# Patient Record
Sex: FEMALE | Race: WHITE | NOT HISPANIC OR LATINO | Employment: FULL TIME | URBAN - METROPOLITAN AREA
[De-identification: names, ages, dates, MRNs, and addresses within clinical notes are randomized per-mention and may not be internally consistent; named-entity substitution may affect disease eponyms.]

---

## 2017-01-17 ENCOUNTER — PATIENT MESSAGE (OUTPATIENT)
Dept: INTERNAL MEDICINE | Facility: CLINIC | Age: 52
End: 2017-01-17

## 2017-01-17 DIAGNOSIS — Z12.31 ENCOUNTER FOR SCREENING MAMMOGRAM FOR BREAST CANCER: Primary | ICD-10-CM

## 2017-01-18 ENCOUNTER — PROCEDURE VISIT (OUTPATIENT)
Dept: DERMATOLOGY | Facility: CLINIC | Age: 52
End: 2017-01-18
Payer: COMMERCIAL

## 2017-01-18 ENCOUNTER — OFFICE VISIT (OUTPATIENT)
Dept: DERMATOLOGY | Facility: CLINIC | Age: 52
End: 2017-01-18
Payer: COMMERCIAL

## 2017-01-18 DIAGNOSIS — D23.10 HIDROCYSTOMA OF EYELID, RIGHT: Primary | ICD-10-CM

## 2017-01-18 DIAGNOSIS — Z41.1 OTHER PLASTIC SURGERY FOR UNACCEPTABLE COSMETIC APPEARANCE: Primary | ICD-10-CM

## 2017-01-18 PROCEDURE — 99213 OFFICE O/P EST LOW 20 MIN: CPT | Mod: S$GLB,,, | Performed by: DERMATOLOGY

## 2017-01-18 PROCEDURE — 11950 SUBQ NJX FILLING MATRL 1CC/<: CPT | Mod: CSM,S$GLB,, | Performed by: DERMATOLOGY

## 2017-01-18 PROCEDURE — 99999 PR PBB SHADOW E&M-EST. PATIENT-LVL II: CPT | Mod: PBBFAC,,, | Performed by: DERMATOLOGY

## 2017-01-18 PROCEDURE — 99499 UNLISTED E&M SERVICE: CPT | Mod: S$GLB,,, | Performed by: DERMATOLOGY

## 2017-01-18 NOTE — PROGRESS NOTES
Subjective:       Patient ID:  Nora Mccoy is a 51 y.o. female who presents for   Chief Complaint   Patient presents with    Cyst     r bottom eyelid     HPI Comments: Pt presents for cyst right lower eyelid that she would like treated. Feels she has had similar lesions in the past .  Itches intermittently.  prseent for   X  Months.  Getting larger.       Cyst         Review of Systems   Skin: Negative for tendency to form keloidal scars.   Hematologic/Lymphatic: Does not bruise/bleed easily.        Objective:    Physical Exam   Constitutional: She appears well-developed and well-nourished. No distress.   Neurological: She is alert and oriented to person, place, and time. She is not disoriented.   Psychiatric: She has a normal mood and affect.   Skin:   Areas Examined (abnormalities noted in diagram):   Head / Face Inspection Performed              Diagram Legend     Erythematous scaling macule/papule c/w actinic keratosis       Vascular papule c/w angioma      Pigmented verrucoid papule/plaque c/w seborrheic keratosis      Yellow umbilicated papule c/w sebaceous hyperplasia      Irregularly shaped tan macule c/w lentigo     1-2 mm smooth white papules consistent with Milia      Movable subcutaneous cyst with punctum c/w epidermal inclusion cyst      Subcutaneous movable cyst c/w pilar cyst      Firm pink to brown papule c/w dermatofibroma      Pedunculated fleshy papule(s) c/w skin tag(s)      Evenly pigmented macule c/w junctional nevus     Mildly variegated pigmented, slightly irregular-bordered macule c/w mildly atypical nevus      Flesh colored to evenly pigmented papule c/w intradermal nevus       Pink pearly papule/plaque c/w basal cell carcinoma      Erythematous hyperkeratotic cursted plaque c/w SCC      Surgical scar with no sign of skin cancer recurrence      Open and closed comedones      Inflammatory papules and pustules      Verrucoid papule consistent consistent with wart      Erythematous eczematous patches and plaques     Dystrophic onycholytic nail with subungual debris c/w onychomycosis     Umbilicated papule    Erythematous-base heme-crusted tan verrucoid plaque consistent with inflamed seborrheic keratosis     Erythematous Silvery Scaling Plaque c/w Psoriasis     See annotation      Assessment / Plan:        Hidrocystoma of eyelid, right  After prep with alcohol, area incised with 11 blade and drained  Discussed risk of bruising and recurrence         Return if symptoms worsen or fail to improve.

## 2017-01-18 NOTE — PROGRESS NOTES
Pt here for repeat botox treatment. Has had written consent signed in the past.    Pt denies any new medical problems or medications. Pt denies current pregnancy.   Risks reviewed including headache, pain, infection, bleeding, bruising, eyebrow droop, eyelid droop, asymmetry, inability to close eye temporarily. Pt understands and would like to have treatment.  16 units placed in glabella, 8 units placed in forehead, 12 units placed in crows feet.   No complications.    Post procedure handout provided.    Botox lot number [id number]  Expiration date 8/2019

## 2017-01-23 ENCOUNTER — PATIENT MESSAGE (OUTPATIENT)
Dept: INTERNAL MEDICINE | Facility: CLINIC | Age: 52
End: 2017-01-23

## 2017-01-23 DIAGNOSIS — N93.8 DUB (DYSFUNCTIONAL UTERINE BLEEDING): Primary | ICD-10-CM

## 2017-02-08 ENCOUNTER — PATIENT MESSAGE (OUTPATIENT)
Dept: DERMATOLOGY | Facility: CLINIC | Age: 52
End: 2017-02-08

## 2017-02-20 ENCOUNTER — PATIENT MESSAGE (OUTPATIENT)
Dept: DERMATOLOGY | Facility: CLINIC | Age: 52
End: 2017-02-20

## 2017-02-20 DIAGNOSIS — H02.539 LID LAG: Primary | ICD-10-CM

## 2017-02-21 ENCOUNTER — HOSPITAL ENCOUNTER (OUTPATIENT)
Dept: RADIOLOGY | Facility: HOSPITAL | Age: 52
Discharge: HOME | End: 2017-02-21
Attending: INTERNAL MEDICINE
Payer: COMMERCIAL

## 2017-02-21 DIAGNOSIS — Z12.31 ENCOUNTER FOR SCREENING MAMMOGRAM FOR BREAST CANCER: ICD-10-CM

## 2017-02-21 PROCEDURE — 77063 BREAST TOMOSYNTHESIS BI: CPT | Mod: 26,,, | Performed by: RADIOLOGY

## 2017-02-21 PROCEDURE — 77067 SCR MAMMO BI INCL CAD: CPT | Mod: 26,,, | Performed by: RADIOLOGY

## 2017-02-21 PROCEDURE — 77067 SCR MAMMO BI INCL CAD: CPT | Mod: TC

## 2017-02-22 ENCOUNTER — TELEPHONE (OUTPATIENT)
Dept: INTERNAL MEDICINE | Facility: CLINIC | Age: 52
End: 2017-02-22

## 2017-02-27 ENCOUNTER — PATIENT MESSAGE (OUTPATIENT)
Dept: INTERNAL MEDICINE | Facility: CLINIC | Age: 52
End: 2017-02-27

## 2017-03-01 ENCOUNTER — TELEPHONE (OUTPATIENT)
Dept: INTERNAL MEDICINE | Facility: CLINIC | Age: 52
End: 2017-03-01

## 2017-03-01 DIAGNOSIS — R30.0 DYSURIA: Primary | ICD-10-CM

## 2017-03-01 RX ORDER — CIPROFLOXACIN 500 MG/1
500 TABLET ORAL 2 TIMES DAILY
Qty: 20 TABLET | Refills: 0 | Status: SHIPPED | OUTPATIENT
Start: 2017-03-01 | End: 2017-03-11

## 2017-03-01 RX ORDER — LORAZEPAM 0.5 MG/1
TABLET ORAL
Qty: 60 TABLET | Refills: 2 | Status: SHIPPED | OUTPATIENT
Start: 2017-03-01 | End: 2017-08-24 | Stop reason: SDUPTHER

## 2017-03-01 NOTE — TELEPHONE ENCOUNTER
----- Message from Cady Campbell sent at 3/1/2017  1:55 PM CST -----  Patient is asking for medication for possible UTI.  Can patient drop off a urine sample?  Please advise.

## 2017-03-02 ENCOUNTER — PATIENT MESSAGE (OUTPATIENT)
Dept: INTERNAL MEDICINE | Facility: CLINIC | Age: 52
End: 2017-03-02

## 2017-03-02 ENCOUNTER — LAB VISIT (OUTPATIENT)
Dept: LAB | Facility: HOSPITAL | Age: 52
End: 2017-03-02
Attending: INTERNAL MEDICINE
Payer: COMMERCIAL

## 2017-03-02 DIAGNOSIS — R30.0 DYSURIA: ICD-10-CM

## 2017-03-02 PROCEDURE — 87077 CULTURE AEROBIC IDENTIFY: CPT

## 2017-03-02 PROCEDURE — 87088 URINE BACTERIA CULTURE: CPT

## 2017-03-02 PROCEDURE — 87186 SC STD MICRODIL/AGAR DIL: CPT

## 2017-03-02 PROCEDURE — 87086 URINE CULTURE/COLONY COUNT: CPT

## 2017-03-05 LAB — BACTERIA UR CULT: NORMAL

## 2017-03-06 ENCOUNTER — TELEPHONE (OUTPATIENT)
Dept: INTERNAL MEDICINE | Facility: CLINIC | Age: 52
End: 2017-03-06

## 2017-03-06 NOTE — TELEPHONE ENCOUNTER
----- Message from Zamzam Bazan MD sent at 3/5/2017  5:50 PM CST -----  Nora,  Good news, your UTI is sensitive to Cipro  Let me know if your sx persist or exacerbate  diego

## 2017-03-07 ENCOUNTER — PATIENT MESSAGE (OUTPATIENT)
Dept: DERMATOLOGY | Facility: CLINIC | Age: 52
End: 2017-03-07

## 2017-03-15 ENCOUNTER — OFFICE VISIT (OUTPATIENT)
Dept: PLASTIC SURGERY | Facility: CLINIC | Age: 52
End: 2017-03-15
Payer: COMMERCIAL

## 2017-03-15 VITALS
SYSTOLIC BLOOD PRESSURE: 124 MMHG | DIASTOLIC BLOOD PRESSURE: 85 MMHG | HEART RATE: 76 BPM | HEIGHT: 67 IN | WEIGHT: 164.44 LBS | BODY MASS INDEX: 25.81 KG/M2 | TEMPERATURE: 98 F

## 2017-03-15 DIAGNOSIS — L72.0 EPIDERMAL CYST OF FACE: Primary | ICD-10-CM

## 2017-03-15 PROCEDURE — 99203 OFFICE O/P NEW LOW 30 MIN: CPT | Mod: S$GLB,,, | Performed by: PHYSICIAN ASSISTANT

## 2017-03-15 PROCEDURE — 99999 PR PBB SHADOW E&M-EST. PATIENT-LVL III: CPT | Mod: PBBFAC,,, | Performed by: PHYSICIAN ASSISTANT

## 2017-03-15 NOTE — LETTER
March 15, 2017      Kristie Valdez MD  [address]  [facility] [address]           [facility]  [address]  [facility] [address]  Phone: [phone]  Fax: [phone]          Patient: Nora Mccoy   MR Number: [id number]   YOB: 1965   Date of Visit: 3/15/2017       Dear Dr. Kristie Valdez:    Thank you for referring Nora Mccoy to me for evaluation. Attached you will find relevant portions of my assessment and plan of care.    If you have questions, please do not hesitate to call me. I look forward to following Nora Mccoy along with you.    Sincerely,    RANJAN Brand    Enclosure  CC:  No Recipients    If you would like to receive this communication electronically, please contact [email or web address] or [phone] to request more information on [facility] Link access.    For providers and/or their staff who would like to refer a patient to [facility], please contact us through our one-stop-shop provider referral line, [facility] , at [phone].    If you feel you have received this communication in error or would no longer like to receive these types of communications, please e-mail [email or web address]

## 2017-03-15 NOTE — PROGRESS NOTES
Nora Mccoy presents to [facility] on 3/15/2017 for consult regarding small cyst of R lower eyelid. She reports having this unroofed by Dermatology in 01/2017 with immediate recurrence. She reports having this for approximately 8-10 months prior to this. She was referred here for excision.     Vitals:    03/15/17 1426   BP: 124/85   Pulse: 76   Temp: 97.6 °F (36.4 °C)     Current Outpatient Prescriptions on File Prior to Visit   Medication Sig Dispense Refill    acyclovir (ZOVIRAX) 800 MG Tab Take 1 tablet (800 mg total) by mouth 2 (two) times daily. 60 tablet 0    cyclobenzaprine (FLEXERIL) 10 MG tablet Take 1 tablet (10 mg total) by mouth 3 (three) times daily as needed for Muscle spasms. 60 tablet 2    fluoxetine (PROZAC) 40 MG capsule Take 1 capsule (40 mg total) by mouth once daily. 90 capsule 3    ibuprofen (ADVIL,MOTRIN) 200 MG tablet Take 200 mg by mouth every 6 (six) hours as needed for Pain.      lorazepam (ATIVAN) 0.5 MG tablet TAKE 1-2 TABLETS BY MOUTH EVERY 6-8 HOURS 60 tablet 2    meloxicam (MOBIC) 15 MG tablet       epinephrine (EPIPEN 2-JAMES) 0.3 mg/0.3 mL (1:1,000) AtIn Inject 0.3 mLs (0.3 mg total) into the muscle once. 2 each 2    multivitamin (THERAGRAN) tablet Take 1 tablet by mouth once daily.         No current facility-administered medications on file prior to visit.      Patient Active Problem List   Diagnosis    Other plastic surgery for unacceptable cosmetic appearance    Knee pain    Screening    Meningioma     Past Surgical History:   Procedure Laterality Date    BACK SURGERY      micro discectomy L4-L5, 2002    COLONOSCOPY N/A 7/7/2016    Procedure: COLONOSCOPY;  Surgeon: Ronan Dolan MD;  Location: [facility] ([address] [place] [address]);  Service: Endoscopy;  Laterality: N/A;     Physical Exam   Constitutional  She appears well-developed and well-nourished. No distress.     Eyes      General -             Right: Right eye is negative for discharge and clear  conjunctiva. Right eye extraocular movements are normal.             Left: Left eye is negative for discharge and clear conjunctiva. Left eye extraocular movements are normal.       Pulmonary/Chest  Effort normal.     Skin  Skin is warm. No rash noted. No erythema.     Face  Her facial skeleton is symmetrical.       A/P: 52 y.o. F with 2mm cyst of R lower eyelid. Discussed excision under local anesthesia. Risks, benefits and alternatives were discussed. She understands that the risks include but are not limited to bleeding, scarring, infection, pain, numbness, asymmetry, deformity, recurrence and need for further surgery. She would like to proceed with excision. Will submit for insurance auth and office staff to call and coordinate minor procedure accordingly.

## 2017-04-04 ENCOUNTER — TELEPHONE (OUTPATIENT)
Dept: PLASTIC SURGERY | Facility: CLINIC | Age: 52
End: 2017-04-04

## 2017-04-25 ENCOUNTER — PROCEDURE VISIT (OUTPATIENT)
Dept: PLASTIC SURGERY | Facility: CLINIC | Age: 52
End: 2017-04-25
Payer: COMMERCIAL

## 2017-04-25 VITALS — DIASTOLIC BLOOD PRESSURE: 80 MMHG | SYSTOLIC BLOOD PRESSURE: 129 MMHG | TEMPERATURE: 98 F | HEART RATE: 93 BPM

## 2017-04-25 DIAGNOSIS — H02.822 CYST OF RIGHT LOWER EYELID: Primary | ICD-10-CM

## 2017-04-25 PROCEDURE — 88304 TISSUE EXAM BY PATHOLOGIST: CPT | Performed by: PATHOLOGY

## 2017-04-25 PROCEDURE — 11440 EXC FACE-MM B9+MARG 0.5 CM/<: CPT | Mod: S$GLB,,, | Performed by: PHYSICIAN ASSISTANT

## 2017-05-02 ENCOUNTER — OFFICE VISIT (OUTPATIENT)
Dept: PLASTIC SURGERY | Facility: CLINIC | Age: 52
End: 2017-05-02
Payer: COMMERCIAL

## 2017-05-02 VITALS
TEMPERATURE: 98 F | DIASTOLIC BLOOD PRESSURE: 75 MMHG | WEIGHT: 162.69 LBS | BODY MASS INDEX: 25.48 KG/M2 | HEART RATE: 83 BPM | SYSTOLIC BLOOD PRESSURE: 131 MMHG

## 2017-05-02 DIAGNOSIS — Z09 SURGERY FOLLOW-UP EXAMINATION: Primary | ICD-10-CM

## 2017-05-02 PROCEDURE — 99024 POSTOP FOLLOW-UP VISIT: CPT | Mod: S$GLB,,, | Performed by: PHYSICIAN ASSISTANT

## 2017-05-02 PROCEDURE — 99999 PR PBB SHADOW E&M-EST. PATIENT-LVL III: CPT | Mod: PBBFAC,,, | Performed by: PHYSICIAN ASSISTANT

## 2017-05-02 NOTE — PROGRESS NOTES
Nora Mccoy presents to [facility] on 5/2/2017 for a follow up visit status post excision of small skin lesion of R lower eyelid under local anesthesia on 04/25/2017    Vitals:    05/02/17 0909   BP: 131/75   Pulse: 83   Temp: 97.6 °F (36.4 °C)     Current Outpatient Prescriptions on File Prior to Visit   Medication Sig Dispense Refill    acyclovir (ZOVIRAX) 800 MG Tab Take 1 tablet (800 mg total) by mouth 2 (two) times daily. 60 tablet 0    cyclobenzaprine (FLEXERIL) 10 MG tablet Take 1 tablet (10 mg total) by mouth 3 (three) times daily as needed for Muscle spasms. 60 tablet 2    epinephrine (EPIPEN 2-JAMES) 0.3 mg/0.3 mL (1:1,000) AtIn Inject 0.3 mLs (0.3 mg total) into the muscle once. 2 each 2    fluoxetine (PROZAC) 40 MG capsule Take 1 capsule (40 mg total) by mouth once daily. 90 capsule 3    ibuprofen (ADVIL,MOTRIN) 200 MG tablet Take 200 mg by mouth every 6 (six) hours as needed for Pain.      lorazepam (ATIVAN) 0.5 MG tablet TAKE 1-2 TABLETS BY MOUTH EVERY 6-8 HOURS 60 tablet 2    meloxicam (MOBIC) 15 MG tablet       multivitamin (THERAGRAN) tablet Take 1 tablet by mouth once daily.         No current facility-administered medications on file prior to visit.      Patient Active Problem List   Diagnosis    Other plastic surgery for unacceptable cosmetic appearance    Knee pain    Screening    Meningioma     Past Surgical History:   Procedure Laterality Date    BACK SURGERY      micro discectomy L4-L5, 2002    COLONOSCOPY N/A 7/7/2016    Procedure: COLONOSCOPY;  Surgeon: Ronan Dolan MD;  Location: [address]);  Service: Endoscopy;  Laterality: N/A;     Doing well today. Expected periorbital bruising and swelling, patient reports resolving.     All sutures removed. Incision clean/dry/intact.     Pathology pending. Will call with results.     All questions were answered. Will return to clinic as needed. The patient was advised to call the clinic with any  questions or concerns in the future.

## 2017-05-05 ENCOUNTER — PATIENT MESSAGE (OUTPATIENT)
Dept: PLASTIC SURGERY | Facility: CLINIC | Age: 52
End: 2017-05-05

## 2017-05-09 ENCOUNTER — OFFICE VISIT (OUTPATIENT)
Dept: PLASTIC SURGERY | Facility: CLINIC | Age: 52
End: 2017-05-09
Payer: COMMERCIAL

## 2017-05-09 VITALS
BODY MASS INDEX: 25.88 KG/M2 | HEART RATE: 82 BPM | WEIGHT: 165.25 LBS | DIASTOLIC BLOOD PRESSURE: 72 MMHG | SYSTOLIC BLOOD PRESSURE: 114 MMHG | TEMPERATURE: 98 F

## 2017-05-09 DIAGNOSIS — Z09 SURGERY FOLLOW-UP EXAMINATION: Primary | ICD-10-CM

## 2017-05-09 PROCEDURE — 99212 OFFICE O/P EST SF 10 MIN: CPT | Mod: S$GLB,,, | Performed by: PHYSICIAN ASSISTANT

## 2017-05-09 PROCEDURE — 99999 PR PBB SHADOW E&M-EST. PATIENT-LVL III: CPT | Mod: PBBFAC,,, | Performed by: PHYSICIAN ASSISTANT

## 2017-05-09 NOTE — MR AVS SNAPSHOT
[facility]  [address]  [place] [address]  Phone: [phone]  Fax: [phone]                  Nora Mccoy   2017 8:45 AM   Office Visit    Description:  Female : 1965   Provider:  RANJAN Brand   Department:  [facility]           Reason for Visit     Follow-up                To Do List           Goals (5 Years of Data)     None      [facility]     [facility] Care [facility] -  Assistance  Unless otherwise directed by your provider, please contact [facility], our nurse care line that is available for  assistance.     Registered nurses in the [facility] Center provide: appointment scheduling, clinical advisement, health education, and other advisory services.  Call: [phone] (toll free)               Medications           Message regarding Medications     Verify the changes and/or additions to your medication regime listed below are the same as discussed with your clinician today.  If any of these changes or additions are incorrect, please notify your healthcare provider.             Verify that the below list of medications is an accurate representation of the medications you are currently taking.  If none reported, the list may be blank. If incorrect, please contact your healthcare provider. Carry this list with you in case of emergency.           Current Medications     acyclovir (ZOVIRAX) 800 MG Tab Take 1 tablet (800 mg total) by mouth 2 (two) times daily.    cyclobenzaprine (FLEXERIL) 10 MG tablet Take 1 tablet (10 mg total) by mouth 3 (three) times daily as needed for Muscle spasms.    epinephrine (EPIPEN 2-JAMES) 0.3 mg/0.3 mL (1:1,000) AtIn Inject 0.3 mLs (0.3 mg total) into the muscle once.    fluoxetine (PROZAC) 40 MG capsule Take 1 capsule (40 mg total) by mouth once daily.    ibuprofen (ADVIL,MOTRIN) 200 MG tablet Take 200 mg by mouth every 6 (six) hours as needed for Pain.     lorazepam (ATIVAN) 0.5 MG tablet TAKE 1-2 TABLETS BY MOUTH EVERY 6-8 HOURS    meloxicam (MOBIC) 15 MG tablet     multivitamin (THERAGRAN) tablet Take 1 tablet by mouth once daily.             Clinical Reference Information           Your Vitals Were     BP Pulse Temp Weight BMI    114/72 (BP Location: Right arm, Patient Position: Sitting, BP Method: Automatic) 82 98 °F (36.7 °C) (Oral) 75 kg (165 lb 3.8 oz) 25.88 kg/m2      Blood Pressure          Most Recent Value    BP  114/72      Allergies as of 5/9/2017     Penicillin G    Penicillins      Immunizations Administered on Date of Encounter - 5/9/2017     None      Language Assistance Services     ATTENTION: Language assistance services are available, free of charge. Please call [phone].      ATENCIÓN: Si tomas richey, tiene a vasquez disposición servicios gratuitos de asistencia lingüística. Llame al [phone].     CHÚ Ý: N?u b?n nói Ti?ng Vi?t, có các d?ch v? h? tr? ngôn ng? mi?n phí dành cho b?n. G?i s? [phone].         [facility] complies with applicable Federal civil rights laws and does not discriminate on the basis of race, color, national origin, age, disability, or sex.

## 2017-05-09 NOTE — PROGRESS NOTES
Nora Mccoy presents to [facility] Clinic on 5/9/2017 for a follow up visit status post excision of skin lesion of R lower eyelid under local anesthesia on 4/25/2017    Vitals:    05/09/17 0905   BP: 114/72   Pulse: 82   Temp: 98 °F (36.7 °C)     Current Outpatient Prescriptions on File Prior to Visit   Medication Sig Dispense Refill    acyclovir (ZOVIRAX) 800 MG Tab Take 1 tablet (800 mg total) by mouth 2 (two) times daily. 60 tablet 0    cyclobenzaprine (FLEXERIL) 10 MG tablet Take 1 tablet (10 mg total) by mouth 3 (three) times daily as needed for Muscle spasms. 60 tablet 2    epinephrine (EPIPEN 2-JAMES) 0.3 mg/0.3 mL (1:1,000) AtIn Inject 0.3 mLs (0.3 mg total) into the muscle once. 2 each 2    fluoxetine (PROZAC) 40 MG capsule Take 1 capsule (40 mg total) by mouth once daily. 90 capsule 3    ibuprofen (ADVIL,MOTRIN) 200 MG tablet Take 200 mg by mouth every 6 (six) hours as needed for Pain.      lorazepam (ATIVAN) 0.5 MG tablet TAKE 1-2 TABLETS BY MOUTH EVERY 6-8 HOURS 60 tablet 2    meloxicam (MOBIC) 15 MG tablet       multivitamin (THERAGRAN) tablet Take 1 tablet by mouth once daily.         No current facility-administered medications on file prior to visit.      Patient Active Problem List   Diagnosis    Other plastic surgery for unacceptable cosmetic appearance    Knee pain    Screening    Meningioma     Past Surgical History:   Procedure Laterality Date    BACK SURGERY      micro discectomy L4-L5, 2002    COLONOSCOPY N/A 7/7/2016    Procedure: COLONOSCOPY;  Surgeon: Ronan Dolan MD;  Location: [address]);  Service: Endoscopy;  Laterality: N/A;     WD WN NAD  AAOx3  Incision R lower eyelid well healed, bruising improved    A/P  - doing well, no complaints  - recommended scar massage  - pathology pending, will call with results  - all questions were answered  - follow up PRN if pathology consistent with benign findings    All questions were answered. The patient  was advised to call the clinic with any questions or concerns in the future

## 2017-05-11 ENCOUNTER — PATIENT MESSAGE (OUTPATIENT)
Dept: INTERNAL MEDICINE | Facility: CLINIC | Age: 52
End: 2017-05-11

## 2017-05-11 RX ORDER — EPINEPHRINE 0.3 MG/.3ML
1 INJECTION SUBCUTANEOUS ONCE
Qty: 2 EACH | Refills: 2 | Status: SHIPPED | OUTPATIENT
Start: 2017-05-11 | End: 2020-08-31 | Stop reason: SDUPTHER

## 2017-05-11 RX ORDER — ACYCLOVIR 800 MG/1
800 TABLET ORAL 2 TIMES DAILY
Qty: 60 TABLET | Refills: 5 | Status: SHIPPED | OUTPATIENT
Start: 2017-05-11 | End: 2017-05-12 | Stop reason: SDUPTHER

## 2017-05-12 ENCOUNTER — OFFICE VISIT (OUTPATIENT)
Dept: INTERNAL MEDICINE | Facility: CLINIC | Age: 52
End: 2017-05-12
Payer: COMMERCIAL

## 2017-05-12 VITALS
RESPIRATION RATE: 16 BRPM | SYSTOLIC BLOOD PRESSURE: 102 MMHG | TEMPERATURE: 98 F | BODY MASS INDEX: 25.51 KG/M2 | DIASTOLIC BLOOD PRESSURE: 81 MMHG | WEIGHT: 162.5 LBS | HEIGHT: 67 IN | HEART RATE: 71 BPM

## 2017-05-12 DIAGNOSIS — R20.2 FACIAL PARESTHESIA: ICD-10-CM

## 2017-05-12 DIAGNOSIS — J34.89 SINUS PRESSURE: ICD-10-CM

## 2017-05-12 DIAGNOSIS — K13.70 ORAL MUCOSAL LESION: Primary | ICD-10-CM

## 2017-05-12 PROCEDURE — 99213 OFFICE O/P EST LOW 20 MIN: CPT | Mod: S$GLB,,, | Performed by: INTERNAL MEDICINE

## 2017-05-12 PROCEDURE — 99999 PR PBB SHADOW E&M-EST. PATIENT-LVL III: CPT | Mod: PBBFAC,,, | Performed by: INTERNAL MEDICINE

## 2017-05-12 RX ORDER — ACYCLOVIR 800 MG/1
TABLET ORAL
Qty: 60 TABLET | Refills: 5
Start: 2017-05-12 | End: 2018-03-01 | Stop reason: SDUPTHER

## 2017-05-12 NOTE — PROGRESS NOTES
CC:   Chief Complaint   Patient presents with    Facial Swelling     burning feeling on face       52 y.o. female presents for tingling that started left upper gums  On Monday  Pt noted small lesion   Then developed tingling around left eye , no lesion noted  Tx: acyclovir 800mg bid ( taken in past for h.simplex)  No blisters or other skin rash noted    MEDCARD:Reviewed  ROS:  No fever, chills , or night sweats  No visual disturbance or itchy/watery eyes  ++ PND, slight pressure over the left maxillary area  No ear pain/pressure  No dysphagia or early satiety  No chest pain or palpitations  No cough or wheezing  No joint swelling or redness  No HA or focal deficits  Remainder of review negative except as previously noted    PMHX: Reviewed  PSHX: Reviewed  SHX: Reviewed  FHX: Reviewed    PE:  VSS  GEN: WDWN, A&O, NAD, conversant and co-operative  EYES: Conj/lids unremarkable, sclera anicteric pupils reactive, EOMI  ENT: Hearing intact; canals w/o cerumen,  TM's unremarkable  Nasal mucosa/turbinates injected w/o exudate or edema  O/P w/o exudate or edema, mucus membranes moist;    Upper left gums 2-3 mm lesion, on erythematous base, no other lesions noted  Sinus nontender  NECK: Supple w/o lymphadenopathy or thyromegaly  RESP: Efforts unlabored, lungs CTAP  CV: Heart RRR  MSK: Gait normal. No CCE noted  NEURO: CRAVEN. No tremor noted  SKIN: Warm and dry, no scalp lesions noted    IMPRESSION:  Oral mucosal lesion  Facial Paresthesias,  Rhinitis, chronic  Sinus pressure, left side    PLAN:  Acyclovir 800mg , increase to 5 x daily  Sinus ray- pt to consider  Call w/ update

## 2017-05-16 ENCOUNTER — TELEPHONE (OUTPATIENT)
Dept: PLASTIC SURGERY | Facility: CLINIC | Age: 52
End: 2017-05-16

## 2017-06-04 RX ORDER — FLUOXETINE HYDROCHLORIDE 40 MG/1
40 CAPSULE ORAL DAILY
Qty: 90 CAPSULE | Refills: 1 | Status: SHIPPED | OUTPATIENT
Start: 2017-06-04 | End: 2017-08-24 | Stop reason: SDUPTHER

## 2017-06-29 ENCOUNTER — OFFICE VISIT (OUTPATIENT)
Dept: PSYCHIATRY | Facility: CLINIC | Age: 52
End: 2017-06-29
Payer: COMMERCIAL

## 2017-06-29 ENCOUNTER — PATIENT MESSAGE (OUTPATIENT)
Dept: INTERNAL MEDICINE | Facility: CLINIC | Age: 52
End: 2017-06-29

## 2017-06-29 DIAGNOSIS — R69 DIAGNOSIS DEFERRED: Primary | ICD-10-CM

## 2017-06-29 PROCEDURE — 90791 PSYCH DIAGNOSTIC EVALUATION: CPT | Mod: S$GLB,,, | Performed by: SOCIAL WORKER

## 2017-06-30 ENCOUNTER — PATIENT MESSAGE (OUTPATIENT)
Dept: INTERNAL MEDICINE | Facility: CLINIC | Age: 52
End: 2017-06-30

## 2017-06-30 NOTE — TELEPHONE ENCOUNTER
Called pharmacy a prescription is not  needed in [place].  The Pharmacist treats it like a flu shot, have to sign paperwork for the medication

## 2017-07-07 ENCOUNTER — OFFICE VISIT (OUTPATIENT)
Dept: INTERNAL MEDICINE | Facility: CLINIC | Age: 52
End: 2017-07-07
Payer: COMMERCIAL

## 2017-07-07 VITALS
WEIGHT: 162.25 LBS | OXYGEN SATURATION: 98 % | TEMPERATURE: 98 F | DIASTOLIC BLOOD PRESSURE: 74 MMHG | RESPIRATION RATE: 16 BRPM | HEART RATE: 66 BPM | BODY MASS INDEX: 25.47 KG/M2 | SYSTOLIC BLOOD PRESSURE: 122 MMHG | HEIGHT: 67 IN

## 2017-07-07 DIAGNOSIS — H60.92 OTITIS EXTERNA OF LEFT EAR, UNSPECIFIED CHRONICITY, UNSPECIFIED TYPE: ICD-10-CM

## 2017-07-07 DIAGNOSIS — M26.609 TMJ (TEMPOROMANDIBULAR JOINT SYNDROME): Primary | ICD-10-CM

## 2017-07-07 PROCEDURE — 99213 OFFICE O/P EST LOW 20 MIN: CPT | Mod: S$GLB,,, | Performed by: INTERNAL MEDICINE

## 2017-07-07 PROCEDURE — 99999 PR PBB SHADOW E&M-EST. PATIENT-LVL III: CPT | Mod: PBBFAC,,, | Performed by: INTERNAL MEDICINE

## 2017-07-07 RX ORDER — NEOMYCIN SULFATE, POLYMYXIN B SULFATE AND HYDROCORTISONE 10; 3.5; 1 MG/ML; MG/ML; [USP'U]/ML
3 SUSPENSION/ DROPS AURICULAR (OTIC) 4 TIMES DAILY
Qty: 10 ML | Refills: 0 | Status: SHIPPED | OUTPATIENT
Start: 2017-07-07 | End: 2018-03-20 | Stop reason: ALTCHOICE

## 2017-07-07 NOTE — PROGRESS NOTES
CC: Ear pain    51 yo female presents today for left ear pain  Went to  , + ETD, Rx Zyrtec  SX started: after in water on vacation  Pain quality: ear feels bruised in canal and tender over the outside    Tx: steroid inj @   Assoc: +stress, ++ gum chewing      MEDCARD: Reviewed  ROS:   No fever, chills, or night sweats  No sinus pain or pressure  No sore throat or dysphagia, + PND  No nausea or emesis  No GERD or abdominal pain  No cough or wheezing  No HA or focal deficits    PMHX: Reviewed  PSHX: Reviewed  SHX: Reviewed  FHX: Reviewed    PE:  VSS  GEN: WDWN, A&O, NAD, conversant and co-operative  EYES: Conj/lids unremarkable, sclera anicteric; PERRL, EOMI  ENT: Canals w/o cerumen, canal injected , TM's unremarkable  Nasal mucosa/ turbinates  w/o exudate or erythema  O/P injected w/o exudate or erythema; mucus membranes moist; no stridor  Sinus non tender; ++ tender over the left TMJ  NECK: Supple, w/o lymphadenopathy or thyromegaly  RESP: Efforts unlabored,   MSK: Gait normal, No CCE  Neuro: CRAVEN. No tremor or facial asymmetry    IMPRESSION:  TMJ, left  Otitis Externa    PLAN:  Rx Cortisporin  Ibuprofen 800 mg tid x 2 days, then bid w/ food and 8 oz liquid  Ice packs  Caution: chewing gum, hard candy, pulling on food, or lge sandwiches or food that requires increased articulation of the jaw  Hydration  Call prn

## 2017-07-10 ENCOUNTER — OFFICE VISIT (OUTPATIENT)
Dept: OPTOMETRY | Facility: CLINIC | Age: 52
End: 2017-07-10
Payer: COMMERCIAL

## 2017-07-10 DIAGNOSIS — H52.4 MYOPIA WITH PRESBYOPIA, BILATERAL: Primary | ICD-10-CM

## 2017-07-10 DIAGNOSIS — Z46.0 FITTING AND ADJUSTMENT OF SPECTACLES AND CONTACT LENSES: Primary | ICD-10-CM

## 2017-07-10 DIAGNOSIS — H52.13 MYOPIA WITH PRESBYOPIA, BILATERAL: Primary | ICD-10-CM

## 2017-07-10 PROCEDURE — 92015 DETERMINE REFRACTIVE STATE: CPT | Mod: S$GLB,,, | Performed by: OPTOMETRIST

## 2017-07-10 PROCEDURE — 92014 COMPRE OPH EXAM EST PT 1/>: CPT | Mod: S$GLB,,, | Performed by: OPTOMETRIST

## 2017-07-10 PROCEDURE — 99999 PR PBB SHADOW E&M-EST. PATIENT-LVL II: CPT | Mod: PBBFAC,,, | Performed by: OPTOMETRIST

## 2017-07-10 PROCEDURE — 92310 CONTACT LENS FITTING OU: CPT | Mod: S$GLB,,, | Performed by: OPTOMETRIST

## 2017-07-10 NOTE — PROGRESS NOTES
MANOLO COOPER 10/2015. Want to try contacts, hasn't worn before. Glasses about 2   yr. Old reading not as clear.    Last edited by Shi Joe on 7/10/2017  8:44 AM. (History)            Assessment /Plan     For exam results, see Encounter Report.    Myopia with presbyopia, bilateral      1. Spec Rx given and  Contact lens trials dispensed to pt. Daily wear only advised, with education to risks of extended wear.  Discussed lens care, compliance and solutions.  RTC 2 weeks contact lens follow up. . Different lens options discussed with patient. RTC 2 weeks clfu and dfe.

## 2017-07-24 ENCOUNTER — TELEPHONE (OUTPATIENT)
Dept: OPTOMETRY | Facility: CLINIC | Age: 52
End: 2017-07-24

## 2017-07-24 NOTE — TELEPHONE ENCOUNTER
----- Message from Casie Olmedo sent at 7/24/2017  3:02 PM CDT -----  Contact: Nora  Ms. Mccoy wants to  a copy of her eye glass prescription tomorrow afternoon.

## 2017-08-15 ENCOUNTER — OFFICE VISIT (OUTPATIENT)
Dept: INTERNAL MEDICINE | Facility: CLINIC | Age: 52
End: 2017-08-15
Payer: COMMERCIAL

## 2017-08-15 ENCOUNTER — HOSPITAL ENCOUNTER (OUTPATIENT)
Dept: RADIOLOGY | Facility: HOSPITAL | Age: 52
Discharge: HOME | End: 2017-08-15
Attending: INTERNAL MEDICINE
Payer: COMMERCIAL

## 2017-08-15 VITALS
DIASTOLIC BLOOD PRESSURE: 75 MMHG | HEART RATE: 73 BPM | WEIGHT: 158.31 LBS | TEMPERATURE: 98 F | HEIGHT: 67 IN | RESPIRATION RATE: 16 BRPM | BODY MASS INDEX: 24.85 KG/M2 | SYSTOLIC BLOOD PRESSURE: 110 MMHG

## 2017-08-15 DIAGNOSIS — G89.29 CHRONIC LEFT SHOULDER PAIN: Primary | ICD-10-CM

## 2017-08-15 DIAGNOSIS — G89.29 CHRONIC LEFT SHOULDER PAIN: ICD-10-CM

## 2017-08-15 DIAGNOSIS — R22.30 MASS OF SHOULDER REGION: ICD-10-CM

## 2017-08-15 DIAGNOSIS — M25.512 CHRONIC LEFT SHOULDER PAIN: Primary | ICD-10-CM

## 2017-08-15 DIAGNOSIS — M25.512 CHRONIC LEFT SHOULDER PAIN: ICD-10-CM

## 2017-08-15 PROCEDURE — 73030 X-RAY EXAM OF SHOULDER: CPT | Mod: 26,LT,, | Performed by: RADIOLOGY

## 2017-08-15 PROCEDURE — 73030 X-RAY EXAM OF SHOULDER: CPT | Mod: TC,PO,LT

## 2017-08-15 PROCEDURE — 99999 PR PBB SHADOW E&M-EST. PATIENT-LVL III: CPT | Mod: PBBFAC,,, | Performed by: INTERNAL MEDICINE

## 2017-08-15 PROCEDURE — 73000 X-RAY EXAM OF COLLAR BONE: CPT | Mod: TC,PO,LT

## 2017-08-15 PROCEDURE — 73000 X-RAY EXAM OF COLLAR BONE: CPT | Mod: 26,LT,, | Performed by: RADIOLOGY

## 2017-08-15 PROCEDURE — 99214 OFFICE O/P EST MOD 30 MIN: CPT | Mod: S$GLB,,, | Performed by: INTERNAL MEDICINE

## 2017-08-15 PROCEDURE — 3008F BODY MASS INDEX DOCD: CPT | Mod: S$GLB,,, | Performed by: INTERNAL MEDICINE

## 2017-08-15 RX ORDER — MELOXICAM 15 MG/1
15 TABLET ORAL DAILY
Qty: 30 TABLET | Refills: 1 | Status: SHIPPED | OUTPATIENT
Start: 2017-08-15 | End: 2017-09-14

## 2017-08-15 NOTE — PROGRESS NOTES
Subjective:       Patient ID: Nora Mccoy is a 52 y.o. female.    Chief Complaint: Shoulder Pain (lt)    HPI   Pt here for evaluation of left sided shoulder pain around an area of possible bone which has been getting worse over the last 4-5 months. It is described as sharp in nature which can radiate to her neck and down the back. Minimal relief with Advil.   Review of Systems   Constitutional: Negative for activity change, appetite change, chills, diaphoresis, fatigue, fever and unexpected weight change.   HENT: Negative for postnasal drip, rhinorrhea, sinus pressure, sneezing, sore throat, trouble swallowing and voice change.    Respiratory: Negative for cough, shortness of breath and wheezing.    Cardiovascular: Negative for chest pain, palpitations and leg swelling.   Gastrointestinal: Negative for abdominal pain, blood in stool, constipation, diarrhea, nausea and vomiting.   Genitourinary: Negative for dysuria.   Musculoskeletal: Negative for arthralgias and myalgias.   Skin: Negative for rash and wound.   Allergic/Immunologic: Negative for environmental allergies and food allergies.   Hematological: Negative for adenopathy. Does not bruise/bleed easily.       Objective:      Physical Exam   Constitutional: She is oriented to person, place, and time. She appears well-developed and well-nourished. No distress.   HENT:   Head: Normocephalic and atraumatic.   Mouth/Throat: No oropharyngeal exudate.   Eyes: Conjunctivae and EOM are normal. Pupils are equal, round, and reactive to light. Right eye exhibits no discharge. Left eye exhibits no discharge. No scleral icterus.   Neck: Neck supple. No JVD present.   Cardiovascular: Normal rate, regular rhythm, normal heart sounds and intact distal pulses.    Pulmonary/Chest: Effort normal and breath sounds normal. No respiratory distress. She has no wheezes. She has no rales.   Musculoskeletal: She exhibits no edema.        Arms:  Tenderness mass    Lymphadenopathy:     She has no cervical adenopathy.   Neurological: She is alert and oriented to person, place, and time.   Skin: Skin is warm and dry. No rash noted. She is not diaphoretic. No pallor.       Assessment:       1. Chronic left shoulder pain    2. Mass of shoulder region        Plan:    1. X-ray shoulder       Rx Mobic 15 mg daily       Referral to Ortho

## 2017-08-16 ENCOUNTER — OFFICE VISIT (OUTPATIENT)
Dept: PSYCHIATRY | Facility: CLINIC | Age: 52
End: 2017-08-16
Payer: COMMERCIAL

## 2017-08-16 DIAGNOSIS — R69 DIAGNOSIS DEFERRED: Primary | ICD-10-CM

## 2017-08-16 PROCEDURE — 90834 PSYTX W PT 45 MINUTES: CPT | Mod: S$GLB,,, | Performed by: SOCIAL WORKER

## 2017-08-24 ENCOUNTER — OFFICE VISIT (OUTPATIENT)
Dept: PSYCHIATRY | Facility: CLINIC | Age: 52
End: 2017-08-24
Payer: COMMERCIAL

## 2017-08-24 DIAGNOSIS — F43.23 ADJUSTMENT DISORDER WITH MIXED ANXIETY AND DEPRESSED MOOD: Primary | ICD-10-CM

## 2017-08-24 PROCEDURE — 90792 PSYCH DIAG EVAL W/MED SRVCS: CPT | Mod: S$GLB,,, | Performed by: PSYCHIATRY & NEUROLOGY

## 2017-08-24 RX ORDER — LORAZEPAM 0.5 MG/1
0.5 TABLET ORAL 2 TIMES DAILY
Qty: 60 TABLET | Refills: 2 | Status: SHIPPED | OUTPATIENT
Start: 2017-08-24 | End: 2017-09-21 | Stop reason: SDUPTHER

## 2017-08-24 RX ORDER — TRAZODONE HYDROCHLORIDE 50 MG/1
TABLET ORAL
Qty: 60 TABLET | Refills: 1 | Status: SHIPPED | OUTPATIENT
Start: 2017-08-24 | End: 2017-12-01 | Stop reason: SDUPTHER

## 2017-08-24 RX ORDER — FLUOXETINE HYDROCHLORIDE 40 MG/1
40 CAPSULE ORAL DAILY
Qty: 90 CAPSULE | Refills: 1 | Status: SHIPPED | OUTPATIENT
Start: 2017-08-24 | End: 2018-09-03 | Stop reason: SDUPTHER

## 2017-08-24 NOTE — PROGRESS NOTES
Outpatient Psychiatry Initial Visit (MD/NP)    8/24/2017    Nora Mccoy, a 52 y.o. female, presenting for initial evaluation visit. Met with patient.    Reason for Encounter: Referral from [facility]. Patient complains of No chief complaint on file.  .    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  The saw the Mr. Salamanca in [facility] for a very stressful family matter.  She has been anxious and depressed about this for several years.  She has crying spells, lack of interest, low energy, and worry.  She has been on Prozac 40 mg daily and Ativan 0.5 mg at bedtime for several years.  Her general health is good.  Of note, a meningioma was discovered in the right parietal region recently.  There is no suicidal ideation or signs of psychosis.      Review Of Systems:     Pertinent items are noted in HPI.    Current Evaluation:     Nutritional Screening: Considering the patient's height and weight, medications, medical history and preferences, should a referral be made to the dietitian? no    Constitutional  Vitals:  Most recent vital signs, dated less than 90 days prior to this appointment, were reviewed.    There were no vitals filed for this visit.     General:  unremarkable, age appropriate     Musculoskeletal  Muscle Strength/Tone:  no tremor   Gait & Station:  non-ataxic     Psychiatric  Speech:  no latency; no press   Mood & Affect:  anxious, depressed  congruent and appropriate   Thought Process:  normal and logical   Associations:  intact   Thought Content:  normal, no suicidality, no homicidality, delusions, or paranoia   Insight:  intact   Judgement: behavior is adequate to circumstances   Orientation:  grossly intact   Memory: intact for content of interview   Language: grossly intact   Attention Span & Concentration:  able to focus   Fund of Knowledge:  intact and appropriate to age and level of education        Relevant Elements of Neurological Exam: normal gait    Functioning in Relationships:  Spouse/partner:   Peers: has friends  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   Latest known visit with results is:   Lab Visit on 03/02/2017   Component Date Value Ref Range Status    Urine Culture, Routine 03/05/2017    Final                    Value:ESCHERICHIA COLI  >100,000 cfu/ml           Medications  Outpatient Encounter Prescriptions as of 8/24/2017   Medication Sig Dispense Refill    acyclovir (ZOVIRAX) 800 MG Tab Take one 5 x daily 60 tablet 5    cyclobenzaprine (FLEXERIL) 10 MG tablet Take 1 tablet (10 mg total) by mouth 3 (three) times daily as needed for Muscle spasms. 60 tablet 2    epinephrine (EPIPEN 2-JAMES) 0.3 mg/0.3 mL AtIn Inject 0.3 mLs (0.3 mg total) into the muscle once. 2 each 2    fluoxetine (PROZAC) 40 MG capsule Take 1 capsule (40 mg total) by mouth once daily. 90 capsule 1    lorazepam (ATIVAN) 0.5 MG tablet Take 1 tablet (0.5 mg total) by mouth 2 (two) times daily. 60 tablet 2    meloxicam (MOBIC) 15 MG tablet Take 1 tablet (15 mg total) by mouth once daily. 30 tablet 1    multivitamin (THERAGRAN) tablet Take 1 tablet by mouth once daily.        neomycin-polymyxin-hydrocortisone (CORTISPORIN) 3.5-10,000-1 mg/mL-unit/mL-% otic suspension Place 3 drops into the left ear 4 (four) times daily. 10 mL 0    trazodone (DESYREL) 50 MG tablet 1 or 2 at bedtime 60 tablet 1    [DISCONTINUED] fluoxetine (PROZAC) 40 MG capsule TAKE 1 CAPSULE (40 MG TOTAL) BY MOUTH ONCE DAILY. 90 capsule 1    [DISCONTINUED] lorazepam (ATIVAN) 0.5 MG tablet TAKE 1-2 TABLETS BY MOUTH EVERY 6-8 HOURS 60 tablet 2     No facility-administered encounter medications on file as of 8/24/2017.            Assessment - Diagnosis - Goals:     Impression: Adjustment Disorder      ICD-10-CM ICD-9-CM   1. Adjustment disorder with mixed anxiety and depressed mood  F43.23 309.28       Strengths and Liabilities: Strength: Patient accepts guidance/feedback, Strength: Patient is expressive/articulate., Strength: Patient is intelligent., Strength: Patient is motivated for change., Strength: Patient is physically healthy., Strength: Patient has positive support network., Strength: Patient has reasonable judgment., Strength: Patient is stable.    Treatment Goals:  Specify outcomes written in observable, behavioral terms:   Help with resolution for family problem    Treatment Plan/Recommendations:   · Medication Management: Continue current medications. The risks and benefits of medication were discussed with the patient. except add trazodone 50 mg 1 or 2 at bedtime and change Ativan 0.5 mg to PRN anxiety   · Continue psychotherapy      Return to Clinic: 1 month    Counseling time: 20  Total time: 60  Consulting clinician was informed of the encounter and consult note.

## 2017-09-01 ENCOUNTER — PATIENT MESSAGE (OUTPATIENT)
Dept: PSYCHIATRY | Facility: CLINIC | Age: 52
End: 2017-09-01

## 2017-09-21 ENCOUNTER — OFFICE VISIT (OUTPATIENT)
Dept: PSYCHIATRY | Facility: CLINIC | Age: 52
End: 2017-09-21
Payer: COMMERCIAL

## 2017-09-21 ENCOUNTER — PATIENT MESSAGE (OUTPATIENT)
Dept: PSYCHIATRY | Facility: CLINIC | Age: 52
End: 2017-09-21

## 2017-09-21 DIAGNOSIS — F43.23 ADJUSTMENT DISORDER WITH MIXED ANXIETY AND DEPRESSED MOOD: Primary | ICD-10-CM

## 2017-09-21 PROCEDURE — 99214 OFFICE O/P EST MOD 30 MIN: CPT | Mod: S$GLB,,, | Performed by: PSYCHIATRY & NEUROLOGY

## 2017-09-21 PROCEDURE — 3008F BODY MASS INDEX DOCD: CPT | Mod: S$GLB,,, | Performed by: PSYCHIATRY & NEUROLOGY

## 2017-09-21 PROCEDURE — 99999 PR PBB SHADOW E&M-EST. PATIENT-LVL I: CPT | Mod: PBBFAC,,, | Performed by: PSYCHIATRY & NEUROLOGY

## 2017-09-21 RX ORDER — LORAZEPAM 0.5 MG/1
0.5 TABLET ORAL 2 TIMES DAILY
Qty: 60 TABLET | Refills: 2 | Status: SHIPPED | OUTPATIENT
Start: 2017-09-21 | End: 2019-07-20 | Stop reason: SDUPTHER

## 2017-09-21 NOTE — PROGRESS NOTES
Outpatient Psychiatry Follow-Up Visit (MD/NP)    9/21/2017    Clinical Status of Patient:  Outpatient (Ambulatory)    Chief Complaint:  Nora Mccoy is a 52 y.o. female who presents today for follow-up of depression and anxiety.  Met with patient.      Interval History and Content of Current Session:  Interim Events/Subjective Report/Content of Current Session: Her daughter was rehospitalized and now is in residential treatment in [place].  She is dealing as well as can be expected with this.  She is sleeping better with the trazodone.  She takes 1/2 to 1 at bedtime.    Psychotherapy:  · Target symptoms: depression, anxiety   · Why chosen therapy is appropriate versus another modality: relevant to diagnosis  · Outcome monitoring methods: self-report  · Therapeutic intervention type: supportive psychotherapy  · Topics discussed/themes: parenting issues, building skills sets for symptom management, symptom recognition  · The patient's response to the intervention is accepting. The patient's progress toward treatment goals is good.   · Duration of intervention: 10 minutes.    Review of Systems   · PSYCHIATRIC: Pertinant items are noted in the narrative.    Past Medical, Family and Social History: The patient's past medical, family and social history have been reviewed and updated as appropriate within the electronic medical record - see encounter notes.    Compliance: yes    Side effects: None    Risk Parameters:  Patient reports no suicidal ideation  Patient reports no homicidal ideation  Patient reports no self-injurious behavior  Patient reports no violent behavior    Exam (detailed: at least 9 elements; comprehensive: all 15 elements)   Constitutional  Vitals:  Most recent vital signs, dated less than 90 days prior to this appointment, were reviewed.   There were no vitals filed for this visit.     General:  unremarkable, age appropriate     Musculoskeletal  Muscle Strength/Tone:  no tremor   Gait &  Station:  non-ataxic     Psychiatric  Speech:  no latency; no press   Mood & Affect:  anxious  congruent and appropriate   Thought Process:  normal and logical   Associations:  intact   Thought Content:  normal, no suicidality, no homicidality, delusions, or paranoia   Insight:  intact   Judgement: behavior is adequate to circumstances   Orientation:  grossly intact   Memory: intact for content of interview   Language: grossly intact   Attention Span & Concentration:  able to focus   Fund of Knowledge:  intact and appropriate to age and level of education     Assessment and Diagnosis   Status/Progress: Based on the examination today, the patient's problem(s) is/are improved.  New problems have not been presented today.   Co-morbidities are not complicating management of the primary condition.  There are no active rule-out diagnoses for this patient at this time.     General Impression: Anxious      ICD-10-CM ICD-9-CM   1. Adjustment disorder with mixed anxiety and depressed mood F43.23 309.28       Intervention/Counseling/Treatment Plan   · Medication Management: Continue current medications.   · Continue psychotherapy      Return to Clinic: 1 month

## 2017-09-22 ENCOUNTER — OFFICE VISIT (OUTPATIENT)
Dept: PSYCHIATRY | Facility: CLINIC | Age: 52
End: 2017-09-22
Payer: COMMERCIAL

## 2017-09-22 DIAGNOSIS — R69 DIAGNOSIS DEFERRED: Primary | ICD-10-CM

## 2017-09-22 PROCEDURE — 90834 PSYTX W PT 45 MINUTES: CPT | Mod: S$GLB,,, | Performed by: SOCIAL WORKER

## 2017-09-26 ENCOUNTER — PATIENT MESSAGE (OUTPATIENT)
Dept: INTERNAL MEDICINE | Facility: CLINIC | Age: 52
End: 2017-09-26

## 2017-09-26 NOTE — TELEPHONE ENCOUNTER
Patient is fine now. She is [employer] employee.  Has chest pain sometimes in pm.  Also notices when rides bike sometimes has shortn. Of breath.  Not every time. Just occasional.  Is under a lot of stress with daughter and wondering if that is cause or just aging?    She Wants to be checked out by her pcp.  I fit her in with dr xiao for Friday.      I made her aware if symptoms return and not resolving to get to er for work up.

## 2017-09-28 ENCOUNTER — PROCEDURE VISIT (OUTPATIENT)
Dept: DERMATOLOGY | Facility: CLINIC | Age: 52
End: 2017-09-28

## 2017-09-28 DIAGNOSIS — Z41.1 OTHER PLASTIC SURGERY FOR UNACCEPTABLE COSMETIC APPEARANCE: Primary | ICD-10-CM

## 2017-09-28 PROCEDURE — 11950 SUBQ NJX FILLING MATRL 1CC/<: CPT | Mod: CSM,S$GLB,, | Performed by: DERMATOLOGY

## 2017-09-28 PROCEDURE — 99499 UNLISTED E&M SERVICE: CPT | Mod: S$GLB,,, | Performed by: DERMATOLOGY

## 2017-09-28 NOTE — PROGRESS NOTES
Pt here for repeat botox treatment. Has had written consent signed in the past.    Pt denies any new medical problems or medications. Pt denies current pregnancy.   Risks reviewed including headache, pain, infection, bleeding, bruising, eyebrow droop, eyelid droop, asymmetry, inability to close eye temporarily. Pt understands and would like to have treatment.  16 units placed in glabella, 8 units placed in forehead, 12 units placed in crows feet.   No complications.    Post procedure handout provided.    Botox lot number [id number]  Expiration date 1/2020

## 2017-09-29 ENCOUNTER — PATIENT MESSAGE (OUTPATIENT)
Dept: INTERNAL MEDICINE | Facility: CLINIC | Age: 52
End: 2017-09-29

## 2017-09-29 ENCOUNTER — OFFICE VISIT (OUTPATIENT)
Dept: INTERNAL MEDICINE | Facility: CLINIC | Age: 52
End: 2017-09-29
Payer: COMMERCIAL

## 2017-09-29 VITALS
BODY MASS INDEX: 24.81 KG/M2 | DIASTOLIC BLOOD PRESSURE: 68 MMHG | OXYGEN SATURATION: 98 % | RESPIRATION RATE: 16 BRPM | TEMPERATURE: 98 F | SYSTOLIC BLOOD PRESSURE: 98 MMHG | HEART RATE: 75 BPM | HEIGHT: 67 IN | WEIGHT: 158.06 LBS

## 2017-09-29 DIAGNOSIS — R07.9 CHEST PAIN, UNSPECIFIED TYPE: Primary | ICD-10-CM

## 2017-09-29 DIAGNOSIS — R06.02 SOB (SHORTNESS OF BREATH) ON EXERTION: ICD-10-CM

## 2017-09-29 PROCEDURE — 3008F BODY MASS INDEX DOCD: CPT | Mod: S$GLB,,, | Performed by: INTERNAL MEDICINE

## 2017-09-29 PROCEDURE — 99214 OFFICE O/P EST MOD 30 MIN: CPT | Mod: S$GLB,,, | Performed by: INTERNAL MEDICINE

## 2017-09-29 PROCEDURE — 93010 ELECTROCARDIOGRAM REPORT: CPT | Mod: S$GLB,,, | Performed by: INTERNAL MEDICINE

## 2017-09-29 PROCEDURE — 93005 ELECTROCARDIOGRAM TRACING: CPT | Mod: S$GLB,,, | Performed by: INTERNAL MEDICINE

## 2017-09-29 PROCEDURE — 99999 PR PBB SHADOW E&M-EST. PATIENT-LVL III: CPT | Mod: PBBFAC,,, | Performed by: INTERNAL MEDICINE

## 2017-09-29 NOTE — PROGRESS NOTES
CC: Chest pain    53 yo female presents for chest pain  Started: when riding bike, intermittent, can't breathe and talk @ same time  Located: left chest   Quality: tightness  Duration: 15'  Radiation: across the chest and down the left arm  Level: mild/tolerable  Associated: associated w/ SOB, checked BP and it was normal, but face flushed  ++ stress w/ dtr illness    MEDCARD: Reviewed  ROS:  No fever, or night sweats  Raw throat and chills this week- improving  No sinus or ear pain or pressure  No decongestants or excessive caffeine intake  No dysphagia or early satiety  No PND or orthopnea  No cough or wheezing  No GERD or abdominal pain  No HA or focal deficits  Remainder of review negative except as previously noted    PMHX:Reviewed  SHX:Reviewed  FHX:Reviewed    PE:  VSS:  GEN: WDWN, A&O, NAD, conversant and co-operative  EYES: Conj/lids unremarkable, sclera anicteric, pupils reactive  ENT: O/P w/o lesions or exudate;  Sinus nontender  NECK: Supple w/o lymphadenopathy or thyromegaly  RESP: Efforts unlabored; lungs CTA  CV: Heart RRR, no carotid bruits noted, 1+ pedal pulse , no LE edema  GI: BS+, soft , NT/ND, no HSM noted  MSK: Gait normal, no CCE; chest wall NT  NEURO: CRAVEN. No tremor  SKIN: WArm and dry    IMPRESSION:  Chest pain, w/ activity  SOB, w/ activity  Anxeity/stress situational    PLAN:  EKG  Stress echo

## 2017-10-06 ENCOUNTER — TELEPHONE (OUTPATIENT)
Dept: INTERNAL MEDICINE | Facility: CLINIC | Age: 52
End: 2017-10-06

## 2017-10-06 ENCOUNTER — CLINICAL SUPPORT (OUTPATIENT)
Dept: CARDIOLOGY | Facility: CLINIC | Age: 52
End: 2017-10-06
Payer: COMMERCIAL

## 2017-10-06 DIAGNOSIS — R07.9 CHEST PAIN, UNSPECIFIED TYPE: ICD-10-CM

## 2017-10-06 DIAGNOSIS — R06.02 SOB (SHORTNESS OF BREATH) ON EXERTION: ICD-10-CM

## 2017-10-06 LAB
DIASTOLIC DYSFUNCTION: NO
MITRAL VALVE MOBILITY: NORMAL
RETIRED EF AND QEF - SEE NOTES: 60 (ref 55–65)

## 2017-10-06 PROCEDURE — 93351 STRESS TTE COMPLETE: CPT | Mod: S$GLB,,, | Performed by: INTERNAL MEDICINE

## 2017-10-06 PROCEDURE — 93321 DOPPLER ECHO F-UP/LMTD STD: CPT | Mod: S$GLB,,, | Performed by: INTERNAL MEDICINE

## 2017-10-06 NOTE — TELEPHONE ENCOUNTER
----- Message from Zamzam Bazan MD sent at 10/6/2017 12:24 PM CDT -----  Nora, good news,  your stress test was negative for ischemia at a high workload , making it a very good study  Please do not hesitate to call/email if you have any questions or concerns  Zamzam Montes

## 2017-10-17 ENCOUNTER — PATIENT MESSAGE (OUTPATIENT)
Dept: PSYCHIATRY | Facility: CLINIC | Age: 52
End: 2017-10-17

## 2017-11-07 ENCOUNTER — OFFICE VISIT (OUTPATIENT)
Dept: PSYCHIATRY | Facility: CLINIC | Age: 52
End: 2017-11-07
Payer: COMMERCIAL

## 2017-11-07 DIAGNOSIS — F43.23 ADJUSTMENT DISORDER WITH MIXED ANXIETY AND DEPRESSED MOOD: Primary | ICD-10-CM

## 2017-11-07 PROCEDURE — 99214 OFFICE O/P EST MOD 30 MIN: CPT | Mod: S$GLB,,, | Performed by: PSYCHIATRY & NEUROLOGY

## 2017-11-07 PROCEDURE — 99999 PR PBB SHADOW E&M-EST. PATIENT-LVL I: CPT | Mod: PBBFAC,,, | Performed by: PSYCHIATRY & NEUROLOGY

## 2017-11-07 NOTE — PROGRESS NOTES
Outpatient Psychiatry Follow-Up Visit (MD/NP)    11/7/2017    Clinical Status of Patient:  Outpatient (Ambulatory)    Chief Complaint:  Nora Mccoy is a 52 y.o. female who presents today for follow-up of depression and anxiety.  Met with patient.      Interval History and Content of Current Session:  Interim Events/Subjective Report/Content of Current Session: Her daughter remains in rehab.  Anticipated discharge in about a month.  Now in IOP and residential part of program.  Pt doing better.  Occasional trouble getting to sleep.  Rarely needs an Ativan.    Psychotherapy:  · Target symptoms: depression, anxiety   · Why chosen therapy is appropriate versus another modality: relevant to diagnosis  · Outcome monitoring methods: self-report, observation  · Therapeutic intervention type: supportive psychotherapy  · Topics discussed/themes: parenting issues, building skills sets for symptom management, symptom recognition  · The patient's response to the intervention is accepting. The patient's progress toward treatment goals is good.   · Duration of intervention: 10 minutes.    Review of Systems   · PSYCHIATRIC: Pertinant items are noted in the narrative.    Past Medical, Family and Social History: The patient's past medical, family and social history have been reviewed and updated as appropriate within the electronic medical record - see encounter notes.    Compliance: yes    Side effects: None    Risk Parameters:  Patient reports no suicidal ideation  Patient reports no homicidal ideation  Patient reports no self-injurious behavior  Patient reports no violent behavior    Exam (detailed: at least 9 elements; comprehensive: all 15 elements)   Constitutional  Vitals:  Most recent vital signs, dated less than 90 days prior to this appointment, were reviewed.   There were no vitals filed for this visit.     General:  unremarkable, age appropriate     Musculoskeletal  Muscle Strength/Tone:  no tremor   Gait &  Station:  non-ataxic     Psychiatric  Speech:  no latency; no press   Mood & Affect:  anxious  congruent and appropriate   Thought Process:  normal and logical   Associations:  intact   Thought Content:  normal, no suicidality, no homicidality, delusions, or paranoia   Insight:  intact   Judgement: behavior is adequate to circumstances   Orientation:  grossly intact   Memory: intact for content of interview   Language: grossly intact   Attention Span & Concentration:  able to focus   Fund of Knowledge:  intact and appropriate to age and level of education     Assessment and Diagnosis   Status/Progress: Based on the examination today, the patient's problem(s) is/are improved.  New problems have not been presented today.   Co-morbidities are not complicating management of the primary condition.  There are no active rule-out diagnoses for this patient at this time.     General Impression: Doing better      ICD-10-CM ICD-9-CM   1. Adjustment disorder with mixed anxiety and depressed mood F43.23 309.28       Intervention/Counseling/Treatment Plan   · Medication Management: Continue current medications.      Return to Clinic: 1 month

## 2017-11-16 ENCOUNTER — TELEPHONE (OUTPATIENT)
Dept: NEUROSURGERY | Facility: CLINIC | Age: 52
End: 2017-11-16

## 2017-11-16 ENCOUNTER — PATIENT MESSAGE (OUTPATIENT)
Dept: NEUROSURGERY | Facility: CLINIC | Age: 52
End: 2017-11-16

## 2017-11-16 DIAGNOSIS — D32.9 MENINGIOMA: Primary | ICD-10-CM

## 2017-11-28 ENCOUNTER — OFFICE VISIT (OUTPATIENT)
Dept: PSYCHIATRY | Facility: CLINIC | Age: 52
End: 2017-11-28
Payer: COMMERCIAL

## 2017-11-28 DIAGNOSIS — R69 DIAGNOSIS DEFERRED: Primary | ICD-10-CM

## 2017-11-28 PROCEDURE — 90834 PSYTX W PT 45 MINUTES: CPT | Mod: S$GLB,,, | Performed by: SOCIAL WORKER

## 2017-12-01 RX ORDER — TRAZODONE HYDROCHLORIDE 50 MG/1
TABLET ORAL
Qty: 60 TABLET | Refills: 1 | Status: SHIPPED | OUTPATIENT
Start: 2017-12-01 | End: 2018-05-11 | Stop reason: SDUPTHER

## 2018-01-05 ENCOUNTER — HOSPITAL ENCOUNTER (OUTPATIENT)
Dept: RADIOLOGY | Facility: HOSPITAL | Age: 53
Discharge: HOME | End: 2018-01-05
Attending: NEUROLOGICAL SURGERY
Payer: COMMERCIAL

## 2018-01-05 ENCOUNTER — OFFICE VISIT (OUTPATIENT)
Dept: NEUROSURGERY | Facility: CLINIC | Age: 53
End: 2018-01-05
Payer: COMMERCIAL

## 2018-01-05 VITALS
DIASTOLIC BLOOD PRESSURE: 64 MMHG | HEIGHT: 67 IN | SYSTOLIC BLOOD PRESSURE: 102 MMHG | WEIGHT: 158.5 LBS | HEART RATE: 79 BPM | BODY MASS INDEX: 24.88 KG/M2

## 2018-01-05 DIAGNOSIS — D32.9 MENINGIOMA: ICD-10-CM

## 2018-01-05 DIAGNOSIS — D32.9 MENINGIOMA: Primary | ICD-10-CM

## 2018-01-05 PROCEDURE — A9585 GADOBUTROL INJECTION: HCPCS | Performed by: NEUROLOGICAL SURGERY

## 2018-01-05 PROCEDURE — 25500020 PHARM REV CODE 255: Performed by: NEUROLOGICAL SURGERY

## 2018-01-05 PROCEDURE — 99213 OFFICE O/P EST LOW 20 MIN: CPT | Mod: S$GLB,,, | Performed by: NEUROLOGICAL SURGERY

## 2018-01-05 PROCEDURE — 70553 MRI BRAIN STEM W/O & W/DYE: CPT | Mod: 26,,, | Performed by: RADIOLOGY

## 2018-01-05 PROCEDURE — 70553 MRI BRAIN STEM W/O & W/DYE: CPT | Mod: TC

## 2018-01-05 PROCEDURE — 99999 PR PBB SHADOW E&M-EST. PATIENT-LVL III: CPT | Mod: PBBFAC,,, | Performed by: NEUROLOGICAL SURGERY

## 2018-01-05 RX ORDER — GADOBUTROL 604.72 MG/ML
8 INJECTION INTRAVENOUS
Status: COMPLETED | OUTPATIENT
Start: 2018-01-05 | End: 2018-01-05

## 2018-01-05 RX ADMIN — GADOBUTROL 8 ML: 604.72 INJECTION INTRAVENOUS at 09:01

## 2018-01-05 NOTE — LETTER
January 5, 2018      Zamzam Bazan MD  [address] [place] [address]           [facility] - Neurosurgery 7th Fl  [address]  [place] [address]  Phone: [phone]          Patient: Nora Mccoy   MR Number: [id number]   YOB: 1965   Date of Visit: 1/5/2018       Dear Dr. Zamzam Bazan:    Thank you for referring Nora Mccoy to me for evaluation. Attached you will find relevant portions of my assessment and plan of care.    If you have questions, please do not hesitate to call me. I look forward to following Nora Mccoy along with you.    Sincerely,    Izabela Dudley MD    Enclosure  CC:  No Recipients    If you would like to receive this communication electronically, please contact [email or web address] or [phone] to request more information on [employer] access.    For providers and/or their staff who would like to refer a patient to [facility], please contact us through our one-stop-shop provider referral line, [facility], at [phone].    If you feel you have received this communication in error or would no longer like to receive these types of communications, please e-mail [email or web address]

## 2018-01-05 NOTE — PROGRESS NOTES
Established Pateint    SUBJECTIVE:     History of Present Illness:  Ms Mccoy is a 52-year-old female who is seeing me today in follow-up. Her last neurosurgery clinic appointment was December 6, 2016. At that time, she had complained of left ear pain and sinus pressure headaches. An MRI of the brain was done at that time which showed right and left frontal likely calcified meningiomas.  At the time of her visit with me, she was asymptomatic. We elected to follow the lesions conservatively.  She is here today to see me with a repeat MRI with and without contrast of the brain.  Currently, she complains of some mild sinus type headaches.  These are not interfering with her daily life and do not occur on a daily basis.  She denies blurry vision, double vision, weakness, numbness, tingling, or seizure-like activity.    Review of patient's allergies indicates:   Allergen Reactions    Penicillin g Anaphylaxis    Penicillins      Other reaction(s): Urticaria       Current Outpatient Prescriptions   Medication Sig Dispense Refill    acyclovir (ZOVIRAX) 800 MG Tab Take one 5 x daily 60 tablet 5    cyclobenzaprine (FLEXERIL) 10 MG tablet Take 1 tablet (10 mg total) by mouth 3 (three) times daily as needed for Muscle spasms. 60 tablet 2    fluoxetine (PROZAC) 40 MG capsule Take 1 capsule (40 mg total) by mouth once daily. 90 capsule 1    lorazepam (ATIVAN) 0.5 MG tablet Take 1 tablet (0.5 mg total) by mouth 2 (two) times daily. 60 tablet 2    multivitamin (THERAGRAN) tablet Take 1 tablet by mouth once daily.        neomycin-polymyxin-hydrocortisone (CORTISPORIN) 3.5-10,000-1 mg/mL-unit/mL-% otic suspension Place 3 drops into the left ear 4 (four) times daily. 10 mL 0    traZODone (DESYREL) 50 MG tablet TAKE 1 TO 2 TABLETS BY MOUTH AT BEDTIME 60 tablet 1    epinephrine (EPIPEN 2-JAMES) 0.3 mg/0.3 mL AtIn Inject 0.3 mLs (0.3 mg total) into the muscle once. 2 each 2     No current facility-administered medications for  this visit.        Past Medical History:   Diagnosis Date    Bronchitis     DJD (degenerative joint disease)     shoulder, knee, and back    Esophagitis, unspecified     Fibrocystic disease of breast     Menstrual disorder     Sinus infection     Stress     UTI (urinary tract infection)      Past Surgical History:   Procedure Laterality Date    BACK SURGERY      micro discectomy L4-L5, 2002    COLONOSCOPY N/A 7/7/2016    Procedure: COLONOSCOPY;  Surgeon: Ronan Dolan MD;  Location: [address]);  Service: Endoscopy;  Laterality: N/A;     Family History     Problem Relation (Age of Onset)    Breast cancer Mother (72), Maternal Aunt    Cancer Father    Cataracts Maternal Grandmother    Hypertension Sister    Lupus Mother    No Known Problems Brother, Brother    Other Mother    Retinal detachment Mother    Thyroid disease Mother        Social History     Social History    Marital status:      Spouse name: Sonu    Number of children: 2    Years of education: 18     Occupational History    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 No     Social History Narrative        Spouse w/ diabetes insipidus, CML , and neuroendocrine tumor newly dx    RN- working in Neuro tele ICU    2 children- dtr and son                   Review of Systems:  Review of Systems   Constitutional: Negative for activity change, diaphoresis, fatigue, fever and unexpected weight change.   HENT: Positive for sinus pain and sinus pressure.    Respiratory: Negative for cough, shortness of breath and wheezing.    Cardiovascular: Negative for chest pain and palpitations.   Gastrointestinal: Negative for abdominal distention,  "abdominal pain, blood in stool, constipation, diarrhea, nausea and vomiting.   Genitourinary: Negative for difficulty urinating, dysuria, frequency and urgency.   Musculoskeletal: Negative for back pain, gait problem, joint swelling, myalgias, neck pain and neck stiffness.   Skin: Negative for color change, rash and wound.   Allergic/Immunologic: Negative for environmental allergies and food allergies.   Neurological: Positive for headaches. Negative for dizziness, seizures, facial asymmetry, speech difficulty, weakness, light-headedness and numbness.   Hematological: Does not bruise/bleed easily.   Psychiatric/Behavioral: Negative for agitation, behavioral problems, dysphoric mood and hallucinations. The patient is not nervous/anxious.        OBJECTIVE:     Vital Signs  Pulse: 79  BP: 102/64  Pain Score:   5  Height: 5' 7" (170.2 cm)  Weight: 71.9 kg (158 lb 8 oz)  Body mass index is 24.82 kg/m².    Physical Exam:  Physical Exam:    Constitutional: She appears well-developed and well-nourished. No distress.     Eyes: Pupils are equal, round, and reactive to light. Conjunctivae and EOM are normal.     Cardiovascular: Normal rate, regular rhythm, normal pulses and no edema.     Abdominal: Soft. Bowel sounds are normal.     Skin: Skin displays no rash on trunk and no rash on extremities. Skin displays no lesions on trunk and no lesions on extremities.     Psych/Behavior: She is alert. She is oriented to person, place, and time. She has a normal mood and affect.     Musculoskeletal: Gait is normal.        Neck: Range of motion is full. There is no tenderness. Muscle strength is 5/5. Tone is normal.        Back: Range of motion is full. There is no tenderness. Muscle strength is 5/5. Tone is normal.        Right Upper Extremities: Range of motion is full. There is no tenderness. Muscle strength is 5/5. Tone is normal.        Left Upper Extremities: Range of motion is full. There is no tenderness. Muscle strength is 5/5. " Tone is normal.       Right Lower Extremities: Range of motion is full. There is no tenderness. Muscle strength is 5/5. Tone is normal.        Left Lower Extremities: Range of motion is full. There is no tenderness. Muscle strength is 5/5. Tone is normal.     Neurological:        Coordination: She has a normal Romberg Test, normal finger to nose coordination and normal tandem walking coordination.        Sensory: There is no sensory deficit in the trunk. There is no sensory deficit in the extremities.        DTRs: DTRs are DTRS NORMAL AND SYMMETRICnormal and symmetric. She displays no Babinski's sign on the right side. She displays no Babinski's sign on the left side.        Cranial nerves: Cranial nerve(s) II, III, IV, V, VI, VII, VIII, IX, X, XI and XII are intact.         Diagnostic Results:  She has an MRI of the brain with and without contrast available for review, which I personally reviewed.  This again redemonstrates both a right frontal and left frontal calcified mass.  The differential for these lesions include a calcified meningioma versus cranial exostosis. Both lesions are completely unchanged in size and character and there is no surrounding vasogenic edema. They are best seen on the [place] images.    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  I believe that she is asymptomatic from these lesions at this time.  Her MRI shows a stable appearance of the lesions when compared to an MRI from one year ago. Because these lesions are calcified, they are unlikely to change in size. Therefore, I do not feel the patient needs any  Further routine follow-up.  Should she complained of increasing and persistent headaches or any other  Signs or symptoms, we will repeat imaging at that time. However, I will plan on seeing her on an as-needed basis at this point. She knows she can  call with any further questions or concerns.        Note dictated with voice recognition software, please excuse any grammatical errors.

## 2018-02-06 ENCOUNTER — OFFICE VISIT (OUTPATIENT)
Dept: PSYCHIATRY | Facility: CLINIC | Age: 53
End: 2018-02-06
Payer: COMMERCIAL

## 2018-02-06 DIAGNOSIS — F33.0 MAJOR DEPRESSIVE DISORDER, RECURRENT EPISODE, MILD: Primary | ICD-10-CM

## 2018-02-06 DIAGNOSIS — F41.1 GENERALIZED ANXIETY DISORDER: ICD-10-CM

## 2018-02-06 PROCEDURE — 90834 PSYTX W PT 45 MINUTES: CPT | Mod: S$GLB,,, | Performed by: SOCIAL WORKER

## 2018-02-06 NOTE — PROGRESS NOTES
Individual Psychotherapy (PhD/LCSW)    2/6/2018    Site:  [facility]         Therapeutic Intervention: Met with patient.  Outpatient - Insight oriented psychotherapy 45 min - CPT code 13155    Chief complaint/reason for encounter: depression and anxiety     Interval history and content of current session:     Pt engaged in therapy and known to me from EA.  We did CBT with emphasis on the functionality of worries.  Can worries prevent bad things from happening.  Do I have control over external events?     She did her thought record--lots of self criticism.   Will start the cbt rhianna.    Papa is home.  Pt has had thoughts of leaving .  She alludes to all nothing thinking.   No active suicidal ideation.      Treatment plan:  · Target symptoms: depression, anxiety   · Why chosen therapy is appropriate versus another modality: relevant to diagnosis  · Outcome monitoring methods: self-report, observation  · Therapeutic intervention type: insight oriented psychotherapy, behavior modifying psychotherapy, supportive psychotherapy    Risk parameters:  Patient reports no suicidal ideation  Patient reports no homicidal ideation  Patient reports no self-injurious behavior  Patient reports no violent behavior    Verbal deficits: None    Patient's response to intervention:  The patient's response to intervention is accepting.    Progress toward goals and other mental status changes:  The patient's progress toward goals is fair .    Diagnosis:     ICD-10-CM ICD-9-CM   1. Major depressive disorder, recurrent episode, mild F33.0 296.31   2. Generalized anxiety disorder F41.1 300.02       Plan:  individual psychotherapy    Return to clinic: 3 weeks    Length of Service (minutes): 45

## 2018-02-22 ENCOUNTER — OFFICE VISIT (OUTPATIENT)
Dept: PSYCHIATRY | Facility: CLINIC | Age: 53
End: 2018-02-22
Payer: COMMERCIAL

## 2018-02-22 VITALS
SYSTOLIC BLOOD PRESSURE: 122 MMHG | DIASTOLIC BLOOD PRESSURE: 65 MMHG | BODY MASS INDEX: 24.4 KG/M2 | WEIGHT: 155.44 LBS | HEART RATE: 89 BPM | HEIGHT: 67 IN

## 2018-02-22 DIAGNOSIS — F43.23 ADJUSTMENT DISORDER WITH MIXED ANXIETY AND DEPRESSED MOOD: Primary | ICD-10-CM

## 2018-02-22 PROCEDURE — 3008F BODY MASS INDEX DOCD: CPT | Mod: S$GLB,,, | Performed by: PSYCHIATRY & NEUROLOGY

## 2018-02-22 PROCEDURE — 99999 PR PBB SHADOW E&M-EST. PATIENT-LVL II: CPT | Mod: PBBFAC,,, | Performed by: PSYCHIATRY & NEUROLOGY

## 2018-02-22 PROCEDURE — 99214 OFFICE O/P EST MOD 30 MIN: CPT | Mod: S$GLB,,, | Performed by: PSYCHIATRY & NEUROLOGY

## 2018-02-22 NOTE — PROGRESS NOTES
"Outpatient Psychiatry Follow-Up Visit (MD/NP)    2/22/2018    Clinical Status of Patient:  Outpatient (Ambulatory)    Chief Complaint:  Nora Mccoy is a 53 y.o. female who presents today for follow-up of depression and anxiety.  Met with patient.      Interval History and Content of Current Session:  Interim Events/Subjective Report/Content of Current Session: She is doing well.  Her daughter is doing well.  Now has 6 months of sobriety.  Pt coping well with that.  Had one minor "melt down" in the passed few months.  No complaints about meds.  Working on negative thoughts in therapy.    Psychotherapy:  · Target symptoms: depression, anxiety   · Why chosen therapy is appropriate versus another modality: relevant to diagnosis  · Outcome monitoring methods: self-report, observation  · Therapeutic intervention type: supportive psychotherapy  · Topics discussed/themes: work stress, parenting issues, building skills sets for symptom management, symptom recognition  · The patient's response to the intervention is accepting. The patient's progress toward treatment goals is good.   · Duration of intervention: 10 minutes.    Review of Systems   · PSYCHIATRIC: Pertinant items are noted in the narrative.    Past Medical, Family and Social History: The patient's past medical, family and social history have been reviewed and updated as appropriate within the electronic medical record - see encounter notes.    Compliance: yes    Side effects: None    Risk Parameters:  Patient reports no suicidal ideation  Patient reports no homicidal ideation  Patient reports no self-injurious behavior  Patient reports no violent behavior    Exam (detailed: at least 9 elements; comprehensive: all 15 elements)   Constitutional  Vitals:  Most recent vital signs, dated less than 90 days prior to this appointment, were reviewed.   Vitals:    02/22/18 0824   BP: 122/65   Pulse: 89   Weight: 70.5 kg (155 lb 6.8 oz)   Height: 5' 7" (1.702 m) "        General:  unremarkable, age appropriate     Musculoskeletal  Muscle Strength/Tone:  no tremor   Gait & Station:  non-ataxic     Psychiatric  Speech:  no latency; no press   Mood & Affect:  euthymic  congruent and appropriate   Thought Process:  normal and logical   Associations:  intact   Thought Content:  normal, no suicidality, no homicidality, delusions, or paranoia   Insight:  intact   Judgement: behavior is adequate to circumstances   Orientation:  grossly intact   Memory: intact for content of interview   Language: grossly intact   Attention Span & Concentration:  able to focus   Fund of Knowledge:  intact and appropriate to age and level of education     Assessment and Diagnosis   Status/Progress: Based on the examination today, the patient's problem(s) is/are well controlled.  New problems have not been presented today.   Co-morbidities are not complicating management of the primary condition.  There are no active rule-out diagnoses for this patient at this time.     General Impression: Doing well      ICD-10-CM ICD-9-CM   1. Adjustment disorder with mixed anxiety and depressed mood F43.23 309.28       Intervention/Counseling/Treatment Plan   · Medication Management: Continue current medications.      Return to Clinic: 2 months

## 2018-03-01 RX ORDER — ACYCLOVIR 800 MG/1
TABLET ORAL
Qty: 60 TABLET | Refills: 5
Start: 2018-03-01 | End: 2018-03-02 | Stop reason: SDUPTHER

## 2018-03-02 ENCOUNTER — TELEPHONE (OUTPATIENT)
Dept: INTERNAL MEDICINE | Facility: CLINIC | Age: 53
End: 2018-03-02

## 2018-03-02 RX ORDER — ACYCLOVIR 800 MG/1
800 TABLET ORAL 2 TIMES DAILY
Qty: 60 TABLET | Refills: 5 | Status: SHIPPED | OUTPATIENT
Start: 2018-03-02 | End: 2023-01-04 | Stop reason: SDUPTHER

## 2018-03-02 NOTE — TELEPHONE ENCOUNTER
Left msg on pharmacy recorder new rx sent for twice daily daily / discard prev rx stating 5 daily directions

## 2018-03-02 NOTE — TELEPHONE ENCOUNTER
----- Message from Evachance Cheung sent at 3/2/2018  3:45 PM CST -----  Contact: [facility] - [phone]  Pharmacy is calling to clarify an RX.  RX name:  acylovir  What do they need to clarify:  Strength in mg  Comments:

## 2018-03-10 ENCOUNTER — PATIENT MESSAGE (OUTPATIENT)
Dept: DERMATOLOGY | Facility: CLINIC | Age: 53
End: 2018-03-10

## 2018-03-18 NOTE — PROGRESS NOTES
3.2HISTORY OF PRESENT ILLNESS:                                                  53-year-old female presents for Annual PE  Nonsmoker, social alcohol consumer.                                                                                                                                            SCREENING TESTS:    Chol/lab, pending    C-scope: 7/2016     One 5 mm polyp in the ascending colon   [id number]                                                 Mammogram: pending    Pap: 3/2018  Eye exam: UTD  DDS exam: UTD    VACCINATIONS:  Flu, yearly  TDAP, 4/8/2013    MedCard reviewed.                                                                                                                                         REVIEW OF SYSTEMS:                                                           CONSTITUTIONAL:  No fever, chill or night sweats.                            CARDIOVASCULAR:  No chest pain or palpitations.                              RESPIRATORY:  No cough or wheezing.                                         GYN. No unusual discharge or abnormal bleeding.                              : No blood in her urine.     INcreased freq w/o dysuria                                                                                              Remainder of review negative except as previously noted.                                                                                                                     PHYSICAL EXAMINATION:                                                        VITAL SIGNS:                               GENERAL:  She is alert and oriented, in no acute distress.  Well-developed, well-nourished,   conversant and cooperative.   Pleasant as always.                               EYES:  Conjunctivae and lids unremarkable.    Sclerae anicteric.  Pupils  reactive.                                                                    NECK:  Supple.  No thyromegaly or lymphadenopathy.                            RESPIRATORY:  Unlabored.  Lungs clear to auscultation.                       HEART:  Regular rate and rhythm.  No carotid bruits.                         EXTREMITIES:  1+ pedal pulse.  No edema.                                     ABDOMEN:  Bowel sounds present, soft, nontender,   no hepatosplenomegaly.      BREASTS:    On inspection, no asymmetry, no puckering, no erythema          On palpation, , no warmth, no discharge, no masses     LYMPH NODES:  Cervical and axillary unremarkable.          GENITOURINARY:  External genitalia,   vaginal area, normal hair distribution.   Unremarkable urethra.  Bladder unremarkable.    Cervix midline and  NT          Uterus midline, no abnormalities.    Adnexa, no masses and no  tenderness.       MSK: Gait nl. No CCE  NEURO: CRAVEN. No tremor noted   SKIN: Warm and dry                                                              IMPRESSION:                                                                  Annual PE  Routine gyn evaluation.                                                          Family history of breast cancer.    Fibrocystic breasts, stable  Meningioma,stable MRI                    PLAN:    Pap  Fasting lab  Mammo   Call prn  RTC 1 year

## 2018-03-20 ENCOUNTER — OFFICE VISIT (OUTPATIENT)
Dept: INTERNAL MEDICINE | Facility: CLINIC | Age: 53
End: 2018-03-20
Payer: COMMERCIAL

## 2018-03-20 ENCOUNTER — LAB VISIT (OUTPATIENT)
Dept: LAB | Facility: HOSPITAL | Age: 53
End: 2018-03-20
Attending: INTERNAL MEDICINE
Payer: COMMERCIAL

## 2018-03-20 VITALS
WEIGHT: 154.75 LBS | HEIGHT: 67 IN | BODY MASS INDEX: 24.29 KG/M2 | RESPIRATION RATE: 16 BRPM | DIASTOLIC BLOOD PRESSURE: 74 MMHG | SYSTOLIC BLOOD PRESSURE: 98 MMHG | HEART RATE: 72 BPM | TEMPERATURE: 99 F

## 2018-03-20 DIAGNOSIS — Z01.419 ENCOUNTER FOR ROUTINE GYNECOLOGICAL EXAMINATION WITH PAPANICOLAOU SMEAR OF CERVIX: ICD-10-CM

## 2018-03-20 DIAGNOSIS — Z00.00 ANNUAL PHYSICAL EXAM: Primary | ICD-10-CM

## 2018-03-20 DIAGNOSIS — Z00.00 ANNUAL PHYSICAL EXAM: ICD-10-CM

## 2018-03-20 DIAGNOSIS — Z12.31 ENCOUNTER FOR SCREENING MAMMOGRAM FOR BREAST CANCER: ICD-10-CM

## 2018-03-20 LAB
ALBUMIN SERPL BCP-MCNC: 4 G/DL
ALP SERPL-CCNC: 62 U/L
ALT SERPL W/O P-5'-P-CCNC: 11 U/L
ANION GAP SERPL CALC-SCNC: 10 MMOL/L
AST SERPL-CCNC: 15 U/L
BASOPHILS # BLD AUTO: 0.05 K/UL
BASOPHILS NFR BLD: 1 %
BILIRUB SERPL-MCNC: 0.8 MG/DL
BUN SERPL-MCNC: 13 MG/DL
CALCIUM SERPL-MCNC: 9.4 MG/DL
CHLORIDE SERPL-SCNC: 105 MMOL/L
CHOLEST SERPL-MCNC: 187 MG/DL
CHOLEST/HDLC SERPL: 3.1 {RATIO}
CO2 SERPL-SCNC: 25 MMOL/L
CREAT SERPL-MCNC: 0.8 MG/DL
DIFFERENTIAL METHOD: NORMAL
EOSINOPHIL # BLD AUTO: 0.2 K/UL
EOSINOPHIL NFR BLD: 4.6 %
ERYTHROCYTE [DISTWIDTH] IN BLOOD BY AUTOMATED COUNT: 14 %
EST. GFR  (AFRICAN AMERICAN): >60 ML/MIN/1.73 M^2
EST. GFR  (NON AFRICAN AMERICAN): >60 ML/MIN/1.73 M^2
GLUCOSE SERPL-MCNC: 89 MG/DL
HCT VFR BLD AUTO: 38 %
HDLC SERPL-MCNC: 61 MG/DL
HDLC SERPL: 32.6 %
HGB BLD-MCNC: 12.2 G/DL
IMM GRANULOCYTES # BLD AUTO: 0.01 K/UL
IMM GRANULOCYTES NFR BLD AUTO: 0.2 %
LDLC SERPL CALC-MCNC: 115.2 MG/DL
LYMPHOCYTES # BLD AUTO: 1.6 K/UL
LYMPHOCYTES NFR BLD: 33.7 %
MCH RBC QN AUTO: 28.9 PG
MCHC RBC AUTO-ENTMCNC: 32.1 G/DL
MCV RBC AUTO: 90 FL
MONOCYTES # BLD AUTO: 0.4 K/UL
MONOCYTES NFR BLD: 7.7 %
NEUTROPHILS # BLD AUTO: 2.5 K/UL
NEUTROPHILS NFR BLD: 52.8 %
NONHDLC SERPL-MCNC: 126 MG/DL
NRBC BLD-RTO: 0 /100 WBC
PLATELET # BLD AUTO: 261 K/UL
PMV BLD AUTO: 11.3 FL
POTASSIUM SERPL-SCNC: 3.9 MMOL/L
PROT SERPL-MCNC: 6.7 G/DL
RBC # BLD AUTO: 4.22 M/UL
SODIUM SERPL-SCNC: 140 MMOL/L
TRIGL SERPL-MCNC: 54 MG/DL
TSH SERPL DL<=0.005 MIU/L-ACNC: 1.16 UIU/ML
WBC # BLD AUTO: 4.81 K/UL

## 2018-03-20 PROCEDURE — 88175 CYTOPATH C/V AUTO FLUID REDO: CPT

## 2018-03-20 PROCEDURE — 84443 ASSAY THYROID STIM HORMONE: CPT

## 2018-03-20 PROCEDURE — 85025 COMPLETE CBC W/AUTO DIFF WBC: CPT

## 2018-03-20 PROCEDURE — 80053 COMPREHEN METABOLIC PANEL: CPT

## 2018-03-20 PROCEDURE — 99396 PREV VISIT EST AGE 40-64: CPT | Mod: S$GLB,,, | Performed by: INTERNAL MEDICINE

## 2018-03-20 PROCEDURE — 80061 LIPID PANEL: CPT

## 2018-03-20 PROCEDURE — 99999 PR PBB SHADOW E&M-EST. PATIENT-LVL IV: CPT | Mod: PBBFAC,,, | Performed by: INTERNAL MEDICINE

## 2018-03-20 PROCEDURE — 36415 COLL VENOUS BLD VENIPUNCTURE: CPT | Mod: PO

## 2018-03-21 ENCOUNTER — TELEPHONE (OUTPATIENT)
Dept: INTERNAL MEDICINE | Facility: CLINIC | Age: 53
End: 2018-03-21

## 2018-03-21 NOTE — TELEPHONE ENCOUNTER
----- Message from Zamzam Bazan MD sent at 3/20/2018  9:49 PM CDT -----  Nora, your lab looks good  Please do not hesitate to call/email if you have any questions or concerns  Zamzam Montes

## 2018-03-27 ENCOUNTER — TELEPHONE (OUTPATIENT)
Dept: INTERNAL MEDICINE | Facility: CLINIC | Age: 53
End: 2018-03-27

## 2018-03-27 NOTE — TELEPHONE ENCOUNTER
----- Message from Zamzam Bazan MD sent at 3/26/2018  5:33 PM CDT -----  Nora,  Good news, your pap was normal;  Zamzam Montes

## 2018-04-27 ENCOUNTER — OFFICE VISIT (OUTPATIENT)
Dept: PSYCHIATRY | Facility: CLINIC | Age: 53
End: 2018-04-27
Payer: COMMERCIAL

## 2018-04-27 DIAGNOSIS — F41.1 GENERALIZED ANXIETY DISORDER: ICD-10-CM

## 2018-04-27 DIAGNOSIS — F33.0 MAJOR DEPRESSIVE DISORDER, RECURRENT EPISODE, MILD: Primary | ICD-10-CM

## 2018-04-27 PROCEDURE — 90834 PSYTX W PT 45 MINUTES: CPT | Mod: S$GLB,,, | Performed by: SOCIAL WORKER
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
## 2018-05-11 RX ORDER — TRAZODONE HYDROCHLORIDE 50 MG/1
100 TABLET ORAL NIGHTLY
Qty: 60 TABLET | Refills: 1 | Status: SHIPPED | OUTPATIENT
Start: 2018-05-11 | End: 2018-08-02 | Stop reason: SDUPTHER

## 2018-06-04 ENCOUNTER — PATIENT MESSAGE (OUTPATIENT)
Dept: INTERNAL MEDICINE | Facility: CLINIC | Age: 53
End: 2018-06-04

## 2018-06-05 ENCOUNTER — HOSPITAL ENCOUNTER (OUTPATIENT)
Dept: RADIOLOGY | Facility: HOSPITAL | Age: 53
Discharge: HOME | End: 2018-06-05
Attending: INTERNAL MEDICINE
Payer: COMMERCIAL

## 2018-06-05 DIAGNOSIS — Z12.31 ENCOUNTER FOR SCREENING MAMMOGRAM FOR BREAST CANCER: ICD-10-CM

## 2018-06-05 PROCEDURE — 77063 BREAST TOMOSYNTHESIS BI: CPT | Mod: 26,,, | Performed by: RADIOLOGY

## 2018-06-05 PROCEDURE — 77067 SCR MAMMO BI INCL CAD: CPT | Mod: TC

## 2018-06-05 PROCEDURE — 77067 SCR MAMMO BI INCL CAD: CPT | Mod: 26,,, | Performed by: RADIOLOGY

## 2018-06-06 NOTE — PROGRESS NOTES
CC: sinus infection    53 y.o. female presents today for sinus pressure x 1-2wks   over the maxillary sinuses  ++ HA , sleeping on sofa 2/2 to sx  [employer], on descended get sharp pain from eye through left eyeball to forehead, followed by a phantom pain x several days  Tx: Claritin D  Assoc:  + sneezing and PND, + left ear pressure  and sore throat    MEDCARD: Reviewed  ROS:  No fever, chills, or night sweats  No HA or dizziness  No tinnitus or hearing loss  No cough or wheezing  Remainder of review negative except as previously noted    PMHX: Reviewed  SHX: Reviewed  FHS: Reviewed    PE:  General: WDWN, A&O, NAD, conversant and co-operative  EYES: Conjunctiva and lids are normal, sclera anicteric, PERRL  ENT: Hearing intact, canals w/o cerumen, TM's are dull; nasal mucosa/turbinates are injected w/o exudate   o/p is injected w/o exudate; sinus are nontender  NECK: Supple w/o lymphadenopathy or thyromegaly  RESPIRATORY: Efforts are unlabored. Lungs are CTAP  CARDIOVASCULAR: Heart RRR  MSK: Gait nl, no CCE  NEURO: CRAVEN, no tremor noted  SKIN: Warm and dry    IMPRESSION:   Sinus pressure, left  Allergic rhinitis  ETD    PLAN:  Sinus xray  Prednisone dosepak  Mucinex  Sudafed  Hold Claritin x 3 days  Saline nasal spray  Call prn Consult ENT if sx persist or exacerbate

## 2018-06-08 ENCOUNTER — HOSPITAL ENCOUNTER (OUTPATIENT)
Dept: RADIOLOGY | Facility: HOSPITAL | Age: 53
Discharge: HOME | End: 2018-06-08
Attending: INTERNAL MEDICINE
Payer: COMMERCIAL

## 2018-06-08 ENCOUNTER — TELEPHONE (OUTPATIENT)
Dept: INTERNAL MEDICINE | Facility: CLINIC | Age: 53
End: 2018-06-08

## 2018-06-08 ENCOUNTER — OFFICE VISIT (OUTPATIENT)
Dept: INTERNAL MEDICINE | Facility: CLINIC | Age: 53
End: 2018-06-08
Payer: COMMERCIAL

## 2018-06-08 VITALS
WEIGHT: 156.75 LBS | TEMPERATURE: 99 F | RESPIRATION RATE: 16 BRPM | DIASTOLIC BLOOD PRESSURE: 70 MMHG | HEART RATE: 66 BPM | HEIGHT: 67 IN | BODY MASS INDEX: 24.6 KG/M2 | SYSTOLIC BLOOD PRESSURE: 110 MMHG

## 2018-06-08 DIAGNOSIS — J32.9 SINUSITIS, UNSPECIFIED CHRONICITY, UNSPECIFIED LOCATION: Primary | ICD-10-CM

## 2018-06-08 DIAGNOSIS — H69.90 DYSFUNCTION OF EUSTACHIAN TUBE, UNSPECIFIED LATERALITY: ICD-10-CM

## 2018-06-08 DIAGNOSIS — J32.9 SINUSITIS, UNSPECIFIED CHRONICITY, UNSPECIFIED LOCATION: ICD-10-CM

## 2018-06-08 DIAGNOSIS — J30.9 ALLERGIC RHINITIS, UNSPECIFIED SEASONALITY, UNSPECIFIED TRIGGER: ICD-10-CM

## 2018-06-08 PROCEDURE — 3008F BODY MASS INDEX DOCD: CPT | Mod: CPTII,S$GLB,, | Performed by: INTERNAL MEDICINE

## 2018-06-08 PROCEDURE — 70220 X-RAY EXAM OF SINUSES: CPT | Mod: 26,,, | Performed by: RADIOLOGY

## 2018-06-08 PROCEDURE — 99999 PR PBB SHADOW E&M-EST. PATIENT-LVL III: CPT | Mod: PBBFAC,,, | Performed by: INTERNAL MEDICINE

## 2018-06-08 PROCEDURE — 99213 OFFICE O/P EST LOW 20 MIN: CPT | Mod: S$GLB,,, | Performed by: INTERNAL MEDICINE

## 2018-06-08 PROCEDURE — 70220 X-RAY EXAM OF SINUSES: CPT | Mod: TC,PO

## 2018-06-08 RX ORDER — PREDNISONE 10 MG/1
TABLET ORAL DAILY
Qty: 1 TABLET | Refills: 0 | Status: SHIPPED | OUTPATIENT
Start: 2018-06-08 | End: 2018-06-18

## 2018-06-08 NOTE — PROGRESS NOTES
Clarification with [facility]. Spoke to Jaimee. Clarified for the prednisone 10 mg dosepack, #48 tablets, once daily with 0 refills.

## 2018-06-08 NOTE — TELEPHONE ENCOUNTER
----- Message from Zamzam Bazan MD sent at 6/7/2018  4:53 PM CDT -----  Please note that your mammogram was read as follows  Impression:  There is no mammographic evidence of malignancy.  Zamzam Montes

## 2018-06-08 NOTE — TELEPHONE ENCOUNTER
----- Message from Zamzam Bazan MD sent at 6/8/2018 10:31 AM CDT -----  Nora, no infection noted on your sinus xray  Please let me know if symptoms persist or if you want me to place ENT Consult order  Zamzam Montes

## 2018-06-26 ENCOUNTER — OFFICE VISIT (OUTPATIENT)
Dept: PSYCHIATRY | Facility: CLINIC | Age: 53
End: 2018-06-26
Payer: COMMERCIAL

## 2018-06-26 DIAGNOSIS — F33.41 RECURRENT MAJOR DEPRESSION IN PARTIAL REMISSION: Primary | ICD-10-CM

## 2018-06-26 PROCEDURE — 99214 OFFICE O/P EST MOD 30 MIN: CPT | Mod: S$GLB,,, | Performed by: PSYCHIATRY & NEUROLOGY

## 2018-06-26 PROCEDURE — 99999 PR PBB SHADOW E&M-EST. PATIENT-LVL I: CPT | Mod: PBBFAC,,, | Performed by: PSYCHIATRY & NEUROLOGY

## 2018-06-26 NOTE — PROGRESS NOTES
Outpatient Psychiatry Follow-Up Visit (MD/NP)    6/26/2018    Clinical Status of Patient:  Outpatient (Ambulatory)    Chief Complaint:  Nora Mccoy is a 53 y.o. female who presents today for follow-up of depression and anxiety.  Met with patient.      Interval History and Content of Current Session:  Interim Events/Subjective Report/Content of Current Session: She continues to do very well.  On a rare occasion, she will have some difficulty getting to sleep.  Has been using just one trazodone.  Daughter now 10 months sober and engaged in therapy.  Pt has no complaints about meds.    Psychotherapy:  · Target symptoms: depression, anxiety   · Why chosen therapy is appropriate versus another modality: relevant to diagnosis  · Outcome monitoring methods: self-report, observation  · Therapeutic intervention type: supportive psychotherapy  · Topics discussed/themes: parenting issues, symptom recognition  · The patient's response to the intervention is accepting. The patient's progress toward treatment goals is good.   · Duration of intervention: 10 minutes.    Review of Systems   · PSYCHIATRIC: Pertinant items are noted in the narrative.    Past Medical, Family and Social History: The patient's past medical, family and social history have been reviewed and updated as appropriate within the electronic medical record - see encounter notes.    Compliance: yes    Side effects: None    Risk Parameters:  Patient reports no suicidal ideation  Patient reports no homicidal ideation  Patient reports no self-injurious behavior  Patient reports no violent behavior    Exam (detailed: at least 9 elements; comprehensive: all 15 elements)   Constitutional  Vitals:  Most recent vital signs, dated less than 90 days prior to this appointment, were reviewed.   There were no vitals filed for this visit.     General:  unremarkable, age appropriate     Musculoskeletal  Muscle Strength/Tone:  no tremor   Gait & Station:  non-ataxic      Psychiatric  Speech:  no latency; no press   Mood & Affect:  euthymic  congruent and appropriate   Thought Process:  normal and logical   Associations:  intact   Thought Content:  normal, no suicidality, no homicidality, delusions, or paranoia   Insight:  intact   Judgement: behavior is adequate to circumstances   Orientation:  grossly intact   Memory: intact for content of interview   Language: grossly intact   Attention Span & Concentration:  able to focus   Fund of Knowledge:  intact and appropriate to age and level of education     Assessment and Diagnosis   Status/Progress: Based on the examination today, the patient's problem(s) is/are well controlled.  New problems have not been presented today.   Co-morbidities are not complicating management of the primary condition.  There are no active rule-out diagnoses for this patient at this time.     General Impression: Doing well      ICD-10-CM ICD-9-CM   1. Recurrent major depression in partial remission F33.41 296.35       Intervention/Counseling/Treatment Plan   · Medication Management: Continue current medications.      Return to Clinic: 3 months

## 2018-06-28 ENCOUNTER — OFFICE VISIT (OUTPATIENT)
Dept: PSYCHIATRY | Facility: CLINIC | Age: 53
End: 2018-06-28
Payer: COMMERCIAL

## 2018-06-28 ENCOUNTER — PATIENT MESSAGE (OUTPATIENT)
Dept: PSYCHIATRY | Facility: CLINIC | Age: 53
End: 2018-06-28

## 2018-06-28 DIAGNOSIS — F41.1 GENERALIZED ANXIETY DISORDER: ICD-10-CM

## 2018-06-28 DIAGNOSIS — F33.41 RECURRENT MAJOR DEPRESSION IN PARTIAL REMISSION: Primary | ICD-10-CM

## 2018-06-28 PROCEDURE — 90834 PSYTX W PT 45 MINUTES: CPT | Mod: S$GLB,,, | Performed by: SOCIAL WORKER

## 2018-06-28 NOTE — PROGRESS NOTES
Individual Psychotherapy (PhD/LCSW)    6/28/2018    Site:  [facility]         Therapeutic Intervention: Met with patient.  Outpatient - Insight oriented psychotherapy 45 min - CPT code 67298    Chief complaint/reason for encounter: depression and anxiety     Interval history and content of current session:       Did read chapter one.   Did record 3 thoughts.  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    Will read chapter 2 and perhaps 3.    Continue recording thoughts and if can challenge them.   Treatment plan:  · Target symptoms: depression, anxiety   · Why chosen therapy is appropriate versus another modality: relevant to diagnosis  · Outcome monitoring methods: self-report, observation  · Therapeutic intervention type: insight oriented psychotherapy, behavior modifying psychotherapy, supportive psychotherapy    Risk parameters:  Patient reports no suicidal ideation  Patient reports no homicidal ideation  Patient reports no self-injurious behavior  Patient reports no violent behavior    Verbal deficits: None    Patient's response to intervention:  The patient's response to intervention is accepting.    Progress toward goals and other mental status changes:  The patient's progress toward goals is fair .    Diagnosis:     ICD-10-CM ICD-9-CM   1. Recurrent major depression in partial remission F33.41 296.35   2. Generalized anxiety disorder F41.1 300.02       Plan:  individual psychotherapy    Return to clinic: 3 weeks    Length of Service (minutes): 45

## 2018-07-19 ENCOUNTER — PATIENT MESSAGE (OUTPATIENT)
Dept: PSYCHIATRY | Facility: CLINIC | Age: 53
End: 2018-07-19

## 2018-08-02 RX ORDER — TRAZODONE HYDROCHLORIDE 50 MG/1
100 TABLET ORAL NIGHTLY
Qty: 60 TABLET | Refills: 1 | Status: SHIPPED | OUTPATIENT
Start: 2018-08-02 | End: 2018-12-21 | Stop reason: SDUPTHER

## 2018-08-07 ENCOUNTER — OFFICE VISIT (OUTPATIENT)
Dept: URGENT CARE | Facility: CLINIC | Age: 53
End: 2018-08-07
Payer: COMMERCIAL

## 2018-08-07 VITALS
OXYGEN SATURATION: 100 % | BODY MASS INDEX: 24.48 KG/M2 | SYSTOLIC BLOOD PRESSURE: 129 MMHG | HEIGHT: 67 IN | TEMPERATURE: 100 F | WEIGHT: 156 LBS | DIASTOLIC BLOOD PRESSURE: 87 MMHG | HEART RATE: 87 BPM | RESPIRATION RATE: 18 BRPM

## 2018-08-07 DIAGNOSIS — R30.0 DYSURIA: ICD-10-CM

## 2018-08-07 DIAGNOSIS — R10.9 FLANK PAIN: ICD-10-CM

## 2018-08-07 DIAGNOSIS — N10 PYELONEPHRITIS, ACUTE: Primary | ICD-10-CM

## 2018-08-07 LAB
BILIRUB UR QL STRIP: NEGATIVE
GLUCOSE UR QL STRIP: NEGATIVE
KETONES UR QL STRIP: NEGATIVE
LEUKOCYTE ESTERASE UR QL STRIP: POSITIVE
PH, POC UA: 7.5 (ref 5–8)
POC BLOOD, URINE: POSITIVE
POC NITRATES, URINE: NEGATIVE
PROT UR QL STRIP: POSITIVE
SP GR UR STRIP: 1.01 (ref 1–1.03)
UROBILINOGEN UR STRIP-ACNC: NORMAL (ref 0.1–1.1)

## 2018-08-07 PROCEDURE — 81003 URINALYSIS AUTO W/O SCOPE: CPT | Mod: QW,S$GLB,, | Performed by: NURSE PRACTITIONER

## 2018-08-07 PROCEDURE — 99214 OFFICE O/P EST MOD 30 MIN: CPT | Mod: 25,S$GLB,, | Performed by: NURSE PRACTITIONER

## 2018-08-07 PROCEDURE — 3008F BODY MASS INDEX DOCD: CPT | Mod: CPTII,S$GLB,, | Performed by: NURSE PRACTITIONER

## 2018-08-07 PROCEDURE — 96372 THER/PROPH/DIAG INJ SC/IM: CPT | Mod: S$GLB,,, | Performed by: NURSE PRACTITIONER

## 2018-08-07 RX ORDER — PHENAZOPYRIDINE HYDROCHLORIDE 200 MG/1
200 TABLET, FILM COATED ORAL
Qty: 9 TABLET | Refills: 0 | Status: SHIPPED | OUTPATIENT
Start: 2018-08-07 | End: 2018-08-10

## 2018-08-07 RX ORDER — CIPROFLOXACIN 500 MG/1
500 TABLET ORAL 2 TIMES DAILY
Qty: 20 TABLET | Refills: 0 | Status: SHIPPED | OUTPATIENT
Start: 2018-08-07 | End: 2020-03-19 | Stop reason: SDUPTHER

## 2018-08-07 RX ORDER — KETOROLAC TROMETHAMINE 30 MG/ML
30 INJECTION, SOLUTION INTRAMUSCULAR; INTRAVENOUS
Status: COMPLETED | OUTPATIENT
Start: 2018-08-07 | End: 2018-08-07

## 2018-08-07 RX ADMIN — KETOROLAC TROMETHAMINE 30 MG: 30 INJECTION, SOLUTION INTRAMUSCULAR; INTRAVENOUS at 07:08

## 2018-08-08 NOTE — PATIENT INSTRUCTIONS
"Urine culture sent. We will call you with the results.   If you've had labs sent out, it will generally take 3-7 days for results to return. We will call you with the results.  Start Cipro tonight   If you do not see marked improvement (fever, chills, increased flank pain, nausea, vomiting, or if s/s worsen after 48 hours of being on the antibiotics you need to follow up as we discussed.      Please return here or go to the Emergency Department for any concerns or worsening of condition.  If you were prescribed antibiotics, please take them to completion.  If you were prescribed a narcotic medication, do not drive or operate heavy equipment or machinery while taking these medications.  Please follow up with your primary care doctor or specialist as needed.  Please drink plenty of fluids.  Please get plenty of rest.  If you were prescribed Pyridium (phenazopyridine), please be aware that if you wear contact lens that this medication may stain your contacts.  While taking this medication it is recommended that you do not wear your contacts until 24 hours after your last dose.  Please follow up with your primary care doctor or specialist as needed.    If you  smoke, please stop smoking.      Kidney Infection (Adult, Female)    An infection in one or both kidneys is called "pyelonephritis." It usually happens when bacteria (or rarely, viruses, fungi, or other disease-causing organisms) get into the kidney. The bacteria (or other disease-causing organisms) can enter the kidneys from the bladder or blood traveling from other parts of the body. A kidney infection can become serious. It can cause severe illness, scarring of the kidneys, or kidney failure if not treated properly.  Common causes for this problem include:  · Not keeping the genital area clean and dry, which promotes the growth of bacteria  · Wiping back to front which drags bacteria from the rectum toward the urinary opening (urethra)  · Wearing tight pants or " underwear (this lets moisture build up in the genital area, which helps bacteria grow)  · Holding urine in for long periods of time  · Dehydration  Kidney infections can cause symptoms similar to a bladder infection. Symptoms include:  · Pain (or burning) when urinating  · Having to urinate more often than usual  · Blood in the urine (pink or red)  · Abdominal pain or discomfort, usually in the lower abdomen  · Pain in the side or back  · Pain above the pubic bone  · Fever or chills  · Vomiting  · Loss of appetite  Treatment is oral antibiotics, or in more severe cases, intramuscular or IV antibiotics. These are started right away and may be changed once urine culture results determine the infecting organisms. Treatment helps prevent a more serious kidney infection.  Medicines  Medicines can help in the treatment of a bladder infection:  · Take antibiotics until they are used up, even if you feel better. It is important to finish them to make sure the infection is gone.  · Unless another medicine was prescribed, you can use over-the-counter medicines for pain, fever, or discomfort. If you have chronic liver of kidney disease, talk with your healthcare provider before using these medicines. Also talk with your provider if you've ever had a stomach ulcer or gastrointestinal (GI) bleeding, or are taking blood thinners.  Home care  The following are general care guidelines:  · Stay home from work or school. Rest in bed until your fever breaks and you are feeling better, or as advised by your healthcare provider.  · Drink lots of fluid unless you must restrict fluids for other medical reasons. This will force the medicine into your urinary system and flush the bacteria out of your body. Ask your healthcare provider how much you should drink.  · Avoid sex until you have finished all of your medicine and your symptoms are gone.  · Avoid caffeine, alcohol, and spicy foods. These foods may irritate the kidneys and  bladder.  · Avoid taking bubble baths. Sensitivity to the chemicals in bubble baths can irritate the urethra.  · Make sure you wipe from front to back after using the toilet.  · Wear loose cloths and cotton underwear.  Prevention  These self-care steps can help prevent future infections:  · Drink plenty of fluids to prevent dehydration and flush out the bladder. Do this unless you must restrict fluids for other health reasons, or your healthcare provider told you not to.  · Proper cleaning after going to the bathroom in important. Make sure you wipe from front to back after using the toilet.  · Urinate more often. Don't try to hold urine in for a long time.  · Avoid tight-fitting pants and underwear.  · Improve your diet to prevent constipation. Eat more fruits, vegetables, and fiber. Eat less junk and fatty foods. Constipation can make a urinary tract infection more likely. Talk with your healthcare provider if you have trouble with bowel movements.  · Urinate right after intercourse to flush out the bladder.  Follow-up care  Follow up with your healthcare provider, or as advised. Additional testing may be needed to make sure the infection has cleared. Close follow-up and further testing is very important to find the cause and to prevent future infections.  If a urine culture was done, you will be contacted if your treatment needs to be changed. If directed, you may call to find out the results.  If you had an X-ray, CT scan, or other diagnostic test, you will be notified of any new findings that may affect your care.  Call 911  Call 911 if any of the following occur:  · Trouble breathing  · Fainting or loss of consciousness  · Rapid or very slow heart rate  · Weakness, dizziness, or fainting  · Difficulty arousing or confusion  When to seek medical advice  Call your healthcare provider right away if any of these occur:  · Fever 100.4°F (38°C) or higher after 48 hours of treatment, or as advised by your healthcare  provider  · Not feeling better within 1 to 2 days after starting antibiotics  · Any symptom that continues after 3 days of treatment  · Increasing pain in the stomach, back, side, or groin area  · Repeated vomiting  · Not able to take prescribed medicine due to nausea or another reason  · Bloody, dark-colored, or foul smelling urine  · Trouble urinating or decreased urine output  · No urine for 8 hours, no tears when crying, sunken eyes, or dry mouth  Date Last Reviewed: 10/1/2016  © 3667-8770 [employer] [address] [place] [id number]. All rights reserved. This information is not intended as a substitute for professional medical care. Always follow your healthcare professional's instructions.

## 2018-08-08 NOTE — PROGRESS NOTES
"Subjective:       Patient ID: Nora Mccoy is a 53 y.o. female.    Vitals:  height is 5' 7" (1.702 m) and weight is 70.8 kg (156 lb). Her oral temperature is 99.5 °F (37.5 °C). Her blood pressure is 129/87 and her pulse is 87. Her respiration is 18 and oxygen saturation is 100%.     Chief Complaint: Dysuria    This is a 53 y.o. female who presents today with a chief complaint of bilateral flank pain. Dysuria for one week getting worse.Recurrent UTIs in teens and twenties.  No vomiting.   Denies vaginal discharge.   Denies hx of kidney stones       Dysuria    This is a new problem. The current episode started in the past 7 days. The problem has been gradually worsening. The quality of the pain is described as aching. The pain is at a severity of 8/10. The pain is severe. There is no history of pyelonephritis. Associated symptoms include chills, flank pain, frequency, hematuria, nausea and urgency. Pertinent negatives include no vomiting. She has tried NSAIDs and increased fluids for the symptoms. The treatment provided no relief. Her past medical history is significant for recurrent UTIs.     Review of Systems   Constitution: Positive for chills. Negative for fever.   Skin: Negative for itching.   Musculoskeletal: Negative for back pain.   Gastrointestinal: Positive for nausea. Negative for abdominal pain and vomiting.   Genitourinary: Positive for dysuria, flank pain, frequency, hematuria and urgency. Negative for genital sores, missed menses and non-menstrual bleeding.       Objective:      Physical Exam   Constitutional: She is oriented to person, place, and time. She appears well-developed and well-nourished.  Non-toxic appearance. She appears ill. No distress.   HENT:   Head: Normocephalic and atraumatic.   Right Ear: External ear normal.   Left Ear: External ear normal.   Nose: Nose normal.   Eyes: Lids are normal.   Neck: Trachea normal, normal range of motion and phonation normal. Neck supple. "   Cardiovascular: Normal rate, regular rhythm, normal heart sounds and normal pulses.    Pulmonary/Chest: Effort normal and breath sounds normal. No respiratory distress.   Abdominal: Soft. Normal appearance and bowel sounds are normal. She exhibits no distension. There is no tenderness. There is CVA tenderness (moderate left sided flank pain ). There is no rigidity, no rebound, no guarding, no tenderness at McBurney's point and negative Price's sign.   Neurological: She is alert and oriented to person, place, and time.   Skin: Skin is warm, dry and intact. She is not diaphoretic.   Psychiatric: She has a normal mood and affect. Her speech is normal and behavior is normal. Cognition and memory are normal.   Nursing note and vitals reviewed.        Results for orders placed or performed in visit on 08/07/18   POCT Urinalysis, Dipstick, Automated, W/O Scope   Result Value Ref Range    POC Blood, Urine Positive (A) Negative    POC Bilirubin, Urine Negative Negative    POC Urobilinogen, Urine Normal 0.1 - 1.1    POC Ketones, Urine Negative Negative    POC Protein, Urine Positive (A) Negative    POC Nitrates, Urine Negative Negative    POC Glucose, Urine Negative Negative    pH, UA 7.5 5 - 8    POC Specific Gravity, Urine 1.010 1.003 - 1.029    POC Leukocytes, Urine Positive (A) Negative       Assessment:       1. Pyelonephritis, acute    2. Flank pain    3. Dysuria        Plan:         Pyelonephritis, acute  -     POCT Urinalysis, Dipstick, Automated, W/O Scope  -     ketorolac injection 30 mg; Inject 1 mL (30 mg total) into the muscle one time.  -     Urine culture  -     ciprofloxacin HCl (CIPRO) 500 MG tablet; Take 1 tablet (500 mg total) by mouth 2 (two) times daily. for 10 days  Dispense: 20 tablet; Refill: 0    Flank pain  -     ketorolac injection 30 mg; Inject 1 mL (30 mg total) into the muscle one time.    Dysuria  -     phenazopyridine (PYRIDIUM) 200 MG tablet; Take 1 tablet (200 mg total) by mouth 3 (three)  "times daily with meals. for 3 days  Dispense: 9 tablet; Refill: 0      Discussed diff dx including kidney stone due to hematuria. Although given low grade fever, chills, and leuk- likely untreated UTI that has turned into pyelonephritis.   Allergy to PCN- anaphylaxis. Deferred Rocephin.   She deferred Zofran   Discussed warning s/s for reevaluation.   Urine culture sent.         Patient Instructions   Urine culture sent. We will call you with the results.   If you've had labs sent out, it will generally take 3-7 days for results to return. We will call you with the results.  Start Cipro tonight   If you do not see marked improvement (fever, chills, increased flank pain, nausea, vomiting, or if s/s worsen after 48 hours of being on the antibiotics you need to follow up as we discussed.      Please return here or go to the Emergency Department for any concerns or worsening of condition.  If you were prescribed antibiotics, please take them to completion.  If you were prescribed a narcotic medication, do not drive or operate heavy equipment or machinery while taking these medications.  Please follow up with your primary care doctor or specialist as needed.  Please drink plenty of fluids.  Please get plenty of rest.  If you were prescribed Pyridium (phenazopyridine), please be aware that if you wear contact lens that this medication may stain your contacts.  While taking this medication it is recommended that you do not wear your contacts until 24 hours after your last dose.  Please follow up with your primary care doctor or specialist as needed.    If you  smoke, please stop smoking.      Kidney Infection (Adult, Female)    An infection in one or both kidneys is called "pyelonephritis." It usually happens when bacteria (or rarely, viruses, fungi, or other disease-causing organisms) get into the kidney. The bacteria (or other disease-causing organisms) can enter the kidneys from the bladder or blood traveling from other " parts of the body. A kidney infection can become serious. It can cause severe illness, scarring of the kidneys, or kidney failure if not treated properly.  Common causes for this problem include:  · Not keeping the genital area clean and dry, which promotes the growth of bacteria  · Wiping back to front which drags bacteria from the rectum toward the urinary opening (urethra)  · Wearing tight pants or underwear (this lets moisture build up in the genital area, which helps bacteria grow)  · Holding urine in for long periods of time  · Dehydration  Kidney infections can cause symptoms similar to a bladder infection. Symptoms include:  · Pain (or burning) when urinating  · Having to urinate more often than usual  · Blood in the urine (pink or red)  · Abdominal pain or discomfort, usually in the lower abdomen  · Pain in the side or back  · Pain above the pubic bone  · Fever or chills  · Vomiting  · Loss of appetite  Treatment is oral antibiotics, or in more severe cases, intramuscular or IV antibiotics. These are started right away and may be changed once urine culture results determine the infecting organisms. Treatment helps prevent a more serious kidney infection.  Medicines  Medicines can help in the treatment of a bladder infection:  · Take antibiotics until they are used up, even if you feel better. It is important to finish them to make sure the infection is gone.  · Unless another medicine was prescribed, you can use over-the-counter medicines for pain, fever, or discomfort. If you have chronic liver of kidney disease, talk with your healthcare provider before using these medicines. Also talk with your provider if you've ever had a stomach ulcer or gastrointestinal (GI) bleeding, or are taking blood thinners.  Home care  The following are general care guidelines:  · Stay home from work or school. Rest in bed until your fever breaks and you are feeling better, or as advised by your healthcare provider.  · Drink  lots of fluid unless you must restrict fluids for other medical reasons. This will force the medicine into your urinary system and flush the bacteria out of your body. Ask your healthcare provider how much you should drink.  · Avoid sex until you have finished all of your medicine and your symptoms are gone.  · Avoid caffeine, alcohol, and spicy foods. These foods may irritate the kidneys and bladder.  · Avoid taking bubble baths. Sensitivity to the chemicals in bubble baths can irritate the urethra.  · Make sure you wipe from front to back after using the toilet.  · Wear loose cloths and cotton underwear.  Prevention  These self-care steps can help prevent future infections:  · Drink plenty of fluids to prevent dehydration and flush out the bladder. Do this unless you must restrict fluids for other health reasons, or your healthcare provider told you not to.  · Proper cleaning after going to the bathroom in important. Make sure you wipe from front to back after using the toilet.  · Urinate more often. Don't try to hold urine in for a long time.  · Avoid tight-fitting pants and underwear.  · Improve your diet to prevent constipation. Eat more fruits, vegetables, and fiber. Eat less junk and fatty foods. Constipation can make a urinary tract infection more likely. Talk with your healthcare provider if you have trouble with bowel movements.  · Urinate right after intercourse to flush out the bladder.  Follow-up care  Follow up with your healthcare provider, or as advised. Additional testing may be needed to make sure the infection has cleared. Close follow-up and further testing is very important to find the cause and to prevent future infections.  If a urine culture was done, you will be contacted if your treatment needs to be changed. If directed, you may call to find out the results.  If you had an X-ray, CT scan, or other diagnostic test, you will be notified of any new findings that may affect your care.  Call  911  Call 911 if any of the following occur:  · Trouble breathing  · Fainting or loss of consciousness  · Rapid or very slow heart rate  · Weakness, dizziness, or fainting  · Difficulty arousing or confusion  When to seek medical advice  Call your healthcare provider right away if any of these occur:  · Fever 100.4°F (38°C) or higher after 48 hours of treatment, or as advised by your healthcare provider  · Not feeling better within 1 to 2 days after starting antibiotics  · Any symptom that continues after 3 days of treatment  · Increasing pain in the stomach, back, side, or groin area  · Repeated vomiting  · Not able to take prescribed medicine due to nausea or another reason  · Bloody, dark-colored, or foul smelling urine  · Trouble urinating or decreased urine output  · No urine for 8 hours, no tears when crying, sunken eyes, or dry mouth  Date Last Reviewed: 10/1/2016  © 1916-2417 [employer]. [address] [place] [address], [place] [address]. All rights reserved. This information is not intended as a substitute for professional medical care. Always follow your healthcare professional's instructions.

## 2018-08-09 ENCOUNTER — TELEPHONE (OUTPATIENT)
Dept: INTERNAL MEDICINE | Facility: CLINIC | Age: 53
End: 2018-08-09

## 2018-08-09 NOTE — TELEPHONE ENCOUNTER
Spoke to pt. Pt stated that two days ago, she went to urgent care for a possible UTI. She was diagnosed with a kidney infection. She was prescribed cipro for 10 day, and pyridium. Pt was offered rocephin in the office, but declined because she is allergic to PCN. Pt was advised that if no improvement in 2 days, or pain continued, or fever to go to the ER. Pt stated that today is the second day. She reports having chills off/on, achy off/on, parched & dry mouth. She stated that she has increased her water intake. She reports some improvement. Her urine is clearer. Pt stated that she has about 8 days of cipro left. She is inquiring on what she should do?  Please advise.

## 2018-08-09 NOTE — TELEPHONE ENCOUNTER
Would recommend she continue cipro for now until her urine culture returns since she is feeling somewhat better. It appears it is not back yet in the system. It usually takes 3-4 days for cultures to return. Her prior uti was sensitive to cipro so hopefully this one is as well.    Continue good hydration with fluids. Check back in clinic tomorrow to see if culture has returned yet.    If symptoms worsen at any point, contact us right away or go to ER

## 2018-08-09 NOTE — TELEPHONE ENCOUNTER
Spoke to pt. Pt informed of recommendations.  Pt verbalized understanding.  Pt will f/u tomorrow, 8/10/18.

## 2018-08-09 NOTE — TELEPHONE ENCOUNTER
----- Message from Meño Tubbs sent at 8/9/2018  8:43 AM CDT -----  Contact: self [phone]  Pt would like to speak to nurse regarding her visit at [facility] on 08/07. Pt says she was diagnosed with kidney infection. Pt has questions regarding Antibiotics.    Please advise

## 2018-08-10 ENCOUNTER — TELEPHONE (OUTPATIENT)
Dept: INTERNAL MEDICINE | Facility: CLINIC | Age: 53
End: 2018-08-10

## 2018-08-10 NOTE — TELEPHONE ENCOUNTER
Was Rx 10 days Cipro  Does she feel like not helping?    Please advise was sent to [facility] and that  they advised  It would be another day or two    Will monitor for results  although  results will be sent to prescribing provider    Please send msg back to me to monitor over the weekend

## 2018-08-10 NOTE — TELEPHONE ENCOUNTER
Spoke to pt. Pt stated that she feels the same as yesterday. Please see telephone message from yesterday, 8/9/18. No fever but has chills, very tired, back pain. Using heating pad.  She will monitor [facility] over weekend for results.

## 2018-08-10 NOTE — TELEPHONE ENCOUNTER
----- Message from Veronica Flaherty sent at 8/10/2018 11:26 AM CDT -----  Contact: self/[phone]  Patient states she did a urine culture on Tuesday and would like results.

## 2018-08-10 NOTE — TELEPHONE ENCOUNTER
Spoke to [facility] lab. They stated that even though the urine was collected at an [facility] facility, it was sent to [facility].  Spoke to [facility]. They stated that they received the urine yesterday, 8/9/10. They stated that it won't be ready for another few days.  Please advise.

## 2018-08-11 ENCOUNTER — TELEPHONE (OUTPATIENT)
Dept: INTERNAL MEDICINE | Facility: CLINIC | Age: 53
End: 2018-08-11

## 2018-08-13 ENCOUNTER — TELEPHONE (OUTPATIENT)
Dept: URGENT CARE | Facility: CLINIC | Age: 53
End: 2018-08-13

## 2018-08-13 ENCOUNTER — TELEPHONE (OUTPATIENT)
Dept: INTERNAL MEDICINE | Facility: CLINIC | Age: 53
End: 2018-08-13

## 2018-08-13 LAB
BACTERIA UR CULT: ABNORMAL
BACTERIA UR CULT: ABNORMAL
OTHER ANTIBIOTIC SUSC ISLT: ABNORMAL

## 2018-08-13 NOTE — TELEPHONE ENCOUNTER
Spoke to pt. Pt informed of results.  Pt stated that she is feeling improvement. Still some back pain, but improving.  Pt stated that she still has 4 days left of abx.  She will call if no improvement.

## 2018-08-13 NOTE — TELEPHONE ENCOUNTER
Spoke with patient about positive culture results.  She was prescribed Cipro.  The bacteria in her urine should be sensitive this medication.  Patient states that she is improved significantly.  She says she does still have some mild back pain but that is also improved from visit with us.  She has been instructed to follow up with PCP if her symptoms worsen.

## 2018-08-13 NOTE — TELEPHONE ENCOUNTER
----- Message from Zamzam Bazan MD sent at 8/13/2018  9:07 AM CDT -----  Please advise pt that her urine culture is sensitive to Cipro  Is she feeling any better

## 2018-09-03 RX ORDER — FLUOXETINE HYDROCHLORIDE 40 MG/1
40 CAPSULE ORAL DAILY
Qty: 90 CAPSULE | Refills: 1 | Status: SHIPPED | OUTPATIENT
Start: 2018-09-03 | End: 2019-01-02 | Stop reason: SDUPTHER

## 2018-09-14 ENCOUNTER — OFFICE VISIT (OUTPATIENT)
Dept: PSYCHIATRY | Facility: CLINIC | Age: 53
End: 2018-09-14
Payer: COMMERCIAL

## 2018-09-14 ENCOUNTER — PATIENT MESSAGE (OUTPATIENT)
Dept: PSYCHIATRY | Facility: CLINIC | Age: 53
End: 2018-09-14

## 2018-09-14 DIAGNOSIS — F33.41 RECURRENT MAJOR DEPRESSION IN PARTIAL REMISSION: Primary | ICD-10-CM

## 2018-09-14 PROCEDURE — 90834 PSYTX W PT 45 MINUTES: CPT | Mod: S$GLB,,, | Performed by: SOCIAL WORKER

## 2018-09-14 NOTE — PROGRESS NOTES
Individual Psychotherapy (PhD/LCSW)    9/14/2018    Site:  [facility]         Therapeutic Intervention: Met with patient.  Outpatient - Insight oriented psychotherapy 45 min - CPT code 30942    Chief complaint/reason for encounter: depression and anxiety     Interval history and content of current session:       Did read next chapter and recorded thoughts.    Notice thoughts like is not perfect, is stupid, you are going to think am  .    Discussion over types of irrational thoughts.  Encouraged simplification of thought record.  Discussion over her fears of aging.  She has a book on mindfulness she did not complete.  I think teaching her mindfulness can be beneficial.      Treatment plan:  · Target symptoms: depression, anxiety   · Why chosen therapy is appropriate versus another modality: relevant to diagnosis  · Outcome monitoring methods: self-report, observation  · Therapeutic intervention type: insight oriented psychotherapy, behavior modifying psychotherapy, supportive psychotherapy    Risk parameters:  Patient reports no suicidal ideation  Patient reports no homicidal ideation  Patient reports no self-injurious behavior  Patient reports no violent behavior    Verbal deficits: None    Patient's response to intervention:  The patient's response to intervention is accepting.    Progress toward goals and other mental status changes:  The patient's progress toward goals is fair .    Diagnosis:     ICD-10-CM ICD-9-CM   1. Recurrent major depression in partial remission F33.41 296.35       Plan:  individual psychotherapy    Return to clinic: 3 weeks    Length of Service (minutes): 45

## 2018-10-10 ENCOUNTER — PATIENT MESSAGE (OUTPATIENT)
Dept: INTERNAL MEDICINE | Facility: CLINIC | Age: 53
End: 2018-10-10

## 2018-10-10 ENCOUNTER — LAB VISIT (OUTPATIENT)
Dept: LAB | Facility: HOSPITAL | Age: 53
End: 2018-10-10
Attending: FAMILY MEDICINE
Payer: COMMERCIAL

## 2018-10-10 DIAGNOSIS — N30.00 ACUTE CYSTITIS WITHOUT HEMATURIA: ICD-10-CM

## 2018-10-10 DIAGNOSIS — N30.00 ACUTE CYSTITIS WITHOUT HEMATURIA: Primary | ICD-10-CM

## 2018-10-10 LAB
BACTERIA #/AREA URNS AUTO: ABNORMAL /HPF
BILIRUB UR QL STRIP: NEGATIVE
CLARITY UR REFRACT.AUTO: ABNORMAL
COLOR UR AUTO: YELLOW
GLUCOSE UR QL STRIP: NEGATIVE
HGB UR QL STRIP: ABNORMAL
KETONES UR QL STRIP: NEGATIVE
LEUKOCYTE ESTERASE UR QL STRIP: ABNORMAL
MICROSCOPIC COMMENT: ABNORMAL
NITRITE UR QL STRIP: POSITIVE
PH UR STRIP: 6 [PH] (ref 5–8)
PROT UR QL STRIP: NEGATIVE
RBC #/AREA URNS AUTO: 22 /HPF (ref 0–4)
SP GR UR STRIP: 1.02 (ref 1–1.03)
SQUAMOUS #/AREA URNS AUTO: 0 /HPF
URN SPEC COLLECT METH UR: ABNORMAL
UROBILINOGEN UR STRIP-ACNC: NEGATIVE EU/DL
WBC #/AREA URNS AUTO: >100 /HPF (ref 0–5)

## 2018-10-10 PROCEDURE — 87086 URINE CULTURE/COLONY COUNT: CPT

## 2018-10-10 PROCEDURE — 87088 URINE BACTERIA CULTURE: CPT

## 2018-10-10 PROCEDURE — 87077 CULTURE AEROBIC IDENTIFY: CPT

## 2018-10-10 PROCEDURE — 87186 SC STD MICRODIL/AGAR DIL: CPT

## 2018-10-10 PROCEDURE — 81001 URINALYSIS AUTO W/SCOPE: CPT

## 2018-10-10 RX ORDER — SULFAMETHOXAZOLE AND TRIMETHOPRIM 800; 160 MG/1; MG/1
1 TABLET ORAL 2 TIMES DAILY
Qty: 20 TABLET | Refills: 0 | Status: SHIPPED | OUTPATIENT
Start: 2018-10-10 | End: 2018-12-11 | Stop reason: ALTCHOICE

## 2018-10-10 RX ORDER — ONDANSETRON 8 MG/1
8 TABLET, ORALLY DISINTEGRATING ORAL EVERY 6 HOURS PRN
Qty: 30 TABLET | Refills: 0 | Status: SHIPPED | OUTPATIENT
Start: 2018-10-10 | End: 2018-12-11 | Stop reason: ALTCHOICE

## 2018-10-10 RX ORDER — PHENAZOPYRIDINE HYDROCHLORIDE 200 MG/1
200 TABLET, FILM COATED ORAL
Qty: 6 TABLET | Refills: 0 | Status: SHIPPED | OUTPATIENT
Start: 2018-10-10 | End: 2018-12-11 | Stop reason: ALTCHOICE

## 2018-10-10 NOTE — TELEPHONE ENCOUNTER
I would normally rec she come in first. The symptoms, inc nausea and back pain, are very concerning. Reviewed recent ur culture. Will send in generic Zofran for nausea and generic BactrimDS for the UTI. If she could come in to the lab for urinalysis and urine culture today asap before starting abx that would be ideal. Regardless, it is important she start the abx asap. Will order all now. Thank you.

## 2018-10-12 ENCOUNTER — TELEPHONE (OUTPATIENT)
Dept: INTERNAL MEDICINE | Facility: CLINIC | Age: 53
End: 2018-10-12

## 2018-10-12 NOTE — TELEPHONE ENCOUNTER
----- Message from Zamzam Bazan MD sent at 10/12/2018  3:54 PM CDT -----  Nora, your urine culture is growing E. Coli, sensitivities are still pending  Please continue your Bactrim and when the results are available , the results will be forwarded to you.  Zamzam Montes

## 2018-10-13 LAB — BACTERIA UR CULT: NORMAL

## 2018-10-15 ENCOUNTER — TELEPHONE (OUTPATIENT)
Dept: INTERNAL MEDICINE | Facility: CLINIC | Age: 53
End: 2018-10-15

## 2018-10-15 NOTE — TELEPHONE ENCOUNTER
----- Message from Zamzam Bazan MD sent at 10/13/2018 12:00 PM CDT -----  Nora, your UTI is sensitive to the Bactrim  Please advise if your symptoms don;t resolve with completion of  the antibiotic course.  Zamzam Montes

## 2018-11-02 ENCOUNTER — OFFICE VISIT (OUTPATIENT)
Dept: PSYCHIATRY | Facility: CLINIC | Age: 53
End: 2018-11-02
Payer: COMMERCIAL

## 2018-11-02 DIAGNOSIS — F41.1 GENERALIZED ANXIETY DISORDER: ICD-10-CM

## 2018-11-02 DIAGNOSIS — F33.41 RECURRENT MAJOR DEPRESSION IN PARTIAL REMISSION: Primary | ICD-10-CM

## 2018-11-02 PROCEDURE — 90834 PSYTX W PT 45 MINUTES: CPT | Mod: S$GLB,,, | Performed by: SOCIAL WORKER

## 2018-11-02 NOTE — PROGRESS NOTES
Individual Psychotherapy (PhD/LCSW)    11/2/2018    Site:  [facility]         Therapeutic Intervention: Met with patient.  Outpatient - Insight oriented psychotherapy 45 min - CPT code 72215    Chief complaint/reason for encounter: depression and anxiety     Interval history and content of current session:     Reports less anx and dep.   Shared chess metaphor.  Discussion about thought fusion.  Mindful meditation.        Treatment plan:  · Target symptoms: depression, anxiety   · Why chosen therapy is appropriate versus another modality: relevant to diagnosis  · Outcome monitoring methods: self-report, observation  · Therapeutic intervention type: insight oriented psychotherapy, behavior modifying psychotherapy, supportive psychotherapy    Risk parameters:  Patient reports no suicidal ideation  Patient reports no homicidal ideation  Patient reports no self-injurious behavior  Patient reports no violent behavior    Verbal deficits: None    Patient's response to intervention:  The patient's response to intervention is accepting.    Progress toward goals and other mental status changes:  The patient's progress toward goals is fair .    Diagnosis:     ICD-10-CM ICD-9-CM   1. Recurrent major depression in partial remission F33.41 296.35   2. Generalized anxiety disorder F41.1 300.02       Plan:  individual psychotherapy    Return to clinic: 3 weeks    Length of Service (minutes): 45

## 2018-11-08 ENCOUNTER — PATIENT MESSAGE (OUTPATIENT)
Dept: DERMATOLOGY | Facility: CLINIC | Age: 53
End: 2018-11-08

## 2018-12-03 ENCOUNTER — OFFICE VISIT (OUTPATIENT)
Dept: PSYCHIATRY | Facility: CLINIC | Age: 53
End: 2018-12-03
Payer: COMMERCIAL

## 2018-12-03 DIAGNOSIS — F33.41 RECURRENT MAJOR DEPRESSION IN PARTIAL REMISSION: Primary | ICD-10-CM

## 2018-12-03 DIAGNOSIS — F41.1 GENERALIZED ANXIETY DISORDER: ICD-10-CM

## 2018-12-03 PROCEDURE — 90834 PSYTX W PT 45 MINUTES: CPT | Mod: S$GLB,,, | Performed by: SOCIAL WORKER

## 2018-12-03 NOTE — PROGRESS NOTES
Individual Psychotherapy (PhD/LCSW)    12/3/2018    Site:  [facility]         Therapeutic Intervention: Met with patient.  Outpatient - Insight oriented psychotherapy 45 min - CPT code 57025    Chief complaint/reason for encounter: depression and anxiety     Interval history and content of current session:      As teen starts experiencing sadness.  At 14. Maybe younger.      Mindfulness exercise.  mataphor of computers shared.  Behavioral intervention for 12 year old discussed including washing his own clothing.      Doing rounds discussed.     Treatment plan:  · Target symptoms: depression, anxiety   · Why chosen therapy is appropriate versus another modality: relevant to diagnosis  · Outcome monitoring methods: self-report, observation  · Therapeutic intervention type: insight oriented psychotherapy, behavior modifying psychotherapy, supportive psychotherapy    Risk parameters:  Patient reports no suicidal ideation  Patient reports no homicidal ideation  Patient reports no self-injurious behavior  Patient reports no violent behavior    Verbal deficits: None    Patient's response to intervention:  The patient's response to intervention is accepting.    Progress toward goals and other mental status changes:  The patient's progress toward goals is fair .    Diagnosis:     ICD-10-CM ICD-9-CM   1. Recurrent major depression in partial remission F33.41 296.35   2. Generalized anxiety disorder F41.1 300.02       Plan:  individual psychotherapy    Return to clinic: as schedule    Length of Service (minutes): 45

## 2018-12-10 NOTE — PROGRESS NOTES
54 yo non smoking  female presents for elbow pain, right, anterior lateral and posterior  Started: several mos  Trauma: n/a, but woke up w/ posterior elbow in stitch in sofa and has been problematic since then  Pain quality: tendonitis- ache; posterior- burning    Tx: post-rest; tendonitis- elbow support   Assoc: tendonitis in past     ROS:  No fever, chills or night sweats  No GERD or abdominal pain  No numbness or focal deficits  No rash or skin lesions  Remainder of review negative except as previously noted    MEDCARD: Reviewed  PMHX:Reviewed  PSHX: Reviewed  SHx: Nonsmoker  FHX: Reviewed    PE:  VSS:  GEN: WDWN, A&O, NAD, conversant and co-operative  EYES: Conj/lids unremarkable, sclera anicteric  NECK: Supple , w/o LN or TM  RESP: Efforts unlabored, lungs CTA  CV: 1+ radial and brachial    MSK: Gait normal. No CCE  Neck: NT, good ROM  SHoulder NT,   Elbow , right tender posteriorly and over the tendon insertion  NEURO: CRAVEN, no tremor noted  SKIN: Warm and dry      IMPRESSION:  Elbow pain, right  - tendonitis  - ulnar radicular sx     PLAN:  Rx ibuprofen 800mg tid prn w/ food and 8 oz liquid  Ice  Support  Consider Ortho  Paperwork to verify non smoking status- never smoked  Call prn

## 2018-12-11 ENCOUNTER — OFFICE VISIT (OUTPATIENT)
Dept: INTERNAL MEDICINE | Facility: CLINIC | Age: 53
End: 2018-12-11
Payer: COMMERCIAL

## 2018-12-11 VITALS
DIASTOLIC BLOOD PRESSURE: 66 MMHG | TEMPERATURE: 99 F | BODY MASS INDEX: 24.71 KG/M2 | HEIGHT: 67 IN | WEIGHT: 157.44 LBS | HEART RATE: 67 BPM | SYSTOLIC BLOOD PRESSURE: 102 MMHG | OXYGEN SATURATION: 98 % | RESPIRATION RATE: 18 BRPM

## 2018-12-11 DIAGNOSIS — G56.21 ULNAR NEUROPATHY AT ELBOW, RIGHT: Primary | ICD-10-CM

## 2018-12-11 DIAGNOSIS — M77.8 RIGHT ELBOW TENDINITIS: ICD-10-CM

## 2018-12-11 PROCEDURE — 99213 OFFICE O/P EST LOW 20 MIN: CPT | Mod: S$GLB,,, | Performed by: INTERNAL MEDICINE

## 2018-12-11 PROCEDURE — 99999 PR PBB SHADOW E&M-EST. PATIENT-LVL III: CPT | Mod: PBBFAC,,, | Performed by: INTERNAL MEDICINE

## 2018-12-11 PROCEDURE — 3008F BODY MASS INDEX DOCD: CPT | Mod: CPTII,S$GLB,, | Performed by: INTERNAL MEDICINE

## 2018-12-11 RX ORDER — IBUPROFEN 800 MG/1
800 TABLET ORAL 3 TIMES DAILY PRN
Qty: 60 TABLET | Refills: 2 | Status: ON HOLD | OUTPATIENT
Start: 2018-12-11 | End: 2019-04-05 | Stop reason: HOSPADM

## 2018-12-21 RX ORDER — TRAZODONE HYDROCHLORIDE 50 MG/1
100 TABLET ORAL NIGHTLY
Qty: 60 TABLET | Refills: 1 | Status: SHIPPED | OUTPATIENT
Start: 2018-12-21 | End: 2019-04-08 | Stop reason: SDUPTHER

## 2019-01-02 RX ORDER — FLUOXETINE HYDROCHLORIDE 40 MG/1
40 CAPSULE ORAL DAILY
Qty: 90 CAPSULE | Refills: 1 | Status: SHIPPED | OUTPATIENT
Start: 2019-01-02 | End: 2019-09-10 | Stop reason: SDUPTHER

## 2019-01-16 ENCOUNTER — OFFICE VISIT (OUTPATIENT)
Dept: OPTOMETRY | Facility: CLINIC | Age: 54
End: 2019-01-16
Payer: COMMERCIAL

## 2019-01-16 DIAGNOSIS — H52.13 MYOPIA WITH PRESBYOPIA, BILATERAL: Primary | ICD-10-CM

## 2019-01-16 DIAGNOSIS — H52.4 MYOPIA WITH PRESBYOPIA, BILATERAL: Primary | ICD-10-CM

## 2019-01-16 DIAGNOSIS — H10.13 ALLERGIC CONJUNCTIVITIS, BILATERAL: ICD-10-CM

## 2019-01-16 PROCEDURE — 92015 DETERMINE REFRACTIVE STATE: CPT | Mod: S$GLB,,, | Performed by: OPTOMETRIST

## 2019-01-16 PROCEDURE — 92014 COMPRE OPH EXAM EST PT 1/>: CPT | Mod: S$GLB,,, | Performed by: OPTOMETRIST

## 2019-01-16 PROCEDURE — 99999 PR PBB SHADOW E&M-EST. PATIENT-LVL II: ICD-10-PCS | Mod: PBBFAC,,, | Performed by: OPTOMETRIST

## 2019-01-16 PROCEDURE — 92015 PR REFRACTION: ICD-10-PCS | Mod: S$GLB,,, | Performed by: OPTOMETRIST

## 2019-01-16 PROCEDURE — 99499 NO LOS: ICD-10-PCS | Mod: S$GLB,,, | Performed by: OPTOMETRIST

## 2019-01-16 PROCEDURE — 99499 UNLISTED E&M SERVICE: CPT | Mod: S$GLB,,, | Performed by: OPTOMETRIST

## 2019-01-16 PROCEDURE — 99999 PR PBB SHADOW E&M-EST. PATIENT-LVL II: CPT | Mod: PBBFAC,,, | Performed by: OPTOMETRIST

## 2019-01-16 PROCEDURE — 92014 PR EYE EXAM, EST PATIENT,COMPREHESV: ICD-10-PCS | Mod: S$GLB,,, | Performed by: OPTOMETRIST

## 2019-01-16 NOTE — PROGRESS NOTES
Assessment /Plan     For exam results, see Encounter Report.    Myopia with presbyopia, bilateral      1. No contact lens follow up done.

## 2019-01-16 NOTE — MEDICAL/APP STUDENT
Pt reports mild blurry vision OU at near with current glasses. Gradual onset. Does not use gtts. Has itchy eyes OU d/t allergies. Denies pain and redness.   ALLISON: 7/2017, current glasses from 7/2017, PALs  FOH: mother had retinal detachment and lupus

## 2019-01-16 NOTE — PROGRESS NOTES
HPI     Pt reports mild blurry vision OU at near with current glasses. Gradual   onset. Does not use gtts. Has itchy eyes OU d/t allergies. Denies pain and   redness.   ALLISON: 7/2017, current glasses from 7/2017, PALs  FOH: mother had retinal detachment and lupus        Last edited by Augustus Nelson, OD on 1/16/2019  9:18 AM. (History)            Assessment /Plan     For exam results, see Encounter Report.    Myopia with presbyopia, bilateral    Allergic conjunctivitis, bilateral      1. Spec Rx given. Different lens options discussed with patient. RTC 1 year full exam.  2. Recommended OTC Zaditor bid OU for itching

## 2019-01-28 ENCOUNTER — PATIENT MESSAGE (OUTPATIENT)
Dept: INTERNAL MEDICINE | Facility: CLINIC | Age: 54
End: 2019-01-28

## 2019-01-28 DIAGNOSIS — J34.89 SINUS PRESSURE: Primary | ICD-10-CM

## 2019-02-05 ENCOUNTER — OFFICE VISIT (OUTPATIENT)
Dept: OTOLARYNGOLOGY | Facility: CLINIC | Age: 54
End: 2019-02-05
Payer: COMMERCIAL

## 2019-02-05 VITALS
TEMPERATURE: 98 F | SYSTOLIC BLOOD PRESSURE: 110 MMHG | HEART RATE: 68 BPM | DIASTOLIC BLOOD PRESSURE: 70 MMHG | BODY MASS INDEX: 24.75 KG/M2 | WEIGHT: 158 LBS

## 2019-02-05 DIAGNOSIS — J32.9 RECURRENT SINUS INFECTIONS: ICD-10-CM

## 2019-02-05 DIAGNOSIS — R09.81 NASAL CONGESTION: ICD-10-CM

## 2019-02-05 DIAGNOSIS — M89.8X8 EXOSTOSIS OF SKULL: ICD-10-CM

## 2019-02-05 DIAGNOSIS — Z98.890 HISTORY OF RECENT DENTAL PROCEDURE: ICD-10-CM

## 2019-02-05 DIAGNOSIS — J34.2 NASAL SEPTAL DEVIATION: ICD-10-CM

## 2019-02-05 DIAGNOSIS — J34.89 NASAL OBSTRUCTION: ICD-10-CM

## 2019-02-05 DIAGNOSIS — R51.9 FREQUENT HEADACHES: ICD-10-CM

## 2019-02-05 DIAGNOSIS — H57.12 PERIORBITAL PAIN, LEFT: Primary | ICD-10-CM

## 2019-02-05 PROCEDURE — 3008F PR BODY MASS INDEX (BMI) DOCUMENTED: ICD-10-PCS | Mod: CPTII,S$GLB,, | Performed by: OTOLARYNGOLOGY

## 2019-02-05 PROCEDURE — 99203 OFFICE O/P NEW LOW 30 MIN: CPT | Mod: S$GLB,,, | Performed by: OTOLARYNGOLOGY

## 2019-02-05 PROCEDURE — 99999 PR PBB SHADOW E&M-EST. PATIENT-LVL IV: CPT | Mod: PBBFAC,,, | Performed by: OTOLARYNGOLOGY

## 2019-02-05 PROCEDURE — 3008F BODY MASS INDEX DOCD: CPT | Mod: CPTII,S$GLB,, | Performed by: OTOLARYNGOLOGY

## 2019-02-05 PROCEDURE — 99203 PR OFFICE/OUTPT VISIT, NEW, LEVL III, 30-44 MIN: ICD-10-PCS | Mod: S$GLB,,, | Performed by: OTOLARYNGOLOGY

## 2019-02-05 PROCEDURE — 99999 PR PBB SHADOW E&M-EST. PATIENT-LVL IV: ICD-10-PCS | Mod: PBBFAC,,, | Performed by: OTOLARYNGOLOGY

## 2019-02-05 NOTE — LETTER
February 6, 2019      Zamzam Bazan MD  [address] [facility] [address]           [facility] - Otorhinolaryngology  [address]  [place] [address]  Phone: [phone]  Fax: [phone]          Patient: Nora Mccoy   MR Number: [id number]   YOB: 1965   Date of Visit: 2/5/2019       Dear Dr. Zamzam Bazan:    Thank you for referring Nora Mccoy to me for evaluation. Attached you will find relevant portions of my assessment and plan of care.    If you have questions, please do not hesitate to call me. I look forward to following Nora Mccoy along with you.    Sincerely,    Rohan Horvath III, MD    Enclosure  CC:  No Recipients    If you would like to receive this communication electronically, please contact [email or web address] or [phone] to request more information on [employer] Link access.    For providers and/or their staff who would like to refer a patient to [facility], please contact us through our one-stop-shop provider referral line, [facility], at [phone].    If you feel you have received this communication in error or would no longer like to receive these types of communications, please e-mail [email or web address]

## 2019-02-05 NOTE — PATIENT INSTRUCTIONS
Sinus CT scheduled; call for results  May use non-sedating antihistamine/decongesant of choice prn  Headache med of choice for now

## 2019-02-06 NOTE — PROGRESS NOTES
Subjective:       Patient ID: Nora Mccoy is a 54 y.o. female.    Chief Complaint: Sinusitis and Sinus Problem    HPI: Ms. Mccoy is a 54-year-old  female whose hand written reason for the visit today is chronic sinus congestion and discomfort   She indicates 1-2 headaches per week.  She indicates sinus pressure and pain all the time. She denies a history of sinus surgery or nasal surgery.   She was allergy tested when she was young ; she was allergic to honey bees.  She indicates left eye pressure symptoms.   Chief She is status post an extraction procedure of a left upper tooth in 1999 which involved her sinus, she says.  She indicates occasional GERD symptoms and chronic throat clearing.  She is an occasional gum chewer.    PMH:  PSH:  Lumbar surgery 2002; left upper tooth extraction 1999  Family history:  High blood pressure, thyroid disease, arthritis, breast cancer  Allergies:  Penicillin G, penicillins  Habits:  Patient denies tobacco or alcohol use; 4- 5 caffeinated drinks per day  Occupation:  RN   Review of Systems   Ears: Positive for ear pain, ear pressure, ringing in ear, dizziness and family history of hearing loss.  Hearing loss: Maybe.    Nose:  Positive for loss of smell, postnasal drip and snoring.    Mouth/Throat: Positive for pain swallowing, hoarse voice and oral ulcers.   Gastrointestinal:  Positive for acid reflux.   Other:  Positive for kidney problem ( UTI), bladder problem, arthritis ( knee), depression and anxiety. Negative for rash.     Current Outpatient Medications on File Prior to Visit   Medication Sig Dispense Refill    acyclovir (ZOVIRAX) 800 MG Tab Take 1 tablet (800 mg total) by mouth 2 (two) times daily. 60 tablet 5    cyclobenzaprine (FLEXERIL) 10 MG tablet Take 1 tablet (10 mg total) by mouth 3 (three) times daily as needed for Muscle spasms. 60 tablet 2    FLUoxetine 40 MG capsule Take 1 capsule (40 mg total) by mouth once daily. 90 capsule 1     ibuprofen (ADVIL,MOTRIN) 800 MG tablet Take 1 tablet (800 mg total) by mouth 3 (three) times daily as needed for Pain. Take with food and 8 oz liquid 60 tablet 2    lorazepam (ATIVAN) 0.5 MG tablet Take 1 tablet (0.5 mg total) by mouth 2 (two) times daily. 60 tablet 2    multivitamin (THERAGRAN) tablet Take 1 tablet by mouth once daily.        traZODone (DESYREL) 50 MG tablet TAKE 2 TABLETS (100 MG TOTAL) BY MOUTH EVERY EVENING. 60 tablet 1    epinephrine (EPIPEN 2-JAMES) 0.3 mg/0.3 mL AtIn Inject 0.3 mLs (0.3 mg total) into the muscle once. 2 each 2     No current facility-administered medications on file prior to visit.              Objective:     Blood pressure 110/70 pulse 68 temperature 98.2° height 5 ft 7 in weight 158 lb  General:  Alert and oriented lady in no acute distress  Physical Exam   Constitutional: She is oriented to person, place, and time. She appears well-developed and well-nourished.   HENT:   Head: Normocephalic.   Right Ear: Tympanic membrane and external ear normal. No drainage. No foreign bodies. No mastoid tenderness. Tympanic membrane is not perforated. No decreased hearing is noted.   Left Ear: Tympanic membrane and external ear normal. No drainage. No foreign bodies. No mastoid tenderness. Tympanic membrane is not perforated. No decreased hearing is noted.   Ears:    Nose: Septal deviation present. No nasal deformity or nasal septal hematoma. No epistaxis. Right sinus exhibits no maxillary sinus tenderness and no frontal sinus tenderness. Left sinus exhibits no maxillary sinus tenderness and no frontal sinus tenderness.       Mouth/Throat: Uvula is midline, oropharynx is clear and moist and mucous membranes are normal. No oral lesions. No trismus in the jaw. No uvula swelling. No oropharyngeal exudate or tonsillar abscesses.       Neck: Neck supple. No tracheal deviation present. No thyromegaly present.   Pulmonary/Chest: Effort normal. No stridor.   Lymphadenopathy:     She has no  cervical adenopathy.   Neurological: She is alert and oriented to person, place, and time.   Skin: No rash noted.       Assessment:       1. Periorbital pain, left    2. History of  dental procedure years ago; left upper molar extraction 1999   3. Nasal congestion, left    4. Nasal obstruction, left    5. Nasal septal deviation , left   6. Frequent headaches    7. Exostosis of skull    8. Recurrent sinus infections        Plan:     Sinus CT scheduled; call for results  May use non-sedating antihistamine/decongesant of choice prn  Headache med of choice for now

## 2019-02-12 ENCOUNTER — HOSPITAL ENCOUNTER (OUTPATIENT)
Dept: RADIOLOGY | Facility: HOSPITAL | Age: 54
Discharge: HOME | End: 2019-02-12
Attending: OTOLARYNGOLOGY
Payer: COMMERCIAL

## 2019-02-12 DIAGNOSIS — J34.2 NASAL SEPTAL DEVIATION: ICD-10-CM

## 2019-02-12 DIAGNOSIS — Z98.890 HISTORY OF RECENT DENTAL PROCEDURE: ICD-10-CM

## 2019-02-12 DIAGNOSIS — R09.81 NASAL CONGESTION: ICD-10-CM

## 2019-02-12 DIAGNOSIS — R51.9 FREQUENT HEADACHES: ICD-10-CM

## 2019-02-12 DIAGNOSIS — H57.12 PERIORBITAL PAIN, LEFT: ICD-10-CM

## 2019-02-12 DIAGNOSIS — J34.89 NASAL OBSTRUCTION: ICD-10-CM

## 2019-02-12 PROCEDURE — 70486 CT MAXILLOFACIAL W/O DYE: CPT | Mod: TC

## 2019-02-12 PROCEDURE — 70486 CT MEDTRONIC SINUSES WITHOUT: ICD-10-PCS | Mod: 26,,, | Performed by: RADIOLOGY

## 2019-02-12 PROCEDURE — 70486 CT MAXILLOFACIAL W/O DYE: CPT | Mod: 26,,, | Performed by: RADIOLOGY

## 2019-02-13 ENCOUNTER — PATIENT MESSAGE (OUTPATIENT)
Dept: DERMATOLOGY | Facility: CLINIC | Age: 54
End: 2019-02-13

## 2019-02-17 ENCOUNTER — PATIENT MESSAGE (OUTPATIENT)
Dept: OTOLARYNGOLOGY | Facility: CLINIC | Age: 54
End: 2019-02-17

## 2019-03-14 ENCOUNTER — PROCEDURE VISIT (OUTPATIENT)
Dept: DERMATOLOGY | Facility: CLINIC | Age: 54
End: 2019-03-14

## 2019-03-14 DIAGNOSIS — Z41.1 ELECTIVE PROCEDURE FOR UNACCEPTABLE COSMETIC APPEARANCE: Primary | ICD-10-CM

## 2019-03-14 NOTE — PROGRESS NOTES
Pt here for repeat botox treatment. Has had written consent signed in the past.    Pt denies any new medical problems or medications. Pt denies current pregnancy.   Risks reviewed including headache, pain, infection, bleeding, bruising, eyebrow droop, eyelid droop, asymmetry, inability to close eye temporarily. Pt understands and would like to have treatment.  16 units placed in glabella, 5 units placed in forehead, 0 units placed in crows feet.   No complications.    Post procedure handout provided.    Botox lot number [id number]  Expiration date 1/2021

## 2019-03-20 ENCOUNTER — OFFICE VISIT (OUTPATIENT)
Dept: OTOLARYNGOLOGY | Facility: CLINIC | Age: 54
End: 2019-03-20
Payer: COMMERCIAL

## 2019-03-20 ENCOUNTER — CLINICAL SUPPORT (OUTPATIENT)
Dept: AUDIOLOGY | Facility: CLINIC | Age: 54
End: 2019-03-20
Payer: COMMERCIAL

## 2019-03-20 VITALS
HEART RATE: 68 BPM | HEIGHT: 67 IN | DIASTOLIC BLOOD PRESSURE: 72 MMHG | SYSTOLIC BLOOD PRESSURE: 124 MMHG | WEIGHT: 161.19 LBS | BODY MASS INDEX: 25.3 KG/M2

## 2019-03-20 DIAGNOSIS — H69.92 EUSTACHIAN TUBE DYSFUNCTION, LEFT: ICD-10-CM

## 2019-03-20 DIAGNOSIS — J34.3 NASAL TURBINATE HYPERTROPHY: ICD-10-CM

## 2019-03-20 DIAGNOSIS — J31.0 CHRONIC RHINITIS: Primary | ICD-10-CM

## 2019-03-20 DIAGNOSIS — J32.8 OTHER CHRONIC SINUSITIS: ICD-10-CM

## 2019-03-20 DIAGNOSIS — H90.3 SENSORY HEARING LOSS, BILATERAL: Primary | ICD-10-CM

## 2019-03-20 DIAGNOSIS — J34.2 DEVIATED NASAL SEPTUM: ICD-10-CM

## 2019-03-20 PROCEDURE — 92557 PR COMPREHENSIVE HEARING TEST: ICD-10-PCS | Mod: S$GLB,,, | Performed by: AUDIOLOGIST

## 2019-03-20 PROCEDURE — 92567 PR TYMPA2METRY: ICD-10-PCS | Mod: S$GLB,,, | Performed by: AUDIOLOGIST

## 2019-03-20 PROCEDURE — 99999 PR PBB SHADOW E&M-EST. PATIENT-LVL I: ICD-10-PCS | Mod: PBBFAC,,,

## 2019-03-20 PROCEDURE — 3008F PR BODY MASS INDEX (BMI) DOCUMENTED: ICD-10-PCS | Mod: CPTII,S$GLB,, | Performed by: OTOLARYNGOLOGY

## 2019-03-20 PROCEDURE — 31231 NASAL ENDOSCOPY DX: CPT | Mod: S$GLB,,, | Performed by: OTOLARYNGOLOGY

## 2019-03-20 PROCEDURE — 99999 PR PBB SHADOW E&M-EST. PATIENT-LVL III: ICD-10-PCS | Mod: PBBFAC,,, | Performed by: OTOLARYNGOLOGY

## 2019-03-20 PROCEDURE — 99999 PR PBB SHADOW E&M-EST. PATIENT-LVL I: CPT | Mod: PBBFAC,,,

## 2019-03-20 PROCEDURE — 92557 COMPREHENSIVE HEARING TEST: CPT | Mod: S$GLB,,, | Performed by: AUDIOLOGIST

## 2019-03-20 PROCEDURE — 92567 TYMPANOMETRY: CPT | Mod: S$GLB,,, | Performed by: AUDIOLOGIST

## 2019-03-20 PROCEDURE — 99999 PR PBB SHADOW E&M-EST. PATIENT-LVL III: CPT | Mod: PBBFAC,,, | Performed by: OTOLARYNGOLOGY

## 2019-03-20 PROCEDURE — 99214 PR OFFICE/OUTPT VISIT, EST, LEVL IV, 30-39 MIN: ICD-10-PCS | Mod: 25,S$GLB,, | Performed by: OTOLARYNGOLOGY

## 2019-03-20 PROCEDURE — 31231 NASAL/SINUS ENDOSCOPY: ICD-10-PCS | Mod: S$GLB,,, | Performed by: OTOLARYNGOLOGY

## 2019-03-20 PROCEDURE — 3008F BODY MASS INDEX DOCD: CPT | Mod: CPTII,S$GLB,, | Performed by: OTOLARYNGOLOGY

## 2019-03-20 PROCEDURE — 99214 OFFICE O/P EST MOD 30 MIN: CPT | Mod: 25,S$GLB,, | Performed by: OTOLARYNGOLOGY

## 2019-03-20 NOTE — PROGRESS NOTES
Subjective:      Nora Mccoy is a 54 y.o. female who was referred to me by Dr. Rohan Horvath* in consultation for sinus pressure.    She relates a long history for several years of daily, perennial, moderate severity pressure in the face, usually in the eyes or frontal region and worse on the left side.  This is associated with recumbancy, and occurs with nasal congestion, nasal blockage (greater on the left), hyposmia, postnasal drip, and left-sided aural fullness.  She denies rhinorrhea but has pruritic symptoms.  She is often fatigued and is a nocturnal mouth-breather, which she attributes to her nasal congestion.    Current sinonasal medications include Flonase, saline and OTC antihistamines.  The last course of antibiotics was a 10-day course of Batrim about 5 months ago, and prior to that a 10-day course of cipro.  She has had steroid shots on multiple occasions as well.  She does not regularly use nasal decongestant sprays.    She does not recall previously having allergy testing.    She denies a history of asthma.    She relates a history of reflux symptoms which is not currently managed with medication.  She has not previously had an EGD.    She denies have a diagnosis of obstructive sleep apnea.     She has not had sinonasal surgery.    She does not recall a prior history of nasal trauma.    SNOT-22 score = 44, NOSE score = 70%, ETDQ-7 score = 4.0    Global QOL = 40%    Days missed = 5 to 25      Past Medical History  She has a past medical history of Bronchitis, DJD (degenerative joint disease), Esophagitis, unspecified, Fibrocystic disease of breast, Menstrual disorder, Sinus infection, Stress, and UTI (urinary tract infection).    Past Surgical History  She has a past surgical history that includes Back surgery; Colonoscopy (N/A, 7/7/2016); and Breast biopsy (Left, 2015).    Family History  Her family history includes Breast cancer in her maternal aunt; Breast cancer (age of onset:  72) in her mother; Cancer in her father; Cataracts in her maternal grandmother; Hypertension in her sister; Lupus in her mother; No Known Problems in her brother and brother; Other in her mother; Retinal detachment in her mother; Thyroid disease in her mother.    Social History  She reports that  has never smoked. she has never used smokeless tobacco. She reports that she drinks about 1.0 oz of alcohol per week. She reports that she does not use drugs.    Allergies  She is allergic to penicillin g and penicillins.    Medications   She has a current medication list which includes the following prescription(s): acyclovir, cyclobenzaprine, fluoxetine, ibuprofen, lorazepam, multivitamin, trazodone, and epinephrine.    Review of Systems  Review of Systems   Constitutional: Negative for fatigue, fever and unexpected weight change.   HENT: Positive for congestion, ear pain, postnasal drip, sinus pressure, sore throat and tinnitus. Negative for dental problem, ear discharge, facial swelling, hearing loss, hoarse voice, nosebleeds, rhinorrhea, trouble swallowing and voice change.    Eyes: Positive for itching. Negative for photophobia, discharge and visual disturbance.   Respiratory: Positive for cough. Negative for apnea, shortness of breath and wheezing.    Cardiovascular: Negative for chest pain and palpitations.   Gastrointestinal: Negative for abdominal pain, nausea and vomiting.   Endocrine: Negative for cold intolerance and heat intolerance.   Genitourinary: Negative for difficulty urinating.   Musculoskeletal: Negative for arthralgias, back pain, myalgias and neck pain.   Skin: Negative for rash.   Allergic/Immunologic: Positive for environmental allergies. Negative for food allergies.   Neurological: Positive for headaches. Negative for dizziness, seizures, syncope and weakness.   Hematological: Negative for adenopathy. Does not bruise/bleed easily.   Psychiatric/Behavioral: Negative for decreased concentration,  "dysphoric mood and sleep disturbance. The patient is not nervous/anxious.           Objective:     /72   Pulse 68   Ht 5' 7" (1.702 m)   Wt 73.1 kg (161 lb 2.5 oz)   LMP 02/15/2017   BMI 25.24 kg/m²        Constitutional:   She appears well-developed. She is cooperative. Normal speech.  No hoarse voice.      Head:  Normocephalic. Salivary glands normal.  Facial strength is normal.      Ears:    Right Ear: No drainage or tenderness. Tympanic membrane is not perforated. Tympanic membrane mobility is normal. No middle ear effusion. No decreased hearing is noted.   Left Ear: No drainage or tenderness. Tympanic membrane is not perforated. Tympanic membrane mobility is normal.  No middle ear effusion. No decreased hearing is noted.     Nose:  Septal deviation present. No mucosal edema, rhinorrhea or polyps. No epistaxis. Turbinate hypertrophy.  Turbinates normal and no turbinate masses.  Right sinus exhibits no maxillary sinus tenderness and no frontal sinus tenderness. Left sinus exhibits no maxillary sinus tenderness and no frontal sinus tenderness.     Mouth/Throat  Oropharynx clear and moist without lesions or asymmetry and normal uvula midline. She does not have dentures. Normal dentition. No oral lesions or mucous membrane lesions. No oropharyngeal exudate or posterior oropharyngeal erythema. Tonsils present, +1.  Mirror exam not performed due to patient tolerance.  Mirror exam not performed due to patient tolerance.      Neck:  Neck normal without thyromegaly masses, asymmetry, normal tracheal structure, crepitus, and tenderness, thyroid normal, trachea normal and no adenopathy. Normal range of motion present.     She has no cervical adenopathy.     Cardiovascular:   Regular rhythm.      Pulmonary/Chest:   Effort normal.     Psychiatric:   She has a normal mood and affect. Her speech is normal and behavior is normal.     Neurological:   No cranial nerve deficit.     Skin:   No rash noted. "       Procedure    Nasal endoscopy performed.  See procedure note.     Left nasal valve     Left MT     Left septal spur     Left ET with edema and scar tissue     Left postnasal drip     Right nasal valve     Right MT     Right ET      Data Reviewed    WBC (K/uL)   Date Value   03/20/2018 4.81     Eosinophil% (%)   Date Value   03/20/2018 4.6     Eos # (K/uL)   Date Value   03/20/2018 0.2     Platelets (K/uL)   Date Value   03/20/2018 261     Glucose (mg/dL)   Date Value   03/20/2018 89     No results found for: IGE    I independently reviewed the images of the CT sinuses dated 2/12/19. Pertinent findings include diffuse partial opacification of bilateral ethmoid, maxillary and proximal frontal sinsues, with predominantly rightward septal deviation.     I independently reviewed the tracings of the complete audiometric evaluation performed today.  I reviewed the audiogram with the patient as well.  Pertinent findings include a normal study.           Assessment:     1. Chronic rhinitis    2. Other chronic sinusitis    3. Deviated nasal septum    4. Nasal turbinate hypertrophy    5. Eustachian tube dysfunction, left         Plan:     I had a long discussion with the patient regarding her condition and the further workup and management options.  She would benefit from endoscopic sinus surgery and septoplasty for the treatment of her condition.  This would include the ethmoid, maxillary and frontal sinuses and would be bilateral.  Inferior turbinate reduction would be included.  I discussed the risks, benefits and alternatives to surgery with the patient, as well as the expected postoperative course.  I gave her the opportunity to ask questions and I answered all of them.  I provided relevant printed information on her condition for her to review at home.  Same-day discharge is anticipated.  She may have anesthesia triage by telephone.   The surgery will be tentatively scheduled for April 5.  She will need to return  for a postoperative visit 1 week after surgery.    Follow-up for surgery.

## 2019-03-20 NOTE — PROGRESS NOTES
Audiologic Evaluation    Nora Mccoy was seen on the above date for a hearing evaluation. Nora Mccoy reports aural fullness in the left ear and occasional tinnitus. Nora Mccoy denies decreased hearing sensitivity and dizziness.    Audiometric testing via insert ear phones indicated normal to borderline hearing sensitivity for 250-8000 Hz in each ear. Pure tone average and speech recognition threshold were in good agreement. Excellent speech discrimination ability was demonstrated in quiet when novel words were presented at a conversational level in each ear.      Recommendations:  1) Otologic consultation.  2) Annual audiometric testing to monitor hearing sensitivity.  3) Hearing protection in the presence of excessive noise.

## 2019-03-20 NOTE — LETTER
2019    Rohan Horvath III, MD  [address] [place] [address]           OTOLARYNGOLOGY AND COMMUNICATION SCIENCES    Geronimo Samuel MD, FACS          SURGICAL AND MEDICAL DISEASES OF HEAD AND NECK  MD Geronimo Patiño MD, FACS  Rohan Horvath III, MD    OTOLOGY, NEUROTOLOGY and SKULL-BASE SURGERY  Jose Eduardo Cooper MD, FACS  Freddy Carrera MD, Director    PEDIATRIC OTOLARYNGOLOGY & OTOLOGY  MIREYA Carlin MD, FAAP  Saurabh Fletcher MD, FACS    FACIAL PLASTIC and RECONSTRUCTIVE SURGERY  ALESSANDRO Jaffe III, MD, FACS    RHINOLOGY and SINUS SURGERY  Vijay Tatum MD, MPH, FACS  Director   ALESSANDRO Jaffe III, MD, FACS    LARYNGOLOGY  Augustus Chavira MD    SPEECH-LANGUAGE PATHOLOGY  Haydee Nava, PhD, [facility]-SLP  Monica Leyva, MS, CCC-SLP  Alisa Jenkins, MS, CCC-SLP  Jerrica Contreras MA, [facility]-SLP    AUDIOLOGY SECTION  Julee Ardon, MS, CCC-A  REBECCA Quiles, CCC-A  Valorie Benson, CCC-A  Valorie Brown, CCC-A  Timothy Rincon Jr., REBECCA, CCA-A  REBECCA Courtney, CCC-A  Valorie Campbell, CCC-A    ADVANCED PRACTICE CLINICIANS  Head and Neck Surgical Oncology  ROSCOE Baltazar, FNP-C  Pedatric Otolaryngology  ROSCOE Hartman, PNP-C       Re:  Nora Mccoy  :  1965    Dear Dr. Horvath,    Thank you for referring your patient, Nora Mccoy, to us for evaluation and treatment.  I have enclosed a copy of my clinic note for your review and records.  If you have any questions please do not hesitate to contact our office.     Thank you for allowing me to participate in the care of your patient.    Sincerely,        Vijay Tatum MD, MPH, FACS    Director, Rhinology and Sinus Surgery  Department of Otorhinolaryngology  [facility]    Encl:  Progress note       [facility] [address]  phone [phone]  fax  [phone]  [email or web address]

## 2019-03-20 NOTE — Clinical Note
March 20, 2019      Rohan Horvath III, MD  [address]  [facility] [address]           [facility] - Otorhinolaryngology  [address]  [facility] [address]  Phone: [phone]  Fax: [phone]          Patient: Nora Mccoy   MR Number: [id number]   YOB: 1965   Date of Visit: 3/20/2019       Dear Dr. Rohan Horvath III:    Thank you for referring Nora Mccoy to me for evaluation. Attached you will find relevant portions of my assessment and plan of care.    If you have questions, please do not hesitate to call me. I look forward to following Nora Mccoy along with you.    Sincerely,    Vijay Tatum MD    Enclosure  CC:  No Recipients    If you would like to receive this communication electronically, please contact [email or web address] or [phone] to request more information on [employer] access.    For providers and/or their staff who would like to refer a patient to [facility], please contact us through our one-stop-shop provider referral line, [facility], at [phone].    If you feel you have received this communication in error or would no longer like to receive these types of communications, please e-mail [email or web address]

## 2019-03-20 NOTE — PROCEDURES
Nasal/sinus endoscopy  Date/Time: 3/20/2019 2:38 PM  Performed by: Vijay Tatum MD  Authorized by: Vijay Tatum MD     Consent Done?:  Yes (Verbal)  Anesthesia:     Local anesthetic:  Lidocaine 4% and Stanley-Synephrine 1/2%    Patient tolerance:  Patient tolerated the procedure well with no immediate complications  Nose:     Procedure Performed:  Nasal Endoscopy  External:      No external nasal deformity  Intranasal:      Mucosa no polyps     Mucosa ulcers not present     No mucosa lesions present     Enlarged turbinates     Septum gross deformity  Nasopharynx:      No mucosa lesions     Adenoids not present     Posterior choanae patent     Eustachian tube patent     Biphasic septal deviation.  No polyps or purulence.  Bilateral MT edema.  Left ET erythema and edema.  Adenoidectomy scar with scar band to left ET torus.

## 2019-03-22 DIAGNOSIS — J34.3 NASAL TURBINATE HYPERTROPHY: ICD-10-CM

## 2019-03-22 DIAGNOSIS — J32.8 OTHER CHRONIC SINUSITIS: Primary | ICD-10-CM

## 2019-03-22 DIAGNOSIS — J34.2 DEVIATED NASAL SEPTUM: ICD-10-CM

## 2019-03-26 ENCOUNTER — PATIENT MESSAGE (OUTPATIENT)
Dept: PSYCHIATRY | Facility: CLINIC | Age: 54
End: 2019-03-26

## 2019-03-26 ENCOUNTER — PATIENT MESSAGE (OUTPATIENT)
Dept: SURGERY | Facility: HOSPITAL | Age: 54
End: 2019-03-26

## 2019-04-04 ENCOUNTER — TELEPHONE (OUTPATIENT)
Dept: OTOLARYNGOLOGY | Facility: CLINIC | Age: 54
End: 2019-04-04

## 2019-04-04 ENCOUNTER — ANESTHESIA EVENT (OUTPATIENT)
Dept: SURGERY | Facility: HOSPITAL | Age: 54
End: 2019-04-04
Payer: COMMERCIAL

## 2019-04-04 NOTE — ANESTHESIA PREPROCEDURE EVALUATION
[facility]  Anesthesia Pre-Operative Evaluation         Patient Name: Nora Mccoy  YOB: 1965  MRN: [id number]    SUBJECTIVE:     Pre-operative evaluation for Procedure(s) (LRB):  FESS, USING COMPUTER-ASSISTED NAVIGATION (Bilateral)  SEPTOPLASTY, NOSE (N/A)  REDUCTION, NASAL TURBINATE (Bilateral)     04/04/2019    Nora Mccoy is a 54 y.o. female w/ a significant PMHx of Penicillin anaphylaxis, DJD, anxiety    Patient now presents for the above procedure(s).      LDA: None documented.       Prev airway: None documented.    Drips: None documented.      Patient Active Problem List   Diagnosis    Other plastic surgery for unacceptable cosmetic appearance    Knee pain    Meningioma       Review of patient's allergies indicates:   Allergen Reactions    Penicillin g Anaphylaxis    Penicillins Anaphylaxis       Current Inpatient Medications:      No current facility-administered medications on file prior to encounter.      Current Outpatient Medications on File Prior to Encounter   Medication Sig Dispense Refill    FLUoxetine 40 MG capsule Take 1 capsule (40 mg total) by mouth once daily. 90 capsule 1    ibuprofen (ADVIL,MOTRIN) 800 MG tablet Take 1 tablet (800 mg total) by mouth 3 (three) times daily as needed for Pain. Take with food and 8 oz liquid 60 tablet 2    multivitamin (THERAGRAN) tablet Take 1 tablet by mouth once daily.        traZODone (DESYREL) 50 MG tablet TAKE 2 TABLETS (100 MG TOTAL) BY MOUTH EVERY EVENING. 60 tablet 1    acyclovir (ZOVIRAX) 800 MG Tab Take 1 tablet (800 mg total) by mouth 2 (two) times daily. (Patient taking differently: Take 800 mg by mouth 2 (two) times daily as needed. ) 60 tablet 5    cyclobenzaprine (FLEXERIL) 10 MG tablet Take 1 tablet (10 mg total) by mouth 3 (three) times daily as needed for Muscle spasms. 60 tablet 2    epinephrine (EPIPEN 2-JAMES) 0.3 mg/0.3 mL AtIn Inject 0.3 mLs (0.3  mg total) into the muscle once. 2 each 2    lorazepam (ATIVAN) 0.5 MG tablet Take 1 tablet (0.5 mg total) by mouth 2 (two) times daily. (Patient taking differently: Take 0.5 mg by mouth every 12 (twelve) hours as needed. ) 60 tablet 2       Past Surgical History:   Procedure Laterality Date    BACK SURGERY      micro discectomy L4-L5, 2002    BREAST BIOPSY Left 2015    core bx-benign    COLONOSCOPY N/A 7/7/2016    Performed by Ronan Dolan MD at [facility] ([address] [place] [address])       Social History     Socioeconomic History    Marital status:      Spouse name: Sonu    Number of children: 2    Years of education: 18    Highest education level: Not on file   Occupational History    Occupation: registered nurse     Employer: [employer]     Comment: ICU   Social Needs    Financial resource strain: Not on file    Food insecurity:     Worry: Not on file     Inability: Not on file    Transportation needs:     Medical: Not on file     Non-medical: Not on file   Tobacco Use    Smoking status: Never Smoker    Smokeless tobacco: Never Used   Substance and Sexual Activity    Alcohol use: Yes     Alcohol/week: 1.0 oz     Types: 2 Standard drinks or equivalent per week    Drug use: No    Sexual activity: Not on file   Lifestyle    Physical activity:     Days per week: Not on file     Minutes per session: Not on file    Stress: Not on file   Relationships    Social connections:     Talks on phone: Not on file     Gets together: Not on file     Attends [employer] service: Not on file     Active member of club or organization: Not on file     Attends meetings of clubs or organizations: Not on file     Relationship status: Not on file   Other Topics Concern    Are you pregnant or think you may be? No    Breast-feeding Not Asked   Social History Narrative        Spouse w/ diabetes insipidus, CML , and neuroendocrine tumor newly dx    RN- working in Neuro tele ICU    2 children- dtr and son                OBJECTIVE:     Vital Signs Range (Last 24H):         Significant Labs:  Lab Results   Component Value Date    WBC 4.81 03/20/2018    HGB 12.2 03/20/2018    HCT 38.0 03/20/2018     03/20/2018    CHOL 187 03/20/2018    TRIG 54 03/20/2018    HDL 61 03/20/2018    ALT 11 03/20/2018    AST 15 03/20/2018     03/20/2018    K 3.9 03/20/2018     03/20/2018    CREATININE 0.8 03/20/2018    BUN 13 03/20/2018    CO2 25 03/20/2018    TSH 1.159 03/20/2018       Diagnostic Studies: No relevant studies.    EKG: No recent studies available.    2D ECHO:  No results found for this or any previous visit.      ASSESSMENT/PLAN:         Anesthesia Evaluation    I have reviewed the Patient Summary Reports.     I have reviewed the Medications.     Review of Systems  Anesthesia Hx:  History of prior surgery of interest to airway management or planning:       Physical Exam  General:  Well nourished    Airway/Jaw/Neck:  Airway Findings: Mouth Opening: Normal Tongue: Normal  General Airway Assessment: Adult      Dental:  Dental Findings: In tact    Chest/Lungs:  Chest/Lungs Findings: Clear to auscultation     Heart/Vascular:  Heart Findings: Rate: Normal  Rhythm: Regular Rhythm        Mental Status:  Mental Status Findings:  Cooperative, Alert and Oriented         Anesthesia Plan  Type of Anesthesia, risks & benefits discussed:  Anesthesia Type:  general  Patient's Preference:   Intra-op Monitoring Plan: standard ASA monitors  Intra-op Monitoring Plan Comments:   Post Op Pain Control Plan: multimodal analgesia, IV/PO Opioids PRN and per primary service following discharge from PACU  Post Op Pain Control Plan Comments:   Induction:   IV  Beta Blocker:  Patient is not currently on a Beta-Blocker (No further documentation required).       Informed Consent: Patient understands risks and agrees with Anesthesia plan.  Questions answered. Anesthesia consent signed with patient.  ASA Score: 2     Day of Surgery Review of  History & Physical:    H&P update referred to the surgeon.         Ready For Surgery From Anesthesia Perspective.

## 2019-04-04 NOTE — PRE-PROCEDURE INSTRUCTIONS
Preop instructions: NPO solids/ milk products after midnight and clears up to 2 hours before arrival (clear liquids are: water, apple juice, Gatorade & Jell-O, black coffee/no milk, cream or creamer), shower instructions, directions, leave all valuables at home, medication instructions for PM prior & am of procedure explained. Patient stated an understanding.     Patient denies any side effects or issues with anesthesia or sedation.     Patient does not know arrival time. Explained that this information comes from the surgeons office and if they have not heard from them by 3pm, to call office. Patient stated an understanding.

## 2019-04-05 ENCOUNTER — ANESTHESIA (OUTPATIENT)
Dept: SURGERY | Facility: HOSPITAL | Age: 54
End: 2019-04-05
Payer: COMMERCIAL

## 2019-04-05 ENCOUNTER — HOSPITAL ENCOUNTER (OUTPATIENT)
Facility: HOSPITAL | Age: 54
Discharge: HOME | End: 2019-04-05
Attending: OTOLARYNGOLOGY | Admitting: OTOLARYNGOLOGY
Payer: COMMERCIAL

## 2019-04-05 VITALS
TEMPERATURE: 98 F | SYSTOLIC BLOOD PRESSURE: 119 MMHG | BODY MASS INDEX: 24.33 KG/M2 | HEART RATE: 94 BPM | OXYGEN SATURATION: 96 % | RESPIRATION RATE: 16 BRPM | WEIGHT: 155 LBS | HEIGHT: 67 IN | DIASTOLIC BLOOD PRESSURE: 69 MMHG

## 2019-04-05 DIAGNOSIS — J32.8 OTHER CHRONIC SINUSITIS: ICD-10-CM

## 2019-04-05 DIAGNOSIS — J32.9 CHRONIC SINUSITIS: Primary | ICD-10-CM

## 2019-04-05 DIAGNOSIS — J34.2 DEVIATED NASAL SEPTUM: ICD-10-CM

## 2019-04-05 DIAGNOSIS — J34.3 NASAL TURBINATE HYPERTROPHY: ICD-10-CM

## 2019-04-05 PROCEDURE — 31256 EXPLORATION MAXILLARY SINUS: CPT | Mod: 50,51,, | Performed by: OTOLARYNGOLOGY

## 2019-04-05 PROCEDURE — 25000003 PHARM REV CODE 250: Performed by: STUDENT IN AN ORGANIZED HEALTH CARE EDUCATION/TRAINING PROGRAM

## 2019-04-05 PROCEDURE — 30520 PR REPAIR, NASAL SEPTUM: ICD-10-PCS | Mod: 51,,, | Performed by: OTOLARYNGOLOGY

## 2019-04-05 PROCEDURE — D9220A PRA ANESTHESIA: Mod: ,,, | Performed by: ANESTHESIOLOGY

## 2019-04-05 PROCEDURE — 61782 SCAN PROC CRANIAL EXTRA: CPT | Mod: ,,, | Performed by: OTOLARYNGOLOGY

## 2019-04-05 PROCEDURE — 63600175 PHARM REV CODE 636 W HCPCS: Performed by: STUDENT IN AN ORGANIZED HEALTH CARE EDUCATION/TRAINING PROGRAM

## 2019-04-05 PROCEDURE — 30520 REPAIR OF NASAL SEPTUM: CPT | Mod: 51,,, | Performed by: OTOLARYNGOLOGY

## 2019-04-05 PROCEDURE — D9220A PRA ANESTHESIA: ICD-10-PCS | Mod: ,,, | Performed by: ANESTHESIOLOGY

## 2019-04-05 PROCEDURE — 31253 PR ENDOSCOPY, NASAL/SINUS, W/ETHMOIDECTOMY: ICD-10-PCS | Mod: 50,,, | Performed by: OTOLARYNGOLOGY

## 2019-04-05 PROCEDURE — 94761 N-INVAS EAR/PLS OXIMETRY MLT: CPT

## 2019-04-05 PROCEDURE — 30140 PR EXCISION TURBINATE,SUBMUCOUS: ICD-10-PCS | Mod: 50,51,, | Performed by: OTOLARYNGOLOGY

## 2019-04-05 PROCEDURE — 37000008 HC ANESTHESIA 1ST 15 MINUTES: Performed by: OTOLARYNGOLOGY

## 2019-04-05 PROCEDURE — 61782 PR STEREOTACTIC COMP ASSIST PROC,CRANIAL,EXTRADURAL: ICD-10-PCS | Mod: ,,, | Performed by: OTOLARYNGOLOGY

## 2019-04-05 PROCEDURE — 31256 PR NASAL/SINUS ENDOSCOPY,OPEN MAXILL SINUS: ICD-10-PCS | Mod: 50,51,, | Performed by: OTOLARYNGOLOGY

## 2019-04-05 PROCEDURE — 36000710: Performed by: OTOLARYNGOLOGY

## 2019-04-05 PROCEDURE — 25000003 PHARM REV CODE 250: Performed by: OTOLARYNGOLOGY

## 2019-04-05 PROCEDURE — 63600175 PHARM REV CODE 636 W HCPCS: Performed by: OTOLARYNGOLOGY

## 2019-04-05 PROCEDURE — 88305 TISSUE EXAM BY PATHOLOGIST: CPT | Performed by: PATHOLOGY

## 2019-04-05 PROCEDURE — 71000015 HC POSTOP RECOV 1ST HR: Performed by: OTOLARYNGOLOGY

## 2019-04-05 PROCEDURE — 88305 TISSUE EXAM BY PATHOLOGIST: CPT | Mod: 26,,, | Performed by: PATHOLOGY

## 2019-04-05 PROCEDURE — 31253 NSL/SINS NDSC TOTAL: CPT | Mod: 50,,, | Performed by: OTOLARYNGOLOGY

## 2019-04-05 PROCEDURE — S0077 INJECTION, CLINDAMYCIN PHOSP: HCPCS | Performed by: OTOLARYNGOLOGY

## 2019-04-05 PROCEDURE — 30140 RESECT INFERIOR TURBINATE: CPT | Mod: 50,51,, | Performed by: OTOLARYNGOLOGY

## 2019-04-05 PROCEDURE — 36000711: Performed by: OTOLARYNGOLOGY

## 2019-04-05 PROCEDURE — 71000033 HC RECOVERY, INTIAL HOUR: Performed by: OTOLARYNGOLOGY

## 2019-04-05 PROCEDURE — 27201423 OPTIME MED/SURG SUP & DEVICES STERILE SUPPLY: Performed by: OTOLARYNGOLOGY

## 2019-04-05 PROCEDURE — 37000009 HC ANESTHESIA EA ADD 15 MINS: Performed by: OTOLARYNGOLOGY

## 2019-04-05 PROCEDURE — 71000039 HC RECOVERY, EACH ADD'L HOUR: Performed by: OTOLARYNGOLOGY

## 2019-04-05 PROCEDURE — 88305 TISSUE SPECIMEN TO PATHOLOGY - SURGERY: ICD-10-PCS | Mod: 26,,, | Performed by: PATHOLOGY

## 2019-04-05 PROCEDURE — C2625 STENT, NON-COR, TEM W/DEL SY: HCPCS | Performed by: OTOLARYNGOLOGY

## 2019-04-05 DEVICE — IMPLANT PROPEL MOMETASONE: Type: IMPLANTABLE DEVICE | Site: SINUS | Status: FUNCTIONAL

## 2019-04-05 RX ORDER — HYDROMORPHONE HYDROCHLORIDE 1 MG/ML
0.2 INJECTION, SOLUTION INTRAMUSCULAR; INTRAVENOUS; SUBCUTANEOUS EVERY 5 MIN PRN
Status: DISCONTINUED | OUTPATIENT
Start: 2019-04-05 | End: 2019-04-05 | Stop reason: HOSPADM

## 2019-04-05 RX ORDER — MUPIROCIN 20 MG/G
OINTMENT TOPICAL
Status: DISCONTINUED | OUTPATIENT
Start: 2019-04-05 | End: 2019-04-05 | Stop reason: HOSPADM

## 2019-04-05 RX ORDER — MIDAZOLAM HYDROCHLORIDE 1 MG/ML
INJECTION, SOLUTION INTRAMUSCULAR; INTRAVENOUS
Status: DISCONTINUED | OUTPATIENT
Start: 2019-04-05 | End: 2019-04-05

## 2019-04-05 RX ORDER — EPINEPHRINE 1 MG/ML
INJECTION, SOLUTION INTRACARDIAC; INTRAMUSCULAR; INTRAVENOUS; SUBCUTANEOUS
Status: DISCONTINUED | OUTPATIENT
Start: 2019-04-05 | End: 2019-04-05 | Stop reason: HOSPADM

## 2019-04-05 RX ORDER — ROCURONIUM BROMIDE 10 MG/ML
INJECTION, SOLUTION INTRAVENOUS
Status: DISCONTINUED | OUTPATIENT
Start: 2019-04-05 | End: 2019-04-05

## 2019-04-05 RX ORDER — FENTANYL CITRATE 50 UG/ML
INJECTION, SOLUTION INTRAMUSCULAR; INTRAVENOUS
Status: DISCONTINUED | OUTPATIENT
Start: 2019-04-05 | End: 2019-04-05

## 2019-04-05 RX ORDER — ACETAMINOPHEN 500 MG
TABLET ORAL
Status: DISCONTINUED
Start: 2019-04-05 | End: 2019-04-05 | Stop reason: HOSPADM

## 2019-04-05 RX ORDER — CLINDAMYCIN PHOSPHATE 900 MG/50ML
900 INJECTION, SOLUTION INTRAVENOUS ONCE
Status: DISCONTINUED | OUTPATIENT
Start: 2019-04-05 | End: 2019-04-05

## 2019-04-05 RX ORDER — LIDOCAINE HYDROCHLORIDE AND EPINEPHRINE 10; 10 MG/ML; UG/ML
INJECTION, SOLUTION INFILTRATION; PERINEURAL
Status: DISCONTINUED | OUTPATIENT
Start: 2019-04-05 | End: 2019-04-05 | Stop reason: HOSPADM

## 2019-04-05 RX ORDER — ACETAMINOPHEN 500 MG
1000 TABLET ORAL EVERY 6 HOURS PRN
Qty: 28 TABLET | Refills: 0 | Status: SHIPPED | OUTPATIENT
Start: 2019-04-05 | End: 2019-05-21

## 2019-04-05 RX ORDER — ACETAMINOPHEN 10 MG/ML
INJECTION, SOLUTION INTRAVENOUS
Status: DISCONTINUED | OUTPATIENT
Start: 2019-04-05 | End: 2019-04-05

## 2019-04-05 RX ORDER — LIDOCAINE HCL/PF 100 MG/5ML
SYRINGE (ML) INTRAVENOUS
Status: DISCONTINUED | OUTPATIENT
Start: 2019-04-05 | End: 2019-04-05

## 2019-04-05 RX ORDER — ONDANSETRON 8 MG/1
8 TABLET, ORALLY DISINTEGRATING ORAL ONCE
Status: COMPLETED | OUTPATIENT
Start: 2019-04-05 | End: 2019-04-05

## 2019-04-05 RX ORDER — ACETAMINOPHEN 500 MG
1000 TABLET ORAL
Status: COMPLETED | OUTPATIENT
Start: 2019-04-05 | End: 2019-04-05

## 2019-04-05 RX ORDER — PROPOFOL 10 MG/ML
VIAL (ML) INTRAVENOUS
Status: DISCONTINUED | OUTPATIENT
Start: 2019-04-05 | End: 2019-04-05

## 2019-04-05 RX ORDER — ONDANSETRON HYDROCHLORIDE 8 MG/1
8 TABLET, FILM COATED ORAL EVERY 8 HOURS PRN
Qty: 6 TABLET | Refills: 0 | Status: SHIPPED | OUTPATIENT
Start: 2019-04-05 | End: 2019-05-21

## 2019-04-05 RX ORDER — SODIUM CHLORIDE 9 MG/ML
INJECTION, SOLUTION INTRAVENOUS CONTINUOUS PRN
Status: DISCONTINUED | OUTPATIENT
Start: 2019-04-05 | End: 2019-04-05

## 2019-04-05 RX ORDER — IBUPROFEN 800 MG/1
800 TABLET ORAL EVERY 6 HOURS PRN
Qty: 28 TABLET | Refills: 0 | Status: SHIPPED | OUTPATIENT
Start: 2019-04-05 | End: 2019-05-21

## 2019-04-05 RX ORDER — SODIUM CHLORIDE 0.9 % (FLUSH) 0.9 %
10 SYRINGE (ML) INJECTION
Status: DISCONTINUED | OUTPATIENT
Start: 2019-04-05 | End: 2019-04-05 | Stop reason: HOSPADM

## 2019-04-05 RX ORDER — KETAMINE HYDROCHLORIDE 100 MG/ML
INJECTION, SOLUTION INTRAMUSCULAR; INTRAVENOUS
Status: DISCONTINUED | OUTPATIENT
Start: 2019-04-05 | End: 2019-04-05

## 2019-04-05 RX ORDER — CLINDAMYCIN PHOSPHATE 900 MG/50ML
900 INJECTION, SOLUTION INTRAVENOUS ONCE
Status: COMPLETED | OUTPATIENT
Start: 2019-04-05 | End: 2019-04-05

## 2019-04-05 RX ADMIN — ROCURONIUM BROMIDE 20 MG: 10 INJECTION, SOLUTION INTRAVENOUS at 02:04

## 2019-04-05 RX ADMIN — MIDAZOLAM HYDROCHLORIDE 2 MG: 1 INJECTION, SOLUTION INTRAMUSCULAR; INTRAVENOUS at 11:04

## 2019-04-05 RX ADMIN — LIDOCAINE HYDROCHLORIDE 100 MG: 20 INJECTION, SOLUTION INTRAVENOUS at 11:04

## 2019-04-05 RX ADMIN — ONDANSETRON 8 MG: 8 TABLET, ORALLY DISINTEGRATING ORAL at 04:04

## 2019-04-05 RX ADMIN — HYDROMORPHONE HYDROCHLORIDE 0.2 MG: 1 INJECTION, SOLUTION INTRAMUSCULAR; INTRAVENOUS; SUBCUTANEOUS at 03:04

## 2019-04-05 RX ADMIN — ACETAMINOPHEN 1000 MG: 500 TABLET ORAL at 02:04

## 2019-04-05 RX ADMIN — FENTANYL CITRATE 100 MCG: 50 INJECTION, SOLUTION INTRAMUSCULAR; INTRAVENOUS at 11:04

## 2019-04-05 RX ADMIN — CLINDAMYCIN IN 5 PERCENT DEXTROSE 900 MG: 18 INJECTION, SOLUTION INTRAVENOUS at 11:04

## 2019-04-05 RX ADMIN — SODIUM CHLORIDE: 0.9 INJECTION, SOLUTION INTRAVENOUS at 11:04

## 2019-04-05 RX ADMIN — ROCURONIUM BROMIDE 20 MG: 10 INJECTION, SOLUTION INTRAVENOUS at 01:04

## 2019-04-05 RX ADMIN — KETAMINE HYDROCHLORIDE 10 MG: 100 INJECTION, SOLUTION, CONCENTRATE INTRAMUSCULAR; INTRAVENOUS at 01:04

## 2019-04-05 RX ADMIN — HYDROMORPHONE HYDROCHLORIDE 0.2 MG: 1 INJECTION, SOLUTION INTRAMUSCULAR; INTRAVENOUS; SUBCUTANEOUS at 02:04

## 2019-04-05 RX ADMIN — ROCURONIUM BROMIDE 40 MG: 10 INJECTION, SOLUTION INTRAVENOUS at 11:04

## 2019-04-05 RX ADMIN — ROCURONIUM BROMIDE 10 MG: 10 INJECTION, SOLUTION INTRAVENOUS at 11:04

## 2019-04-05 RX ADMIN — ROCURONIUM BROMIDE 20 MG: 10 INJECTION, SOLUTION INTRAVENOUS at 12:04

## 2019-04-05 RX ADMIN — PROPOFOL 190 MG: 10 INJECTION, EMULSION INTRAVENOUS at 11:04

## 2019-04-05 RX ADMIN — PROPOFOL 10 MG: 10 INJECTION, EMULSION INTRAVENOUS at 02:04

## 2019-04-05 RX ADMIN — ROCURONIUM BROMIDE 10 MG: 10 INJECTION, SOLUTION INTRAVENOUS at 01:04

## 2019-04-05 RX ADMIN — KETAMINE HYDROCHLORIDE 30 MG: 100 INJECTION, SOLUTION, CONCENTRATE INTRAMUSCULAR; INTRAVENOUS at 11:04

## 2019-04-05 RX ADMIN — FENTANYL CITRATE 50 MCG: 50 INJECTION, SOLUTION INTRAMUSCULAR; INTRAVENOUS at 02:04

## 2019-04-05 RX ADMIN — ACETAMINOPHEN 1000 MG: 10 INJECTION, SOLUTION INTRAVENOUS at 11:04

## 2019-04-05 NOTE — BRIEF OP NOTE
[facility]  Brief Operative Note     SUMMARY     Surgery Date: 4/5/2019     Surgeon(s) and Role:     * Vijay Tatum MD - Primary     * Francisco Interiano MD - Resident - Assisting        Pre-op Diagnosis:  Other chronic sinusitis [J32.8]  Deviated nasal septum [J34.2]  Nasal turbinate hypertrophy [J34.3]    Post-op Diagnosis:  Post-Op Diagnosis Codes:     * Other chronic sinusitis [J32.8]     * Deviated nasal septum [J34.2]     * Nasal turbinate hypertrophy [J34.3]    Procedure(s) (LRB):  FESS, USING COMPUTER-ASSISTED NAVIGATION (Bilateral)  SEPTOPLASTY, NOSE (N/A)  REDUCTION, NASAL TURBINATE (Bilateral)    Anesthesia: General    Description of the findings of the procedure: FESS with maxillaries, ethmoids, and frontals as well as turbinate reduction and septoplasty    Findings/Key Components: See full op report    Estimated Blood Loss: * No values recorded between 4/5/2019 12:01 PM and 4/5/2019  2:31 PM *         Specimens:   Specimen (12h ago, onward)    Start     Ordered    04/05/19 1406  Specimen to Pathology - Surgery  Once     Comments:  Pre-op Diagnosis: Other chronic sinusitis [J32.8]Deviated nasal septum [J34.2]Nasal turbinate hypertrophy [J34.3]Procedure(s):FESS, USING COMPUTER-ASSISTED NAVIGATIONSEPTOPLASTY, NOSEREDUCTION, NASAL TURBINATE Number of specimens: 2Name of specimens: 1. Left Ethmoid - permanent2. Right Ethmoid - permament     Start Status     04/05/19 1406 Collected (04/05/19 1401) Order ID: [id number]       04/05/19 1406          Discharge Note    SUMMARY     Admit Date: 4/5/2019    Discharge Date and Time:  04/05/2019 2:39 PM    Hospital Course Following completion of an electively scheduled procedure, she was transferred to the PACU for postoperative monitoring. her hospital course was uneventful and noted for adequate pain control and PO intake following surgery. she is discharged home in good condition and will follow-up with Dr. Tatum in 1 week.      Final  Diagnosis: Post-Op Diagnosis Codes:     * Other chronic sinusitis [J32.8]     * Deviated nasal septum [J34.2]     * Nasal turbinate hypertrophy [J34.3]    Disposition: Home or Self Care    Follow Up/Patient Instructions:     Medications:  Reconciled Home Medications:      Medication List      START taking these medications    acetaminophen 500 MG tablet  Commonly known as:  TYLENOL  Take 2 tablets (1,000 mg total) by mouth every 6 (six) hours as needed for Pain (Alternate with motrin).     ondansetron 8 MG tablet  Commonly known as:  ZOFRAN  Take 1 tablet (8 mg total) by mouth every 8 (eight) hours as needed for Nausea.        CHANGE how you take these medications    acyclovir 800 MG Tab  Commonly known as:  ZOVIRAX  Take 1 tablet (800 mg total) by mouth 2 (two) times daily.  What changed:    · when to take this  · reasons to take this     ibuprofen 800 MG tablet  Commonly known as:  ADVIL,MOTRIN  Take 1 tablet (800 mg total) by mouth every 6 (six) hours as needed for Pain.  What changed:    · when to take this  · additional instructions     LORazepam 0.5 MG tablet  Commonly known as:  ATIVAN  Take 1 tablet (0.5 mg total) by mouth 2 (two) times daily.  What changed:    · when to take this  · reasons to take this        CONTINUE taking these medications    cyclobenzaprine 10 MG tablet  Commonly known as:  FLEXERIL  Take 1 tablet (10 mg total) by mouth 3 (three) times daily as needed for Muscle spasms.     EPINEPHrine 0.3 mg/0.3 mL Atin  Commonly known as:  EPIPEN 2-JAMES  Inject 0.3 mLs (0.3 mg total) into the muscle once.     FLUoxetine 40 MG capsule  Take 1 capsule (40 mg total) by mouth once daily.     multivitamin tablet  Commonly known as:  THERAGRAN  Take 1 tablet by mouth once daily.     traZODone 50 MG tablet  Commonly known as:  DESYREL  TAKE 2 TABLETS (100 MG TOTAL) BY MOUTH EVERY EVENING.          Discharge Procedure Orders   Diet Adult Regular     Lifting restrictions   Order Comments: Light duty. No  straining, stooping or lifting > 10 pounds x 2 weeks. No nose blowing. Sneeze with mouth open.     No dressing needed     Notify your health care provider if you experience any of the following:  persistent nausea and vomiting or diarrhea     Notify your health care provider if you experience any of the following:  severe uncontrolled pain     Notify your health care provider if you experience any of the following:  redness, tenderness, or signs of infection (pain, swelling, redness, odor or green/yellow discharge around incision site)     Notify your health care provider if you experience any of the following:  difficulty breathing or increased cough     Follow-up Information     Vijay Tatum MD In 1 week.    Specialty:  Otolaryngology  Contact information:  [facility] [address] [facility] [address]  [phone]

## 2019-04-05 NOTE — TRANSFER OF CARE
"Anesthesia Transfer of Care Note    Patient: Nora Mccoy    Procedure(s) Performed: Procedure(s) (LRB):  FESS, USING COMPUTER-ASSISTED NAVIGATION (Bilateral)  SEPTOPLASTY, NOSE (N/A)  REDUCTION, NASAL TURBINATE (Bilateral)    Patient location: PACU    Anesthesia Type: general    Transport from OR: Transported from OR on 6-10 L/min O2 by face mask with adequate spontaneous ventilation    Post pain: adequate analgesia    Post assessment: no apparent anesthetic complications    Post vital signs: stable    Level of consciousness: awake    Nausea/Vomiting: no nausea/vomiting    Complications: none    Transfer of care protocol was followed      Last vitals:   Visit Vitals  /60 (BP Location: Left arm, Patient Position: Lying)   Pulse (!) 112   Temp 37.2 °C (98.9 °F) (Temporal)   Resp 18   Ht 5' 7" (1.702 m)   Wt 70.3 kg (155 lb)   LMP 02/15/2017   SpO2 100%   Breastfeeding? No   BMI 24.28 kg/m²     "

## 2019-04-05 NOTE — H&P
Otolaryngology H&P    Subjective:      Nora Mccoy is a 54 y.o. female who was referred to me by Dr. Rohan Horvath* in consultation for sinus pressure.     She relates a long history for several years of daily, perennial, moderate severity pressure in the face, usually in the eyes or frontal region and worse on the left side.  This is associated with recumbancy, and occurs with nasal congestion, nasal blockage (greater on the left), hyposmia, postnasal drip, and left-sided aural fullness.  She denies rhinorrhea but has pruritic symptoms.  She is often fatigued and is a nocturnal mouth-breather, which she attributes to her nasal congestion.     Current sinonasal medications include Flonase, saline and OTC antihistamines.  The last course of antibiotics was a 10-day course of Batrim about 5 months ago, and prior to that a 10-day course of cipro.  She has had steroid shots on multiple occasions as well.  She does not regularly use nasal decongestant sprays.     She does not recall previously having allergy testing.     She denies a history of asthma.     She relates a history of reflux symptoms which is not currently managed with medication.  She has not previously had an EGD.     She denies have a diagnosis of obstructive sleep apnea.      She has not had sinonasal surgery.     She does not recall a prior history of nasal trauma.     SNOT-22 score = 44, NOSE score = 70%, ETDQ-7 score = 4.0     Global QOL = 40%     Days missed = 5 to 25        Past Medical History  She has a past medical history of Bronchitis, DJD (degenerative joint disease), Esophagitis, unspecified, Fibrocystic disease of breast, Menstrual disorder, Sinus infection, Stress, and UTI (urinary tract infection).     Past Surgical History  She has a past surgical history that includes Back surgery; Colonoscopy (N/A, 7/7/2016); and Breast biopsy (Left, 2015).     Family History  Her family history includes Breast cancer in her maternal  aunt; Breast cancer (age of onset: 72) in her mother; Cancer in her father; Cataracts in her maternal grandmother; Hypertension in her sister; Lupus in her mother; No Known Problems in her brother and brother; Other in her mother; Retinal detachment in her mother; Thyroid disease in her mother.     Social History  She reports that  has never smoked. she has never used smokeless tobacco. She reports that she drinks about 1.0 oz of alcohol per week. She reports that she does not use drugs.     Allergies  She is allergic to penicillin g and penicillins.     Medications   She has a current medication list which includes the following prescription(s): acyclovir, cyclobenzaprine, fluoxetine, ibuprofen, lorazepam, multivitamin, trazodone, and epinephrine.     Review of Systems  Review of Systems   Constitutional: Negative for fatigue, fever and unexpected weight change.   HENT: Positive for congestion, ear pain, postnasal drip, sinus pressure, sore throat and tinnitus. Negative for dental problem, ear discharge, facial swelling, hearing loss, hoarse voice, nosebleeds, rhinorrhea, trouble swallowing and voice change.    Eyes: Positive for itching. Negative for photophobia, discharge and visual disturbance.   Respiratory: Positive for cough. Negative for apnea, shortness of breath and wheezing.    Cardiovascular: Negative for chest pain and palpitations.   Gastrointestinal: Negative for abdominal pain, nausea and vomiting.   Endocrine: Negative for cold intolerance and heat intolerance.   Genitourinary: Negative for difficulty urinating.   Musculoskeletal: Negative for arthralgias, back pain, myalgias and neck pain.   Skin: Negative for rash.   Allergic/Immunologic: Positive for environmental allergies. Negative for food allergies.   Neurological: Positive for headaches. Negative for dizziness, seizures, syncope and weakness.   Hematological: Negative for adenopathy. Does not bruise/bleed easily.   Psychiatric/Behavioral:  "Negative for decreased concentration, dysphoric mood and sleep disturbance. The patient is not nervous/anxious.             Objective:      /72   Pulse 68   Ht 5' 7" (1.702 m)   Wt 73.1 kg (161 lb 2.5 oz)   LMP 02/15/2017   BMI 25.24 kg/m²          Constitutional:   She appears well-developed. She is cooperative. Normal speech.  No hoarse voice.       Head:  Normocephalic. Salivary glands normal.  Facial strength is normal.       Ears:    Right Ear: No drainage or tenderness. Tympanic membrane is not perforated. Tympanic membrane mobility is normal. No middle ear effusion. No decreased hearing is noted.   Left Ear: No drainage or tenderness. Tympanic membrane is not perforated. Tympanic membrane mobility is normal.  No middle ear effusion. No decreased hearing is noted.      Nose:  Septal deviation present. No mucosal edema, rhinorrhea or polyps. No epistaxis. Turbinate hypertrophy.  Turbinates normal and no turbinate masses.  Right sinus exhibits no maxillary sinus tenderness and no frontal sinus tenderness. Left sinus exhibits no maxillary sinus tenderness and no frontal sinus tenderness.      Mouth/Throat  Oropharynx clear and moist without lesions or asymmetry and normal uvula midline. She does not have dentures. Normal dentition. No oral lesions or mucous membrane lesions. No oropharyngeal exudate or posterior oropharyngeal erythema. Tonsils present, +1.  Mirror exam not performed due to patient tolerance.  Mirror exam not performed due to patient tolerance.       Neck:  Neck normal without thyromegaly masses, asymmetry, normal tracheal structure, crepitus, and tenderness, thyroid normal, trachea normal and no adenopathy. Normal range of motion present.     She has no cervical adenopathy.      Cardiovascular:   Regular rhythm.       Pulmonary/Chest:   Effort normal.      Psychiatric:   She has a normal mood and affect. Her speech is normal and behavior is normal.      Neurological:   No cranial nerve " deficit.      Skin:   No rash noted.         Procedure     Nasal endoscopy performed.  See procedure note.      Left nasal valve      Left MT      Left septal spur      Left ET with edema and scar tissue      Left postnasal drip      Right nasal valve      Right MT      Right ET        Data Reviewed         WBC (K/uL)   Date Value   03/20/2018 4.81          Eosinophil% (%)   Date Value   03/20/2018 4.6          Eos # (K/uL)   Date Value   03/20/2018 0.2          Platelets (K/uL)   Date Value   03/20/2018 261          Glucose (mg/dL)   Date Value   03/20/2018 89      No results found for: IGE     I independently reviewed the images of the CT sinuses dated 2/12/19. Pertinent findings include diffuse partial opacification of bilateral ethmoid, maxillary and proximal frontal sinsues, with predominantly rightward septal deviation.      I independently reviewed the tracings of the complete audiometric evaluation performed today.  I reviewed the audiogram with the patient as well.  Pertinent findings include a normal study.             Assessment:      1. Chronic rhinitis    2. Other chronic sinusitis    3. Deviated nasal septum    4. Nasal turbinate hypertrophy    5. Eustachian tube dysfunction, left          Plan:      I had a long discussion with the patient regarding her condition and the further workup and management options.  She would benefit from endoscopic sinus surgery and septoplasty for the treatment of her condition.  This would include the ethmoid, maxillary and frontal sinuses and would be bilateral.  Inferior turbinate reduction would be included.  I discussed the risks, benefits and alternatives to surgery with the patient, as well as the expected postoperative course.  I gave her the opportunity to ask questions and I answered all of them.  I provided relevant printed information on her condition for her to review at home.  Same-day discharge is anticipated.  She may have anesthesia triage by telephone.    The surgery will be tentatively scheduled for April 5.  She will need to return for a postoperative visit 1 week after surgery.      Interval H&P    No significant changes since last clinic visit. Will proceed to OR for procedure.    Francisco Interiano MD  Otolaryngology PGY-III

## 2019-04-05 NOTE — OP NOTE
DATE OF OPERATION: 4/5/2019    SURGEON:  Vijay Tatum MD     ASSISTANT SURGEON:  Francisco Interiano MD     OPERATION:     1. Endoscopic septoplasty.  2. Bilateral inferior turbinate reduction with submucosal resection.  3. Bilateral image-guided endoscopic total ethmoidectomy.  4. Bilateral image-guided endoscopic maxillary antrostomy.  5. Bilateral image-guided endoscopic frontal dissection with Draf IIA sinusotomy.     PREOPERATIVE DIAGNOSIS:      1. Deviated nasal septum.  2. Hypertrophic turbinates.  3. Chronic rhinosinusitis.        POSTOPERATIVE DIAGNOSIS:      1. Deviated nasal septum.  2. Hypertrophic turbinates.  3. Chronic rhinosinusitis.        ANESTHESIA: General.     COMPLICATIONS: None.     ESTIMATED BLOOD LOSS: 50 mL     SPECIMEN: Bilateral ethmoid.      WOUND EXPECTANCY: Clean-contaminated.    DRESSING: Propel in the bilateral middle meatus. No nasal packing.    FINDINGS: Rightward septal deviation.  Compound inferior turbinate hypertrophy.   Diffuse mucosal hypertrophy and nonpurulent mucus.     INDICATIONS: Chronic rhinosinusitis and anatomic nasal obstruction, not controlled with maximal medical therapy.     I discussed the risks, benefits and alternatives of surgical correction of the septal deviation, turbinate hypertrophy and chronically obstructed sinuses with the patient as well as the expected postoperative course. I gave her the opportunity to ask questions and I answered all of them. On the morning of surgery I again met with the patient and reviewed the indications for surgery and she consented to proceed.     DESCRIPTION OF PROCEDURE: The patient was brought to the operating room and placed supine on the operating table. The patient was placed under general anesthesia and intubated. The patient was positioned with a donut under the head and the image-guidance headset for the Medtronic Fusion system was applied.  Image-guided navigation was indicated to facilitate exenteration of all  ethmoid cells and the extent of sinusotomy.  The CT scan disc was loaded into the image-guidance system and registered with the patient tracking system according to the 's instructions. The pointer was calibrated and registration was verified using predefined landmarks.  Cottonoid pledgets soaked with 4% cocaine were placed into the nasal cavity bilaterally for mucosal decongestion. The mucosa of the nasal septum was injected with 1% lidocaine with 1:100,000 parts epinephrine. Prophylactic clindamycin was given prior to the surgery start. A time-out was performed to confirm the proper patient, site and procedure.  The CT images were again reviewed prior to the surgical start. The patient was prepped and draped in the usual fashion.  The bed was placed in 20-degree reverse Trendelenberg position.     Surgery began with performance of submucous resection of the nasal septum. This began with additional injections, assisted by a 0-degree endoscope. Then, a hemitransfixion incision was made using a #15 blade on the left side. The submucoperichondrial plane was identified and this was elevated using a [place] elevator. This plane was carried posteriorly to the bony-cartilaginous junction.  A Cole elevator was used to incise the cartilage at the junction and a contralateral mucoperiosteal flap was elevated. Then, using a 0-degree endoscope, the septal pocket was visualized and there was an additional long process of cartilage along the floor causing a deviation. This was resected using a Cole elevator and was removed.       Additional elevation of the flaps over the vomer revealed a large spur that was jutting into the middle meatus. This portion of the bone was resected top and bottom using a Fomon scissors and the spur was removed using a Jhonatan forceps. After removal, there was a small perforation on the right side of the mucoperichondrial flap, but the contralateral flap remained intact. At this point,  10,000 units of topical thrombin with 1:10,000 parts epinephrine was applied to pledgets and placed between the flaps for topical hemostasis.  After these pledgets were removed, there was excellent hemostasis at the septal site.       Then, under endoscopic visualization, a transseptal quilting suture of 4-0 plain gut was used to reapproximate the nasal septal flaps.  Then the hemitransfixion incision was closed using 4-0 chromic gut suture in figure-of-eight fashion times two.   Repeated nasal endoscopy at this point revealed marked improvement of the septal deviation and good visualization of the vertical suspension of both middle turbinates.     Attention was then turned to the turbinates.  Additional injections were performed around the inferior turbinates and the sphenopalatine ganglion region bilaterally.  Incisions were made in the anterior head of the inferior turbinate using a #6400 blade.  A Barry elevator was then tunnelled medially to the turbinate bone and used to segmentally outfracture and morselize the bone.  Then, a 2 mm blade on the powered tissue shaver was tunneled submucosally and used to resect soft tissue of the turbinate while overlying mucosa was preserved.   Finally, the turbinates were outfractured using a Mosher/Boies elevator.  An identical procedure was performed bilaterally.     Attention was then turned to the sinuses.   The left middle meatus was topically decongested using thrombin with epinephrine pledgets.    The uncinate process was then visualized and a micro-karely backbiter was used to incise the uncinate process. The uncinate was dissected to its superior attachment and removed using cutting forceps.  A andi bullosa was not present.       After removal of the bone, the natural ostium of the maxillary sinus was visible. The lumen was visualized with a 30-degree scope and entered using a curved suction to confirm the dimension. The antrostomy was enlarged with cutting  instruments to include the natural sinus ostium, taking care not to move anterior to the maxillary line so as to avoid injury to the nasolacrimal duct.  Additional bone was removed using forceps.  No abnormal tissue  was present within the maxillary sinus.        The ethmoid bulla was entered using a microdebrider and anterior ethmoidectomy was performed.  The roof of the bulla was removed with forceps and the suprabullar recess was exposed. The grand lamella of the middle turbinate was then traversed and posterior ethmoidectomy was performed.  An Onodi cell was not present.  The mucosa was hypertrophic throughout the sinuses, indicative of chronic inflammation.  Topical hemostatic agents on pledgets were then placed into the ethmoid cavity for hemostasis.    Dissection was performed at the site of the nasofrontal recess.  Using a 70-degree scope, a suprabullar cell was dissected and uncapped in a medial-to-lateral direction.  An agger nasi cell was then opened from an retrograde approach.  Additional frontal cells were not present.  Afterward, the frontal sinus ostium was visible but stenotic.  Therefore, the ostium was enlarged anteriorly using cutting instruments, taking care to avoid circumerential mucosal injury.  Powered instrumentation was not required.  The floor of the frontal sinus was removed between the lamina of the orbit and the suspension of the middle turbinate to complete a Draf IIA sinusotomy.  The anterior ethmoidal artery was identified and avoided with assistance from the image-guidance system probe.  At the conclusion of dissection there was excellent visualization into the frontal sinus, which would not otherwise have been possible.     Attention was then turned to the right side of the sinonasal tract.  A similar procedure was performed on this side, including uncinectomy, maxillary antrostomy, total ethmoidectomy, and frontal sinus dissection.     At the conclusion of these procedures, the  image-guidance probe was used to verify that all ethmoid cells had been properly opened and that the skull base was visible and that the lamina papyracea had not been traversed on either side.  All sinuses were copiously irrigated with warm normal saline solution.     At this point, the pledgets were all removed. Rene hemostatic agent was placed into the surgical sites.  A Propel steroid-eluting stent was placed into the bilateral ethmoid cavities to stent open the surgical site.  The nasal cavity was not packed.  Mupirocin ointment was applied to the vestibule bilaterally.  At this point, the headset was removed and the drapes were taken down. Intravenous dexamethasone was given toward the end of the case. The patient was turned back toward the anesthesiologist and awakened from anesthesia, extubated and transferred to the recovery room in stable condition.      POSTOPERATIVE PLAN:  Budesonide once stents are out.

## 2019-04-05 NOTE — DISCHARGE INSTRUCTIONS
INSTRUCTIONS TO FOLLOW AFTER SINUS AND NASAL SURGERY  DR. McCOUL - [facility]    Office hours:  Weekdays 8:00 am to 5:00 pm.  Please call [phone] and ask to speak with his nurse, Casie.    After-hours & weekends:  Please call [phone] and ask to speak with the ENT resident doctor.    Your first office visit with Dr. Tatum after surgery should have been already scheduled.  If you dont know when it is, call Dr. Myles nurse Casie at [phone].    Please call IMMMEDIATELY if you have:  - Temperature of 101° F or greater  - Any unusual, painful swelling  - Any active bleeding that saturates more than a 4x4 gauze  - Any thick drainage green or yellow drainage  - Changes in vision or swelling around the eye  - Pain not relieved by your prescribed pain medication    ACTIVITY:    Sleep on your back with the head of the bead elevated, up on 2-3 pillows, or in a recliner for the first 3 to 5 days. This will help with swelling.     After surgery you may have a lot of nasal drainage. This is normal. You may breathe through your nose if youre able but avoid inhaling forcibly. Let all drainage fall on your mustache dressing and change it as needed.    You may wake up after surgery with thick white stockings on. Wear them until you are walking around more. It is important to walk around often while at home to keep your blood circulating and prevent blood clots.    If you use CPAP or BiPAP to sleep at night, you should wait at least 48 hours before resuming use.  Dr. Tatum will advise you when it is safe to do this.    You may shower 24 hours after surgery.    RESTRICTED ACTIVITIES AFTER SURGERY:    Do NOT blow your nose for 2 weeks. If you have to sneeze or cough, do so with your mouth open.     AVOID all heavy lifting, straining or bending for 2 weeks.     AVOID any sexual activity for 2 weeks after surgery.    AVOID semi-contact sports or vigorous exercising for 3-4 weeks. Dr. Tatum will let you know  when you are cleared to resume exercise.    AVOID flying or swimming for 2 weeks.      Do NOT operate a motor vehicle or any type of heavy machinery within 24 hours of taking pain medication.    DO NOT smoke or be around smokers.    AVOID irritating substances that might make you sneeze, such as dust, chalk, harsh chemicals, and allergic triggers.  This might also include spicy foods.    DRESSINGS:    Change the gauze mustache dressing under your nose as needed. (If unsure what this dressing is or how to do this, ask your doctor or nurse before you leave the hospital.) You may have pinkish-red drainage for 2-3 days.    Usually there is no gauze packing placed inside the nose.  If packing is necessary, you will be informed by your surgeon.  Do not touch or pull at the packing. The packing will be removed by your doctor at your first visit after surgery.     You may also have a dissolvable stent or dissolvable sponge placed into the sinuses during surgery.  These usually do not need to be removed unless you are told otherwise by Dr. Tatum.  You may notice small fragments of these items come out of your nose in the weeks following surgery.    MEDICATIONS:    After surgery, you will be sent home with prescriptions for pain medication and an anti-nausea medication.  Antibiotics are usually not necessary.    Most people need pain medication for the first few days after surgery, although a narcotic is rarely necessary.  The best pain control comes from a combination of acetaminophen (Tylenol) and ibuprofen (Motrin).  You will be given prescriptions for these at the recommended dose.  These can be alternated so that you are taking something every 2 or 3 hours.    Some people have problems with bowel movements after surgery. If you have NOT had a bowel movement 3-5 days after surgery, go to your local pharmacy and purchase an over the counter stool softener such as COLACE. You can also ask the pharmacist for his or her  recommendation. If you still do not have a bowel movement after starting the softener, please call the office.    You will need these over-the-counter medications after surgery:    SALINE SINUS RINSE (Branden Med brand):  You will use this to rinse out your nose and sinuses after surgery.  Begin doing this the day after surgery, unless instructed otherwise by Dr. Tatum.  You should do this 2 times a day, following the instructions on the box.  AFRIN (regular strength): Only use if you have nasal congestion or bleeding. Use 2 times per day for 3 days, stop for 1 day, continue 2 times per day for 3 days, then stop completely.  NOTE:  You may not need to do this at all.    DIET:    Avoid hot and spicy foods for 1 week after surgery.    Begin with bland foods the evening after surgery and advance to your regular diet as tolerated.  It is not necessary to take only soft food unless you are recovering from tonsil surgery.    Drink plenty of fluids (water is best).     Avoid alcoholic and caffeinated beverages for 1 week after surgery because they can cause you to become dehydrated.

## 2019-04-06 NOTE — ANESTHESIA POSTPROCEDURE EVALUATION
Anesthesia Post Evaluation    Patient: Nora Mccoy    Procedure(s) Performed: Procedure(s) (LRB):  FESS, USING COMPUTER-ASSISTED NAVIGATION (Bilateral)  SEPTOPLASTY, NOSE (N/A)  REDUCTION, NASAL TURBINATE (Bilateral)    Final Anesthesia Type: general  Patient location during evaluation: PACU  Patient participation: Yes- Able to Participate  Level of consciousness: awake and alert and oriented  Post-procedure vital signs: reviewed and stable  Pain management: adequate  Airway patency: patent  PONV status at discharge: No PONV  Anesthetic complications: no      Cardiovascular status: blood pressure returned to baseline and hemodynamically stable  Respiratory status: unassisted  Hydration status: euvolemic  Follow-up not needed.          Vitals Value Taken Time   /69 4/5/2019  4:15 PM   Temp 36.8 °C (98.2 °F) 4/5/2019  4:15 PM   Pulse 96 4/5/2019  4:16 PM   Resp 16 4/5/2019  4:15 PM   SpO2 97 % 4/5/2019  4:16 PM   Vitals shown include unvalidated device data.      Event Time     Out of Recovery 15:37:06          Pain/Sonia Score: Pain Rating Prior to Med Admin: 6 (4/5/2019  3:05 PM)  Pain Rating Post Med Admin: 4 (4/5/2019  3:25 PM)  Sonia Score: 10 (4/5/2019  3:25 PM)

## 2019-04-08 RX ORDER — TRAZODONE HYDROCHLORIDE 50 MG/1
100 TABLET ORAL NIGHTLY
Qty: 60 TABLET | Refills: 1 | Status: SHIPPED | OUTPATIENT
Start: 2019-04-08 | End: 2019-06-04 | Stop reason: SDUPTHER

## 2019-04-11 ENCOUNTER — OFFICE VISIT (OUTPATIENT)
Dept: OTOLARYNGOLOGY | Facility: CLINIC | Age: 54
End: 2019-04-11
Payer: COMMERCIAL

## 2019-04-11 VITALS — SYSTOLIC BLOOD PRESSURE: 112 MMHG | DIASTOLIC BLOOD PRESSURE: 72 MMHG | HEART RATE: 86 BPM

## 2019-04-11 DIAGNOSIS — J32.8 OTHER CHRONIC SINUSITIS: Primary | ICD-10-CM

## 2019-04-11 PROCEDURE — 31237 NSL/SINS NDSC SURG BX POLYPC: CPT | Mod: 50,79,S$GLB, | Performed by: OTOLARYNGOLOGY

## 2019-04-11 PROCEDURE — 99024 PR POST-OP FOLLOW-UP VISIT: ICD-10-PCS | Mod: S$GLB,,, | Performed by: OTOLARYNGOLOGY

## 2019-04-11 PROCEDURE — 31237 NASAL/SINUS ENDOSCOPY: ICD-10-PCS | Mod: 50,79,S$GLB, | Performed by: OTOLARYNGOLOGY

## 2019-04-11 PROCEDURE — 99999 PR PBB SHADOW E&M-EST. PATIENT-LVL III: ICD-10-PCS | Mod: PBBFAC,,, | Performed by: OTOLARYNGOLOGY

## 2019-04-11 PROCEDURE — 99024 POSTOP FOLLOW-UP VISIT: CPT | Mod: S$GLB,,, | Performed by: OTOLARYNGOLOGY

## 2019-04-11 PROCEDURE — 99999 PR PBB SHADOW E&M-EST. PATIENT-LVL III: CPT | Mod: PBBFAC,,, | Performed by: OTOLARYNGOLOGY

## 2019-04-11 NOTE — LETTER
2019             OTOLARYNGOLOGY AND COMMUNICATION SCIENCES    Geronimo Samuel MD, FACS          SURGICAL AND MEDICAL DISEASES OF HEAD AND NECK  MD Geronimo Patiño MD, FACS  Rohan Horvath III, MD    OTOLOGY, NEUROTOLOGY and SKULL-BASE SURGERY  Jose Eduardo Cooper MD, FACS  Freddy Carrera MD, Director    PEDIATRIC OTOLARYNGOLOGY & OTOLOGY  MIREYA Carlin MD, FAAP  Saurabh Fletcher MD, FACS    FACIAL PLASTIC and RECONSTRUCTIVE SURGERY  ALESSANDRO Jaffe III, MD, FACS    RHINOLOGY and SINUS SURGERY  Vijay Tatum MD, MPH, FACS  Director   ALESSANDRO Jaffe III, MD, FACS    LARYNGOLOGY  Augustus Chavira MD    SPEECH-LANGUAGE PATHOLOGY  Haydee Nava, PhD, [facility]-SLP  Monica Leyva, MS, CCC-SLP  Alisa Jenkins, MS, CCC-SLP  Jerrica Contreras MA, [facility]-SLP    AUDIOLOGY SECTION  Julee Ardon, MS, CCC-A  REBECCA Quiles, CCC-A  Manjula Ramirez, Valorie, CCC-A  Valorie Brown, CCC-A  Timothy Rincon Jr., REBECCA, CCA-A  REBECCA Courtney, CCC-A  Valorie Campbell, CCC-A    [facility]  Head and Neck Surgical Oncology  ROSCOE Baltazar, FNP-C  Pedatric Otolaryngology  ROSCOE Hartman, PNP-C       Re:  Nora Mccoy  :  1965    To whom it may concern:    Nroa Mccoy underwent surgery on 2019.  She may return to work without restrictions as of .      Feel free to contact me with any questions.    Sincerely,        Vijay Tatum MD, MPH, FACS    Director, Rhinology and Sinus Surgery  Department of Otorhinolaryngology   [facility]         [facility] [address] [place] [address]  phone [phone]  fax [phone]  [email or web address]

## 2019-04-11 NOTE — PROGRESS NOTES
Subjective:      Nora Mccoy is a 54 y.o. female who comes for follow-up 1 week status-post endoscopic sinus surgery.  Her last visit with me was on 3/20/2019.  Doing well, breathing better, some dryness, congestion and tenderness.  Using saline rinse.    QOL assessment deferred.      Objective:     LMP 02/15/2017      General:   not in distress   Nasal:  edematous mucosa   no septal hematoma   no bleeding   Oral Cavity:   clear   Oropharynx:   no bleeding   Neck:   nontender       Procedure    Endoscopic debridement performed.  See procedure note.        Data Reviewed    Pathology report pending.         Assessment:     Doing well following bilateral endoscopic sinus surgery.  She also underwent septum/turbinate surgery which is unrelated to the reason for today's visit.    1. Other chronic sinusitis         Plan:     Continue saline rinse BID.  RTW 4/16.  Follow up in about 1 month (around 5/11/2019).

## 2019-04-11 NOTE — PROCEDURES
Nasal/sinus endoscopy  Date/Time: 4/11/2019 10:46 AM  Performed by: Vijay Tatum MD  Authorized by: Vijay Tatum MD     Consent Done?:  Yes (Verbal)  Anesthesia:     Local anesthetic:  Lidocaine 4% and Stanley-Synephrine 1/2%    Patient tolerance:  Patient tolerated the procedure well with no immediate complications  Nose:     Procedure Performed:  Removal of Debridement  External:      No external nasal deformity  Intranasal:      Mucosa no polyps     Mucosa ulcers not present     No mucosa lesions present     Turbinates not enlarged     No septum gross deformity  Nasopharynx:      No mucosa lesions     Adenoids not present     Posterior choanae patent     Eustachian tube patent     Debridement of both ethmoid cavities performed with Torrez forceps.  Sinuses otherwise patent.  Propel stents in place.

## 2019-04-12 ENCOUNTER — PATIENT MESSAGE (OUTPATIENT)
Dept: OTOLARYNGOLOGY | Facility: CLINIC | Age: 54
End: 2019-04-12

## 2019-04-15 ENCOUNTER — TELEPHONE (OUTPATIENT)
Dept: OTOLARYNGOLOGY | Facility: CLINIC | Age: 54
End: 2019-04-15

## 2019-04-29 ENCOUNTER — PATIENT MESSAGE (OUTPATIENT)
Dept: OTOLARYNGOLOGY | Facility: CLINIC | Age: 54
End: 2019-04-29

## 2019-04-29 DIAGNOSIS — J32.8 OTHER CHRONIC SINUSITIS: Primary | ICD-10-CM

## 2019-04-29 RX ORDER — CLINDAMYCIN HYDROCHLORIDE 300 MG/1
300 CAPSULE ORAL 3 TIMES DAILY
Qty: 21 CAPSULE | Refills: 0 | Status: SHIPPED | OUTPATIENT
Start: 2019-04-29 | End: 2019-05-06

## 2019-04-29 RX ORDER — PREDNISONE 20 MG/1
20 TABLET ORAL DAILY
Qty: 5 TABLET | Refills: 0 | Status: SHIPPED | OUTPATIENT
Start: 2019-04-29 | End: 2019-05-04

## 2019-05-21 ENCOUNTER — OFFICE VISIT (OUTPATIENT)
Dept: OTOLARYNGOLOGY | Facility: CLINIC | Age: 54
End: 2019-05-21
Payer: COMMERCIAL

## 2019-05-21 VITALS — SYSTOLIC BLOOD PRESSURE: 110 MMHG | HEART RATE: 81 BPM | DIASTOLIC BLOOD PRESSURE: 71 MMHG

## 2019-05-21 DIAGNOSIS — J31.0 CHRONIC RHINITIS: Primary | ICD-10-CM

## 2019-05-21 DIAGNOSIS — J32.8 OTHER CHRONIC SINUSITIS: ICD-10-CM

## 2019-05-21 PROCEDURE — 87077 CULTURE AEROBIC IDENTIFY: CPT

## 2019-05-21 PROCEDURE — 87075 CULTR BACTERIA EXCEPT BLOOD: CPT

## 2019-05-21 PROCEDURE — 99999 PR PBB SHADOW E&M-EST. PATIENT-LVL II: CPT | Mod: PBBFAC,,, | Performed by: OTOLARYNGOLOGY

## 2019-05-21 PROCEDURE — 31237 NASAL/SINUS ENDOSCOPY: ICD-10-PCS | Mod: 50,79,S$GLB, | Performed by: OTOLARYNGOLOGY

## 2019-05-21 PROCEDURE — 87070 CULTURE OTHR SPECIMN AEROBIC: CPT

## 2019-05-21 PROCEDURE — 31237 NSL/SINS NDSC SURG BX POLYPC: CPT | Mod: 50,79,S$GLB, | Performed by: OTOLARYNGOLOGY

## 2019-05-21 PROCEDURE — 99214 OFFICE O/P EST MOD 30 MIN: CPT | Mod: 25,24,S$GLB, | Performed by: OTOLARYNGOLOGY

## 2019-05-21 PROCEDURE — 87186 SC STD MICRODIL/AGAR DIL: CPT

## 2019-05-21 PROCEDURE — 87107 FUNGI IDENTIFICATION MOLD: CPT

## 2019-05-21 PROCEDURE — 99999 PR PBB SHADOW E&M-EST. PATIENT-LVL II: ICD-10-PCS | Mod: PBBFAC,,, | Performed by: OTOLARYNGOLOGY

## 2019-05-21 PROCEDURE — 99214 PR OFFICE/OUTPT VISIT, EST, LEVL IV, 30-39 MIN: ICD-10-PCS | Mod: 25,24,S$GLB, | Performed by: OTOLARYNGOLOGY

## 2019-05-21 RX ORDER — BUDESONIDE 0.5 MG/2ML
0.5 INHALANT ORAL DAILY
Qty: 180 ML | Refills: 1 | Status: SHIPPED | OUTPATIENT
Start: 2019-05-21 | End: 2021-01-27 | Stop reason: SDUPTHER

## 2019-05-21 NOTE — PROGRESS NOTES
Subjective:      Nora Mccoy is a 54 y.o. female who comes for follow-up 4-6 weeks status-post endoscopic sinus surgery.  Her last visit with me was on 4/11/2019.  Doing better, finished 2 courses of antibiotics, still feels blockage on right side of nose, milky and/or yellow mucus sometimes, a large chunk 2 days ago.  Stopped rinsing for a little while.    SNOT-22 score = 23, NOSE score = 25%, ETDQ-7 score = 1.0      Objective:     /71   Pulse 81   LMP 02/15/2017      General:   not in distress   Nasal:  edematous mucosa   no septal hematoma   no bleeding   Oral Cavity:   clear   Oropharynx:   no bleeding   Neck:   nontender       Procedure    Endoscopic debridement performed.  See procedure note.        Data Reviewed    Pathology report indicated chronic inflammation with eosinophilia.         Assessment:     Ongoing sinusitis following bilateral endoscopic sinus surgery.  She also underwent septum/turbinate surgery which is unrelated to the reason for today's visit.    1. Chronic rhinitis    2. Other chronic sinusitis         Plan:     Cultures taken, will consider additional antibiotics.  Rx budesonide in sinus rinse daily.  Urged to resume rinsing BID initially.  Follow up in about 2 months (around 7/21/2019).

## 2019-05-21 NOTE — PROCEDURES
Nasal/sinus endoscopy  Date/Time: 5/21/2019 9:38 AM  Performed by: Vijay Tatum MD  Authorized by: Vijay Tatum MD     Consent Done?:  Yes (Verbal)  Anesthesia:     Local anesthetic:  Lidocaine 4% and Stanley-Synephrine 1/2%    Patient tolerance:  Patient tolerated the procedure well with no immediate complications  Nose:     Procedure Performed:  Removal of Debridement  External:      No external nasal deformity  Intranasal:      Mucosa no polyps     Mucosa ulcers not present     No mucosa lesions present     Turbinates not enlarged     No septum gross deformity  Nasopharynx:      No mucosa lesions     Adenoids not present     Posterior choanae patent     Eustachian tube patent     Debridement of both ethmoid cavities performed with Torrez forceps.  Sinuses otherwise patent.  Propel stents dissolving and removed.  Scar tissue lysed with scissors at right inferior turbinate.

## 2019-05-27 LAB — BACTERIA SPEC ANAEROBE CULT: NORMAL

## 2019-05-29 ENCOUNTER — PATIENT MESSAGE (OUTPATIENT)
Dept: OTOLARYNGOLOGY | Facility: CLINIC | Age: 54
End: 2019-05-29

## 2019-05-29 LAB — BACTERIA SPEC AEROBE CULT: NORMAL

## 2019-06-04 RX ORDER — TRAZODONE HYDROCHLORIDE 50 MG/1
100 TABLET ORAL NIGHTLY
Qty: 60 TABLET | Refills: 0 | Status: SHIPPED | OUTPATIENT
Start: 2019-06-04 | End: 2019-08-07 | Stop reason: SDUPTHER

## 2019-06-11 ENCOUNTER — PATIENT MESSAGE (OUTPATIENT)
Dept: INTERNAL MEDICINE | Facility: CLINIC | Age: 54
End: 2019-06-11

## 2019-06-11 DIAGNOSIS — Z12.31 VISIT FOR SCREENING MAMMOGRAM: Primary | ICD-10-CM

## 2019-06-17 ENCOUNTER — HOSPITAL ENCOUNTER (OUTPATIENT)
Dept: RADIOLOGY | Facility: HOSPITAL | Age: 54
Discharge: HOME | End: 2019-06-17
Attending: INTERNAL MEDICINE
Payer: COMMERCIAL

## 2019-06-17 ENCOUNTER — TELEPHONE (OUTPATIENT)
Dept: INTERNAL MEDICINE | Facility: CLINIC | Age: 54
End: 2019-06-17

## 2019-06-17 DIAGNOSIS — Z12.31 VISIT FOR SCREENING MAMMOGRAM: ICD-10-CM

## 2019-06-17 PROCEDURE — 77067 MAMMO DIGITAL SCREENING BILAT WITH TOMOSYNTHESIS_CAD: ICD-10-PCS | Mod: 26,,, | Performed by: RADIOLOGY

## 2019-06-17 PROCEDURE — 77063 BREAST TOMOSYNTHESIS BI: CPT | Mod: 26,,, | Performed by: RADIOLOGY

## 2019-06-17 PROCEDURE — 77063 MAMMO DIGITAL SCREENING BILAT WITH TOMOSYNTHESIS_CAD: ICD-10-PCS | Mod: 26,,, | Performed by: RADIOLOGY

## 2019-06-17 PROCEDURE — 77067 SCR MAMMO BI INCL CAD: CPT | Mod: TC

## 2019-06-17 PROCEDURE — 77067 SCR MAMMO BI INCL CAD: CPT | Mod: 26,,, | Performed by: RADIOLOGY

## 2019-06-17 NOTE — TELEPHONE ENCOUNTER
----- Message from Zamzam Bazan MD sent at 6/17/2019  4:11 PM CDT -----  Please note that your mammogram was read as follows  Impression:  There is no mammographic evidence of malignancy.  Zamzam Montes

## 2019-07-16 ENCOUNTER — OFFICE VISIT (OUTPATIENT)
Dept: OTOLARYNGOLOGY | Facility: CLINIC | Age: 54
End: 2019-07-16
Payer: COMMERCIAL

## 2019-07-16 VITALS — SYSTOLIC BLOOD PRESSURE: 111 MMHG | DIASTOLIC BLOOD PRESSURE: 71 MMHG | HEART RATE: 69 BPM

## 2019-07-16 DIAGNOSIS — J32.8 OTHER CHRONIC SINUSITIS: ICD-10-CM

## 2019-07-16 DIAGNOSIS — J31.0 CHRONIC RHINITIS: Primary | ICD-10-CM

## 2019-07-16 PROCEDURE — 99999 PR PBB SHADOW E&M-EST. PATIENT-LVL III: ICD-10-PCS | Mod: PBBFAC,,, | Performed by: OTOLARYNGOLOGY

## 2019-07-16 PROCEDURE — 31231 NASAL/SINUS ENDOSCOPY: ICD-10-PCS | Mod: S$GLB,,, | Performed by: OTOLARYNGOLOGY

## 2019-07-16 PROCEDURE — 31231 NASAL ENDOSCOPY DX: CPT | Mod: S$GLB,,, | Performed by: OTOLARYNGOLOGY

## 2019-07-16 PROCEDURE — 99999 PR PBB SHADOW E&M-EST. PATIENT-LVL III: CPT | Mod: PBBFAC,,, | Performed by: OTOLARYNGOLOGY

## 2019-07-16 PROCEDURE — 99213 PR OFFICE/OUTPT VISIT, EST, LEVL III, 20-29 MIN: ICD-10-PCS | Mod: 25,S$GLB,, | Performed by: OTOLARYNGOLOGY

## 2019-07-16 PROCEDURE — 99213 OFFICE O/P EST LOW 20 MIN: CPT | Mod: 25,S$GLB,, | Performed by: OTOLARYNGOLOGY

## 2019-07-16 NOTE — PROCEDURES
Nasal/sinus endoscopy  Date/Time: 7/16/2019 10:47 AM  Performed by: Vijay Tatum MD  Authorized by: Vijay Tatum MD     Consent Done?:  Yes (Verbal)  Anesthesia:     Local anesthetic:  Lidocaine 4% and Stanley-Synephrine 1/2%    Patient tolerance:  Patient tolerated the procedure well with no immediate complications  Nose:     Procedure Performed:  Nasal Endoscopy  External:      No external nasal deformity  Intranasal:      Mucosa no polyps     Mucosa ulcers not present     No mucosa lesions present     Turbinates not enlarged     No septum gross deformity  Nasopharynx:      No mucosa lesions     Adenoids not present     Posterior choanae patent     Eustachian tube patent     Sinuses all clear and patent.  Minor edema in right frontal recess.

## 2019-07-16 NOTE — PROGRESS NOTES
Subjective:      Nora is a 54 y.o. female who comes for follow-up of sinusitis.  Her last visit with me was on 5/21/2019.  Now over 3 months status-post endoscopic sinus surgery.   Doing well, minimal complaints, feeling fine, minor postnasal drip.  Using budesonide rinse daily.    SNOT-22 score = 11, NOSE score = 10%, ETDQ-7 score = 1.3    The patient's medications, allergies, past medical, surgical, social and family histories were reviewed and updated as appropriate.    A detailed review of systems was obtained with pertinent positives as per the above HPI, and otherwise negative.        Objective:     /71   Pulse 69   LMP 02/15/2017        Constitutional:   She appears well-developed. She is cooperative. Normal speech.  No hoarse voice.      Head:  Normocephalic. Salivary glands normal.  Facial strength is normal.      Ears:    Right Ear: No drainage or tenderness. Tympanic membrane is not perforated. Tympanic membrane mobility is normal. No middle ear effusion. No decreased hearing is noted.   Left Ear: No drainage or tenderness. Tympanic membrane is not perforated. Tympanic membrane mobility is normal.  No middle ear effusion. No decreased hearing is noted.     Nose:  No mucosal edema, rhinorrhea, septal deviation or polyps. No epistaxis. Turbinates normal, no turbinate masses and no turbinate hypertrophy.  Right sinus exhibits no maxillary sinus tenderness and no frontal sinus tenderness. Left sinus exhibits no maxillary sinus tenderness and no frontal sinus tenderness.     Mouth/Throat  Oropharynx clear and moist without lesions or asymmetry and normal uvula midline. She does not have dentures. Normal dentition. No oral lesions or mucous membrane lesions. No oropharyngeal exudate or posterior oropharyngeal erythema. Mirror exam not performed due to patient tolerance.  Mirror exam not performed due to patient tolerance.      Neck:  Neck normal without thyromegaly masses, asymmetry, normal tracheal  structure, crepitus, and tenderness, thyroid normal, trachea normal and no adenopathy. Normal range of motion present.     She has no cervical adenopathy.     Cardiovascular:   Regular rhythm.      Pulmonary/Chest:   Effort normal.     Psychiatric:   She has a normal mood and affect. Her speech is normal and behavior is normal.     Neurological:   No cranial nerve deficit.     Skin:   No rash noted.       Procedure    Nasal endoscopy performed.  See procedure note.     Left ethmoid     Left frontal     Right ethmoid     Right frontal     Right choana with PND        Data Reviewed    WBC (K/uL)   Date Value   03/20/2018 4.81     Eosinophil% (%)   Date Value   03/20/2018 4.6     Eos # (K/uL)   Date Value   03/20/2018 0.2     Platelets (K/uL)   Date Value   03/20/2018 261     Glucose (mg/dL)   Date Value   03/20/2018 89     No results found for: IGE    Pathology report indicated chronic inflammation with eosinophilia.    Cultures showed Aspergillus.      Assessment:     1. Chronic rhinitis    2. Other chronic sinusitis         Plan:     Continue budesonide sinus rinse daily.  No follow-ups on file.

## 2019-07-21 ENCOUNTER — PATIENT MESSAGE (OUTPATIENT)
Dept: PSYCHIATRY | Facility: CLINIC | Age: 54
End: 2019-07-21

## 2019-07-21 RX ORDER — LORAZEPAM 0.5 MG/1
0.5 TABLET ORAL 2 TIMES DAILY
Qty: 60 TABLET | Refills: 1 | Status: SHIPPED | OUTPATIENT
Start: 2019-07-21 | End: 2021-04-29 | Stop reason: SDUPTHER

## 2019-07-29 ENCOUNTER — PATIENT MESSAGE (OUTPATIENT)
Dept: INTERNAL MEDICINE | Facility: CLINIC | Age: 54
End: 2019-07-29

## 2019-07-30 ENCOUNTER — OFFICE VISIT (OUTPATIENT)
Dept: INTERNAL MEDICINE | Facility: CLINIC | Age: 54
End: 2019-07-30
Payer: COMMERCIAL

## 2019-07-30 VITALS
WEIGHT: 162.5 LBS | TEMPERATURE: 99 F | SYSTOLIC BLOOD PRESSURE: 104 MMHG | RESPIRATION RATE: 18 BRPM | DIASTOLIC BLOOD PRESSURE: 70 MMHG | HEART RATE: 68 BPM | BODY MASS INDEX: 25.51 KG/M2 | HEIGHT: 67 IN

## 2019-07-30 DIAGNOSIS — G56.21 ULNAR NEUROPATHY AT ELBOW OF RIGHT UPPER EXTREMITY: ICD-10-CM

## 2019-07-30 PROCEDURE — 99213 OFFICE O/P EST LOW 20 MIN: CPT | Mod: S$GLB,,, | Performed by: STUDENT IN AN ORGANIZED HEALTH CARE EDUCATION/TRAINING PROGRAM

## 2019-07-30 PROCEDURE — 3008F PR BODY MASS INDEX (BMI) DOCUMENTED: ICD-10-PCS | Mod: CPTII,S$GLB,, | Performed by: STUDENT IN AN ORGANIZED HEALTH CARE EDUCATION/TRAINING PROGRAM

## 2019-07-30 PROCEDURE — 3008F BODY MASS INDEX DOCD: CPT | Mod: CPTII,S$GLB,, | Performed by: STUDENT IN AN ORGANIZED HEALTH CARE EDUCATION/TRAINING PROGRAM

## 2019-07-30 PROCEDURE — 99999 PR PBB SHADOW E&M-EST. PATIENT-LVL III: ICD-10-PCS | Mod: PBBFAC,,, | Performed by: STUDENT IN AN ORGANIZED HEALTH CARE EDUCATION/TRAINING PROGRAM

## 2019-07-30 PROCEDURE — 99999 PR PBB SHADOW E&M-EST. PATIENT-LVL III: CPT | Mod: PBBFAC,,, | Performed by: STUDENT IN AN ORGANIZED HEALTH CARE EDUCATION/TRAINING PROGRAM

## 2019-07-30 PROCEDURE — 99213 PR OFFICE/OUTPT VISIT, EST, LEVL III, 20-29 MIN: ICD-10-PCS | Mod: S$GLB,,, | Performed by: STUDENT IN AN ORGANIZED HEALTH CARE EDUCATION/TRAINING PROGRAM

## 2019-07-30 RX ORDER — GABAPENTIN 300 MG/1
300 CAPSULE ORAL 2 TIMES DAILY
Qty: 60 CAPSULE | Refills: 3 | Status: SHIPPED | OUTPATIENT
Start: 2019-07-30 | End: 2021-04-29

## 2019-07-30 RX ORDER — MELOXICAM 15 MG/1
15 TABLET ORAL DAILY PRN
Qty: 14 TABLET | Refills: 1 | Status: SHIPPED | OUTPATIENT
Start: 2019-07-30 | End: 2019-08-13

## 2019-07-30 NOTE — PROGRESS NOTES
Clinic Note  7/30/2019      Subjective:       Patient ID:  Nora is a 54 y.o. female being seen for an established visit.    Chief Complaint: Numbness (and Pain in right arm; forearm)    Nora Castro is a 54 year old female with chronic sinusitis, anxiety, and depression who is here with worsening right elbow and forearm pain with some numbness and tingling. This is made worse with typing and flexing of her elbow. Uses a brace on the elbow when at work typing. Has had similar symptoms before managed with Mobic. Started taking Mobic two days ago with some improvement. No rashes or changes in skin. Denies trauma. Denies chest pain, fevers, chills, and weakness.           Review of Systems   Constitutional: Negative for chills and fever.   HENT: Negative for congestion.    Eyes: Negative for redness.   Respiratory: Negative for sputum production and shortness of breath.    Cardiovascular: Negative for chest pain, claudication and leg swelling.   Gastrointestinal: Negative for nausea and vomiting.   Skin: Negative.    Neurological: Positive for tingling. Negative for dizziness, tremors, sensory change, focal weakness, weakness and headaches.       Past Medical History:   Diagnosis Date    Bronchitis     DJD (degenerative joint disease)     shoulder, knee, and back    Esophagitis, unspecified     Fibrocystic disease of breast     Menstrual disorder     Sinus infection     Stress     UTI (urinary tract infection)        Family History   Problem Relation Age of Onset    Hypertension Sister     Lupus Mother     Other Mother         chest pain    Breast cancer Mother 72    Retinal detachment Mother     Thyroid disease Mother     Cancer Father         unk d. 77    Breast cancer Maternal Aunt         d. 39    Cataracts Maternal Grandmother     No Known Problems Brother         1/2 bro ( Dad) unk    No Known Problems Brother         1/2 bro (Dad) unk    Amblyopia Neg Hx     Blindness Neg Hx     Diabetes  "Neg Hx     Glaucoma Neg Hx     Macular degeneration Neg Hx     Strabismus Neg Hx     Stroke Neg Hx         reports that she has never smoked. She has never used smokeless tobacco. She reports that she drinks about 1.0 oz of alcohol per week. She reports that she does not use drugs.    Medication List with Changes/Refills   Current Medications    ACYCLOVIR (ZOVIRAX) 800 MG TAB    Take 1 tablet (800 mg total) by mouth 2 (two) times daily.    BUDESONIDE (PULMICORT) 0.5 MG/2 ML NEBULIZER SOLUTION    Take 2 mLs (0.5 mg total) by nebulization once daily. For use in saline irrigation as directed.    CYCLOBENZAPRINE (FLEXERIL) 10 MG TABLET    Take 1 tablet (10 mg total) by mouth 3 (three) times daily as needed for Muscle spasms.    EPINEPHRINE (EPIPEN 2-JAMES) 0.3 MG/0.3 ML ATIN    Inject 0.3 mLs (0.3 mg total) into the muscle once.    FLUOXETINE 40 MG CAPSULE    Take 1 capsule (40 mg total) by mouth once daily.    LORAZEPAM (ATIVAN) 0.5 MG TABLET    TAKE 1 TABLET (0.5 MG TOTAL) BY MOUTH 2 (TWO) TIMES DAILY.    MULTIVITAMIN (THERAGRAN) TABLET    Take 1 tablet by mouth once daily.      TRAZODONE (DESYREL) 50 MG TABLET    TAKE 2 TABLETS (100 MG TOTAL) BY MOUTH EVERY EVENING.     Review of patient's allergies indicates:   Allergen Reactions    Penicillin g Anaphylaxis    Penicillins Anaphylaxis    Penicillin Hives       Patient Active Problem List   Diagnosis    Other plastic surgery for unacceptable cosmetic appearance    Knee pain    Meningioma    Chronic sinusitis    Deviated nasal septum    Nasal turbinate hypertrophy           Objective:      [facility] 02/15/2017   Estimated body mass index is 24.28 kg/m² as calculated from the following:    Height as of 4/5/19: 5' 7" (1.702 m).    Weight as of 4/5/19: 70.3 kg (155 lb).      Physical Exam   Constitutional: She is oriented to person, place, and time and well-developed, well-nourished, and in no distress. No distress.   HENT:   Head: Normocephalic and atraumatic. "   Eyes: Conjunctivae and EOM are normal.   Neck: Normal range of motion. Neck supple.   Cardiovascular: Normal rate, regular rhythm, normal heart sounds and intact distal pulses. Exam reveals no gallop and no friction rub.   No murmur heard.  Pulmonary/Chest: Breath sounds normal. No respiratory distress.   Abdominal: Soft. Bowel sounds are normal.   Musculoskeletal: Normal range of motion. She exhibits no edema.   Neurological: She is alert and oriented to person, place, and time. She has normal motor skills, normal sensation and normal strength. She displays no weakness, no atrophy and no tremor. No cranial nerve deficit or sensory deficit. Coordination normal.   Skin: Skin is warm and dry. She is not diaphoretic.         Assessment and Plan:         Patient was seen today for numbness.    Diagnoses and all orders for this visit:    Ulnar neuropathy at elbow of right upper extremity  -     gabapentin (NEURONTIN) 300 MG capsule; Take 1 capsule (300 mg total) by mouth 2 (two) times daily.  -     meloxicam (MOBIC) 15 MG tablet; Take 1 tablet (15 mg total) by mouth daily as needed for Pain.        -     Patient will continue to use elbow brace        -     Patient does not want Ortho referral but will consider one in the future           Other Orders Placed This Visit:  No orders of the defined types were placed in this encounter.            Interview, Assessment, Findings, and Plan discussed with Dr. Silva.    Follow up in about 1 year (around 7/30/2020), or if symptoms worsen or fail to improve.      Pasha Vernon MD  Internal Medicine

## 2019-07-31 NOTE — PROGRESS NOTES
I have interviewed and examined the patient w/ the resident, Dr. Vernon  I agree w/ the impression and plan as outlined above.  Zamzam Silva MD- Staff

## 2019-08-01 ENCOUNTER — PATIENT MESSAGE (OUTPATIENT)
Dept: OTOLARYNGOLOGY | Facility: CLINIC | Age: 54
End: 2019-08-01

## 2019-08-07 RX ORDER — TRAZODONE HYDROCHLORIDE 50 MG/1
100 TABLET ORAL NIGHTLY
Qty: 60 TABLET | Refills: 1 | Status: SHIPPED | OUTPATIENT
Start: 2019-08-07 | End: 2019-09-10 | Stop reason: SDUPTHER

## 2019-08-14 ENCOUNTER — PATIENT MESSAGE (OUTPATIENT)
Dept: PSYCHIATRY | Facility: CLINIC | Age: 54
End: 2019-08-14

## 2019-08-19 ENCOUNTER — OFFICE VISIT (OUTPATIENT)
Dept: INTERNAL MEDICINE | Facility: CLINIC | Age: 54
End: 2019-08-19
Payer: COMMERCIAL

## 2019-08-19 ENCOUNTER — HOSPITAL ENCOUNTER (OUTPATIENT)
Dept: RADIOLOGY | Facility: HOSPITAL | Age: 54
Discharge: HOME | End: 2019-08-19
Attending: INTERNAL MEDICINE
Payer: COMMERCIAL

## 2019-08-19 ENCOUNTER — PATIENT MESSAGE (OUTPATIENT)
Dept: INTERNAL MEDICINE | Facility: CLINIC | Age: 54
End: 2019-08-19

## 2019-08-19 ENCOUNTER — TELEPHONE (OUTPATIENT)
Dept: INTERNAL MEDICINE | Facility: CLINIC | Age: 54
End: 2019-08-19

## 2019-08-19 VITALS
WEIGHT: 163.38 LBS | HEART RATE: 84 BPM | BODY MASS INDEX: 25.64 KG/M2 | TEMPERATURE: 98 F | HEIGHT: 67 IN | RESPIRATION RATE: 20 BRPM | DIASTOLIC BLOOD PRESSURE: 68 MMHG | SYSTOLIC BLOOD PRESSURE: 106 MMHG

## 2019-08-19 DIAGNOSIS — M25.579 ACUTE ANKLE PAIN, UNSPECIFIED LATERALITY: ICD-10-CM

## 2019-08-19 DIAGNOSIS — M25.579 ACUTE ANKLE PAIN, UNSPECIFIED LATERALITY: Primary | ICD-10-CM

## 2019-08-19 PROCEDURE — 73630 XR FOOT COMPLETE 3 VIEW RIGHT: ICD-10-PCS | Mod: 26,RT,, | Performed by: RADIOLOGY

## 2019-08-19 PROCEDURE — 99213 PR OFFICE/OUTPT VISIT, EST, LEVL III, 20-29 MIN: ICD-10-PCS | Mod: S$GLB,,, | Performed by: INTERNAL MEDICINE

## 2019-08-19 PROCEDURE — 99213 OFFICE O/P EST LOW 20 MIN: CPT | Mod: S$GLB,,, | Performed by: INTERNAL MEDICINE

## 2019-08-19 PROCEDURE — 73630 X-RAY EXAM OF FOOT: CPT | Mod: 26,RT,, | Performed by: RADIOLOGY

## 2019-08-19 PROCEDURE — 73610 X-RAY EXAM OF ANKLE: CPT | Mod: 26,RT,GC, | Performed by: RADIOLOGY

## 2019-08-19 PROCEDURE — 73610 X-RAY EXAM OF ANKLE: CPT | Mod: TC,PO,RT

## 2019-08-19 PROCEDURE — 99999 PR PBB SHADOW E&M-EST. PATIENT-LVL IV: CPT | Mod: PBBFAC,,, | Performed by: INTERNAL MEDICINE

## 2019-08-19 PROCEDURE — 99999 PR PBB SHADOW E&M-EST. PATIENT-LVL IV: ICD-10-PCS | Mod: PBBFAC,,, | Performed by: INTERNAL MEDICINE

## 2019-08-19 PROCEDURE — 3008F PR BODY MASS INDEX (BMI) DOCUMENTED: ICD-10-PCS | Mod: CPTII,S$GLB,, | Performed by: INTERNAL MEDICINE

## 2019-08-19 PROCEDURE — 73630 X-RAY EXAM OF FOOT: CPT | Mod: TC,PO,RT

## 2019-08-19 PROCEDURE — 3008F BODY MASS INDEX DOCD: CPT | Mod: CPTII,S$GLB,, | Performed by: INTERNAL MEDICINE

## 2019-08-19 PROCEDURE — 73610 XR ANKLE COMPLETE 3 VIEW RIGHT: ICD-10-PCS | Mod: 26,RT,GC, | Performed by: RADIOLOGY

## 2019-08-19 NOTE — PROGRESS NOTES
53 yo female presents for ankle pain for several wks and over  bones on top of feet and joints were puffy and then walking  fell a few mos ago in parking lot under the train track @ [place] and went flying across the parking lot  Wore heels Friday night and woke up Saturday AM an has been in pain since  Trauma: above  Pain quality: ache  Pain level: 5/10, was higher Saturday morning  Pain location: right ankle chandler the lateral aspect  Tx: elevated and ice and meloxicam  Assoc: + hx knee pain    ROS:  No fever, chills or night sweats  No GERD or abdominal pain  No numbness or focal deficits  No rash or skin lesions  Remainder of review negative except as previously noted    MEDCARD: Reviewed  PMHX:Reviewed  PSHX: Reviewed  SHx: Nonsmoker  FHX: Reviewed    PE:  VSS:  GEN: WDWN, A&O, NAD, conversant and co-operative  EYES: Conj/lids unremarkable, sclera anicteric  CV: 1+ pedal pulses, no LE edema  MSK: Gait normal. No CCE  Ankles: right tender over the anterior and lateral aspect  + edema also noted  Feet: tender along lateral 5th metatarsal   SKIN: warm and dry, no erythema or warmth      IMPRESSION:  Ankle pain/foot pain    PLAN:  Xray ankle and foot  PT  Ice, support wrap and continue meloxicam  Call prn

## 2019-08-19 NOTE — TELEPHONE ENCOUNTER
----- Message from Zamzam Bazan MD sent at 8/19/2019  4:29 PM CDT -----  Nora, your foot xray was unremarkable  But the ankle had arthritis and a calcaneal spur, unlikely to be causing your symptoms as it would not be the same location as your pain.  Let me know how your doing w/ PT  Zamzam Montes

## 2019-08-20 ENCOUNTER — PATIENT MESSAGE (OUTPATIENT)
Dept: INTERNAL MEDICINE | Facility: CLINIC | Age: 54
End: 2019-08-20

## 2019-08-21 ENCOUNTER — CLINICAL SUPPORT (OUTPATIENT)
Dept: REHABILITATION | Facility: HOSPITAL | Age: 54
End: 2019-08-21
Attending: INTERNAL MEDICINE
Payer: COMMERCIAL

## 2019-08-21 DIAGNOSIS — M25.671 DECREASED RANGE OF MOTION OF RIGHT ANKLE: ICD-10-CM

## 2019-08-21 DIAGNOSIS — R29.898 ANKLE WEAKNESS: ICD-10-CM

## 2019-08-21 DIAGNOSIS — M25.571 ACUTE RIGHT ANKLE PAIN: ICD-10-CM

## 2019-08-21 PROBLEM — M25.673 DECREASED ROM OF ANKLE: Status: ACTIVE | Noted: 2019-08-21

## 2019-08-21 PROBLEM — M25.579 ANKLE PAIN: Status: ACTIVE | Noted: 2019-08-21

## 2019-08-21 PROCEDURE — 97110 THERAPEUTIC EXERCISES: CPT

## 2019-08-21 PROCEDURE — 97161 PT EVAL LOW COMPLEX 20 MIN: CPT

## 2019-08-21 NOTE — PLAN OF CARE
[facility]  Physical Therapy Initial Evaluation    Name: Nora Mccoy  Clinic Number: [id number]    Therapy Diagnosis:   Encounter Diagnoses   Name Primary?    Acute right ankle pain     Ankle weakness     Decreased range of motion of right ankle      Physician: Zamzam Bazan    Physician Orders: PT Eval and Treat   Medical Diagnosis from Referral: M25.579 (ICD-10-CM) - Acute ankle pain, unspecified laterality  Evaluation Date: 8/21/2019  Authorization Period Expiration: 12/31/2019  Plan of Care Expiration: 10/04/2019  Visit # / Visits authorized: 1/ 30    Time In: 9:05  Time Out: 10:07  Total Billable Time: 62 minutes      Precautions: Standard    Subjective   Date of onset: June 2019  History of current condition - Nora reports: Having a fall in early June in which she rolled her ankle. Pt had minimal pain afterwards on the outer part of her right ankle which subsided after a week. Pt reported 2 weeks prior to today that her R ankle pain began to feel more intense. This past weekend pt reported that she was wearing heels which exacerbated her symptoms and has been constant since then. Pt reported that her imaging showed a bone spur on her R heel and is going to an orthopedist for a follow up. Pt reports having a microdiscectomy in 2002, but she reports her symptoms were resolved from surgery.     Medical History:   Past Medical History:   Diagnosis Date    Bronchitis     DJD (degenerative joint disease)     shoulder, knee, and back    Esophagitis, unspecified     Fibrocystic disease of breast     Menstrual disorder     Sinus infection     Stress     UTI (urinary tract infection)        Surgical History:   Nora Mccoy  has a past surgical history that includes Back surgery; Colonoscopy (N/A, 7/7/2016); Breast biopsy (Left, 2015); Functional endoscopic sinus surgery (FESS) using computer-assisted navigation (Bilateral, 4/5/2019); Ethmoidectomy  (N/A, 4/5/2019); Nasal turbinate reduction (Bilateral, 4/5/2019); Maxillary antrostomy (4/5/2019); and Frontal sinus obliteration (4/5/2019).    Medications:   Nora has a current medication list which includes the following prescription(s): acyclovir, budesonide, cyclobenzaprine, epinephrine, fluoxetine, gabapentin, lorazepam, multivitamin, and trazodone.    Allergies:   Review of patient's allergies indicates:   Allergen Reactions    Penicillin g Anaphylaxis    Penicillins Anaphylaxis    Penicillin Hives        Imaging, X-Ray: See imaging section. Results were negative for fracture and dislocation    Prior Therapy: None for ankle  Social History: Lives with their family and lives with their spouse. Pt reports having a second floor but bedroom is downstairs.  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    Objective     Lumbar ROM Screen: WNL with no increased ankle symptoms.     Hip Right Right Left Left Pain/Dysfunction with Movement    AROM MMT AROM MMT    Flexion WNL 5/5 WNL 5/5    Extension WNL 4+/5 WNL 4+/5    Abduction WNL 4+/5 WNL 4+/5    External rotation WNL 5/5 WNL 5/5       Knee Right Right Left Left Pain/Dysfunction with Movement    AROM MMT AROM MMT    Flexion WNL 4+/5 WNL 4+/5    Extension WNL 4+/5 WNL 4+/5      Ankle Right Right Left Left Pain/Dysfunction with Movement Normal Active Range of Motion Values    AROM MMT AROM MMT     Plantarflexion 55 4/5 60 5/5 Increased pain w/ MMT on lateral aspect of R foot radiating to toes 50 degrees    Dorsiflexion 10 4/5 10 5/5  20 degrees   Inversion 46 4/5 50 5/5 Increased pain w/ MMT on lateral aspect of R foot 35 degrees   Eversion 20 4/5 20 5/5 Increased pain w/ MMT on lateral aspect of R foot (worst pain motion) 25 degrees     * pain subsided after MMT    Special Tests:  Anterior Drawer (ATFL) - Positive   Eversion Talar Tilt (Calcaneofibular Lig) - Negative  Inversion Talar Tilt (Deltoid Lig)- Negative    Palpation:  - Tender to palpation of ATFL    Gait Analysis: no excessive motion. Normal gait mechanics    Joint Mobility:  Talocrural PA and AP grade III mobilization  - R: Hypomobility compared to L with both directions; increased pain on lateral aspect of ankle near ATFL  - L: Normal mobility   Subtalar PA and AP grade III mobilization  - R: Hypomobility with both directions compared to L; increased pain on lateral aspect of ankle near ATFL  - L: Normal mobility           CMS Impairment/Limitation/Restriction for FOTO Ankle Survey    Therapist reviewed FOTO scores for Nora Mccoy on 8/21/2019.   FOTO documents entered into [email or web address] - see Media section.    Limitation Score: 46%  Category: Mobility           TREATMENT   Treatment Time In: 9:55  Treatment Time Out: 10:07  Total Treatment time separate from Evaluation: 12 minutes    Nora received therapeutic exercises to develop strength, endurance, ROM and flexibility for 12 minutes including:    Isometric DF 45sec x 3  DF Towel Stretch 30 sec x 3  Intrinsic towel squeeze 2 min          Home Exercises and Patient Education Provided    Education provided:   - HEP    Written Home Exercises Provided: yes.  Exercises were reviewed and Nora was able to demonstrate them prior to the end of the session.  Nora demonstrated good  understanding of the education provided.     See EMR under Patient Instructions for exercises provided 8/21/2019.    Assessment   Nora is a 54 y.o. female presents with increased R ankle pain upon MMT and palpation of  lateral aspect of R ankle, decreased talocrural and subtalar mobility, and decreased R ankle strength which is consistent with medical diagnosis of acute ankle pain, unspecified laterality. These impairments limit these functions which impairs ability to participate in leisure bicycling, standing, and walking for long periods of time. Patient would benefit from skilled physical therapy services to improve patients R ankle strength, flexibility, endurance, ROM, and joint mobility.      Pt prognosis is Good.   Pt will benefit from skilled outpatient Physical Therapy to address the deficits stated above and in the chart below, provide pt/family education, and to maximize pt's level of independence.     Plan of care discussed with patient: Yes  Pt's spiritual, cultural and educational needs considered and patient is agreeable to the plan of care and goals as stated below:     Anticipated Barriers for therapy: None    Medical Necessity is demonstrated by the following  History  Co-morbidities and personal factors that may impact the plan of care Co-morbidities:   No deficits    Personal Factors:   no deficits     low   Examination  Body Structures and Functions, activity limitations and participation restrictions that may impact the plan of care Body Regions:   lower extremities    Body Systems:    ROM  strength  balance  gait  transfers    Participation Restrictions:   Leisure bicycling    Activity limitations:   Learning and applying knowledge  no deficits    General Tasks and Commands  no deficits    Communication  no deficits    Mobility  walking    Self care  no deficits    Domestic Life  doing house work (cleaning house, washing dishes, laundry)    Interactions/Relationships  no deficits    Life Areas  no deficits    Community and Social Life  recreation and leisure         high   Clinical Presentation stable and uncomplicated low   Decision Making/ Complexity Score: low     Goals:  Short Term Goals: 3 weeks   1.  Pt will decrease R ankle pain by 50% to improve patients ability to participate in ADLs and leisure bicycling  2. Pt will increased R ankle strength by 1/2 MMT score to improve dynamic stability when walking  3. Pt will increased R ankle ROM by 5 degrees in all planes of motion with deficits to improve dynamic mobility    Long Term Goals: 6 weeks   1. Pt will improve FOTO score to </= 27% to improve participation in ADL and leisure activities  2. Pt will decrease R ankle pain by 100% to improve patients ability to participate in ADLs and leisure bicycling  3. Pt will increased R ankle strength by 1 MMT score to improve dynamic stability when walking  4. Pt will increased R ankle ROM by 10 degrees in all planes of motion with deficits to improve dynamic mobility    Plan   Plan of care Certification: 8/21/2019 to 10/04/2019.    Outpatient Physical Therapy 2 times weekly for 6 weeks to include the following interventions: Gait Training, Manual Therapy, Neuromuscular Re-ed, Patient Education, Therapeutic Activites and Therapeutic Exercise.     Alvaro Esparza, PT

## 2019-08-28 ENCOUNTER — CLINICAL SUPPORT (OUTPATIENT)
Dept: REHABILITATION | Facility: HOSPITAL | Age: 54
End: 2019-08-28
Attending: INTERNAL MEDICINE
Payer: COMMERCIAL

## 2019-08-28 DIAGNOSIS — R29.898 ANKLE WEAKNESS: ICD-10-CM

## 2019-08-28 DIAGNOSIS — M25.571 ACUTE RIGHT ANKLE PAIN: ICD-10-CM

## 2019-08-28 DIAGNOSIS — M25.671 DECREASED RANGE OF MOTION OF RIGHT ANKLE: ICD-10-CM

## 2019-08-28 PROCEDURE — 97110 THERAPEUTIC EXERCISES: CPT

## 2019-08-28 NOTE — PROGRESS NOTES
"  Physical Therapy Daily Treatment Note     Name: Nora Castillo [place]  Clinic Number: [id number]    Therapy Diagnosis:   Encounter Diagnoses   Name Primary?    Acute right ankle pain     Ankle weakness     Decreased range of motion of right ankle      Physician: Zamzam Bazan    Visit Date: 8/28/2019    Physician Orders: PT Eval and Treat   Medical Diagnosis from Referral: M25.579 (ICD-10-CM) - Acute ankle pain, unspecified laterality  Evaluation Date: 8/21/2019  Authorization Period Expiration: 12/31/2019  Plan of Care Expiration: 10/04/2019  Visit # / Visits authorized: 2/ 30    Time In: 7:55 am  Time Out: 8:46  Total Billable Time: 51 minutes    Precautions: Standard    Subjective     Pt reports: wearing her "dance go shoes" yesterday, which she feels made her pain worse on the bottom of her foot. Pt reports that the top of her foot is not hurting as much today, but the bottom of her foot is worse. Pt reports that her home exercises are helping her pain.  She was compliant with home exercise program.  Response to previous treatment: No adverse effects  Functional change: None    Pain: 4/10  Location: right feet      Objective     Nora received therapeutic exercises to develop strength, endurance, ROM and flexibility for 48 minutes including:    Isometric DF 45sec x 3  DF Towel Stretch 30 sec x 3  Intrinsic towel squeeze 2 min  Plantar Fascia Roll 2 min x2 B  4 Way ankle Green TB 20x ea  Shuttle calf raise DL 2c 20x       Nora participated in neuromuscular re-education activities to improve: Balance, Coordination and Proprioception for 3 minutes. The following activities were included:    SLS on blue foam 30x3 B       Home Exercises Provided and Patient Education Provided     Education provided:   - Continue HEP  - Appropriate shoe wear  - Perform plantar fascia stretch at work while sitting    Written Home Exercises Provided: Patient instructed to cont prior HEP.  Exercises were reviewed and " Nora was able to demonstrate them prior to the end of the session.  Nora demonstrated good  understanding of the education provided.     See EMR under Patient Instructions for exercises provided prior visit.    Assessment     Pt able to tolerate therapy session without c/o increased symptoms. Pt responded very well to plantar fascia roll, which helped reduce patients symptoms. Pt was able to walk pain free after therapy session. Pt was educated on performing plantar fascia roll stretch while at work since she sits for prolonged periods.   Nora is progressing well towards her goals.   Pt prognosis is Good.     Pt will continue to benefit from skilled outpatient physical therapy to address the deficits listed in the problem list box on initial evaluation, provide pt/family education and to maximize pt's level of independence in the home and community environment.     Pt's spiritual, cultural and educational needs considered and pt agreeable to plan of care and goals.     Anticipated barriers to physical therapy: None    Goals:   Short Term Goals: 3 weeks   1. Pt will decrease R ankle pain by 50% to improve patients ability to participate in ADLs and leisure bicycling  2. Pt will increased R ankle strength by 1/2 MMT score to improve dynamic stability when walking  3. Pt will increased R ankle ROM by 5 degrees in all planes of motion with deficits to improve dynamic mobility     Long Term Goals: 6 weeks   1. Pt will improve FOTO score to </= 27% to improve participation in ADL and leisure activities  2. Pt will decrease R ankle pain by 100% to improve patients ability to participate in ADLs and leisure bicycling  3. Pt will increased R ankle strength by 1 MMT score to improve dynamic stability when walking  4. Pt will increased R ankle ROM by 10 degrees in all planes of motion with deficits to improve dynamic mobility    Plan     Continue plan of care as tolerated    Alvaro Espazra PT

## 2019-09-05 ENCOUNTER — CLINICAL SUPPORT (OUTPATIENT)
Dept: REHABILITATION | Facility: HOSPITAL | Age: 54
End: 2019-09-05
Attending: INTERNAL MEDICINE
Payer: COMMERCIAL

## 2019-09-05 DIAGNOSIS — R29.898 ANKLE WEAKNESS: ICD-10-CM

## 2019-09-05 DIAGNOSIS — M25.571 ACUTE RIGHT ANKLE PAIN: ICD-10-CM

## 2019-09-05 DIAGNOSIS — M25.671 DECREASED RANGE OF MOTION OF RIGHT ANKLE: ICD-10-CM

## 2019-09-05 PROCEDURE — 97110 THERAPEUTIC EXERCISES: CPT

## 2019-09-05 PROCEDURE — 97112 NEUROMUSCULAR REEDUCATION: CPT

## 2019-09-05 NOTE — PROGRESS NOTES
"  Physical Therapy Daily Treatment Note     Name: Nora Castillo [facility] Number: [id number]    Therapy Diagnosis:   Encounter Diagnoses   Name Primary?    Acute right ankle pain     Ankle weakness     Decreased range of motion of right ankle      Physician: Zamzam Bazan    Visit Date: 9/5/2019    Physician Orders: PT Eval and Treat   Medical Diagnosis from Referral: M25.579 (ICD-10-CM) - Acute ankle pain, unspecified laterality  Evaluation Date: 8/21/2019  Authorization Period Expiration: 12/31/2019  Plan of Care Expiration: 10/04/2019  Visit # / Visits authorized: 3/ 30    Time In: 8:05 am  Time Out: 8:55am  Total Billable Time: 50 minutes    Precautions: Standard    Subjective     Pt reports:she feels like she is walking on rocks in the morning due to stiffness   She was compliant with home exercise program.  Response to previous treatment: No adverse effects  Functional change: None    Pain: 0/10, only stiffness   Location: right foot    Objective     Nora received therapeutic exercises to develop strength, endurance, ROM and flexibility for 40 minutes including:    Isometric DF 45sec x 3  DF Towel Stretch 30 sec x 3- not performed   B gastroc wedge stretch 3x30"   Intrinsic towel squeeze 2 min  Plantar Fascia Roll 2 min x2 B  4 Way ankle Green TB 30x ea  Shuttle calf raise DL 2c 20x -not performed     BAPS cc/ccw, DF/PF/IV/EV x15 ea B  Ankle ABC B x1      Nora participated in neuromuscular re-education activities to improve: Balance, Coordination and Proprioception for 10 minutes. The following activities were included:    SLS on blue foam 30x3 B       Home Exercises Provided and Patient Education Provided     Education provided:   - Continue HEP  - Appropriate shoe wear  - GTB provided for HEP     Written Home Exercises Provided: Patient instructed to cont prior HEP.  Exercises were reviewed and Nora was able to demonstrate them prior to the end of the session.  Nora demonstrated good  " understanding of the education provided.     See EMR under Patient Instructions for exercises provided 9/5/19.    Assessment     Pt tolerated therex well today without adverse effects. She reported reduced B ankle stiffness following ROM exercises. She demonstrated impaired standing balance on airex, able to maintain SLS up to 5 sec bilaterally.     Nora is progressing well towards her goals.   Pt prognosis is Good.     Pt will continue to benefit from skilled outpatient physical therapy to address the deficits listed in the problem list box on initial evaluation, provide pt/family education and to maximize pt's level of independence in the home and community environment.     Pt's spiritual, cultural and educational needs considered and pt agreeable to plan of care and goals.     Anticipated barriers to physical therapy: None    Goals:   Short Term Goals: 3 weeks   1. Pt will decrease R ankle pain by 50% to improve patients ability to participate in ADLs and leisure bicycling (progressing, not met)   2. Pt will increased R ankle strength by 1/2 MMT score to improve dynamic stability when walking  (progressing, not met)   3. Pt will increased R ankle ROM by 5 degrees in all planes of motion with deficits to improve dynamic mobility  (progressing, not met)      Long Term Goals: 6 weeks   1. Pt will improve FOTO score to </= 27% to improve participation in ADL and leisure activities  (progressing, not met)   2. Pt will decrease R ankle pain by 100% to improve patients ability to participate in ADLs and leisure bicycling  (progressing, not met)   3. Pt will increased R ankle strength by 1 MMT score to improve dynamic stability when walking  (progressing, not met)   4. Pt will increased R ankle ROM by 10 degrees in all planes of motion with deficits to improve dynamic mobility  (progressing, not met)     Plan     Continue plan of care as tolerated    Priyanka Murray, PT

## 2019-09-06 ENCOUNTER — OFFICE VISIT (OUTPATIENT)
Dept: ORTHOPEDICS | Facility: CLINIC | Age: 54
End: 2019-09-06
Payer: COMMERCIAL

## 2019-09-06 VITALS
HEIGHT: 67 IN | HEART RATE: 75 BPM | TEMPERATURE: 98 F | SYSTOLIC BLOOD PRESSURE: 113 MMHG | WEIGHT: 163.25 LBS | BODY MASS INDEX: 25.62 KG/M2 | DIASTOLIC BLOOD PRESSURE: 68 MMHG

## 2019-09-06 DIAGNOSIS — M72.2 PLANTAR FASCIITIS: Primary | ICD-10-CM

## 2019-09-06 DIAGNOSIS — M25.774 METATARSAL BOSSING, RIGHT: ICD-10-CM

## 2019-09-06 DIAGNOSIS — S93.491A SPRAIN OF ANTERIOR TALOFIBULAR LIGAMENT OF RIGHT ANKLE, INITIAL ENCOUNTER: ICD-10-CM

## 2019-09-06 PROCEDURE — 3008F BODY MASS INDEX DOCD: CPT | Mod: CPTII,S$GLB,, | Performed by: ORTHOPAEDIC SURGERY

## 2019-09-06 PROCEDURE — 99203 PR OFFICE/OUTPT VISIT, NEW, LEVL III, 30-44 MIN: ICD-10-PCS | Mod: S$GLB,,, | Performed by: ORTHOPAEDIC SURGERY

## 2019-09-06 PROCEDURE — 99999 PR PBB SHADOW E&M-EST. PATIENT-LVL III: ICD-10-PCS | Mod: PBBFAC,,, | Performed by: ORTHOPAEDIC SURGERY

## 2019-09-06 PROCEDURE — 99203 OFFICE O/P NEW LOW 30 MIN: CPT | Mod: S$GLB,,, | Performed by: ORTHOPAEDIC SURGERY

## 2019-09-06 PROCEDURE — 3008F PR BODY MASS INDEX (BMI) DOCUMENTED: ICD-10-PCS | Mod: CPTII,S$GLB,, | Performed by: ORTHOPAEDIC SURGERY

## 2019-09-06 PROCEDURE — 99999 PR PBB SHADOW E&M-EST. PATIENT-LVL III: CPT | Mod: PBBFAC,,, | Performed by: ORTHOPAEDIC SURGERY

## 2019-09-06 NOTE — LETTER
September 6, 2019      Geronimo Sequeira, DO  [address] [facility] [address]           [facility] Orthoped[facility]  [address], 5th [address]  [facility] [address]  Phone: [phone]          Patient: Nora Mccoy   MR Number: [id number]   YOB: 1965   Date of Visit: 9/6/2019       Dear Dr. Geronimo Sequeira:    Thank you for referring Nora Mccoy to me for evaluation. Attached you will find relevant portions of my assessment and plan of care.    If you have questions, please do not hesitate to call me. I look forward to following Nora Mccoy along with you.    Sincerely,    Juan Manuel Artis MD    Enclosure  CC:  No Recipients    If you would like to receive this communication electronically, please contact [email or web address] or [phone] to request more information on [facility] Link access.    For providers and/or their staff who would like to refer a patient to [facility], please contact us through our one-stop-shop provider referral line, [facility], at [phone].    If you feel you have received this communication in error or would no longer like to receive these types of communications, please e-mail [email or web address]

## 2019-09-06 NOTE — PROGRESS NOTES
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 Pt reports an inversion injury to her right ankle 4 weeks ago with swelling and pain. Over the past several weeks pain has gradually improved. She also reports chronic plantar fascitis R>L and dorsal right foot pain. She reports pain and stiffness in right foot that is worse when she wakes up in the morning. Pain does not affect her ADLs. She has tried PT with some improvement. Takes mobic when her pain is severe which also provides relief.     PAST MEDICAL/SURGICAL HISTORY:  Past Medical History:   Diagnosis Date    Bronchitis     DJD (degenerative joint disease)     shoulder, knee, and back    Esophagitis, unspecified     Fibrocystic disease of breast     Menstrual disorder     Sinus infection     Stress     UTI (urinary tract infection)      Past Surgical History:   Procedure Laterality Date    BACK SURGERY      micro discectomy L4-L5, 2002    BREAST BIOPSY Left 2015    core bx-benign    COLONOSCOPY N/A 7/7/2016    Performed by [facility]an [facility]olan [facility] at [facility] ENDO (4TH FLR)    ETHMOIDECTOMY N/A 4/5/2019    Performed by [facility]ay Tatum [facility] at [facility] OR 2ND FLR    FESS, USING COMPUTER-ASSISTED NAVIGATION Bilateral 4/5/2019    [facility]    MAXILLARY ANTROSTOMY  4/5/2019    [facility]    REDUCTION, NASAL TURBINATE Bilateral 4/5/2019    Performed by [facility] at [facility] OR [address] [place] [address]    SINUSOTOMY, FRONTAL SINUS, OBLITERATIVE  4/5/2019    Performed by [facility]ijay Tatum MD at [facility] OR [address] [place] [address]       Current Medications:   Current Outpatient Medications:     acyclovir (ZOVIRAX) 800 MG Tab, Take 1 tablet (800 mg total) by mouth 2 (two) times daily. (Patient taking differently: Take 800 mg by  mouth 2 (two) times daily as needed. ), Disp: 60 tablet, Rfl: 5    cyclobenzaprine (FLEXERIL) 10 MG tablet, Take 1 tablet (10 mg total) by mouth 3 (three) times daily as needed for Muscle spasms., Disp: 60 tablet, Rfl: 2    FLUoxetine 40 MG capsule, Take 1 capsule (40 mg total) by mouth once daily., Disp: 90 capsule, Rfl: 1    LORazepam (ATIVAN) 0.5 MG tablet, TAKE 1 TABLET (0.5 MG TOTAL) BY MOUTH 2 (TWO) TIMES DAILY., Disp: 60 tablet, Rfl: 1    multivitamin (THERAGRAN) tablet, Take 1 tablet by mouth once daily.  , Disp: , Rfl:     traZODone (DESYREL) 50 MG tablet, TAKE 2 TABLETS (100 MG TOTAL) BY MOUTH EVERY EVENING., Disp: 60 tablet, Rfl: 1    budesonide (PULMICORT) 0.5 mg/2 mL nebulizer solution, Take 2 mLs (0.5 mg total) by nebulization once daily. For use in saline irrigation as directed., Disp: 180 mL, Rfl: 1    epinephrine (EPIPEN 2-JAMES) 0.3 mg/0.3 mL AtIn, Inject 0.3 mLs (0.3 mg total) into the muscle once., Disp: 2 each, Rfl: 2    gabapentin (NEURONTIN) 300 MG capsule, Take 1 capsule (300 mg total) by mouth 2 (two) times daily., Disp: 60 capsule, Rfl: 3    Social History:   Social History     Socioeconomic History    Marital status:      Spouse name: Sonu    Number of children: 2    Years of education: 18    Highest education level: Not on file   Occupational History    Occupation: registered nurse     Employer: [employer]     Comment: ICU   Social Needs    Financial resource strain: Not on file    Food insecurity:     Worry: Not on file     Inability: Not on file    Transportation needs:     Medical: Not on file     Non-medical: Not on file   Tobacco Use    Smoking status: Never Smoker    Smokeless tobacco: Never Used   Substance and Sexual Activity    Alcohol use: Yes     Alcohol/week: 1.0 oz     Types: 2 Standard drinks or equivalent per week    Drug use: No    Sexual activity: Not on file   Lifestyle    Physical activity:     Days per week: Not on file      "Minutes per session: Not on file    Stress: Not on file   Relationships    Social connections:     Talks on phone: Not on file     Gets together: Not on file     Attends Voodoo service: Not on file     Active member of club or organization: Not on file     Attends meetings of clubs or organizations: Not on file     Relationship status: Not on file   Other Topics Concern    Are you pregnant or think you may be? No    Breast-feeding Not Asked   Social History Narrative        Spouse w/ diabetes insipidus, CML , and neuroendocrine tumor newly dx    RN- working in Neuro tele ICU    2 children- dtr and son               REVIEW OF SYSTEMS:  Constitution: Negative. Negative for chills, fever and night sweats.   Cardiovascular: Negative for chest pain and syncope.   Respiratory: Negative for cough and shortness of breath.   Gastrointestinal: See HPI. Negative for nausea/vomiting. Negative for abdominal pain.  Genitourinary: See HPI. Negative for discoloration or dysuria.  Skin: Negative for dry skin, itching and rash.   Hematologic/Lymphatic: Negative for bleeding problem. Does not bruise/bleed easily.   Musculoskeletal: Negative for falls and muscle weakness.   Neurological: See HPI. No seizures.   Endocrine: Negative for polydipsia, polyphagia and polyuria.   Allergic/Immunologic: Negative for hives and persistent infections.    PHYSICAL EXAMINATION:    /68   Pulse 75   Temp 98.4 °F (36.9 °C)   Ht 5' 7" (1.702 m)   Wt 74 kg (163 lb 4 oz)   LMP 02/15/2017   BMI 25.57 kg/m²     General: The patient is a  54 y.o. female in no apparent distress, the patient is orientatied to person, place and time.   Psych: Normal mood and affect  HEENT:  NCAT  Lungs:  Respirations are equal and unlabored.  CV:  2+ bilateral upper and lower extremity pulses.  Skin:  Intact throughout.    MSK:    B Foot and Ankle Exam    INSPECTION:      ALIGNMENT:  Gait:    Normal    Hindfoot  Normal  "   Scars:   None    Midfoot: Normal  Swelling:  None    Forefoot: Normal  Color:   Normal      Atrophy:  None      Heel / Toe Walking: No difficulty                          TENDERNESS:  lATERAL:    anterior:  Sinus tarsi:  None  Anteromedial joint line:  none  Syndesmosis:  none  Anterolateral joint line:  none  ATFL:   none  Talonavicular:    none   CFL:   none  Anterior tibialis:   none  Anterolateral gutter: none  Extensor tendons:   none  Fibula:   none  Peroneal tendons: none  POSTERIOR:  Peroneal tubercle.  None  Medial/lateral achilles:  none       Medial/lateral achilles insertion: none  MEDIAL:      Deltoid:  none  CALCANEUS:  Malleolus:  none  Retrocalcaneal:   none  PTT:   none  Medial achilles:   none  Navicular:  none  Lateral achilles:   none       Calcaneal tuberosity:   +L/+R  FOOT:    Calcaneal cuboid  none  MT / MT heads:  none   Navicular   none   Medial cord origin PF:  none  Cuneiforms:   none   Web space:   none  Lisfranc    none   Tarsal tunnel:   none  Base of the fifth metatarsal  none  Tinels sign   neg        RANGE OF MOTION:  RIGHT/ LEFT   STRENGTH: (affected)  Ankle DF/PF:  15/45  15/45    Anterior tibialis: 5/5     Eversion/Inversion: 15/25 15/25  Posterior tibialis: 5/5   Midfoot ABD/ADD: 10/10 10/10  Gastroc-soleus: 5/5   First MTP DF/PF: 60/25 60/25  Peroneals:  5/5         EHL:   5/5   (* = pain)     FHL:   5/5         (* = pain)      SPECIAL TESTS:   ANKLE INSTABILITY: (*pain)    Anterior drawer:   Normal      (C-W contralateral side)     Inversion:   30°     Eversion  10°            Collective Instability: (Ant-post and varus-valgus)     Stable        PROVOCATIVE TESTING:    Forced DF/ER: No pain at syndesmosis.    Mid-leg squeeze  No pain at syndesmosis    Forced DF:  No pain anterior joint line.      Forced PF:  No pain posterior ankle.     Forced INV:  No pain lateral    Forced EV:  No pain medial     Peterss sign: Normal ankle plantar flexion.     Resisted peroneal No  subluxation or pain    1st-2nd MT toggle No pain at Lisfranc    MT-T torque  No pain at Lisfranc     NEUROLOGIC TESTING:  All dermatomes foot, ankle and leg have normal sensation light touch  Ankle Reflexes 2+, symmetric   Negative Babinski and No Clonus    VASCULAR:  2+ pulses PT/DT with brisk capillary refill toes.          IMAGING:     R foot: osteophyte dorsal midfoot    ASSESSMENT/PLAN:    1. Plantar fasciitis     2. Sprain of anterior talofibular ligament of right ankle, initial encounter     3. Metatarsal bossing, right         - Discussed with patient nonoperative and operative management of her plantar fascitis and midfoot osteophytes including partial plantar fasciotomy and ostectomy of dorsal osteophytes. Pt would like to continue nonoperative management at this time. Recc rest, ice, stretching, mobic PRN.     I have personally taken the history and examined this patient and agree with the residents note as stated above.  This patient has chronic plantar fasciitis worse on the right than on the left and has had appropriate conservative treatment. She would be a candidate for partial plantar fasciotomy if she elects to.  She has some dorsal osteophyte formation on her midfoot which makes it difficult to wear her supportive shoes which could be removed as well. She has a mild sprain of her right ankle which I think will heal without any long-term dysfunction.  She will call if she decides she would like to have anything done to her foot.

## 2019-09-10 ENCOUNTER — OFFICE VISIT (OUTPATIENT)
Dept: PSYCHIATRY | Facility: CLINIC | Age: 54
End: 2019-09-10
Payer: COMMERCIAL

## 2019-09-10 DIAGNOSIS — F33.41 RECURRENT MAJOR DEPRESSION IN PARTIAL REMISSION: Primary | ICD-10-CM

## 2019-09-10 PROCEDURE — 99214 PR OFFICE/OUTPT VISIT, EST, LEVL IV, 30-39 MIN: ICD-10-PCS | Mod: S$GLB,,, | Performed by: PSYCHIATRY & NEUROLOGY

## 2019-09-10 PROCEDURE — 99999 PR PBB SHADOW E&M-EST. PATIENT-LVL I: CPT | Mod: PBBFAC,,, | Performed by: PSYCHIATRY & NEUROLOGY

## 2019-09-10 PROCEDURE — 99999 PR PBB SHADOW E&M-EST. PATIENT-LVL I: ICD-10-PCS | Mod: PBBFAC,,, | Performed by: PSYCHIATRY & NEUROLOGY

## 2019-09-10 PROCEDURE — 99214 OFFICE O/P EST MOD 30 MIN: CPT | Mod: S$GLB,,, | Performed by: PSYCHIATRY & NEUROLOGY

## 2019-09-10 RX ORDER — FLUOXETINE HYDROCHLORIDE 40 MG/1
40 CAPSULE ORAL DAILY
Qty: 90 CAPSULE | Refills: 1 | Status: SHIPPED | OUTPATIENT
Start: 2019-09-10 | End: 2020-09-04 | Stop reason: SDUPTHER

## 2019-09-10 RX ORDER — TRAZODONE HYDROCHLORIDE 50 MG/1
100 TABLET ORAL NIGHTLY
Qty: 180 TABLET | Refills: 1 | Status: SHIPPED | OUTPATIENT
Start: 2019-09-10 | End: 2020-06-04 | Stop reason: SDUPTHER

## 2019-09-10 NOTE — PROGRESS NOTES
Outpatient Psychiatry Follow-Up Visit (MD/NP)    9/10/2019    Clinical Status of Patient:  Outpatient (Ambulatory)    Chief Complaint:  Nora Mccoy is a 54 y.o. female who presents today for follow-up of depression and anxiety.  Met with patient.      Interval History and Content of Current Session:  Interim Events/Subjective Report/Content of Current Session: She comes in for yearly med check.  She has done well in general.  Has occasional down day.  We discussed possibility of her Prozac pooping out.  She will resume therapy next month.  Daughter continues to do better.    Psychotherapy:  · Target symptoms: depression  · Why chosen therapy is appropriate versus another modality: relevant to diagnosis  · Outcome monitoring methods: self-report  · Therapeutic intervention type: supportive psychotherapy  · Topics discussed/themes: building skills sets for symptom management, symptom recognition  · The patient's response to the intervention is accepting. The patient's progress toward treatment goals is good.   · Duration of intervention: 10 minutes.    Review of Systems   · PSYCHIATRIC: Pertinant items are noted in the narrative.    Past Medical, Family and Social History: The patient's past medical, family and social history have been reviewed and updated as appropriate within the electronic medical record - see encounter notes.    Compliance: yes    Side effects: None    Risk Parameters:  Patient reports no suicidal ideation  Patient reports no homicidal ideation  Patient reports no self-injurious behavior  Patient reports no violent behavior    Exam (detailed: at least 9 elements; comprehensive: all 15 elements)   Constitutional  Vitals:  Most recent vital signs, dated less than 90 days prior to this appointment, were reviewed.   There were no vitals filed for this visit.     General:  unremarkable, age appropriate     Musculoskeletal  Muscle Strength/Tone:  no tremor   Gait & Station:  non-ataxic      Psychiatric  Speech:  no latency; no press   Mood & Affect:  anxious  congruent and appropriate   Thought Process:  normal and logical   Associations:  intact   Thought Content:  normal, no suicidality, no homicidality, delusions, or paranoia   Insight:  intact   Judgement: behavior is adequate to circumstances   Orientation:  grossly intact   Memory: intact for content of interview   Language: grossly intact   Attention Span & Concentration:  able to focus   Fund of Knowledge:  intact and appropriate to age and level of education     Assessment and Diagnosis   Status/Progress: Based on the examination today, the patient's problem(s) is/are well controlled.  New problems have not been presented today.   Co-morbidities are not complicating management of the primary condition.  There are no active rule-out diagnoses for this patient at this time.     General Impression: Mild depression      ICD-10-CM ICD-9-CM   1. Recurrent major depression in partial remission F33.41 296.35       Intervention/Counseling/Treatment Plan   · Medication Management: Continue current medications. The risks and benefits of medication were discussed with the patient.      Return to Clinic: 6 months

## 2019-09-16 ENCOUNTER — CLINICAL SUPPORT (OUTPATIENT)
Dept: REHABILITATION | Facility: HOSPITAL | Age: 54
End: 2019-09-16
Attending: INTERNAL MEDICINE
Payer: COMMERCIAL

## 2019-09-16 DIAGNOSIS — R29.898 ANKLE WEAKNESS: ICD-10-CM

## 2019-09-16 DIAGNOSIS — M25.571 ACUTE RIGHT ANKLE PAIN: ICD-10-CM

## 2019-09-16 DIAGNOSIS — M25.671 DECREASED RANGE OF MOTION OF RIGHT ANKLE: ICD-10-CM

## 2019-09-16 PROCEDURE — 97110 THERAPEUTIC EXERCISES: CPT

## 2019-09-16 NOTE — PROGRESS NOTES
"  Physical Therapy Daily Treatment Note     Name: Nora Castillo [facility] Number: [id number]    Therapy Diagnosis:   Encounter Diagnoses   Name Primary?    Acute right ankle pain     Ankle weakness     Decreased range of motion of right ankle      Physician: Zamzam Bazan    Visit Date: 9/16/2019    Physician Orders: PT Eval and Treat   Medical Diagnosis from Referral: M25.579 (ICD-10-CM) - Acute ankle pain, unspecified laterality  Evaluation Date: 8/21/2019  Authorization Period Expiration: 12/31/2019  Plan of Care Expiration: 10/04/2019  Visit # / Visits authorized: 4/ 30    Time In:7:05  Time Out: 755  Total Billable Time: 50 minutes      Precautions: Standard    Subjective     Pt reports: Pt reports that her pain is getting better in the morning and that she doesn't feel like she is walking on rocks as much. It is less frequent throughout the day.     She was compliant with home exercise program.  Response to previous treatment: No adverse effects  Functional change: None    Pain: 4/10, only stiffness   Location: right foot    Objective     Nora received therapeutic exercises to develop strength, endurance, ROM and flexibility for 48 minutes including:      DF Towel Stretch 30 sec x 3  B gastroc wedge stretch 3x30" (Knee straight and Knee Bent)  Big Toe Extension Stretch on Therabar 30sec x 3  Intrinsic towel squeeze 2 min  Plantar Fascia Roll 2 min x2 B  4 Way ankle Green TB 30x ea  Shuttle calf raise DL 2c 3x10    BAPS cc/ccw, DF/PF/IV/EV x20 ea B  Ankle ABC B x1      Nora participated in neuromuscular re-education activities to improve: Balance, Coordination and Proprioception for 2 minutes. The following activities were included:    SLS on blue foam 30x3 B       Home Exercises Provided and Patient Education Provided     Education provided:   - Continue HEP  - Appropriate shoe wear  - GTB provided for HEP     Written Home Exercises Provided: Patient instructed to cont prior " HEP.  Exercises were reviewed and Nora was able to demonstrate them prior to the end of the session.  Nora demonstrated good  understanding of the education provided.     See EMR under Patient Instructions for exercises provided 9/5/19.    Assessment   Pt continues to tolerate therapy session with no adverse effects. New stretching exercises were added to target the soleus and the plantar fascia with hallucis attachments. Pt reports that she feels better compared to when she walked in. Pt reports that her pain is now a 2/10. Pt was educated to perform big toe stretching and soleus stretching at home. Pt is progressing well.    Nora is progressing well towards her goals.   Pt prognosis is Good.     Pt will continue to benefit from skilled outpatient physical therapy to address the deficits listed in the problem list box on initial evaluation, provide pt/family education and to maximize pt's level of independence in the home and community environment.     Pt's spiritual, cultural and educational needs considered and pt agreeable to plan of care and goals.     Anticipated barriers to physical therapy: None    Goals:   Short Term Goals: 3 weeks   1. Pt will decrease R ankle pain by 50% to improve patients ability to participate in ADLs and leisure bicycling (progressing, not met)   2. Pt will increased R ankle strength by 1/2 MMT score to improve dynamic stability when walking  (progressing, not met)   3. Pt will increased R ankle ROM by 5 degrees in all planes of motion with deficits to improve dynamic mobility  (progressing, not met)      Long Term Goals: 6 weeks   1. Pt will improve FOTO score to </= 27% to improve participation in ADL and leisure activities  (progressing, not met)   2. Pt will decrease R ankle pain by 100% to improve patients ability to participate in ADLs and leisure bicycling  (progressing, not met)   3. Pt will increased R ankle strength by 1 MMT score to improve dynamic stability when walking   (progressing, not met)   4. Pt will increased R ankle ROM by 10 degrees in all planes of motion with deficits to improve dynamic mobility  (progressing, not met)     Plan     Continue plan of care as tolerated    Alvaro Esparza, PT

## 2019-11-07 ENCOUNTER — PATIENT MESSAGE (OUTPATIENT)
Dept: DERMATOLOGY | Facility: CLINIC | Age: 54
End: 2019-11-07

## 2019-11-11 ENCOUNTER — PATIENT MESSAGE (OUTPATIENT)
Dept: PSYCHIATRY | Facility: CLINIC | Age: 54
End: 2019-11-11

## 2019-12-03 ENCOUNTER — PATIENT MESSAGE (OUTPATIENT)
Dept: INTERNAL MEDICINE | Facility: CLINIC | Age: 54
End: 2019-12-03

## 2019-12-05 NOTE — PROGRESS NOTES
CC: LBP     54 y.o. female presents today for LBP w/ LLE radiculopathy  Started-one month, now constant  Trauma-n/a  Pain level- 5/constant; spikes after sitting   Pain quality- starts w/ mm tightness then progresses to burning,  throbbing  Pain location-left buttock  Tx- stand and stretch and Advil  Associated factors-worse w/ sitting, leg goes to sleep, up to knee  occ higher    MEDCARD: Reviewed    ROS:  No fever, chills,or night sweats  No dysphagia or chest pain  No GERD or abdominal pain  No change in bowel or bladder function  No limb weakness  Remainder of review negative except as previously noted    PAST MEDICAL HX: Reviewed  PAST SURGICAL HX:Reviewed  SOCIAL HX: Reviewed  FAMILY HX: Reviewed    PHYSICAL EXAM:  VS: As noted  GENERAL: WDWN, A&O, NAD, conversant and co-operative  EYES: Conj/lids unremarkable, sclera anicteric  RESPIRATORY: Efforts unlabored.  CARDIOVASCULAR:  1+ pedal pulses. No LE edema    MUSCULOSKELETAL: Gait normal. No CCE noted  Back: Spine NT, paraspinal mm tender and tight left lumbar, upper buttock region  Negative SLR - right and left negative  NEUROLOGIC: CRAVEN. No tremor noted  SKIN: Warm and dry    IMPRESSION:  LBP w/ left radicular sx    PLAN:  Toradol 60mg  L-spine xrays  Caution activities  Consider PT  Resume gabapentin, and Flexeril prn, and NSAIDS  Call prn

## 2019-12-06 ENCOUNTER — HOSPITAL ENCOUNTER (OUTPATIENT)
Dept: RADIOLOGY | Facility: HOSPITAL | Age: 54
Discharge: HOME | End: 2019-12-06
Attending: INTERNAL MEDICINE
Payer: COMMERCIAL

## 2019-12-06 ENCOUNTER — OFFICE VISIT (OUTPATIENT)
Dept: INTERNAL MEDICINE | Facility: CLINIC | Age: 54
End: 2019-12-06
Payer: COMMERCIAL

## 2019-12-06 VITALS
HEART RATE: 77 BPM | DIASTOLIC BLOOD PRESSURE: 66 MMHG | RESPIRATION RATE: 16 BRPM | TEMPERATURE: 98 F | BODY MASS INDEX: 25.26 KG/M2 | SYSTOLIC BLOOD PRESSURE: 116 MMHG | HEIGHT: 67 IN | WEIGHT: 160.94 LBS

## 2019-12-06 DIAGNOSIS — M54.42 ACUTE LEFT-SIDED LOW BACK PAIN WITH LEFT-SIDED SCIATICA: ICD-10-CM

## 2019-12-06 PROCEDURE — 96372 PR INJECTION,THERAP/PROPH/DIAG2ST, IM OR SUBCUT: ICD-10-PCS | Mod: S$GLB,,, | Performed by: INTERNAL MEDICINE

## 2019-12-06 PROCEDURE — 3008F BODY MASS INDEX DOCD: CPT | Mod: CPTII,S$GLB,, | Performed by: INTERNAL MEDICINE

## 2019-12-06 PROCEDURE — 72110 XR LUMBAR SPINE COMPLETE 5 VIEW: ICD-10-PCS | Mod: 26,,, | Performed by: RADIOLOGY

## 2019-12-06 PROCEDURE — 72110 X-RAY EXAM L-2 SPINE 4/>VWS: CPT | Mod: TC,PO

## 2019-12-06 PROCEDURE — 99999 PR PBB SHADOW E&M-EST. PATIENT-LVL III: ICD-10-PCS | Mod: PBBFAC,,, | Performed by: INTERNAL MEDICINE

## 2019-12-06 PROCEDURE — 72110 X-RAY EXAM L-2 SPINE 4/>VWS: CPT | Mod: 26,,, | Performed by: RADIOLOGY

## 2019-12-06 PROCEDURE — 96372 THER/PROPH/DIAG INJ SC/IM: CPT | Mod: S$GLB,,, | Performed by: INTERNAL MEDICINE

## 2019-12-06 PROCEDURE — 99999 PR PBB SHADOW E&M-EST. PATIENT-LVL III: CPT | Mod: PBBFAC,,, | Performed by: INTERNAL MEDICINE

## 2019-12-06 PROCEDURE — 99214 OFFICE O/P EST MOD 30 MIN: CPT | Mod: 25,S$GLB,, | Performed by: INTERNAL MEDICINE

## 2019-12-06 PROCEDURE — 99214 PR OFFICE/OUTPT VISIT, EST, LEVL IV, 30-39 MIN: ICD-10-PCS | Mod: 25,S$GLB,, | Performed by: INTERNAL MEDICINE

## 2019-12-06 PROCEDURE — 3008F PR BODY MASS INDEX (BMI) DOCUMENTED: ICD-10-PCS | Mod: CPTII,S$GLB,, | Performed by: INTERNAL MEDICINE

## 2019-12-06 RX ORDER — KETOROLAC TROMETHAMINE 30 MG/ML
60 INJECTION, SOLUTION INTRAMUSCULAR; INTRAVENOUS
Status: COMPLETED | OUTPATIENT
Start: 2019-12-06 | End: 2019-12-06

## 2019-12-06 RX ADMIN — KETOROLAC TROMETHAMINE 60 MG: 30 INJECTION, SOLUTION INTRAMUSCULAR; INTRAVENOUS at 04:12

## 2019-12-09 ENCOUNTER — PATIENT MESSAGE (OUTPATIENT)
Dept: INTERNAL MEDICINE | Facility: CLINIC | Age: 54
End: 2019-12-09

## 2019-12-09 ENCOUNTER — TELEPHONE (OUTPATIENT)
Dept: INTERNAL MEDICINE | Facility: CLINIC | Age: 54
End: 2019-12-09

## 2019-12-09 DIAGNOSIS — M54.42 ACUTE LEFT-SIDED LOW BACK PAIN WITH LEFT-SIDED SCIATICA: Primary | ICD-10-CM

## 2019-12-09 NOTE — TELEPHONE ENCOUNTER
----- Message from Zamzam Bazan MD sent at 12/7/2019  1:00 PM CST -----  Nora, your xray revealed moderate degenerative disease at L5-S1, likely causing your symptoms.  PT would be the next step, please let me know if you want a PT Consult placed.  Zamzam Montes

## 2019-12-12 ENCOUNTER — OFFICE VISIT (OUTPATIENT)
Dept: PSYCHIATRY | Facility: CLINIC | Age: 54
End: 2019-12-12
Payer: COMMERCIAL

## 2019-12-12 DIAGNOSIS — F41.1 GENERALIZED ANXIETY DISORDER: ICD-10-CM

## 2019-12-12 DIAGNOSIS — F33.41 RECURRENT MAJOR DEPRESSION IN PARTIAL REMISSION: Primary | ICD-10-CM

## 2019-12-12 PROCEDURE — 90834 PSYTX W PT 45 MINUTES: CPT | Mod: S$GLB,,, | Performed by: SOCIAL WORKER

## 2019-12-12 PROCEDURE — 90834 PR PSYCHOTHERAPY W/PATIENT, 45 MIN: ICD-10-PCS | Mod: S$GLB,,, | Performed by: SOCIAL WORKER

## 2019-12-12 NOTE — PROGRESS NOTES
Individual Psychotherapy (PhD/LCSW)    12/12/2019    Site:  [facility]         Therapeutic Intervention: Met with patient.  Outpatient - Insight oriented psychotherapy 45 min - CPT code 78925    Chief complaint/reason for encounter: depression and anxiety     Interval history and content of current session:      Reports she has been doing better.    We did mindfulness meditation of sounds and sensation in lower legs.   We discussed the meditation as it compares to life.  I shared the chessboard metaphor.  She has not read more.  She did write some of her irrational thoughts.   Encouraged to read next chapter in book.   And use thoughts as bubbles that appear and disappear.    Some discussion of self as context.     Treatment plan:  · Target symptoms: depression, anxiety   · Why chosen therapy is appropriate versus another modality: relevant to diagnosis  · Outcome monitoring methods: self-report, observation  · Therapeutic intervention type: insight oriented psychotherapy, behavior modifying psychotherapy, supportive psychotherapy    Risk parameters:  Patient reports no suicidal ideation  Patient reports no homicidal ideation  Patient reports no self-injurious behavior  Patient reports no violent behavior    Verbal deficits: None    Patient's response to intervention:  The patient's response to intervention is accepting.    Progress toward goals and other mental status changes:  The patient's progress toward goals is fair .    Diagnosis:     ICD-10-CM ICD-9-CM   1. Recurrent major depression in partial remission F33.41 296.35   2. Generalized anxiety disorder F41.1 300.02       Plan:  individual psychotherapy    Return to clinic: as schedule    Length of Service (minutes): 45

## 2019-12-17 ENCOUNTER — OFFICE VISIT (OUTPATIENT)
Dept: OTOLARYNGOLOGY | Facility: CLINIC | Age: 54
End: 2019-12-17
Payer: COMMERCIAL

## 2019-12-17 VITALS — SYSTOLIC BLOOD PRESSURE: 120 MMHG | DIASTOLIC BLOOD PRESSURE: 77 MMHG | HEART RATE: 88 BPM

## 2019-12-17 DIAGNOSIS — J32.8 OTHER CHRONIC SINUSITIS: ICD-10-CM

## 2019-12-17 DIAGNOSIS — J31.0 CHRONIC RHINITIS: Primary | ICD-10-CM

## 2019-12-17 PROCEDURE — 31231 NASAL/SINUS ENDOSCOPY: ICD-10-PCS | Mod: S$GLB,,, | Performed by: OTOLARYNGOLOGY

## 2019-12-17 PROCEDURE — 31231 NASAL ENDOSCOPY DX: CPT | Mod: S$GLB,,, | Performed by: OTOLARYNGOLOGY

## 2019-12-17 PROCEDURE — 99999 PR PBB SHADOW E&M-EST. PATIENT-LVL III: CPT | Mod: PBBFAC,,, | Performed by: OTOLARYNGOLOGY

## 2019-12-17 PROCEDURE — 99213 OFFICE O/P EST LOW 20 MIN: CPT | Mod: 25,S$GLB,, | Performed by: OTOLARYNGOLOGY

## 2019-12-17 PROCEDURE — 99999 PR PBB SHADOW E&M-EST. PATIENT-LVL III: ICD-10-PCS | Mod: PBBFAC,,, | Performed by: OTOLARYNGOLOGY

## 2019-12-17 PROCEDURE — 99213 PR OFFICE/OUTPT VISIT, EST, LEVL III, 20-29 MIN: ICD-10-PCS | Mod: 25,S$GLB,, | Performed by: OTOLARYNGOLOGY

## 2019-12-17 NOTE — PROGRESS NOTES
Subjective:      Nora is a 54 y.o. female who comes for follow-up of sinusitis.  Her last visit with me was on 7/16/2019.  Now over 8 months status-post endoscopic sinus surgery.   Doing well, breathing well, no mucus, transient frontal pressure during weather change a couple of weeks ago.  Using budesonide sinus rinse daily.    SNOT-22 score = 12, NOSE score = 20%, ETDQ-7 score = 1.1    The patient's medications, allergies, past medical, surgical, social and family histories were reviewed and updated as appropriate.    A detailed review of systems was obtained with pertinent positives as per the above HPI, and otherwise negative.        Objective:     /77   Pulse 88   LMP 02/15/2017        Constitutional:   She appears well-developed. She is cooperative.     Head:  Normocephalic.     Nose:  No mucosal edema, rhinorrhea, septal deviation or polyps. No epistaxis. Turbinates normal, no turbinate masses and no turbinate hypertrophy.  Right sinus exhibits no maxillary sinus tenderness and no frontal sinus tenderness. Left sinus exhibits no maxillary sinus tenderness and no frontal sinus tenderness.     Mouth/Throat  Oropharynx clear and moist without lesions or asymmetry. No oropharyngeal exudate or posterior oropharyngeal erythema.     Neck:  No adenopathy. Normal range of motion present.     She has no cervical adenopathy.       Procedure    Nasal endoscopy performed.  See procedure note.     Left ethmoid     Right ethmoid        Data Reviewed    WBC (K/uL)   Date Value   03/20/2018 4.81     Eosinophil% (%)   Date Value   03/20/2018 4.6     Eos # (K/uL)   Date Value   03/20/2018 0.2     Platelets (K/uL)   Date Value   03/20/2018 261     Glucose (mg/dL)   Date Value   03/20/2018 89     No results found for: IGE    Pathology report indicated chronic inflammation with eosinophilia.    Cultures showed Aspergillus.      Assessment:     1. Chronic rhinitis    2. Other chronic sinusitis         Plan:     Continue  budesonide rinse.  Will reassess at 1 year jose.  Follow up in about 3 months (around 3/17/2020).

## 2019-12-17 NOTE — PROCEDURES
Nasal/sinus endoscopy  Date/Time: 12/17/2019 1:45 PM  Performed by: Vijay Tatum MD  Authorized by: Vijay Tatum MD     Consent Done?:  Yes (Verbal)  Anesthesia:     Local anesthetic:  Lidocaine 4% and Stanley-Synephrine 1/2%    Patient tolerance:  Patient tolerated the procedure well with no immediate complications  Nose:     Procedure Performed:  Nasal Endoscopy  External:      No external nasal deformity  Intranasal:      Mucosa no polyps     Mucosa ulcers not present     No mucosa lesions present     Turbinates not enlarged     No septum gross deformity  Nasopharynx:      No mucosa lesions     Adenoids not present     Posterior choanae patent     Eustachian tube patent     Sinuses all clear and patent bilaterally.

## 2019-12-31 ENCOUNTER — PATIENT MESSAGE (OUTPATIENT)
Dept: DERMATOLOGY | Facility: CLINIC | Age: 54
End: 2019-12-31

## 2020-01-17 ENCOUNTER — OFFICE VISIT (OUTPATIENT)
Dept: OPTOMETRY | Facility: CLINIC | Age: 55
End: 2020-01-17
Payer: COMMERCIAL

## 2020-01-17 DIAGNOSIS — H52.13 MYOPIA WITH PRESBYOPIA, BILATERAL: Primary | ICD-10-CM

## 2020-01-17 DIAGNOSIS — H25.13 NUCLEAR SCLEROSIS OF BOTH EYES: ICD-10-CM

## 2020-01-17 DIAGNOSIS — H52.4 MYOPIA WITH PRESBYOPIA, BILATERAL: Primary | ICD-10-CM

## 2020-01-17 PROCEDURE — 99999 PR PBB SHADOW E&M-EST. PATIENT-LVL II: ICD-10-PCS | Mod: PBBFAC,,, | Performed by: OPTOMETRIST

## 2020-01-17 PROCEDURE — 99999 PR PBB SHADOW E&M-EST. PATIENT-LVL II: CPT | Mod: PBBFAC,,, | Performed by: OPTOMETRIST

## 2020-01-17 PROCEDURE — 92015 DETERMINE REFRACTIVE STATE: CPT | Mod: S$GLB,,, | Performed by: OPTOMETRIST

## 2020-01-17 PROCEDURE — 92015 PR REFRACTION: ICD-10-PCS | Mod: S$GLB,,, | Performed by: OPTOMETRIST

## 2020-01-17 PROCEDURE — 92014 PR EYE EXAM, EST PATIENT,COMPREHESV: ICD-10-PCS | Mod: S$GLB,,, | Performed by: OPTOMETRIST

## 2020-01-17 PROCEDURE — 92014 COMPRE OPH EXAM EST PT 1/>: CPT | Mod: S$GLB,,, | Performed by: OPTOMETRIST

## 2020-01-17 NOTE — PROGRESS NOTES
"MANOLO COOPER 01/2019  Glasses about 2 yrs. Old.  Patient states vision looks   "shadowy" in low light. Eyes feel tried.  Not using any drops.  Eyes feel   itchy when having seasonal allergies.    Last edited by Shi Joe on 1/17/2020  7:38 AM. (History)            Assessment /Plan     For exam results, see Encounter Report.    Myopia with presbyopia, bilateral    Nuclear sclerosis of both eyes      1. New Spec Rx given. Different lens options discussed with patient. RTC 1 year full exam.  2. Educated pt on presence of cataracts and effects on vision. No surgery at this time. Recheck in one year. RTC or call if satill having probs with vision, will order HVf test to rule out any blind spots in vision. CVF normal.                "

## 2020-01-20 ENCOUNTER — CLINICAL SUPPORT (OUTPATIENT)
Dept: REHABILITATION | Facility: HOSPITAL | Age: 55
End: 2020-01-20
Payer: COMMERCIAL

## 2020-01-20 DIAGNOSIS — M54.42 CHRONIC BILATERAL LOW BACK PAIN WITH LEFT-SIDED SCIATICA: ICD-10-CM

## 2020-01-20 DIAGNOSIS — R26.89 BALANCE PROBLEM: ICD-10-CM

## 2020-01-20 DIAGNOSIS — M62.81 GENERALIZED MUSCLE WEAKNESS: ICD-10-CM

## 2020-01-20 DIAGNOSIS — G89.29 CHRONIC BILATERAL LOW BACK PAIN WITH LEFT-SIDED SCIATICA: ICD-10-CM

## 2020-01-20 DIAGNOSIS — M53.86 DECREASED RANGE OF MOTION OF LUMBAR SPINE: ICD-10-CM

## 2020-01-20 PROCEDURE — 97161 PT EVAL LOW COMPLEX 20 MIN: CPT | Mod: PN

## 2020-01-20 PROCEDURE — 97110 THERAPEUTIC EXERCISES: CPT | Mod: PN

## 2020-01-20 NOTE — PLAN OF CARE
"[facility]  Physical Therapy Initial Evaluation    Name: Nora Mccoy  Clinic Number: [id number]    Therapy Diagnosis:   Encounter Diagnoses   Name Primary?    Generalized muscle weakness     Decreased range of motion of lumbar spine     Balance problem     Chronic bilateral low back pain with left-sided sciatica      Physician: Zamzam Bazan    Physician Orders: PT Eval and Treat   Medical Diagnosis from Referral: Acute left-sided low back pain with left-sided sciatica  Evaluation Date: 1/20/2020  Authorization Period Expiration: 12/31/20  Plan of Care Expiration: 03/31/20  Visit # / Visits authorized: 1/ 60    Time In: 0900  Time Out: 1000  Total Billable Time: 60 minutes    Precautions: Standard    Subjective   Date of onset: 12/09/19  History of current condition - Nora reports: Last couple of months she started having pain that felt like a "tight muscle" in her LLE. One day while seated at the edge of a chair she felt throbbing pain down the LLE. She went to see her PCP and had an x-ray performed which showed DJD. Pt has a hx of lumbar sx. The sx was back in 2002 for a microdisectomy. PCP recommended PT. Pt has felt grabbing pain from her L-side of low back down to her ankle but now it has been stopping at her L knee.      Medical History:   Past Medical History:   Diagnosis Date    Bronchitis     DJD (degenerative joint disease)     shoulder, knee, and back    Esophagitis, unspecified     Fibrocystic disease of breast     Menstrual disorder     Sinus infection     Stress     UTI (urinary tract infection)        Surgical History:   Nora Mccoy  has a past surgical history that includes Back surgery; Colonoscopy (N/A, 7/7/2016); Breast biopsy (Left, 2015); Functional endoscopic sinus surgery (FESS) using computer-assisted navigation (Bilateral, 4/5/2019); Ethmoidectomy (N/A, 4/5/2019); Nasal turbinate reduction (Bilateral, 4/5/2019); " Maxillary antrostomy (4/5/2019); and Frontal sinus obliteration (4/5/2019).    Medications:   Nora has a current medication list which includes the following prescription(s): acyclovir, budesonide, cyclobenzaprine, epinephrine, fluoxetine, gabapentin, lorazepam, multivitamin, and trazodone.    Allergies:   Review of patient's allergies indicates:   Allergen Reactions    Penicillin g Anaphylaxis    Penicillins Anaphylaxis    Penicillin Hives        Imaging, x-rays     Prior Therapy: [place] for ankle   Social History:  lives with their spouse and two kids, in a 2 story home with 15 stairs and 1 step leading to the front door.   Occupation: RN  Prior Level of Function: Independent with all ADL's, driving, and working   Current Level of Function: Independent with all ADL's (depending on severity she might need min-mod A for household chores), driving, and working    Pain:  Current 4/10, worst 8/10, best 1/10   Location: left back  and upper legs  Description: Aching, Burning, Throbbing, Grabbing, Tight, Tingling and Numb  Aggravating Factors: Sitting, Standing, Bending, Walking, Morning and Lifting  Easing Factors: positioinal changes and stretches     Pts goals: Pt would like to get back into the gym at normal intensity.     Objective     L-Spine         Palpation:      Patient with palpation tenderness to: L piriformis/glute max region        Flexibility      Muscle Left Right Comment         Hamstrings severe Mod    Quadriceps mod mod    Illipsoas mod mod    TFL severe mild    Pirifromis mod Mod                             ROM       L-Spine AROM AROM Status Comment    Left Right            Flexion 40 40     Extension 10 10     Sidebending 10 10                                MMT      L-spine   Status Comment    Left Right            Abdominals 4/5 4/5     Lower Abdominals 3+/5 3+/5     Hip ABduction 4/5 5/5     Hip ADduction 4/5 4/5     Hip Flexion 3+/5 5/5     Hip Extension 5/5 5/5                  "         SPECIAL TESTS           Test   Status Comment    Left Right     SLR Pos Neg     Slump Test Pos Neg                    FUNCTIONAL MOBILITY        Balance: SLS on even surface with eyes open: RLE - Good; LLE- Poor             CMS Impairment/Limitation/Restriction for FOTO Lumbar Spine Survey    Therapist reviewed FOTO scores for Nora Mccoy on 1/20/2020.   FOTO documents entered into [employer] - see Media section.    Limitation Score: 44%         TREATMENT   Treatment Time In: 0950  Treatment Time Out: 1000  Total Treatment time separate from Evaluation: 10 minutes    Nora received therapeutic exercises to develop strength, endurance, ROM, flexibility, posture and core stabilization for 10 minutes including:  Slouch Overcorrect 10x10"  TA Drawers 10x10"  Hamstring Stretch 3x30"  Piriformis Stretch 3x30"   Hip Flexor/Quad Stretch in supine 3x30"  Prone on Elbows 3x30"  Prone Press Up 3x10, 3" hold      Home Exercises and Patient Education Provided    Education provided:   - HEP  -PT tx plan    Written Home Exercises Provided: yes.  Exercises were reviewed and Nora was able to demonstrate them prior to the end of the session.  Nora demonstrated good  understanding of the education provided.     See EMR under Patient Instructions for exercises provided 1/20/2020.    Assessment   Nora is a 55 y.o. female referred to outpatient Physical Therapy with a medical diagnosis of Acute left-sided low back pain with left-sided sciatica. Pt presents with difficulty sitting/standing/walking for extended periods of time, shopping, performing household chores/duties, work, and recreational activities due to increased pain, decreased strength, decreased ROM, decreased balance, and overall decreased functional mobility.     Pt prognosis is Good.   Pt will benefit from skilled outpatient Physical Therapy to address the deficits stated above and in the chart below, provide pt/family education, and to maximize pt's " level of independence.     Plan of care discussed with patient: Yes  Pt's spiritual, cultural and educational needs considered and patient is agreeable to the plan of care and goals as stated below:     Anticipated Barriers for therapy: work    Medical Necessity is demonstrated by the following  History  Co-morbidities and personal factors that may impact the plan of care Co-morbidities:   depression and prior lumbar surgery    Personal Factors:   no deficits     low   Examination  Body Structures and Functions, activity limitations and participation restrictions that may impact the plan of care Body Regions:   back  lower extremities    Body Systems:    gross symmetry  ROM  strength  gross coordinated movement  balance  gait  transfers    Participation Restrictions:   Household chores/duties and recreational activities     Activity limitations:   Learning and applying knowledge  no deficits    General Tasks and Commands  no deficits    Communication  no deficits    Mobility  lifting and carrying objects  walking  driving (bike, car, motorcycle)    Self care  no deficits    Domestic Life  shopping  doing house work (cleaning house, washing dishes, laundry)    Interactions/Relationships  no deficits    Life Areas  no deficits    Community and Social Life  no deficits         low   Clinical Presentation stable and uncomplicated low   Decision Making/ Complexity Score: low     Goals:  Short Term Goals (4 Weeks):  1. Pt will be compliant with HEP to supplement PT in decreasing pain with functional mobility.  2. Pt will perform pallof press with good control to demonstrate improved core strength.  3. Pt will improve lumbar ROM to </=minimal loss in all planes to improve functional mobility.  4. Pt will improve impaired LE MMTs to >/=4/5 to improve strength for functional tasks.  Long Term Goals (8 Weeks):   1. Pt will improve FOTO score to </= 40% limited to decrease perceived limitation with maintaining/changing body  position.   2. Pt will perform double leg lowering 5x with good control to demonstrate improved core strength.  3. Pt will improve impaired LE MMTs to >/=4+/5 to improve strength for functional tasks.  4. Pt will report no pain during lumbar ROM to promote functional mobility.      Plan   Plan of care Certification: 1/20/2020 to 03/31/20.    Outpatient Physical Therapy 2 times weekly for 16 visits to include the following interventions: Cervical/Lumbar Traction, Electrical Stimulation -, Gait Training, Iontophoresis (with -), Manual Therapy, Moist Heat/ Ice, Neuromuscular Re-ed, Patient Education, Therapeutic Activites and Therapeutic Exercise.     Memo Delong, PT

## 2020-01-23 ENCOUNTER — CLINICAL SUPPORT (OUTPATIENT)
Dept: REHABILITATION | Facility: HOSPITAL | Age: 55
End: 2020-01-23
Payer: COMMERCIAL

## 2020-01-23 DIAGNOSIS — R26.89 BALANCE PROBLEM: ICD-10-CM

## 2020-01-23 DIAGNOSIS — G89.29 CHRONIC BILATERAL LOW BACK PAIN WITH LEFT-SIDED SCIATICA: ICD-10-CM

## 2020-01-23 DIAGNOSIS — M53.86 DECREASED RANGE OF MOTION OF LUMBAR SPINE: ICD-10-CM

## 2020-01-23 DIAGNOSIS — M62.81 GENERALIZED MUSCLE WEAKNESS: ICD-10-CM

## 2020-01-23 DIAGNOSIS — M54.42 CHRONIC BILATERAL LOW BACK PAIN WITH LEFT-SIDED SCIATICA: ICD-10-CM

## 2020-01-23 PROCEDURE — 97110 THERAPEUTIC EXERCISES: CPT | Mod: PN

## 2020-01-23 NOTE — PROGRESS NOTES
"  Physical Therapy Daily Treatment Note     Name: Nora Castillo [facility] Number: [id number]    Therapy Diagnosis:   Encounter Diagnoses   Name Primary?    Generalized muscle weakness     Decreased range of motion of lumbar spine     Balance problem     Chronic bilateral low back pain with left-sided sciatica      Physician: Zamzam Bazan    Visit Date: 1/23/2020    Physician Orders: PT Eval and Treat   Medical Diagnosis: Acute left-sided low back pain with left-sided sciatica  Evaluation Date: 1/20/2020  Authorization Period Expiration: 12/31/20  Plan of Care Certification Period: 1/20/2020 to 03/31/20  Visit #/Visits authorized: 2/ 60 (POC total visits 16)     Time In: 0800  Time Out: 0900  Total Billable Time: 55 minutes    Precautions: Standard    Subjective     Pt reports: Feeling achy and sore this morning with radiating pain in her left glute down to her L knee   She was compliant with home exercise program.  Response to previous treatment: today is 1st tx session   Functional change: today is 1st tx session    Pain: 5/10  Location: left back  and upper legs     Objective     Nora received therapeutic exercises to develop strength, endurance, ROM, flexibility, posture and core stabilization for 55 minutes including:    Slouch Overcorrect     10x10"  TA Drawers      10x10"  Hamstring Stretch     3x30"  Piriformis Stretch     3x30"   Hip Flexor/Quad Stretch in supine   3x30"  Prone on Elbows     3x10, 5" hold   Prone Press Up     3x15"  Sciatic Nerve Glide     3x10, supine   Pelvic tilts      3x10, 5" hold  DKTC       3x10, 3" hold  3-way Prayer Stretch   3x30"  Fitter Stretch, Gastroc   3x30"      Home Exercises Provided and Patient Education Provided     Education provided:   - HEP    Written Home Exercises Provided: yes.  Exercises were reviewed and Nora was able to demonstrate them prior to the end of the session.  Nora demonstrated good  understanding of the education provided. "     See EMR under Patient Instructions for exercises provided prior visit.    Assessment     Pt was able to tolerate all exercises during her first tx session. Pt progressed with neural tension glides, and was able to verbalize and demonstrate understanding to add to HEP. Pt also added pelvic tilts, DKTC, 3-way prayer stretch, and gastroc stretch. Pt reported she felt looser and had decreased pain at the end of her session   Nora is progressing well towards her goals.   Pt prognosis is Good.     Pt will continue to benefit from skilled outpatient physical therapy to address the deficits listed in the problem list box on initial evaluation, provide pt/family education and to maximize pt's level of independence in the home and community environment.     Pt's spiritual, cultural and educational needs considered and pt agreeable to plan of care and goals.     Anticipated barriers to physical therapy: work    Goals:   Short Term Goals (4 Weeks):  1. Pt will be compliant with HEP to supplement PT in decreasing pain with functional mobility.  2. Pt will perform pallof press with good control to demonstrate improved core strength.  3. Pt will improve lumbar ROM to </=minimal loss in all planes to improve functional mobility.  4. Pt will improve impaired LE MMTs to >/=4/5 to improve strength for functional tasks.  Long Term Goals (8 Weeks):   1. Pt will improve FOTO score to </= 40% limited to decrease perceived limitation with maintaining/changing body position.   2. Pt will perform double leg lowering 5x with good control to demonstrate improved core strength.  3. Pt will improve impaired LE MMTs to >/=4+/5 to improve strength for functional tasks.  4. Pt will report no pain during lumbar ROM to promote functional mobility.    Plan     Pt will continue with POC as tolerated.     Memo Delong, PT

## 2020-02-04 ENCOUNTER — CLINICAL SUPPORT (OUTPATIENT)
Dept: REHABILITATION | Facility: HOSPITAL | Age: 55
End: 2020-02-04
Payer: COMMERCIAL

## 2020-02-04 DIAGNOSIS — G89.29 CHRONIC BILATERAL LOW BACK PAIN WITH LEFT-SIDED SCIATICA: ICD-10-CM

## 2020-02-04 DIAGNOSIS — R26.89 BALANCE PROBLEM: ICD-10-CM

## 2020-02-04 DIAGNOSIS — M53.86 DECREASED RANGE OF MOTION OF LUMBAR SPINE: ICD-10-CM

## 2020-02-04 DIAGNOSIS — M54.42 CHRONIC BILATERAL LOW BACK PAIN WITH LEFT-SIDED SCIATICA: ICD-10-CM

## 2020-02-04 DIAGNOSIS — M62.81 GENERALIZED MUSCLE WEAKNESS: ICD-10-CM

## 2020-02-04 PROCEDURE — 97110 THERAPEUTIC EXERCISES: CPT | Mod: PN,CQ

## 2020-02-04 NOTE — PROGRESS NOTES
"  Physical Therapy Daily Treatment Note     Name: Nora Castillo [facility] Number: [id number]    Therapy Diagnosis:   Encounter Diagnoses   Name Primary?    Generalized muscle weakness     Decreased range of motion of lumbar spine     Balance problem     Chronic bilateral low back pain with left-sided sciatica      Physician: Zamzam Bazan    Visit Date: 2/4/2020    Physician Orders: PT Eval and Treat   Medical Diagnosis: Acute left-sided low back pain with left-sided sciatica  Evaluation Date: 1/20/2020  Authorization Period Expiration: 12/31/20  Plan of Care Certification Period: 1/20/2020 to 03/31/20  Visit #/Visits authorized: 3/ 60 (POC total visits 16)     Time In: 0800  Time Out: 0900  Total Billable Time: 40 minutes TE 3  Precautions: Standard    Subjective     Pt reports: Feeling "tight and sore" this morning with radiating pain in her left glute down to her L knee when driving, resolved after getting out of car.  States pain has been easing off with therex.  She was compliant with home exercise program.  Response to previous treatment: No adverse effects  Functional change:None    Pain: 4/10  Location: left back  and upper legs     Objective     Nora received therapeutic exercises to develop strength, endurance, ROM, flexibility, posture and core stabilization for 55 minutes including:    Slouch Overcorrect     10x10"  TA Drawers      10x10"  Hamstring Stretch     3x30"  Piriformis Stretch     3x30"   Hip Flexor/Quad Stretch in supine   3x30"  Prone on Elbows     3x10, 5" hold   Prone Press Up     3x15"  Sciatic Nerve Glide     3x10, supine   Pelvic tilts      3x10, 5" hold  DKTC       3x10, 3" hold  3-way Prayer Stretch   3x30"  Stair Stretch, Gastroc   3x30"      Home Exercises Provided and Patient Education Provided     Education provided:   - HEP  -Self stretching for hamstring, gastroc and hip flexor at stairs, piriformis in sitting for HEP    Written Home Exercises Provided: " "yes.  Exercises were reviewed and Nora was able to demonstrate them prior to the end of the session.  Nora demonstrated good  understanding of the education provided.     See EMR under Patient Instructions for exercises provided 1/20/2020.    Assessment     Patient able to complete all therex without complaint of pain or difficulty.   Noted "good stretch" with instruction of piriformis stretch in sitting.  Appreciative of modifications of stretching program to be able to work them into her work day and when unable to lie down. States "definitely better - looser" after treatment.  Rates "2/10"  Nora is progressing well towards her goals.   Pt prognosis is Good.     Pt will continue to benefit from skilled outpatient physical therapy to address the deficits listed in the problem list box on initial evaluation, provide pt/family education and to maximize pt's level of independence in the home and community environment.     Pt's spiritual, cultural and educational needs considered and pt agreeable to plan of care and goals.     Anticipated barriers to physical therapy: work    Goals:   Short Term Goals (4 Weeks):  1. Pt will be compliant with HEP to supplement PT in decreasing pain with functional mobility.  2. Pt will perform pallof press with good control to demonstrate improved core strength.  3. Pt will improve lumbar ROM to </=minimal loss in all planes to improve functional mobility.  4. Pt will improve impaired LE MMTs to >/=4/5 to improve strength for functional tasks.  Long Term Goals (8 Weeks):   1. Pt will improve FOTO score to </= 40% limited to decrease perceived limitation with maintaining/changing body position.   2. Pt will perform double leg lowering 5x with good control to demonstrate improved core strength.  3. Pt will improve impaired LE MMTs to >/=4+/5 to improve strength for functional tasks.  4. Pt will report no pain during lumbar ROM to promote functional mobility.    Plan     Pt will " continue with POC as tolerated.     Korin Eric, PTA

## 2020-02-05 ENCOUNTER — PATIENT MESSAGE (OUTPATIENT)
Dept: ORTHOPEDICS | Facility: CLINIC | Age: 55
End: 2020-02-05

## 2020-02-06 ENCOUNTER — PATIENT MESSAGE (OUTPATIENT)
Dept: ORTHOPEDICS | Facility: CLINIC | Age: 55
End: 2020-02-06

## 2020-02-12 NOTE — PROGRESS NOTES
"  Physical Therapy Daily Treatment Note     Name: Nora Castillo [facility] Number: [id number]    Therapy Diagnosis:   Encounter Diagnoses   Name Primary?    Generalized muscle weakness     Decreased range of motion of lumbar spine     Balance problem     Chronic bilateral low back pain with left-sided sciatica      Physician: Zamzam Bazan    Visit Date: 2/14/2020    Physician Orders: PT Eval and Treat   Medical Diagnosis: Acute left-sided low back pain with left-sided sciatica  Evaluation Date: 1/20/2020  Authorization Period Expiration: 12/31/20  Plan of Care Certification Period: 1/20/2020 to 03/31/20  Visit #/Visits authorized: 4/ 60 (POC total visits 16)   FOTO:  4/5 NEXT  PTA visit: 2/6    Time In: 0900  Time Out: 0955  Total Billable Time: 30 minutes TE 2  Precautions: Standard    Subjective     Pt reports: Just slight pain. Has been able to perform stretching at work with modifications taught.  She was compliant with home exercise program.  Response to previous treatment: No adverse effects  Functional change: Able to go from sit<>stand without pain.  Able to stretch during work day    Pain: 2/10  Location: left back  and upper legs     Objective     Nora received therapeutic exercises to develop strength, endurance, ROM, flexibility, posture and core stabilization for 55 minutes including:    Slouch Overcorrect     10x10"  TA Drawers      10x10"  Hamstring Stretch     3x30" Long sit   Piriformis Stretch     3x30"   Hip Flexor/Quad Stretch in supine   3x30"  Prone on Elbows     3x10, 5" hold   Prone Press Up     3x15"  Sciatic Nerve Glide     3x10, supine   Pelvic tilts      3x10, 5" hold  DKTC       3x10, 5" hold  3-way Prayer Stretch    3x30"  Stair Stretch, Gastroc    3x30"      Home Exercises Provided and Patient Education Provided     Education provided:   - HEP  -Self stretching for hamstring, gastroc and hip flexor at stairs, piriformis in sitting for HEP    Written Home Exercises " "Provided: yes.  Exercises were reviewed and Nora was able to demonstrate them prior to the end of the session.  Nora demonstrated good  understanding of the education provided.     See EMR under Patient Instructions for exercises provided 1/20/2020.    Assessment     Patient able to complete all therex without complaint of pain or difficulty.  States my leg is zero but slight ache in back.  Rates "1/10"  Nora is progressing well towards her goals.   Pt prognosis is Good.     Pt will continue to benefit from skilled outpatient physical therapy to address the deficits listed in the problem list box on initial evaluation, provide pt/family education and to maximize pt's level of independence in the home and community environment.     Pt's spiritual, cultural and educational needs considered and pt agreeable to plan of care and goals.     Anticipated barriers to physical therapy: work    Goals:   Short Term Goals (4 Weeks):  1. Pt will be compliant with HEP to supplement PT in decreasing pain with functional mobility.  2. Pt will perform pallof press with good control to demonstrate improved core strength.  3. Pt will improve lumbar ROM to </=minimal loss in all planes to improve functional mobility.  4. Pt will improve impaired LE MMTs to >/=4/5 to improve strength for functional tasks.  Long Term Goals (8 Weeks):   1. Pt will improve FOTO score to </= 40% limited to decrease perceived limitation with maintaining/changing body position.   2. Pt will perform double leg lowering 5x with good control to demonstrate improved core strength.  3. Pt will improve impaired LE MMTs to >/=4+/5 to improve strength for functional tasks.  4. Pt will report no pain during lumbar ROM to promote functional mobility.    Plan     Pt will continue with POC as tolerated.     Korin Eric, PTA   "

## 2020-02-14 ENCOUNTER — CLINICAL SUPPORT (OUTPATIENT)
Dept: REHABILITATION | Facility: HOSPITAL | Age: 55
End: 2020-02-14
Payer: COMMERCIAL

## 2020-02-14 DIAGNOSIS — R26.89 BALANCE PROBLEM: ICD-10-CM

## 2020-02-14 DIAGNOSIS — M53.86 DECREASED RANGE OF MOTION OF LUMBAR SPINE: ICD-10-CM

## 2020-02-14 DIAGNOSIS — G89.29 CHRONIC BILATERAL LOW BACK PAIN WITH LEFT-SIDED SCIATICA: ICD-10-CM

## 2020-02-14 DIAGNOSIS — M62.81 GENERALIZED MUSCLE WEAKNESS: ICD-10-CM

## 2020-02-14 DIAGNOSIS — M54.42 CHRONIC BILATERAL LOW BACK PAIN WITH LEFT-SIDED SCIATICA: ICD-10-CM

## 2020-02-14 PROCEDURE — 97110 THERAPEUTIC EXERCISES: CPT | Mod: PN,CQ

## 2020-02-14 PROCEDURE — 97130 THER IVNTJ EA ADDL 15 MIN: CPT | Mod: PN,CQ

## 2020-02-21 ENCOUNTER — CLINICAL SUPPORT (OUTPATIENT)
Dept: REHABILITATION | Facility: HOSPITAL | Age: 55
End: 2020-02-21
Payer: COMMERCIAL

## 2020-02-21 DIAGNOSIS — G89.29 CHRONIC BILATERAL LOW BACK PAIN WITH LEFT-SIDED SCIATICA: ICD-10-CM

## 2020-02-21 DIAGNOSIS — R26.89 BALANCE PROBLEM: ICD-10-CM

## 2020-02-21 DIAGNOSIS — M62.81 GENERALIZED MUSCLE WEAKNESS: ICD-10-CM

## 2020-02-21 DIAGNOSIS — M54.42 CHRONIC BILATERAL LOW BACK PAIN WITH LEFT-SIDED SCIATICA: ICD-10-CM

## 2020-02-21 DIAGNOSIS — M53.86 DECREASED RANGE OF MOTION OF LUMBAR SPINE: ICD-10-CM

## 2020-02-21 PROCEDURE — 97110 THERAPEUTIC EXERCISES: CPT | Mod: PN

## 2020-02-21 NOTE — PROGRESS NOTES
"  Physical Therapy Daily Treatment Note     Name: Nora Castillo [facility] Number: [id number]    Therapy Diagnosis:   Encounter Diagnoses   Name Primary?    Generalized muscle weakness     Decreased range of motion of lumbar spine     Balance problem     Chronic bilateral low back pain with left-sided sciatica      Physician: Zamzam Bazan    Visit Date: 2/21/2020    Physician Orders: PT Eval and Treat   Medical Diagnosis: Acute left-sided low back pain with left-sided sciatica  Evaluation Date: 1/20/2020  Authorization Period Expiration: 12/31/20  Plan of Care Certification Period: 1/20/2020 to 03/31/20  Visit #/Visits authorized: 5/ 60 (POC total visits 16)   FOTO:  5/5 DONE  PTA visit: 0/6    Time In: 0700  Time Out: 0800  Total Billable Time: 55 minutes TE 4  Precautions: Standard    Subjective     Pt reports: Work is going well, had some tightness this morning when first waking up but otherwise feeling good.   She was compliant with home exercise program.  Response to previous treatment: No adverse effects  Functional change: Able to go from sit<>stand without pain.  Able to stretch during work day    Pain: 1/10  Location: left back  and upper legs     Objective     Nora received therapeutic exercises to develop strength, endurance, ROM, flexibility, posture and core stabilization for 55 minutes including:    Slouch Overcorrect     10x10"  TA Drawers      10x10"  Hamstring Stretch     3x30" Long sit   Piriformis Stretch     3x30"   Hip Flexor/Quad Stretch in supine   3x30"  Prone on Elbows     3x10, 5" hold   Prone Press Up     3x15"  Sciatic Nerve Glide     3x10, supine   Pelvic tilts      3x10, 5" hold  DKTC       3x10, 5" hold  3-way Prayer Stretch    3x30"  Stair Stretch, Gastroc    3x30"  Heel Raises     3x10  Mini Squats     3x10  Step Ups L1     NEXT      Home Exercises Provided and Patient Education Provided     Education provided:   - HEP  -Self stretching for hamstring, gastroc and " hip flexor at stairs, piriformis in sitting for HEP    Written Home Exercises Provided: yes.  Exercises were reviewed and Nora was able to demonstrate them prior to the end of the session.  Nora demonstrated good  understanding of the education provided.     See EMR under Patient Instructions for exercises provided 1/20/2020.    Assessment     Pt was able to tolerate all exercises without any c/o increased pain or discomfort. Pt progressed with heel raises and mini squats. Prone press ups saved until end of tx to normalize and decrease pain.   Nora is progressing well towards her goals.   Pt prognosis is Good.     Pt will continue to benefit from skilled outpatient physical therapy to address the deficits listed in the problem list box on initial evaluation, provide pt/family education and to maximize pt's level of independence in the home and community environment.     Pt's spiritual, cultural and educational needs considered and pt agreeable to plan of care and goals.     Anticipated barriers to physical therapy: work    Goals:   Short Term Goals (4 Weeks):  1. Pt will be compliant with HEP to supplement PT in decreasing pain with functional mobility.  2. Pt will perform pallof press with good control to demonstrate improved core strength.  3. Pt will improve lumbar ROM to </=minimal loss in all planes to improve functional mobility.  4. Pt will improve impaired LE MMTs to >/=4/5 to improve strength for functional tasks.  Long Term Goals (8 Weeks):   1. Pt will improve FOTO score to </= 40% limited to decrease perceived limitation with maintaining/changing body position.   2. Pt will perform double leg lowering 5x with good control to demonstrate improved core strength.  3. Pt will improve impaired LE MMTs to >/=4+/5 to improve strength for functional tasks.  4. Pt will report no pain during lumbar ROM to promote functional mobility.    Plan     Pt will continue with POC as tolerated.     Memo Delong, PT

## 2020-02-26 ENCOUNTER — CLINICAL SUPPORT (OUTPATIENT)
Dept: REHABILITATION | Facility: HOSPITAL | Age: 55
End: 2020-02-26
Payer: COMMERCIAL

## 2020-02-26 DIAGNOSIS — M54.42 CHRONIC BILATERAL LOW BACK PAIN WITH LEFT-SIDED SCIATICA: ICD-10-CM

## 2020-02-26 DIAGNOSIS — M62.81 GENERALIZED MUSCLE WEAKNESS: ICD-10-CM

## 2020-02-26 DIAGNOSIS — R26.89 BALANCE PROBLEM: ICD-10-CM

## 2020-02-26 DIAGNOSIS — G89.29 CHRONIC BILATERAL LOW BACK PAIN WITH LEFT-SIDED SCIATICA: ICD-10-CM

## 2020-02-26 DIAGNOSIS — M53.86 DECREASED RANGE OF MOTION OF LUMBAR SPINE: ICD-10-CM

## 2020-02-26 PROCEDURE — 97110 THERAPEUTIC EXERCISES: CPT | Mod: PN

## 2020-02-26 NOTE — PROGRESS NOTES
"  Physical Therapy Daily Treatment Note     Name: Nora Castillo [facility] Number: [id number]    Therapy Diagnosis:   Encounter Diagnoses   Name Primary?    Generalized muscle weakness     Decreased range of motion of lumbar spine     Balance problem     Chronic bilateral low back pain with left-sided sciatica      Physician: Zamzam Bazan    Visit Date: 2/26/2020    Physician Orders: PT Eval and Treat   Medical Diagnosis: Acute left-sided low back pain with left-sided sciatica  Evaluation Date: 1/20/2020  Authorization Period Expiration: 12/31/20  Plan of Care Certification Period: 1/20/2020 to 03/31/20  Visit #/Visits authorized: 6/ 60 (POC total visits 16)   FOTO:  6/10  PTA visit: 0/6    Time In: 0900  Time Out: 1000  Total Billable Time: 55 minutes TE 4  Precautions: Standard    Subjective     Pt reports: No new c/o of pain, just soreness    She was compliant with home exercise program.  Response to previous treatment: No adverse effects  Functional change: Able to go from sit<>stand without pain.  Able to stretch during work day    Pain: 2/10  Location: left back  and upper legs     Objective     Nora received therapeutic exercises to develop strength, endurance, ROM, flexibility, posture and core stabilization for 55 minutes including:    Slouch Overcorrect     10x10"  TA Drawers      10x10"  Hamstring Stretch     3x30" Long sit   Piriformis Stretch     3x30"   Hip Flexor/Quad Stretch in supine   3x30"  Prone on Elbows     3x10, 5" hold   Prone Press Up     3x15"  Sciatic Nerve Glide     3x10, supine, L  Pelvic tilts      3x10, 5" hold  DKTC       3x10, 5" hold  3-way Prayer Stretch    3x30"  Stair Stretch, Gastroc    3x30"  Heel Raises     3x10  Mini Squats     3x10  Step Ups L1     2x10      Home Exercises Provided and Patient Education Provided     Education provided:   - HEP  -Self stretching for hamstring, gastroc and hip flexor at stairs, piriformis in sitting for HEP    Written Home " Exercises Provided: yes.  Exercises were reviewed and Nora was able to demonstrate them prior to the end of the session.  Nora demonstrated good  understanding of the education provided.     See EMR under Patient Instructions for exercises provided 1/20/2020.    Assessment     Pt was able to tolerate all exercises without any c/o increased pain or discomfort. Pt progressed with step ups L1. Pt reports she still has some difficulty and increased pain when performing stairs at work. PT will begin progressing pt to more standing exercises and machines, moving away from mat exercises.   Nora is progressing well towards her goals.   Pt prognosis is Good.     Pt will continue to benefit from skilled outpatient physical therapy to address the deficits listed in the problem list box on initial evaluation, provide pt/family education and to maximize pt's level of independence in the home and community environment.     Pt's spiritual, cultural and educational needs considered and pt agreeable to plan of care and goals.     Anticipated barriers to physical therapy: work    Goals:   Short Term Goals (4 Weeks):  1. Pt will be compliant with HEP to supplement PT in decreasing pain with functional mobility.  2. Pt will perform pallof press with good control to demonstrate improved core strength.  3. Pt will improve lumbar ROM to </=minimal loss in all planes to improve functional mobility.  4. Pt will improve impaired LE MMTs to >/=4/5 to improve strength for functional tasks.  Long Term Goals (8 Weeks):   1. Pt will improve FOTO score to </= 40% limited to decrease perceived limitation with maintaining/changing body position.   2. Pt will perform double leg lowering 5x with good control to demonstrate improved core strength.  3. Pt will improve impaired LE MMTs to >/=4+/5 to improve strength for functional tasks.  4. Pt will report no pain during lumbar ROM to promote functional mobility.    Plan     Pt will continue with POC  as tolerated.     Memo Delong, PT

## 2020-02-29 ENCOUNTER — PATIENT MESSAGE (OUTPATIENT)
Dept: ORTHOPEDICS | Facility: CLINIC | Age: 55
End: 2020-02-29

## 2020-03-02 ENCOUNTER — PROCEDURE VISIT (OUTPATIENT)
Dept: DERMATOLOGY | Facility: CLINIC | Age: 55
End: 2020-03-02

## 2020-03-02 ENCOUNTER — PATIENT MESSAGE (OUTPATIENT)
Dept: ORTHOPEDICS | Facility: CLINIC | Age: 55
End: 2020-03-02

## 2020-03-02 ENCOUNTER — TELEPHONE (OUTPATIENT)
Dept: ORTHOPEDICS | Facility: CLINIC | Age: 55
End: 2020-03-02

## 2020-03-02 DIAGNOSIS — Z41.1 ELECTIVE PROCEDURE FOR UNACCEPTABLE COSMETIC APPEARANCE: Primary | ICD-10-CM

## 2020-03-02 PROCEDURE — 99499 UNLISTED E&M SERVICE: CPT | Mod: S$GLB,,, | Performed by: DERMATOLOGY

## 2020-03-02 PROCEDURE — 99499 NO LOS: ICD-10-PCS | Mod: S$GLB,,, | Performed by: DERMATOLOGY

## 2020-03-02 PROCEDURE — 15788 PR CHEM PEEL, FACE, EPIDERM: ICD-10-PCS | Mod: CSM,S$GLB,, | Performed by: DERMATOLOGY

## 2020-03-02 PROCEDURE — 15788 CHEMICAL PEEL FACIAL EPIDRM: CPT | Mod: CSM,S$GLB,, | Performed by: DERMATOLOGY

## 2020-03-02 NOTE — PROGRESS NOTES
Pt here for ViPeel  After verbal and written consent, pt had time to ask any questions.   Denies hx of allergy to salicyclic acid or phenol,  or recurrent fever blisters.   Pt denies current pregnancy, breast feeding, or Accutane course within the past 3 months   After face cleansed, face  And neck prepped with acetone  ViPeel applied to face and neck  Pt tolerated well with no complications  Post peel instructions including strict sun avoidance and absolutely no manual peeling of the skin  Post peel kit provided

## 2020-03-10 ENCOUNTER — CLINICAL SUPPORT (OUTPATIENT)
Dept: REHABILITATION | Facility: HOSPITAL | Age: 55
End: 2020-03-10
Payer: COMMERCIAL

## 2020-03-10 DIAGNOSIS — M53.86 DECREASED RANGE OF MOTION OF LUMBAR SPINE: ICD-10-CM

## 2020-03-10 DIAGNOSIS — G89.29 CHRONIC BILATERAL LOW BACK PAIN WITH LEFT-SIDED SCIATICA: ICD-10-CM

## 2020-03-10 DIAGNOSIS — M62.81 GENERALIZED MUSCLE WEAKNESS: ICD-10-CM

## 2020-03-10 DIAGNOSIS — M54.42 CHRONIC BILATERAL LOW BACK PAIN WITH LEFT-SIDED SCIATICA: ICD-10-CM

## 2020-03-10 DIAGNOSIS — R26.89 BALANCE PROBLEM: ICD-10-CM

## 2020-03-10 PROCEDURE — 97110 THERAPEUTIC EXERCISES: CPT | Mod: PN

## 2020-03-10 NOTE — PROGRESS NOTES
"  Physical Therapy Daily Treatment Note     Name: Nora Castillo [facility] Number: [id number]    Therapy Diagnosis:   Encounter Diagnoses   Name Primary?    Generalized muscle weakness     Decreased range of motion of lumbar spine     Balance problem     Chronic bilateral low back pain with left-sided sciatica      Physician: Zamzam Bazan    Visit Date: 3/10/2020    Physician Orders: PT Eval and Treat   Medical Diagnosis: Acute left-sided low back pain with left-sided sciatica  Evaluation Date: 1/20/2020  Authorization Period Expiration: 12/31/20  Plan of Care Certification Period: 1/20/2020 to 03/31/20  Visit #/Visits authorized: 7/ 60 (POC total visits 16)   FOTO:  7/10  PTA visit: 0/6    Time In: 0805  Time Out: 0900  Total Billable Time: 40 minutes (TE 3)  Precautions: Standard    Subjective     Pt reports: that she only notices her back pain with certain movements for example going from a sitting to standing position.    She was compliant with home exercise program.  Response to previous treatment: No adverse effects  Functional change: none voiced today.    Pain: 1/10  Location: left back  and upper legs     Objective     Nora received therapeutic exercises to develop strength, endurance, ROM, flexibility, posture and core stabilization for 40 minutes with PT 1:1 including assessment and 15 minutes under supervision:    Hamstring Stretch     3x30" Long sit   Prone on Elbows     3x10, 5" hold   Prone Press Up     3x15"  Sciatic Nerve Glide     3x10, supine, L  LTRs  DKTC       20 reps    deadbugs     Phase I/II; 2x10  hooklying ab isos    Alt. 2x10 x 3" hold  Modified prone planks on Swiss ball  2x10 x 3" hold   Half kneel lifts     Blue medicine ball; 2x10  Tall kneel hip thrusts    Blue medicine ball; 2x10    Mini Squats     3x10   Steamboats     2x10 RTB  Leg press     5.5 plates; 3x10  Seated lumbar flexion with Swiss ball 2x10 (end of session)      Home Exercises Provided and Patient " Education Provided   Education provided:   - HEP  -Self stretching for hamstring, gastroc and hip flexor at stairs, piriformis in sitting for HEP    Written Home Exercises Provided: yes.  Exercises were reviewed and Nora was able to demonstrate them prior to the end of the session.  Nora demonstrated good  understanding of the education provided.     See EMR under Patient Instructions for exercises provided 1/20/2020.    Assessment   A:  Tolerated new exercises well without back pain.  Emphasis on core strengthening and dynamic lumbar flexibility exercises.      Nora is progressing well towards her goals.   Pt prognosis is Good.     Pt will continue to benefit from skilled outpatient physical therapy to address the deficits listed in the problem list box on initial evaluation, provide pt/family education and to maximize pt's level of independence in the home and community environment.     Pt's spiritual, cultural and educational needs considered and pt agreeable to plan of care and goals.     Anticipated barriers to physical therapy: work    Goals:   Short Term Goals (4 Weeks):  1. Pt will be compliant with HEP to supplement PT in decreasing pain with functional mobility.  2. Pt will perform pallof press with good control to demonstrate improved core strength.  3. Pt will improve lumbar ROM to </=minimal loss in all planes to improve functional mobility.  4. Pt will improve impaired LE MMTs to >/=4/5 to improve strength for functional tasks.    Long Term Goals (8 Weeks):   1. Pt will improve FOTO score to </= 40% limited to decrease perceived limitation with maintaining/changing body position.   2. Pt will perform double leg lowering 5x with good control to demonstrate improved core strength.  3. Pt will improve impaired LE MMTs to >/=4+/5 to improve strength for functional tasks.  4. Pt will report no pain during lumbar ROM to promote functional mobility.    Plan     Pt will continue with POC as tolerated.   Assess goals and DC planning (?).    Bobby Laird, PT

## 2020-03-19 ENCOUNTER — PATIENT MESSAGE (OUTPATIENT)
Dept: INTERNAL MEDICINE | Facility: CLINIC | Age: 55
End: 2020-03-19

## 2020-03-19 DIAGNOSIS — N10 PYELONEPHRITIS, ACUTE: ICD-10-CM

## 2020-03-19 RX ORDER — CIPROFLOXACIN 500 MG/1
500 TABLET ORAL 2 TIMES DAILY
Qty: 20 TABLET | Refills: 0 | Status: SHIPPED | OUTPATIENT
Start: 2020-03-19 | End: 2020-03-29

## 2020-03-25 ENCOUNTER — DOCUMENTATION ONLY (OUTPATIENT)
Dept: REHABILITATION | Facility: HOSPITAL | Age: 55
End: 2020-03-25

## 2020-03-25 DIAGNOSIS — M54.42 CHRONIC BILATERAL LOW BACK PAIN WITH LEFT-SIDED SCIATICA: ICD-10-CM

## 2020-03-25 DIAGNOSIS — G89.29 CHRONIC BILATERAL LOW BACK PAIN WITH LEFT-SIDED SCIATICA: ICD-10-CM

## 2020-03-25 DIAGNOSIS — R26.89 BALANCE PROBLEM: ICD-10-CM

## 2020-03-25 DIAGNOSIS — M53.86 DECREASED RANGE OF MOTION OF LUMBAR SPINE: ICD-10-CM

## 2020-03-25 DIAGNOSIS — M62.81 GENERALIZED MUSCLE WEAKNESS: ICD-10-CM

## 2020-03-25 NOTE — PROGRESS NOTES
Pt was evaluated on 01/20/20 and was seen 7 times for PT. Pt has not attended PT since 03/10/20. Pt was given HEP. Current status is unknown. Pt to be d/c'd at this time.

## 2020-03-26 PROBLEM — M54.42 CHRONIC BILATERAL LOW BACK PAIN WITH LEFT-SIDED SCIATICA: Status: RESOLVED | Noted: 2020-01-20 | Resolved: 2020-03-26

## 2020-03-26 PROBLEM — G89.29 CHRONIC BILATERAL LOW BACK PAIN WITH LEFT-SIDED SCIATICA: Status: RESOLVED | Noted: 2020-01-20 | Resolved: 2020-03-26

## 2020-03-26 PROBLEM — R26.89 BALANCE PROBLEM: Status: RESOLVED | Noted: 2020-01-20 | Resolved: 2020-03-26

## 2020-03-26 PROBLEM — M62.81 GENERALIZED MUSCLE WEAKNESS: Status: RESOLVED | Noted: 2020-01-20 | Resolved: 2020-03-26

## 2020-03-26 PROBLEM — M53.86 DECREASED RANGE OF MOTION OF LUMBAR SPINE: Status: RESOLVED | Noted: 2020-01-20 | Resolved: 2020-03-26

## 2020-04-16 RX ORDER — TRAZODONE HYDROCHLORIDE 50 MG/1
100 TABLET ORAL NIGHTLY
Qty: 180 TABLET | Refills: 1 | OUTPATIENT
Start: 2020-04-16

## 2020-04-21 DIAGNOSIS — Z01.84 ANTIBODY RESPONSE EXAMINATION: ICD-10-CM

## 2020-04-22 ENCOUNTER — LAB VISIT (OUTPATIENT)
Dept: LAB | Facility: HOSPITAL | Age: 55
End: 2020-04-22
Attending: INTERNAL MEDICINE
Payer: COMMERCIAL

## 2020-04-22 DIAGNOSIS — Z01.84 ANTIBODY RESPONSE EXAMINATION: ICD-10-CM

## 2020-04-22 LAB — SARS-COV-2 IGG SERPL QL IA: NEGATIVE

## 2020-04-22 PROCEDURE — 86769 SARS-COV-2 COVID-19 ANTIBODY: CPT

## 2020-04-22 PROCEDURE — 36415 COLL VENOUS BLD VENIPUNCTURE: CPT

## 2020-05-08 ENCOUNTER — TELEPHONE (OUTPATIENT)
Dept: ORTHOPEDICS | Facility: CLINIC | Age: 55
End: 2020-05-08

## 2020-05-08 NOTE — TELEPHONE ENCOUNTER
Spoke to patient about r/s surgery. She says her foot is feeling a little better and will call if she decides to have sx.

## 2020-05-20 RX ORDER — TRAZODONE HYDROCHLORIDE 50 MG/1
100 TABLET ORAL NIGHTLY
Qty: 180 TABLET | Refills: 1 | OUTPATIENT
Start: 2020-05-20

## 2020-05-24 RX ORDER — TRAZODONE HYDROCHLORIDE 50 MG/1
100 TABLET ORAL NIGHTLY
Qty: 180 TABLET | Refills: 1 | OUTPATIENT
Start: 2020-05-24

## 2020-05-29 ENCOUNTER — PATIENT MESSAGE (OUTPATIENT)
Dept: PSYCHIATRY | Facility: CLINIC | Age: 55
End: 2020-05-29

## 2020-06-04 ENCOUNTER — OFFICE VISIT (OUTPATIENT)
Dept: PSYCHIATRY | Facility: CLINIC | Age: 55
End: 2020-06-04
Payer: COMMERCIAL

## 2020-06-04 ENCOUNTER — DOCUMENTATION ONLY (OUTPATIENT)
Dept: PSYCHIATRY | Facility: CLINIC | Age: 55
End: 2020-06-04

## 2020-06-04 DIAGNOSIS — F41.1 GENERALIZED ANXIETY DISORDER: ICD-10-CM

## 2020-06-04 DIAGNOSIS — F32.A DEPRESSION, UNSPECIFIED DEPRESSION TYPE: Primary | ICD-10-CM

## 2020-06-04 DIAGNOSIS — F33.42 RECURRENT MAJOR DEPRESSION IN COMPLETE REMISSION: Primary | ICD-10-CM

## 2020-06-04 PROCEDURE — 99214 PR OFFICE/OUTPT VISIT, EST, LEVL IV, 30-39 MIN: ICD-10-PCS | Mod: 95,,, | Performed by: SPECIALIST

## 2020-06-04 PROCEDURE — 90833 PSYTX W PT W E/M 30 MIN: CPT | Mod: 95,,, | Performed by: SPECIALIST

## 2020-06-04 PROCEDURE — 99214 OFFICE O/P EST MOD 30 MIN: CPT | Mod: 95,,, | Performed by: SPECIALIST

## 2020-06-04 PROCEDURE — 90833 PR PSYCHOTHERAPY W/PATIENT W/E&M, 30 MIN (ADD ON): ICD-10-PCS | Mod: 95,,, | Performed by: SPECIALIST

## 2020-06-04 RX ORDER — TRAZODONE HYDROCHLORIDE 50 MG/1
100 TABLET ORAL NIGHTLY
Qty: 180 TABLET | Refills: 1 | Status: SHIPPED | OUTPATIENT
Start: 2020-06-04 | End: 2021-01-19

## 2020-06-04 NOTE — PROGRESS NOTES
Patient couldn't login for her telepsych visit and called to let me know she might need to reschedule. She is not repoerting any acute or new problems, mood is stable; she needs a refill of her trazodone 50 mg 2 nightly. This has been done. She is not suicidal, homicidal or otherwise unstable.

## 2020-06-04 NOTE — PROGRESS NOTES
Outpatient Psychiatry Follow-Up Visit (MD/NP)    6/4/2020    Clinical Status of Patient:  Outpatient (Ambulatory)    Chief Complaint:  Nora Mccoy is a 55 y.o. female who presents today for follow-up of depression and anxiety.  Met with patient.  Via telepsychatry.     Interval History and Content of Current Session:  Interim Events/Subjective Report/Content of Current Session: She is a former patient of Dr. Braga, who she last saw 9/2019.  She has a h/o MDD, recurrent, and VIK, which have marta in treatment for the past many years with flouxetine 40 mg, lorazepam 0.5 mg bid prn, and trazodone 100 mg HS.  She also takes gabapentin 300 mg bid. In addition to the medications, she was engaged in psychotherapy wiith Dr Salamanca (therapist), who she last saw last year.  She has plans to reconnect with this therapist soon. She reports good sleep, stable mood, normal motvation and interest, stable appetite, no problems with memory or concentraion, no suicidal feelings and no extra irritability. She does not report any side effects from her current regimen.  She is happy with how things are feeling and has no new problems to report.   She does not use drugs, tobacco and is a social drinker with 1 drink/week.     Psychotherapy:  · Target symptoms: depression, recurrent depression, anxiety , mood disorder, worry and anxiety  · Why chosen therapy is appropriate versus another modality: relevant to diagnosis  · Outcome monitoring methods: self-report  · Therapeutic intervention type: supportive psychotherapy, and medicaton management  · Topics discussed/themes: parenting issues, building skills sets for symptom management  · The patient's response to the intervention is accepting. The patient's progress toward treatment goals is excellent.   · Duration of intervention: 30 minutes.    Review of Systems   · PSYCHIATRIC: Pertinant items are noted in the narrative.   · She reports she has occasional pain in her  joints and knees, and some back pain.  She has a calcification on her skull which does not give her any difficulty.   ·     Past Medical, Family and Social History: The patient's past medical, family and social history have been reviewed and updated as appropriate within the electronic medical record - see encounter notes.    Compliance: yes    Side effects: None    Risk Parameters:  Patient reports no suicidal ideation  Patient reports no homicidal ideation  Patient reports no self-injurious behavior  Patient reports no violent behavior    Exam (detailed: at least 9 elements; comprehensive: all 15 elements)   Constitutional  Vitals:  Most recent vital signs, dated greater than 90 days prior to this appointment, were not reviewed.   There were no vitals filed for this visit.     General:  unremarkable, age appropriate, normal weight, well nourished, neatly groomed     Musculoskeletal  Muscle Strength/Tone:  not examined   Gait & Station:  non-ataxic     Psychiatric  Speech:  no latency; no press, spontaneous   Mood & Affect:  euthymic  congruent and appropriate, mood-congruent   Thought Process:  normal and logical   Associations:  intact   Thought Content:  normal, no suicidality, no homicidality, delusions, or paranoia   Insight:  intact   Judgement: behavior is adequate to circumstances   Orientation:  grossly intact, person, place, situation, time/date, day of week, month of year, year   Memory: intact for content of interview, not tested   Language: grossly intact, not tested   Attention Span & Concentration:  able to focus   Fund of Knowledge:  intact and appropriate to age and level of education     Assessment and Diagnosis   Status/Progress: Based on the examination today, the patient's problem(s) is/are well controlled.  New problems have not been presented today.   Co-morbidities, Diagnostic uncertainty and Lack of compliance are not complicating management of the primary condition.  There are no active  rule-out diagnoses for this patient at this time.     General Impression: patient in treatment for recurrent MDD and VIK, stable.       Recurrent MDD, in treatment and stable.   VIK, in treatment, stable.   Occasional joint pain    Intervention/Counseling/Treatment Plan   · Medication Management: Continue current medications.      Return to Clinic: 3 months   The patient is stable for OP management  She will call me or RTC or ED if there are new problems that arise prior to next appointment.   She will call and FU with Dr Salamanca

## 2020-06-15 ENCOUNTER — PATIENT MESSAGE (OUTPATIENT)
Dept: INTERNAL MEDICINE | Facility: CLINIC | Age: 55
End: 2020-06-15

## 2020-06-15 DIAGNOSIS — Z12.31 VISIT FOR SCREENING MAMMOGRAM: Primary | ICD-10-CM

## 2020-07-20 ENCOUNTER — CLINICAL SUPPORT (OUTPATIENT)
Dept: URGENT CARE | Facility: CLINIC | Age: 55
End: 2020-07-20
Payer: COMMERCIAL

## 2020-07-20 VITALS — RESPIRATION RATE: 18 BRPM | OXYGEN SATURATION: 97 % | HEART RATE: 76 BPM | TEMPERATURE: 98 F

## 2020-07-20 DIAGNOSIS — R05.9 COUGH: ICD-10-CM

## 2020-07-20 DIAGNOSIS — R53.83 FATIGUE, UNSPECIFIED TYPE: ICD-10-CM

## 2020-07-20 DIAGNOSIS — Z20.822 EXPOSURE TO COVID-19 VIRUS: ICD-10-CM

## 2020-07-20 DIAGNOSIS — R51.9 COMPLAINT OF HEADACHE: ICD-10-CM

## 2020-07-20 DIAGNOSIS — R51.9 COMPLAINT OF HEADACHE: Primary | ICD-10-CM

## 2020-07-20 PROCEDURE — U0003 INFECTIOUS AGENT DETECTION BY NUCLEIC ACID (DNA OR RNA); SEVERE ACUTE RESPIRATORY SYNDROME CORONAVIRUS 2 (SARS-COV-2) (CORONAVIRUS DISEASE [COVID-19]), AMPLIFIED PROBE TECHNIQUE, MAKING USE OF HIGH THROUGHPUT TECHNOLOGIES AS DESCRIBED BY CMS-2020-01-R: HCPCS

## 2020-07-22 ENCOUNTER — TELEPHONE (OUTPATIENT)
Dept: PRIMARY CARE CLINIC | Facility: CLINIC | Age: 55
End: 2020-07-22

## 2020-07-22 LAB — SARS-COV-2 RNA RESP QL NAA+PROBE: NOT DETECTED

## 2020-07-29 ENCOUNTER — HOSPITAL ENCOUNTER (OUTPATIENT)
Dept: RADIOLOGY | Facility: HOSPITAL | Age: 55
Discharge: HOME | End: 2020-07-29
Attending: INTERNAL MEDICINE
Payer: COMMERCIAL

## 2020-07-29 DIAGNOSIS — Z12.31 VISIT FOR SCREENING MAMMOGRAM: ICD-10-CM

## 2020-07-29 PROCEDURE — 77063 MAMMO DIGITAL SCREENING BILAT WITH TOMOSYNTHESIS_CAD: ICD-10-PCS | Mod: 26,,, | Performed by: RADIOLOGY

## 2020-07-29 PROCEDURE — 77063 BREAST TOMOSYNTHESIS BI: CPT | Mod: 26,,, | Performed by: RADIOLOGY

## 2020-07-29 PROCEDURE — 77067 MAMMO DIGITAL SCREENING BILAT WITH TOMOSYNTHESIS_CAD: ICD-10-PCS | Mod: 26,,, | Performed by: RADIOLOGY

## 2020-07-29 PROCEDURE — 77067 SCR MAMMO BI INCL CAD: CPT | Mod: TC

## 2020-07-29 PROCEDURE — 77067 SCR MAMMO BI INCL CAD: CPT | Mod: 26,,, | Performed by: RADIOLOGY

## 2020-08-04 ENCOUNTER — OFFICE VISIT (OUTPATIENT)
Dept: PSYCHIATRY | Facility: CLINIC | Age: 55
End: 2020-08-04
Payer: COMMERCIAL

## 2020-08-04 DIAGNOSIS — F33.41 RECURRENT MAJOR DEPRESSION IN PARTIAL REMISSION: ICD-10-CM

## 2020-08-04 DIAGNOSIS — F41.1 GENERALIZED ANXIETY DISORDER: Primary | ICD-10-CM

## 2020-08-04 PROCEDURE — 90834 PSYTX W PT 45 MINUTES: CPT | Mod: S$GLB,,, | Performed by: SOCIAL WORKER

## 2020-08-04 PROCEDURE — 90834 PR PSYCHOTHERAPY W/PATIENT, 45 MIN: ICD-10-PCS | Mod: S$GLB,,, | Performed by: SOCIAL WORKER

## 2020-08-04 NOTE — PROGRESS NOTES
Individual Psychotherapy (PhD/LCSW)    8/4/2020    Site:  [facility]         Therapeutic Intervention: Met with patient.  Outpatient - Insight oriented psychotherapy 45 min - CPT code 82200    Chief complaint/reason for encounter: depression and anxiety     Interval history and content of current session:      Same pattern of skewed thinking and excessive worry.    Thoughts of incompetence, failure, others mad at her, etc.    Mindfulness applied to de fuse thoughts.     No thoughts of suicide.    Daughter 23 at home and doing well.     Stepfather health problems.     Mother worrying.     Treatment plan:  · Target symptoms: depression, anxiety   · Why chosen therapy is appropriate versus another modality: relevant to diagnosis  · Outcome monitoring methods: self-report, observation  · Therapeutic intervention type: insight oriented psychotherapy, behavior modifying psychotherapy, supportive psychotherapy    Risk parameters:  Patient reports no suicidal ideation  Patient reports no homicidal ideation  Patient reports no self-injurious behavior  Patient reports no violent behavior    Verbal deficits: None    Patient's response to intervention:  The patient's response to intervention is accepting.    Progress toward goals and other mental status changes:  The patient's progress toward goals is fair .    Diagnosis:     ICD-10-CM ICD-9-CM   1. Generalized anxiety disorder  F41.1 300.02   2. Recurrent major depression in partial remission  F33.41 296.35       Plan:  individual psychotherapy    Return to clinic: as schedule    Length of Service (minutes): 45

## 2020-08-21 ENCOUNTER — HOSPITAL ENCOUNTER (OUTPATIENT)
Dept: RADIOLOGY | Facility: HOSPITAL | Age: 55
Discharge: HOME | End: 2020-08-21
Attending: INTERNAL MEDICINE
Payer: COMMERCIAL

## 2020-08-21 DIAGNOSIS — R92.8 ABNORMAL MAMMOGRAM: ICD-10-CM

## 2020-08-21 PROCEDURE — 76642 ULTRASOUND BREAST LIMITED: CPT | Mod: 26,RT,, | Performed by: RADIOLOGY

## 2020-08-21 PROCEDURE — 77061 BREAST TOMOSYNTHESIS UNI: CPT | Mod: TC

## 2020-08-21 PROCEDURE — 77065 DX MAMMO INCL CAD UNI: CPT | Mod: 26,,, | Performed by: RADIOLOGY

## 2020-08-21 PROCEDURE — 77065 MAMMO DIGITAL DIAGNOSTIC RIGHT WITH TOMOSYNTHESIS_CAD: ICD-10-PCS | Mod: 26,,, | Performed by: RADIOLOGY

## 2020-08-21 PROCEDURE — 76642 US BREAST RIGHT LIMITED: ICD-10-PCS | Mod: 26,RT,, | Performed by: RADIOLOGY

## 2020-08-21 PROCEDURE — G0279 TOMOSYNTHESIS, MAMMO: HCPCS | Mod: 26,,, | Performed by: RADIOLOGY

## 2020-08-21 PROCEDURE — G0279 MAMMO DIGITAL DIAGNOSTIC RIGHT WITH TOMOSYNTHESIS_CAD: ICD-10-PCS | Mod: 26,,, | Performed by: RADIOLOGY

## 2020-08-21 PROCEDURE — 76642 ULTRASOUND BREAST LIMITED: CPT | Mod: TC,RT

## 2020-08-21 PROCEDURE — 77065 DX MAMMO INCL CAD UNI: CPT | Mod: TC

## 2020-09-01 RX ORDER — EPINEPHRINE 0.3 MG/.3ML
1 INJECTION SUBCUTANEOUS ONCE
Qty: 2 EACH | Refills: 2 | Status: SHIPPED | OUTPATIENT
Start: 2020-09-01 | End: 2023-12-06

## 2020-09-04 DIAGNOSIS — F41.9 ANXIETY: Primary | ICD-10-CM

## 2020-09-04 RX ORDER — FLUOXETINE HYDROCHLORIDE 40 MG/1
CAPSULE ORAL
Qty: 90 CAPSULE | Refills: 1 | Status: SHIPPED | OUTPATIENT
Start: 2020-09-04 | End: 2021-04-29 | Stop reason: SDUPTHER

## 2020-09-11 ENCOUNTER — OFFICE VISIT (OUTPATIENT)
Dept: PSYCHIATRY | Facility: CLINIC | Age: 55
End: 2020-09-11
Payer: COMMERCIAL

## 2020-09-11 DIAGNOSIS — F33.40 RECURRENT MAJOR DEPRESSION IN REMISSION: Primary | ICD-10-CM

## 2020-09-11 PROCEDURE — 99214 PR OFFICE/OUTPT VISIT, EST, LEVL IV, 30-39 MIN: ICD-10-PCS | Mod: 95,,, | Performed by: SPECIALIST

## 2020-09-11 PROCEDURE — 99214 OFFICE O/P EST MOD 30 MIN: CPT | Mod: 95,,, | Performed by: SPECIALIST
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
## 2020-09-23 ENCOUNTER — PATIENT MESSAGE (OUTPATIENT)
Dept: INTERNAL MEDICINE | Facility: CLINIC | Age: 55
End: 2020-09-23

## 2020-09-25 ENCOUNTER — OFFICE VISIT (OUTPATIENT)
Dept: PSYCHIATRY | Facility: CLINIC | Age: 55
End: 2020-09-25
Payer: COMMERCIAL

## 2020-09-25 ENCOUNTER — PATIENT MESSAGE (OUTPATIENT)
Dept: PSYCHIATRY | Facility: CLINIC | Age: 55
End: 2020-09-25

## 2020-09-25 DIAGNOSIS — R69 DIAGNOSIS DEFERRED: Primary | ICD-10-CM

## 2020-09-25 PROCEDURE — 90834 PSYTX W PT 45 MINUTES: CPT | Mod: S$GLB,,, | Performed by: SOCIAL WORKER

## 2020-09-25 PROCEDURE — 90834 PR PSYCHOTHERAPY W/PATIENT, 45 MIN: ICD-10-PCS | Mod: S$GLB,,, | Performed by: SOCIAL WORKER

## 2020-09-28 ENCOUNTER — OFFICE VISIT (OUTPATIENT)
Dept: INTERNAL MEDICINE | Facility: CLINIC | Age: 55
End: 2020-09-28
Payer: COMMERCIAL

## 2020-09-28 VITALS
TEMPERATURE: 100 F | DIASTOLIC BLOOD PRESSURE: 70 MMHG | HEART RATE: 66 BPM | HEIGHT: 67 IN | SYSTOLIC BLOOD PRESSURE: 90 MMHG | BODY MASS INDEX: 26.02 KG/M2 | WEIGHT: 165.81 LBS

## 2020-09-28 DIAGNOSIS — M54.16 LUMBAR BACK PAIN WITH RADICULOPATHY AFFECTING LEFT LOWER EXTREMITY: Primary | ICD-10-CM

## 2020-09-28 PROCEDURE — 96372 PR INJECTION,THERAP/PROPH/DIAG2ST, IM OR SUBCUT: ICD-10-PCS | Mod: S$GLB,,, | Performed by: INTERNAL MEDICINE

## 2020-09-28 PROCEDURE — 99999 PR PBB SHADOW E&M-EST. PATIENT-LVL III: CPT | Mod: PBBFAC,,, | Performed by: STUDENT IN AN ORGANIZED HEALTH CARE EDUCATION/TRAINING PROGRAM

## 2020-09-28 PROCEDURE — 99999 PR PBB SHADOW E&M-EST. PATIENT-LVL III: ICD-10-PCS | Mod: PBBFAC,,, | Performed by: STUDENT IN AN ORGANIZED HEALTH CARE EDUCATION/TRAINING PROGRAM

## 2020-09-28 PROCEDURE — 99214 OFFICE O/P EST MOD 30 MIN: CPT | Mod: 25,S$GLB,, | Performed by: STUDENT IN AN ORGANIZED HEALTH CARE EDUCATION/TRAINING PROGRAM

## 2020-09-28 PROCEDURE — 99214 PR OFFICE/OUTPT VISIT, EST, LEVL IV, 30-39 MIN: ICD-10-PCS | Mod: 25,S$GLB,, | Performed by: STUDENT IN AN ORGANIZED HEALTH CARE EDUCATION/TRAINING PROGRAM

## 2020-09-28 PROCEDURE — 3008F BODY MASS INDEX DOCD: CPT | Mod: CPTII,S$GLB,, | Performed by: STUDENT IN AN ORGANIZED HEALTH CARE EDUCATION/TRAINING PROGRAM

## 2020-09-28 PROCEDURE — 3008F PR BODY MASS INDEX (BMI) DOCUMENTED: ICD-10-PCS | Mod: CPTII,S$GLB,, | Performed by: STUDENT IN AN ORGANIZED HEALTH CARE EDUCATION/TRAINING PROGRAM

## 2020-09-28 PROCEDURE — 96372 THER/PROPH/DIAG INJ SC/IM: CPT | Mod: S$GLB,,, | Performed by: INTERNAL MEDICINE

## 2020-09-28 RX ORDER — KETOROLAC TROMETHAMINE 30 MG/ML
30 INJECTION, SOLUTION INTRAMUSCULAR; INTRAVENOUS
Status: COMPLETED | OUTPATIENT
Start: 2020-09-28 | End: 2020-09-28

## 2020-09-28 RX ADMIN — KETOROLAC TROMETHAMINE 30 MG: 30 INJECTION, SOLUTION INTRAMUSCULAR; INTRAVENOUS at 03:09

## 2020-09-28 NOTE — PROGRESS NOTES
I have interviewed and examined the patient w/ the resident,   I agree w/ the impression and plan as outlined above.     Zamzam Silva MD- Staff

## 2020-09-28 NOTE — PROGRESS NOTES
Internal Medicine Clinic Note  9/28/20      Subjective:       Patient ID: Nora Mccoy is a 55 y.o. female being seen for an established visit.    Chief Complaint: Back Pain (2 mo) and Hip Pain (2  mo)      HPI  Patient is a 55 year old female with chronic sinusitis, anxiety, depression, and multiple previous occurences of lower extremity, upper extremity, and lower back pain who presents to clinic with concerns for hip and lower back pain. She reports that she has been having back problems for months but this current back pain feels different. Her pain started last weekend without inciting incident. She reports that she feels left sided buttocks pain that occasionally radiates to her anterior thigh; she also has occasional shooting pains down to her toes that keeps her awake at night. She reports feeling weaker than usual but has had no difficulty walking or progressive lower extremity weakness. She also denies any bowel or bladder incontinence or fevers and chills. Patient has tried flexeril and gabapentin in the past but felt that it left her too drowsy to work. She has also tried Mobic but felt that over the counter advil had the same affect. Previous imaging has shown some degenerative disease at L5-S1 for which she has gone to physical therapy as an outpatient. She felt that this helped her but did not take away the pain entirely. At the time of my exam, she denies any headaches, dizziness, chest pain, SOB, cough, abdominal pain, n/v/d, fevers, chills, other arthralgias or skin rashes.     Past Medical History:   Diagnosis Date    Bronchitis     DJD (degenerative joint disease)     shoulder, knee, and back    Esophagitis, unspecified     Fibrocystic disease of breast     Menstrual disorder     Sinus infection     Stress     UTI (urinary tract infection)        Past Surgical History:   Procedure Laterality Date    BACK SURGERY      micro discectomy L4-L5, 2002    BREAST BIOPSY Left 2015     core bx-benign    COLONOSCOPY N/A 7/7/2016    Procedure: COLONOSCOPY;  Surgeon: Ronan Dolan MD;  Location: [facility] ENDO (4TH FLR);  Service: Endoscopy;  Laterality: N/A;    ETHMOIDECTOMY N/A 4/5/2019    Procedure: ETHMOIDECTOMY;  Surgeon: Vijay Tatum MD;  Location: [facility] OR 2ND FLR;  Service: ENT;  Laterality: N/A;    FRONTAL SINUS OBLITERATION  4/5/2019    Procedure: SINUSOTOMY, FRONTAL SINUS, OBLITERATIVE;  Surgeon: Vijay Tatum MD;  Location: [facility] OR 2ND FLR;  Service: ENT;;    FUNCTIONAL ENDOSCOPIC SINUS SURGERY (FESS) USING COMPUTER-ASSISTED NAVIGATION Bilateral 4/5/2019    Procedure: FESS, USING COMPUTER-ASSISTED NAVIGATION;  Surgeon: Vijay Tatum MD;  Location: [facility] OR 2ND FLR;  Service: ENT;  Laterality: Bilateral;    MAXILLARY ANTROSTOMY  4/5/2019    Procedure: MAXILLARY ANTROSTOMY;  Surgeon: Vijay Tatum MD;  [facility];  Service: ENT;;    NASAL TURBINATE REDUCTION Bilateral 4/5/2019    Procedure: REDUCTION, NASAL TURBINATE;  Surgeon: Vijay Tatum MD;  Location: [facility] OR [facility] FLR;  Service: ENT;  Laterality: Bilateral;       Family History   Problem Relation Age of Onset    Hypertension Sister     Lupus Mother     Other Mother         chest pain    Breast cancer Mother 72    Retinal detachment Mother     Thyroid disease Mother     Cancer Father         unk d. 77    Breast cancer Maternal Aunt         d. 39    Cataracts Maternal Grandmother     No Known Problems Brother         1/2 bro ( Dad) unk    No Known Problems Brother         1/2 bro (Dad) unk    Amblyopia Neg Hx     Blindness Neg Hx     Diabetes Neg Hx     Glaucoma Neg Hx     Macular degeneration Neg Hx     Strabismus Neg Hx     Stroke Neg Hx        Social History     Socioeconomic History    Marital status:      Spouse name: Sonu    Number of children: 2    Years of education: 18    Highest education level: Not on file   Occupational History    Occupation: registered  nurse     Employer: [employer]     Comment: ICU   Social Needs    Financial resource strain: Not on file    Food insecurity     Worry: Not on file     Inability: Not on file    Transportation needs     Medical: Not on file     Non-medical: Not on file   Tobacco Use    Smoking status: Never Smoker    Smokeless tobacco: Never Used   Substance and Sexual Activity    Alcohol use: Yes     Alcohol/week: 1.7 standard drinks     Types: 2 Standard drinks or equivalent per week    Drug use: No    Sexual activity: Not on file   Lifestyle    Physical activity     Days per week: Not on file     Minutes per session: Not on file    Stress: Not on file   Relationships    Social connections     Talks on phone: Not on file     Gets together: Not on file     Attends Latter day service: Not on file     Active member of club or organization: Not on file     Attends meetings of clubs or organizations: Not on file     Relationship status: Not on file   Other Topics Concern    Are you pregnant or think you may be? No    Breast-feeding Not Asked   Social History Narrative        Spouse w/ diabetes insipidus, CML , and neuroendocrine tumor newly dx    RN- working in Neuro tele ICU    2 children- dtr and son               Review of Systems   Constitutional: Positive for activity change. Negative for chills, fatigue and fever.   Respiratory: Negative for cough, chest tightness and shortness of breath.    Cardiovascular: Negative for chest pain, palpitations and leg swelling.   Gastrointestinal: Negative for abdominal pain, constipation, diarrhea and nausea.   Musculoskeletal: Positive for arthralgias and myalgias. Negative for back pain.   Skin: Negative for color change, pallor and rash.   Neurological: Negative for dizziness, weakness and headaches.       Patient's Medications   New Prescriptions    No medications on file   Previous Medications    ACYCLOVIR (ZOVIRAX) 800 MG TAB    Take 1 tablet (800 mg  "total) by mouth 2 (two) times daily.    BUDESONIDE (PULMICORT) 0.5 MG/2 ML NEBULIZER SOLUTION    Take 2 mLs (0.5 mg total) by nebulization once daily. For use in saline irrigation as directed.    CYCLOBENZAPRINE (FLEXERIL) 10 MG TABLET    Take 1 tablet (10 mg total) by mouth 3 (three) times daily as needed for Muscle spasms.    EPINEPHRINE (EPIPEN 2-JAMES) 0.3 MG/0.3 ML ATIN    Inject 0.3 mLs (0.3 mg total) into the muscle once. for 1 dose as needed    FLUOXETINE 40 MG CAPSULE    Take 1 po daily    GABAPENTIN (NEURONTIN) 300 MG CAPSULE    Take 1 capsule (300 mg total) by mouth 2 (two) times daily.    LORAZEPAM (ATIVAN) 0.5 MG TABLET    TAKE 1 TABLET (0.5 MG TOTAL) BY MOUTH 2 (TWO) TIMES DAILY.    MULTIVITAMIN (THERAGRAN) TABLET    Take 1 tablet by mouth once daily.      TRAZODONE (DESYREL) 50 MG TABLET    Take 2 tablets (100 mg total) by mouth every evening.   Modified Medications    No medications on file   Discontinued Medications    No medications on file       Patient Active Problem List    Diagnosis Date Noted    Ankle pain 08/21/2019    Ankle weakness 08/21/2019    Decreased ROM of ankle 08/21/2019    Ulnar neuropathy at elbow of right upper extremity 07/30/2019    Chronic sinusitis 04/05/2019    Deviated nasal septum     Nasal turbinate hypertrophy     Meningioma 12/06/2016    Knee pain 07/26/2012    Other plastic surgery for unacceptable cosmetic appearance 06/28/2012           Objective:      BP 90/70 (BP Location: Left arm, Patient Position: Sitting, BP Method: Medium (Manual))   Pulse 66   Temp 99.5 °F (37.5 °C) (Temporal)   Ht 5' 7" (1.702 m)   Wt 75.2 kg (165 lb 12.6 oz)   LMP 02/15/2017   BMI 25.97 kg/m²   Estimated body mass index is 25.97 kg/m² as calculated from the following:    Height as of this encounter: 5' 7" (1.702 m).    Weight as of this encounter: 75.2 kg (165 lb 12.6 oz).    Physical Exam  Vitals signs reviewed.   Constitutional:       Appearance: Normal appearance. She is " well-developed. She is not ill-appearing or toxic-appearing.   HENT:      Head: Normocephalic and atraumatic.      Right Ear: External ear normal.      Left Ear: External ear normal.      Nose: Nose normal.   Eyes:      General: No scleral icterus.     Extraocular Movements: Extraocular movements intact.      Conjunctiva/sclera: Conjunctivae normal.   Neck:      Musculoskeletal: Normal range of motion and neck supple.   Cardiovascular:      Rate and Rhythm: Normal rate and regular rhythm.      Heart sounds: Normal heart sounds. No murmur.   Pulmonary:      Effort: Pulmonary effort is normal.      Breath sounds: Normal breath sounds. No wheezing or rales.   Abdominal:      General: Bowel sounds are normal.      Palpations: Abdomen is soft.   Musculoskeletal: Normal range of motion.         General: Tenderness (minor left buttock tenderness) present. No swelling, deformity or signs of injury.   Skin:     General: Skin is warm and dry.      Findings: No lesion or rash.   Neurological:      Mental Status: She is alert and oriented to person, place, and time.   Psychiatric:         Behavior: Behavior normal.         Assessment:         1. Lumbar back pain with radiculopathy affecting left lower extremity  ketorolac injection 30 mg         Plan:         1. Lumbar back pain with radiculopathy affecting left lower extremity  - Patient with months of lower back pain with radiculopathy who has tried NSAID, flexeril, gabapentin, and PT. Reports that all interventions have helped somewhat  - ketorolac injection 30 mg in office  - Recommended continued use of over the counter NSAID as this helps her the same amount and prescription strength NSAID  - Recommended that patient takes flexeril and gabapentin at night prior to sleep as this sedates her and gives her some relief of pain; advised stopping trazodone while she takes these medications  - Informed patient of healthy back program. Given the extended commitment to this  program (roughly 6 months), patient said she will message back if she is interested in referral.   - Advised patient regarding warning symptoms (bowel/bladder incontinence, progressive weakness etc) and to contact clinic or present to ED if these develop.       No orders of the defined types were placed in this encounter.            Patient seen and plan of care discussed with Dr. Shira Humphrey  Internal Medicine, PGY-3  [phone]

## 2020-09-29 ENCOUNTER — PATIENT MESSAGE (OUTPATIENT)
Dept: INTERNAL MEDICINE | Facility: CLINIC | Age: 55
End: 2020-09-29

## 2020-09-29 DIAGNOSIS — M54.16 LUMBAR BACK PAIN WITH RADICULOPATHY AFFECTING LEFT LOWER EXTREMITY: Primary | ICD-10-CM

## 2020-09-30 ENCOUNTER — OFFICE VISIT (OUTPATIENT)
Dept: PSYCHIATRY | Facility: CLINIC | Age: 55
End: 2020-09-30
Payer: COMMERCIAL

## 2020-09-30 VITALS
HEIGHT: 67 IN | WEIGHT: 166.56 LBS | HEART RATE: 79 BPM | DIASTOLIC BLOOD PRESSURE: 66 MMHG | SYSTOLIC BLOOD PRESSURE: 116 MMHG | BODY MASS INDEX: 26.14 KG/M2

## 2020-09-30 DIAGNOSIS — F32.1 CURRENT MODERATE EPISODE OF MAJOR DEPRESSIVE DISORDER WITHOUT PRIOR EPISODE: Primary | ICD-10-CM

## 2020-09-30 PROCEDURE — 3008F PR BODY MASS INDEX (BMI) DOCUMENTED: ICD-10-PCS | Mod: CPTII,S$GLB,, | Performed by: SPECIALIST

## 2020-09-30 PROCEDURE — 3008F BODY MASS INDEX DOCD: CPT | Mod: CPTII,S$GLB,, | Performed by: SPECIALIST

## 2020-09-30 PROCEDURE — 99214 OFFICE O/P EST MOD 30 MIN: CPT | Mod: S$GLB,,, | Performed by: SPECIALIST

## 2020-09-30 PROCEDURE — 99214 PR OFFICE/OUTPT VISIT, EST, LEVL IV, 30-39 MIN: ICD-10-PCS | Mod: S$GLB,,, | Performed by: SPECIALIST

## 2020-09-30 PROCEDURE — 99999 PR PBB SHADOW E&M-EST. PATIENT-LVL II: ICD-10-PCS | Mod: PBBFAC,,, | Performed by: SPECIALIST

## 2020-09-30 PROCEDURE — 99999 PR PBB SHADOW E&M-EST. PATIENT-LVL II: CPT | Mod: PBBFAC,,, | Performed by: SPECIALIST

## 2020-09-30 RX ORDER — FLUOXETINE 10 MG/1
CAPSULE ORAL
Qty: 30 CAPSULE | Refills: 3 | Status: SHIPPED | OUTPATIENT
Start: 2020-09-30 | End: 2020-10-12 | Stop reason: SDUPTHER
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
## 2020-10-02 NOTE — PROGRESS NOTES
Subjective:      Patient ID: Nora Mccoy is a 55 y.o. female.    Chief Complaint: No chief complaint on file.    Ms Mccoy is a 54 yo female here  for evaluation for the healthy back lumbar program.  she has had low back pain for 18 years but got better after micodiscectomy in 2002.  The pain has been on and off since then.  The most recent flare was 2 months with new left groin and quad pain.  There was no event that started the pain.  The pain is left glute dominant, and goes across the lower back, and goes to the front and back of the left leg to the knee.  The pain is achy with sharp pain with certain movements including standing and walking.  She has burning in the left glute and back of left leg.  She has tingling of the left foot and the back of the left leg.  There is no numbness and no weakness.  The pain is constant 3-7/10.  It is worse with prolonged standing and walking, lying on back, sitting, heavy lifting, twisting to both sides, afternoon, nighttime, and bedtime.  It is better with bending, lying on her back with head elevated, lying on her sides, stairs, gabapentin, morning.  She did PT with some relief 2/2020.  She had back surgery 2002, no injections or chiropractor visits.  Her goals are sitting, prolonged standing, and lying on her back.  Pattern 1      X-ray lumbar 9/28/2020  There is moderate DJD at L5-S1 mild above this.  Alignment is normal.  No fracture dislocation bone destruction or pars defect seen.  No trauma seen.     Impression:     DJD.    Past Medical History:  No date: Bronchitis  No date: DJD (degenerative joint disease)      Comment:  shoulder, knee, and back  No date: Esophagitis, unspecified  No date: Fibrocystic disease of breast  No date: Menstrual disorder  No date: Sinus infection  No date: Stress  No date: UTI (urinary tract infection)    Past Surgical History:  No date: BACK SURGERY      Comment:  micro discectomy L4-L5, 2002  2015: BREAST BIOPSY; Left       Comment:  core bx-benign  7/7/2016: COLONOSCOPY; N/A      Comment:  Procedure: COLONOSCOPY;  Surgeon: Ronan Dolan MD;  Location: [facility] ENDO (4TH FLR);  Service: Endoscopy;                Laterality: N/A;  4/5/2019: ETHMOIDECTOMY; N/A      Comment:  Procedure: ETHMOIDECTOMY;  Surgeon: Vijay Tatum MD;  Location: [facility] OR 2ND FLR;  Service: ENT;                 Laterality: N/A;  4/5/2019: FRONTAL SINUS OBLITERATION      Comment:  Procedure: SINUSOTOMY, FRONTAL SINUS, OBLITERATIVE;                 Surgeon: Vijay Tatum MD;  Location: [facility] OR 2ND                FLR;  Service: ENT;;  4/5/2019: FUNCTIONAL ENDOSCOPIC SINUS SURGERY (FESS) USING COMPUTER-  ASSISTED NAVIGATION; Bilateral      Comment:  Procedure: FESS, USING COMPUTER-ASSISTED NAVIGATION;                 Surgeon: Vijay Tatum MD;  Location: [facility] OR 2ND                FLR;  Service: ENT;  Laterality: Bilateral;  4/5/2019: MAXILLARY ANTROSTOMY      Comment:  Procedure: MAXILLARY ANTROSTOMY;  Surgeon: Vijay Tatum MD;  Location: [facility] OR 2ND FLR;  Service: ENT;;  4/5/2019: NASAL TURBINATE REDUCTION; Bilateral      Comment:  Procedure: REDUCTION, NASAL TURBINATE;  Surgeon: Vijay Tatum MD;  Location: [facility] OR 2ND FLR;  Service: ENT;               Laterality: Bilateral;    Review of patient's family history indicates:  Problem: Hypertension      Relation: Sister          Age of Onset: (Not Specified)  Problem: Lupus      Relation: Mother          Age of Onset: (Not Specified)  Problem: Other      Relation: Mother          Age of Onset: (Not Specified)          Comment: chest pain  Problem: Breast cancer      Relation: Mother          Age of Onset: 72  Problem: Retinal detachment      Relation: Mother          Age of Onset: (Not Specified)  Problem: Thyroid disease      Relation: Mother          Age of Onset: (Not Specified)  Problem: Cancer      Relation: Father          Age of  Onset: (Not Specified)          Comment: carriek d. 77  Problem: Breast cancer      Relation: Maternal Aunt          Age of Onset: (Not Specified)          Comment: d. 39  Problem: Cataracts      Relation: Maternal Grandmother          Age of Onset: (Not Specified)  Problem: No Known Problems      Relation: Brother          Age of Onset: (Not Specified)          Comment: 1/2 bro ( Dad) unk  Problem: No Known Problems      Relation: Brother          Age of Onset: (Not Specified)          Comment: 1/2 bro (Dad) unk  Problem: Amblyopia      Relation: Neg Hx          Age of Onset: (Not Specified)  Problem: Blindness      Relation: Neg Hx          Age of Onset: (Not Specified)  Problem: Diabetes      Relation: Neg Hx          Age of Onset: (Not Specified)  Problem: Glaucoma      Relation: Neg Hx          Age of Onset: (Not Specified)  Problem: Macular degeneration      Relation: Neg Hx          Age of Onset: (Not Specified)  Problem: Strabismus      Relation: Neg Hx          Age of Onset: (Not Specified)  Problem: Stroke      Relation: Neg Hx          Age of Onset: (Not Specified)      Social History    Socioeconomic History      Marital status:       Spouse name: Sonu      Number of children: 2      Years of education: 18      Highest education level: Not on file    Occupational History      Occupation: registered nurse        Employer: [employer]        Comment: ICU    Social Needs      Financial resource strain: Not on file      Food insecurity        Worry: Not on file        Inability: Not on file      Transportation needs        Medical: Not on file        Non-medical: Not on file    Tobacco Use      Smoking status: Never Smoker      Smokeless tobacco: Never Used    Substance and Sexual Activity      Alcohol use: Yes        Alcohol/week: 1.7 standard drinks        Types: 2 Standard drinks or equivalent per week      Drug use: No      Sexual activity: Not on file    Lifestyle      Physical  activity        Days per week: Not on file        Minutes per session: Not on file      Stress: Not on file    Relationships      Social connections        Talks on phone: Not on file        Gets together: Not on file        Attends Gnosticist service: Not on file        Active member of club or organization: Not on file        Attends meetings of clubs or organizations: Not on file        Relationship status: Not on file    Other Topics      Concerns:        Are you pregnant or think you may be?: No        Breast-feeding: Not Asked    Social History Narrative            Spouse w/ diabetes insipidus, CML , and neuroendocrine tumor newly dx      RN- working in Neuro tele ICU      2 children- dtr and son                  Current Outpatient Medications:  acyclovir (ZOVIRAX) 800 MG Tab, Take 1 tablet (800 mg total) by mouth 2 (two) times daily. (Patient taking differently: Take 800 mg by mouth 2 (two) times daily as needed. ), Disp: 60 tablet, Rfl: 5  budesonide (PULMICORT) 0.5 mg/2 mL nebulizer solution, Take 2 mLs (0.5 mg total) by nebulization once daily. For use in saline irrigation as directed., Disp: 180 mL, Rfl: 1  cyclobenzaprine (FLEXERIL) 10 MG tablet, Take 1 tablet (10 mg total) by mouth 3 (three) times daily as needed for Muscle spasms., Disp: 60 tablet, Rfl: 2  EPINEPHrine (EPIPEN 2-JAMES) 0.3 mg/0.3 mL AtIn, Inject 0.3 mLs (0.3 mg total) into the muscle once. for 1 dose as needed, Disp: 2 each, Rfl: 2  FLUoxetine 10 MG capsule, Take 1 pill with your 40 mg prozacdaily  for 2 weeks, then take 2 pills with your 40 mg prozac daily, Disp: 30 capsule, Rfl: 3  FLUoxetine 40 MG capsule, Take 1 po daily, Disp: 90 capsule, Rfl: 1  gabapentin (NEURONTIN) 300 MG capsule, Take 1 capsule (300 mg total) by mouth 2 (two) times daily. (Patient not taking: Reported on 9/28/2020), Disp: 60 capsule, Rfl: 3  LORazepam (ATIVAN) 0.5 MG tablet, TAKE 1 TABLET (0.5 MG TOTAL) BY MOUTH 2 (TWO) TIMES DAILY., Disp: 60 tablet, Rfl:  1  multivitamin (THERAGRAN) tablet, Take 1 tablet by mouth once daily.  , Disp: , Rfl:   traZODone (DESYREL) 50 MG tablet, Take 2 tablets (100 mg total) by mouth every evening., Disp: 180 tablet, Rfl: 1    No current facility-administered medications for this visit.       Review of patient's allergies indicates:   -- Penicillin g -- Anaphylaxis   -- Penicillins -- Anaphylaxis   -- Penicillin -- Hives          Review of Systems   Constitution: Negative for weight gain and weight loss.   Cardiovascular: Negative for chest pain.   Respiratory: Negative for shortness of breath.    Musculoskeletal: Positive for back pain (left leg). Negative for joint pain and joint swelling.   Gastrointestinal: Negative for abdominal pain, bowel incontinence, nausea and vomiting.   Genitourinary: Negative for bladder incontinence.   Neurological: Positive for paresthesias (left leg occasionally). Negative for numbness.         Objective:        General: Nora is well-developed, well-nourished, appears stated age, in no acute distress, alert and oriented to time, place and person.     General    Vitals reviewed.  Constitutional: She is oriented to person, place, and time. She appears well-developed and well-nourished.   HENT:   Head: Normocephalic and atraumatic.   Pulmonary/Chest: Effort normal.   Neurological: She is alert and oriented to person, place, and time.   Psychiatric: She has a normal mood and affect. Her behavior is normal. Judgment and thought content normal.     General Musculoskeletal Exam   Gait: normal     Right Ankle/Foot Exam     Tests   Heel Walk: able to perform  Tiptoe Walk: able to perform    Left Ankle/Foot Exam     Tests   Heel Walk: able to perform  Tiptoe Walk: able to perform  Back (L-Spine & T-Spine) / Neck (C-Spine) Exam     Tenderness Left paramedian tenderness of the Sacrum.     Back (L-Spine & T-Spine) Range of Motion   Extension: 20   Flexion: 90   Lateral bend right: 20   Lateral bend left: 20 (with  pain on left)   Rotation right: 40   Rotation left: 40 (with pain on left)     Spinal Sensation   Right Side Sensation  C-Spine Level: normal   L-Spine Level: normal  S-Spine Level: normal  Left Side Sensation  C-Spine Level: normal  L-Spine Level: normal  S-Spine Level: normal    Back (L-Spine & T-Spine) Tests   Right Side Tests  Straight leg raise:      Sitting SLR: > 70 degrees      Left Side Tests  Straight leg raise:     Sitting SLR: > 70 degrees          Other She has no scoliosis .  Spinal Kyphosis:  Absent    Comments:  Pos kai on right with left back pain  Kai on left with left back pain      Muscle Strength   Right Upper Extremity   Biceps: 5/5   Deltoid:  5/5  Triceps:  5/5  Wrist extension: 5/5   Finger Flexors:  5/5  Left Upper Extremity  Biceps: 5/5   Deltoid:  5/5  Triceps:  5/5  Wrist extension: 5/5   Finger Flexors:  5/5  Right Lower Extremity   Hip Flexion: 5/5   Quadriceps:  5/5   Anterior tibial:  5/5   EHL:  5/5  Left Lower Extremity   Hip Flexion: 5/5   Quadriceps:  5/5   Anterior tibial:  5/5   EHL:  5/5    Reflexes     Left Side  Biceps:  2+  Triceps:  2+  Brachioradialis:  2+  Achilles:  2+  Left Nunez's Sign:  Absent  Babinski Sign:  absent  Quadriceps:  2+    Right Side   Biceps:  2+  Triceps:  2+  Brachioradialis:  2+  Achilles:  2+  Right Nunez's Sign:  absent  Babinski Sign:  absent  Quadriceps:  2+    Vascular Exam     Right Pulses        Carotid:                  2+    Left Pulses        Carotid:                  2+              Assessment:       1. SI (sacroiliac) joint dysfunction    2. Lumbar back pain with radiculopathy affecting left lower extremity    3. Spondylosis of lumbar region without myelopathy or radiculopathy           Plan:       Orders Placed This Encounter    Ambulatory referral/consult to [facility]      The patient has had a history of low back pain with limitations in there activities of Daily living    Previous treatment has not provided  relief.    The situation was discussed at length with the patient.  We discussed different causes of back pain and different treatment options.  We discussed the importance of stretching and strengthening.  We discussed posture.  We discussed the pros and cons of further diagnostic testing, alternative treatment and Medications    Based on the history, physical exam, and functional index, an active physical therapy program is recommended.  The goal is to restore the patients function and reduce pain.  A program of progressive resistance exercises, biomechanical, and mobility maneuvers, instructions in proper body mechanics, aerobic conditioning and HEP will be utilized. The program will continue as long as making improvements.    An assessment of patients progress will be made at each visit to document change in status.    The patient will be actively involved in there own treatment, and responsible for appointments and home program    The patient's lumbar isometric strength will be tested and they will be placed in a program of isolated strength training based on 50% of their total functional torque and advanced as clinically appropriate.      Directional preference of pain will further influence the patients active rehabilitation program    The patient was instructed there might be an initial increase in discomfort    They are enrolled with good prognosis  Pattern 1    Follow-up: No follow-ups on file. If there are any questions prior to this, the patient was instructed to contact the office.

## 2020-10-05 ENCOUNTER — CLINICAL SUPPORT (OUTPATIENT)
Dept: REHABILITATION | Facility: OTHER | Age: 55
End: 2020-10-05
Attending: PHYSICAL MEDICINE & REHABILITATION
Payer: COMMERCIAL

## 2020-10-05 VITALS
SYSTOLIC BLOOD PRESSURE: 99 MMHG | WEIGHT: 165.81 LBS | DIASTOLIC BLOOD PRESSURE: 65 MMHG | HEIGHT: 67 IN | HEART RATE: 92 BPM | BODY MASS INDEX: 26.02 KG/M2

## 2020-10-05 DIAGNOSIS — M47.816 SPONDYLOSIS OF LUMBAR REGION WITHOUT MYELOPATHY OR RADICULOPATHY: ICD-10-CM

## 2020-10-05 DIAGNOSIS — M53.3 SI (SACROILIAC) JOINT DYSFUNCTION: Primary | ICD-10-CM

## 2020-10-05 DIAGNOSIS — M54.16 LUMBAR BACK PAIN WITH RADICULOPATHY AFFECTING LEFT LOWER EXTREMITY: ICD-10-CM

## 2020-10-05 PROCEDURE — 97750 PHYSICAL PERFORMANCE TEST: CPT | Mod: 32 | Performed by: PHYSICAL MEDICINE & REHABILITATION

## 2020-10-05 NOTE — PROGRESS NOTES
Health  met with patient to complete initial outcomes for the Healthy Back lumbar program.  Questions were reviewed with patient and discussed with Dr. Claros. The patient will meet with Dr. Claros to determine program enrollment.     HealthyBack Questionnaire  10/5/2020   Please select the location of your worst pain:  Seat- Left   Please select the location of any additional pain: (hold down the control key, and click to select multiple responses) Low back, Leg- Left   Did any event trigger your pain?  No   How long has this pain been going on?  18 years, improved after surgery.  On and off pain since and latest flare was 2 monhts ago, left groin and quad pain started   Please indicate how the pain is changing.  Worsening   What is the WORST level of pain that you have experienced in the last week?  7   What is the LEAST level of pain that you have experienced in the past week?  3   What can you NOT do not that you used to be able to do?  N/A   Are your limitations mainly due to your pain?  No   What are your additional complaints, if any? Burning, Tingling   Is there ever a time during the day when your pain stops, even for a brief moment, before returning? No   If yes, when your pain stops, does it disappear completely? Is it totally gone? No   Does bending forward make your typical pain worse? No   Morning: Better during   Afternoon: Worse during   Evening:  Worse during   Nighttime: Worse during   Standing:  Worse   Lying on stomach: Same   Lying on back: Worse   Sitting:  Worse   Walking: Worse   Climbing stairs: Better   In the last seven days, have you had any changes in your bowel and/or bladder habits? No   Have all of your attempts to treat your back/leg pain resulted in failure?  Yes   Do you believe your doctor(s) do NOT understand how much pain you have?  No   Do you believe that you have one or more conditions that have not been diagnosed by your doctor(s)?  Yes   Do you believe that you  "deserve more understanding from others including your family than you are getting?  No   Do you feel relatively calm about how your back/leg pain has impacted your life?  No   Are you OK with treatment taking a very long time (even years) before you feel relief from your back/leg pain?  Yes   Do you believe that your pain has caused you to be more forgetful?  No   Do you feel that you have not received enough treatment for your pain?  No   Do you believe that your doctor(s) do not have the right training to treat your back/leg pain effectively?  No   Do you believe you should not be allowed to work or attend school because of your back/leg pain?  No   When did this pain begin?  18 years ago, improved after surgery.  On and off since then with latest flare 2 months ago with left groin and quad pain   Did the pain begin after an injury or an accident? No   Is the pain work related?  Yes   If yes, please enter the approximate date you reported it to your employer.  N/A   Please jose which of the following over-the-counter medicines you take. (hold down the control key, and click to select multiple responses) Other   If you chose "other," please specify:  Advil   Please jose which of the following prescription medicines you take. (hold down the control key, and click to select multiple responses) Anticonvulsants (Gabapentin)   Emergency department  No   Health care providers (hold down the control key, and click to select multiple responses) Family doctor, Physical therapist   Investigations done (hold down the control key, and click to select multiple responses) None   Procedures (hold down the control key, and click to select multiple responses) None   Emergency department  No   Health care providers (hold down the control key, and click to select multiple responses) Family doctor   Investigations done (hold down the control key, and click to select multiple responses) X-ray, MRI   Procedures (hold down the control " key, and click to select multiple responses) None   Have you had any surgery on your back?  Yes   Please jose what you are experiencing. (hold down the control key, and click to select multiple responses) Palpitations, Arthritic joint pain, Muscle pain in arms or legs   First activity you would like to perform better:  Sitting   Current ability score to perform first activity: 5   Second activity you would like to perform better: Prolonged standing   Current ability score to perform second activity: 5   Third activity you would like to perform better: Lying on back   Current ability score to perform third activity: 0   Pain intensity:  The pain is moderate and does not vary much.   Personal care (washing, dressing, etc.):  Washing and dressing increase the pain, but I manage not to change my way of doing it.   Lifting: Pain prevents me from lifting heavy weights, but I can manage light to medium weights if conveniently positioned.   Walking: I cannot walk more than one mile without increasing pain.   Sitting: Pain prevents me from sitting more than 10 minutes.   Standing: Pain prevents me from standing more than 1/2 hour.   Sleeping: I get pain in bed, but it does not prevent me from sleeping well.   Social life: My social life is normal but increases the degree of pain.   Traveling: I get some pain while traveling, but none of my usual forms of travel make it any worse.   Changing degree of pain: My pain is gradually worsening.   Do you need any help looking after yourself? I need no help at all.   When doing household tasks, e.g., preparing food, gardening, using the video recorder, radio, telephone, or washing the car: I need no help at all.   Thinking about how easily you can get around your home and community: I get around my home and community by myself without any difficulty.   Because of your health, your relationships, e.g., your friends, partner or parents, generally: Are very close and warm   Thinking  about your relationships with other people: Although I have friends, I am occasionally lonely.   Thinking about your health and your relationship with your  family:  There are some parts of my family role I cannot carry out.   Thinking about your vision, including when using your glasses or contact lenses if needed: I see normally.   Thinking about your hearing, including using your hearing aid if needed: I hear normally.   When you communicate with others, e.g., talking, listening, writing, or signing: I have no trouble speaking to them or understanding what they are saying.   Thinking about how you sleep: I am able to sleep without difficulty most of the time.   Thinking about how you generally feel: I am slightly anxious, worried or depressed.   How much pain or discomfort do you experience? I have moderate pain.   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 What are your hobbies? Riding bike and going out to dinner with friends/family   How do you spend your leisure time? What are your hobbies? Riding bike and going out to dinner with friends/family   What is your highest level of education? Advanced/graduate   What is your work status? Employed   How did you hear about the Healthyback program?  Patient referral   When did this pain begin?  18 years ago, improved after surgery.  On and off since then with latest flare 2 months ago with left groin and quad pain   Do you need any help looking after yourself? I need no help at all.   When doing household tasks, e.g., preparing food, gardening, using the video recorder, radio, telephone, or washing the car: I need no help at all.   Thinking about how easily you can get around your home and community: I get around my home and community by myself without any difficulty.   Because of your health, your relationships, e.g.,  your friends, partner or parents, generally: Are very close and warm   Thinking about your relationships with other people: Although I have friends, I am occasionally lonely.   Thinking about your health and your relationship with your  family:  There are some parts of my family role I cannot carry out.   Thinking about your vision, including when using your glasses or contact lenses if needed: I see normally.   Thinking about your hearing, including using your hearing aid if needed: I hear normally.   When you communicate with others, e.g., talking, listening, writing, or signing: I have no trouble speaking to them or understanding what they are saying.   Thinking about how you sleep: I am able to sleep without difficulty most of the time.   Thinking about how you generally feel: I am slightly anxious, worried or depressed.   How much pain or discomfort do you experience? I have moderate pain.   Little interest or pleasure in doing things More than half the days   Feeling down, depressed or hopeless Several days

## 2020-10-20 ENCOUNTER — PATIENT MESSAGE (OUTPATIENT)
Dept: PSYCHIATRY | Facility: CLINIC | Age: 55
End: 2020-10-20

## 2020-10-22 ENCOUNTER — CLINICAL SUPPORT (OUTPATIENT)
Dept: REHABILITATION | Facility: OTHER | Age: 55
End: 2020-10-22
Attending: PHYSICAL MEDICINE & REHABILITATION
Payer: COMMERCIAL

## 2020-10-22 ENCOUNTER — PATIENT MESSAGE (OUTPATIENT)
Dept: DERMATOLOGY | Facility: CLINIC | Age: 55
End: 2020-10-22

## 2020-10-22 DIAGNOSIS — M53.86 DECREASED ROM OF LUMBAR SPINE: ICD-10-CM

## 2020-10-22 DIAGNOSIS — M53.3 SI (SACROILIAC) JOINT DYSFUNCTION: ICD-10-CM

## 2020-10-22 DIAGNOSIS — M47.816 SPONDYLOSIS OF LUMBAR REGION WITHOUT MYELOPATHY OR RADICULOPATHY: ICD-10-CM

## 2020-10-22 DIAGNOSIS — M62.81 WEAKNESS OF TRUNK MUSCULATURE: ICD-10-CM

## 2020-10-22 DIAGNOSIS — R29.898 WEAKNESS OF BOTH LOWER EXTREMITIES: ICD-10-CM

## 2020-10-22 DIAGNOSIS — R68.89 DECREASED FUNCTIONAL ACTIVITY TOLERANCE: ICD-10-CM

## 2020-10-22 PROCEDURE — 97750 PHYSICAL PERFORMANCE TEST: CPT | Mod: 32

## 2020-10-22 NOTE — PATIENT INSTRUCTIONS
WALKING PROGRAM  Walking is one of the easiest forms of exercise.  All you need is a good pair of shoes, comfortable clothing, and desire.   Walking on a regular basis has been shown to have many healthy benefits:    Decrease in pain   Improved mood & overall sense of well-being: decrease in depression & anxiety   Improved sleep   Decrease stress levels. Decreased cortisol levels.   Improved blood pressure and heart strength   Improved fitness and musculature strength  GETTING STARTED:    Goal is to walk for at least 10 mins every day of the week  Tips:  Watch your posture.  Walk tall.  Pretend you are being pulled up by a rope attached to the crown of your head.  Your shoulders should be down, back and relaxed. Feel the natural swing of your arms in opposition to your leg swing.  Land on your heel and roll off on your toes.   Be sure to drink plenty of water before, during, and after walking.   Incorporate a warm up and cool down into your routine.  Start your walk at a slow warm up pace, then walk desired length of time.  End your walk with slower cool down.  Any stretches that your therapist has recommended for you may be done before and after your walk to prevent injury.  PROGRESSING:  GOAL: 30 minutes, 5 days a week   1. Increase your walking time by 2-3 minutes. Do this for several days until it gets easy. Then increase your time again until you have reached your maximum desired time.     2. Once you have achieved your time goal, you can further progress your program by increasing the speed at which you walk.    Make it a Habit:   Things to help with motivation to continue your walking program:    Get a walking partner   Listen to favorite music   SET GOALS & reward yourself for achieving them   Make it fun & remember all of the health benefits of simply walking   Record your progress  WEEK MON TUES WED THUR FRI SAT SUN   Week 1  Date:______  Goal:______          Week  2  Date:______  Goal:______          Week 3  Date:______  Goal:______          Week 4  Date:______  Goal:______          Week 5  Date:______  Goal:______          Week 6  Date:______  Goal:______          Week 7  Date:______  Goal:______          Week 8  Date:______  Goal:______          Week 9  Date:______  Goal:______          Week 10  Date:______  Goal:______          Week 11  Date:______  Goal:______

## 2020-10-23 NOTE — PLAN OF CARE
[facility] - PHYSICAL THERAPY EVALUATION     Name: Nichol Mccoy  Clinic Number: [id number]    Therapy Diagnosis:   Encounter Diagnoses   Name Primary?    SI (sacroiliac) joint dysfunction     Spondylosis of lumbar region without myelopathy or radiculopathy     Weakness of trunk musculature     Decreased ROM of lumbar spine     Weakness of both lower extremities     Decreased functional activity tolerance      Physician: Shi Claros, *    Physician Orders: PT Eval and Treat     Medical Diagnosis from Referral:   M53.3 (ICD-10-CM) - SI (sacroiliac) joint dysfunction  M47.816 (ICD-10-CM) - Spondylosis of lumbar region without myelopathy or radiculopathy    Evaluation Date: 10/22/2020  Authorization Period Expiration: 12/31/20  Plan of Care Expiration: 12/31/20 (10 weeks)  Reassessment Due: 11/23/20  Visit # / Visits authorized: 1/ 20   (medx as [email or web address])    Time In: 1315  Time Out: 1420  Total Billable Time: 65 minutes     Precautions: Standard; Hx of back Sx. DJD L5-S1    Pattern of pain determined: 1 PEP      Subjective   Date of onset: on and off for 18 years; most recent flare up about 2 months ago.    History of current condition - Nichol reports having LBP for years, including back Sx in 2002. The pain has been off and on with taking pain medication. The past two months, the pain has been constantly present; some days worst than others. Pain is at the L side SIJ/buttcok region at this time. She denies shooting pain or numbness/tingling to the L LE. Pain is worse with prolonged sitting/standing/walking, bending forward, lifting and carrying heavy objects (full laundry basket); riding bike for more than 30 minutes and regular exercises. 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 Pain is better with repositioning, gabapentin at at, stretching sometimes, heating pad and ice alternating. Had a microdiscectomy at the R side lower lumbar. Lives with  and  two kids; two level home with only one step to enter.      Medical History:   Past Medical History:   Diagnosis Date    Bronchitis     DJD (degenerative joint disease)     shoulder, knee, and back    Esophagitis, unspecified     Fibrocystic disease of breast     Menstrual disorder     Sinus infection     Stress     UTI (urinary tract infection)        Surgical History:   Nora Mccoy  has a past surgical history that includes Back surgery; Colonoscopy (N/A, 7/7/2016); Breast biopsy (Left, 2015); Functional endoscopic sinus surgery (FESS) using computer-assisted navigation (Bilateral, 4/5/2019); Ethmoidectomy (N/A, 4/5/2019); Nasal turbinate reduction (Bilateral, 4/5/2019); Maxillary antrostomy (4/5/2019); and Frontal sinus obliteration (4/5/2019).    Medications:   Nora has a current medication list which includes the following prescription(s): acyclovir, budesonide, cyclobenzaprine, epinephrine, fluoxetine, fluoxetine, gabapentin, lorazepam, multivitamin, trazodone, and fluoxetine.    Allergies:   Review of patient's allergies indicates:   Allergen Reactions    Penicillin g Anaphylaxis    Penicillins Anaphylaxis    Penicillin Hives        Imaging:  X-ray lumbar 9/28/2020  There is moderate DJD at L5-S1 mild above this.  Alignment is normal.  No fracture dislocation bone destruction or pars defect seen.  No trauma seen.  Impression:  DJD.    Prior Therapy:  PT Feb 2020, improved condition which stopped radiating pain to the LLE.  Prior Treatment: Hx of back surgery 2002.  Social History:  See sujective  Occupation: See subjective  Leisure: Hang out, go to dinner with friends      Prior Level of Function: Unlimited bike riding, walking a lot for 2 miles 3x/week  Current Level of Function: Able to walk for a mile (2x/week); riding bike about 30 minutes only (2-3x/week)  DME owned/used: NA    Pain:  Current 4/10, worst 8/10, best 2/10   Location: left SIJ/buttock  Description: Aching, Sharp,  "spasm, burning and variable overall  Aggravating Factors: See subjective  Easing Factors: See subjective  Disturbed Sleep: Yes; "takes me a while to get comfortable".     Pattern of pain questions:  1.  Where is your pain the worst? See above  2.  Is your pain constant or intermittent? Constant  3.  Does bending forward make your typical pain worse? Yes  4.  Since the start of your back pain, has there been a change in your bowel or bladder? No  5.  What can't you do now that you use to be able to do? Limited with prolonged stand/walking for shopping; exercising regularly; housework; gardening.       Pts goals: "Make sure to not worsening condition; minimize pain; be more independent with self management".       Red Flag Screening:   Cough  Sneeze  Strain: (--)  Bladder/ bowel: (--)  Falls: (+) x1 in the parking lot in the dark during rain  Night pain: (--)  Unexplained weight loss: (--)  General health: "Good".     OBJECTIVE     Postural examination/scapula alignment: Slightly depressed R shoulder with mild R SB/left trunk shift appearance; mild fwd head with rounded shoulders.  Skin integrity: normal  Edema: Normal B  Sitting: fair  Standing: fair  Correction of posture: better with lumbar roll with slim roll.     MOVEMENT LOSS    ROM Loss   Flexion ==> tightness and burning sensation at L side   minimal loss   Extension moderate loss   Side bending Right moderate loss   Side bending Left minimal loss   Rotation Right within functional limits   Rotation Left within functional limits     Lower Extremity Strength  Right LE  Left LE    Hip flexion: 4+/5 Hip flexion: 4+/5   Hip extension:  5/5 Hip extension: 5/5   Hip abduction: 5/5 Hip abduction: 5/5   Hip adduction:  5/5 Hip adduction:  5/5   Hip Internal rotation   5/5 Hip Internal rotation 5/5   Knee Flexion 5/5 Knee Flexion 5/5   Knee Extension 5/5 Knee Extension 5/5   Ankle dorsiflexion: 5/5 Ankle dorsiflexion: 5/5   Ankle plantarflexion: 5/5 Ankle " "plantarflexion: 5/5     5x sit to stand for functional leg strength: 9 seconds w/o increasing pain with transitions    GAIT:  Assistive Device used: none  Level of Assistance: independent  Patient displays the following gait deviations:  unsteady gait mildly.     Special Tests:   Test Name  Test Result   Walking on toes (--)   Walking on heels  (--)       NEUROLOGICAL SCREENING     Sensory deficit: normal B LE'/lower quarter for light touch    Reflexes:    Left Right   Patella Tendon 2+ 2+   Achilles Tendon 1+ 1+   Babinski  (--) (--)   Clonus (--) (--)     REPEATED TEST MOVEMENTS:  Repeated Flexion in Standing end range pain  worse   Repeated Extension in Standing no worse   Repeated Flexion in lying "a little better".   Repeated Extension in lying  better       STATIC TESTS   Lying prone in extension  "it burns a little bit at left side buttocks".      JANINE: same sensation as above.     Baseline Isometric Testing on Med X equipment: Testing administered by PT  Date of testing: 10/22/20  ROM 0-45 deg   Max Peak Torque 99    Min Peak Torque 44    Flex/Ext Ratio 2.25:1   % below normative data 42%     Oswestry Questionnaire Review 10/22/2020 10/5/2020   Pain Intensity 2 3   Personal Care (Washing, Dressing) 0 2   Lifting 4 4   Walking 2 2   Sitting 2 4   Standing 2 3   Sleeping 2 1   Social Life 2 1   Traveling 2 1   Changing Degree of Pain 4 4   Score 22 25         Treatment     Total Treatment time separate from Evaluation: 35 minutes      Nora received therapeutic exercises to develop/improve posture, lumbar/cervical ROM, strength and muscular endurance for 25 minutes including the following exercises:     [facility] 10/22/2020   Visit Number 1   VAS Pain Rating 6   Lumbar Extension Seat Pad 0   Femur Restraint 5   Top Dead Center 24   Counterweight 259   Lumbar Flexion 45   Lumbar Extension 0   Lumbar Peak Torque 99   Min Torque 44   Test Percent Below Normative Data 42   Lumbar Weight 45   Ice - Z Lie " (in min.) 10     DKTC  EIL  EIS      Written Home Exercises Provided: yes.  Exercises were reviewed and Nora was able to demonstrate them prior to the end of the session.  Nora demonstrated good  understanding of the education provided.     See EMR under Patient Instructions for exercises provided 10/22/2020.      Education provided:   - Patient received education regarding proper posture and body mechanics.  Patient was given top [facility] Visit 1 handouts which discuss what to expect in therapy, the purpose and opportunity for health coaching, the program,  wellness when discharged from therapy, back education and care specifically for posture seated, standing, lifting correctly, components of exercise, importance of nutrition and hydration, and importance of sleep.   Information on lumbar rolls provided.  - Sophia roll tried, recommended, and purchase information was provided.    - Patient received a handout regarding anticipated muscular soreness following the isometric test and strategies for management were reviewed with patient including stretching, using ice and scheduled rest.   - Patient received education on the [facility] program, purpose of the isometric test, progression of back strengthening as well as wellness approach and systemic strengthening.  Details of the program were discussed.  Reviewed that patient should feel support/pressure from med ex restraints but no pain or discomfort and patient expressed understanding.    Nora received cold pack for 10 minutes to lower back.    Assessment   Nora is a 55 y.o. female referred to [facility] with a medical diagnosis of  SI (sacroiliac) joint dysfunction; and  Spondylosis of lumbar region without myelopathy or radiculopathy  . Pt presents with 1 PEP pain pattern and some postural malalignment (see objective). Assessment revealed  weakness of trunk musculature; decreased ROM of lumbar spine; weakness of both lower  extremities; and decreased functional activity tolerance. Oswestry score at 22% impairment/limitation with function. Lumbar ext IM strength was assessed on Medx. Results revealed strength at 42% below age norm value. POC will follow HB protocol for appropriate pain pattern, and per IM test results.        Pain Pattern: 1 PEP    Pt prognosis is Good.   Pt will benefit from skilled outpatient Physical Therapy to address the deficits stated above and in the chart below, provide pt/family education, and to maximize pt's level of independence. Based on the above history and physical examination an active physical therapy program is recommended.  Pt will continue to benefit from skilled outpatient physical therapy to address the deficits listed below in the chart, provide pt/family education and to maximize pt's level of independence in the home and community environment. .       Plan of care discussed with patient: Yes  Pt's spiritual, cultural and educational needs considered and patient is agreeable to the plan of care and goals as stated below:     Anticipated Barriers for therapy: none    PT Evaluation Completed? Yes    Medical necessity is demonstrated by the following problem list.    Pt presents with the following impairments:     History  Co-morbidities and personal factors that may impact the plan of care Co-morbidities:   Hx of back Sx. DJD L5-S1    Personal Factors:   no deficits     low   Examination  Body Structures and Functions, activity limitations and participation restrictions that may impact the plan of care Body Regions:   back  lower extremities  trunk    Body Systems:    ROM  strength  transfers  transitions    Participation Restrictions:   none    Activity limitations:   Learning and applying knowledge  no deficits    General Tasks and Commands  no deficits    Communication  no deficits    Mobility  lifting and carrying objects  walking  driving (bike, car, motorcycle)  standing    Self care  no  deficits    Domestic Life  shopping  cooking  doing house work (cleaning house, washing dishes, laundry)  assisting others    Interactions/Relationships  no deficits    Life Areas  no deficits    Community and Social Life  community life  recreation and leisure         low   Clinical Presentation stable and uncomplicated low   Decision Making/ Complexity Score: low       GOALS: Pt is in agreement with the following goals.    Short term goals:  6 weeks or 10 visits   1.  Pt will demonstrate increased lumbar ROM by at least 6 degrees from the initial ROM value with improvements noted in functional ROM and ability to perform ADLs.  2.  Pt will demonstrate increased MedX average isometric strength value  by 15% from initial test resulting in improved ability to perform bending, lifting, and carrying activities safely, confidently.    3.  Patient report a reduction in worst pain score by 1-2 points for improved tolerance for functional activities.  4.  Pt able to perform HEP correctly with minimal cueing or supervision from therapist to encourage independent management of symptoms.       Long term goals: 10 weeks or 20 visits   1. Pt will demonstrate increased lumbar ROM by at least 8 degrees from initial ROM value, resulting in improved ability to perform functional fwd bending while standing and sitting.   2. Pt will demonstrate increased MedX average isometric strength value  by 25% from initial test resulting in improved ability to perform bending, lifting, and carrying activities safely, confidently.  3. Pt to demonstrate ability to independently control and reduce their pain through posture positioning and mechanical movements throughout a typical day.  4.  Pt will demonstrate reduced pain and improved functional outcomes as reported on the Oswestry Disability Index by reaching a score of 5 or less in order to demonstrate subjective improvement in pt's condition.    5. Pt will demonstrate independence with the HEP  "at discharge  6.  Patient (patient goal)will demonstrate a 5/5 B LE MMT strength  7. Patient will be able to complete a walking program for 30 minutes at least 5 days/week w/o increasing LBP.       Plan   Outpatient physical therapy 2x week for 10 weeks or 20 visits to include the following:   - Patient education  - Therapeutic exercise  - Manual therapy  - Performance testing   - Neuromuscular Re-education  - Therapeutic activity   - Modalities    Pt may be seen by PTA as part of the rehabilitation team.     Therapist: Matt Tilley, PT  10/22/2020    "I certify the need for these services furnished under this plan of treatment and while under my care."    ____________________________________  Physician/Referring Practitioner    _______________  Date of Signature              "

## 2020-10-27 ENCOUNTER — OFFICE VISIT (OUTPATIENT)
Dept: PSYCHIATRY | Facility: CLINIC | Age: 55
End: 2020-10-27
Payer: COMMERCIAL

## 2020-10-27 DIAGNOSIS — R69 DIAGNOSIS DEFERRED: Primary | ICD-10-CM

## 2020-10-27 PROCEDURE — 90834 PSYTX W PT 45 MINUTES: CPT | Mod: 95,,, | Performed by: SOCIAL WORKER

## 2020-10-27 PROCEDURE — 90834 PR PSYCHOTHERAPY W/PATIENT, 45 MIN: ICD-10-PCS | Mod: 95,,, | Performed by: SOCIAL WORKER

## 2020-11-03 ENCOUNTER — CLINICAL SUPPORT (OUTPATIENT)
Dept: REHABILITATION | Facility: OTHER | Age: 55
End: 2020-11-03
Attending: PHYSICAL MEDICINE & REHABILITATION
Payer: COMMERCIAL

## 2020-11-03 DIAGNOSIS — R29.898 WEAKNESS OF BOTH LOWER EXTREMITIES: ICD-10-CM

## 2020-11-03 DIAGNOSIS — R68.89 DECREASED FUNCTIONAL ACTIVITY TOLERANCE: ICD-10-CM

## 2020-11-03 DIAGNOSIS — M53.86 DECREASED ROM OF LUMBAR SPINE: ICD-10-CM

## 2020-11-03 DIAGNOSIS — M62.81 WEAKNESS OF TRUNK MUSCULATURE: Primary | ICD-10-CM

## 2020-11-03 PROCEDURE — 97750 PHYSICAL PERFORMANCE TEST: CPT | Mod: 32

## 2020-11-03 NOTE — PROGRESS NOTES
"[facility] Physical Therapy Treatment      Name: Nichol Mccoy  Clinic Number: [id number]    Therapy Diagnosis:   Encounter Diagnoses   Name Primary?    Weakness of trunk musculature Yes    Decreased ROM of lumbar spine     Weakness of both lower extremities     Decreased functional activity tolerance      Physician: Shi Claros, *    Visit Date: 11/3/2020    Physician Orders: PT Eval and Treat      Medical Diagnosis from Referral:   M53.3 (ICD-10-CM) - SI (sacroiliac) joint dysfunction  M47.816 (ICD-10-CM) - Spondylosis of lumbar region without myelopathy or radiculopathy     Evaluation Date: 10/22/2020  Authorization Period Expiration: 12/31/20  Plan of Care Expiration: 12/31/20 (10 weeks)  Reassessment Due: 11/23/20  Visit # / Visits authorized: 2/ 20   (medx as [email or web address])     Time In: 10:00  Time Out: 11:00  Total Billable Time: 65 minutes     Precautions: Standard; Hx of back Sx. DJD L5-S1     Pattern of pain determined: 1 PEP     Subjective   Nichol reports 5/10  L LB/glut pain today.      Patient reports tolerating previous visit well with min soreness.  Patient reports their pain to be 6/10 on a 0-10 scale with 0 being no pain and 10 being the worst pain imaginable.  Pain Location: L LB and glut     Occupation: See subjective  Leisure: Hang out, go to dinner with friends      Pts goals: "Make sure to not worsening condition; minimize pain; be more independent with self management".      Objective     Baseline Isometric Testing on Med X equipment: Testing administered by PT  Date of testing: 10/22/20  ROM 0-45 deg   Max Peak Torque 99    Min Peak Torque 44    Flex/Ext Ratio 2.25:1   % below normative data 42%         Treatment    Pt was instructed in and performed the following:     Nichol received therapeutic exercises to develop/improved posture, cardiovascular endurance, muscular endurance, lumbar/cervical ROM, strength and muscular endurance for 60 minutes including the " following exercises:             Peripheral muscle strengthening which included 1 set of 15-20 repetitions at a slow, controlled 10-13 second per rep pace focused on strengthening supporting musculature for improved body mechanics and functional mobility.  Pt and therapist focused on proper form during treatment to ensure optimal strengthening of each targeted muscle group.  Machines were utilized including torso rotation, leg extension, leg curl, chest press, upright row. Tricep extension, bicep curl, leg press, and hip abduction added visit 3    Nora received the following manual therapy techniques: n/a were applied to the: n/a for n/a minutes.     DKTC 10x  EIL 10x  EIS 10x  Pirformis stretch 10secs 3x  LTR 10x  [facility] 11/3/2020   Visit Number 2   VAS Pain Rating 6   Lumbar Extension Seat Pad -   Femur Restraint -   Top Dead Center -   Counterweight -   Lumbar Flexion -   Lumbar Extension -   Lumbar Peak Torque -   Min Torque -   Test Percent Below Normative Data -   Lumbar Weight 50   Repetitions 18   Rating of Perceived Exertion 4   Ice - Z Lie (in min.) 5       Home Exercises Provided and Patient Education Provided   Home exercises include:  DKTC  EIL  EIS     Cardio program:TBD    Education provided:   - Educated pt on the importance of daily stretch to increase the benefit of program and positive results.     Written Home Exercises Provided: Patient instructed to cont prior HEP.  Exercises were reviewed and Nora was able to demonstrate them prior to the end of the session.  Nora demonstrated good  understanding of the education provided.     See EMR under Patient Instructions for exercises provided prior visit.          Assessment   Pt with moderate LBP that did decrease a little during and post session. Reviewed HEP with mod vc for tech.Educated pt on the importance of daily stretch to increase the benefit of program and positive results. Added piriformis stretch due to L glut pain. Also LTR  for more rotation stretch. No pain. Educated pt the concept of the reversal lumbar stretch when sitting to much. Pt understood. Pt demo well. Pt tolerated lumbar medx machine with starting weight 50% of pts max peak torque with no c/o pain. Pt tolerated the medx machines well to the upright row with no c/o increased LB pain or any limb pain.        Patient is making good progress towards established goals.  Pt will continue to benefit from skilled outpatient physical therapy to address the deficits stated in the impairment chart, provide pt/family education and to maximize pt's level of independence in the home and community environment.     Anticipated Barriers for therapy: none  Pt's spiritual, cultural and educational needs considered and pt agreeable to plan of care and goals as stated below:         GOALS: Pt is in agreement with the following goals.     Short term goals:  6 weeks or 10 visits   1.  Pt will demonstrate increased lumbar ROM by at least 6 degrees from the initial ROM value with improvements noted in functional ROM and ability to perform ADLs.  2.  Pt will demonstrate increased MedX average isometric strength value  by 15% from initial test resulting in improved ability to perform bending, lifting, and carrying activities safely, confidently.     3.  Patient report a reduction in worst pain score by 1-2 points for improved tolerance for functional activities.  4.  Pt able to perform HEP correctly with minimal cueing or supervision from therapist to encourage independent management of symptoms.         Long term goals: 10 weeks or 20 visits   1. Pt will demonstrate increased lumbar ROM by at least 8 degrees from initial ROM value, resulting in improved ability to perform functional fwd bending while standing and sitting.   2. Pt will demonstrate increased MedX average isometric strength value  by 25% from initial test resulting in improved ability to perform bending, lifting, and carrying  activities safely, confidently.  3. Pt to demonstrate ability to independently control and reduce their pain through posture positioning and mechanical movements throughout a typical day.  4.  Pt will demonstrate reduced pain and improved functional outcomes as reported on the Oswestry Disability Index by reaching a score of 5 or less in order to demonstrate subjective improvement in pt's condition.    5. Pt will demonstrate independence with the HEP at discharge  6.  Patient (patient goal)will demonstrate a 5/5 B LE MMT strength  7. Patient will be able to complete a walking program for 30 minutes at least 5 days/week w/o increasing LBP.                Plan   Continue with established Plan of Care towards established PT goals.

## 2020-11-04 DIAGNOSIS — F32.5 MAJOR DEPRESSIVE DISORDER IN REMISSION, UNSPECIFIED WHETHER RECURRENT: Primary | ICD-10-CM

## 2020-11-04 RX ORDER — FLUOXETINE HYDROCHLORIDE 20 MG/1
CAPSULE ORAL
Qty: 30 CAPSULE | Refills: 3 | Status: SHIPPED | OUTPATIENT
Start: 2020-11-04 | End: 2021-01-27

## 2020-11-09 ENCOUNTER — PATIENT MESSAGE (OUTPATIENT)
Dept: INTERNAL MEDICINE | Facility: CLINIC | Age: 55
End: 2020-11-09

## 2020-11-09 DIAGNOSIS — M25.569 KNEE PAIN, UNSPECIFIED CHRONICITY, UNSPECIFIED LATERALITY: Primary | ICD-10-CM

## 2020-11-10 ENCOUNTER — OFFICE VISIT (OUTPATIENT)
Dept: ORTHOPEDICS | Facility: CLINIC | Age: 55
End: 2020-11-10
Attending: FAMILY MEDICINE
Payer: COMMERCIAL

## 2020-11-10 ENCOUNTER — HOSPITAL ENCOUNTER (OUTPATIENT)
Dept: RADIOLOGY | Facility: HOSPITAL | Age: 55
Discharge: HOME | End: 2020-11-10
Attending: PHYSICIAN ASSISTANT
Payer: COMMERCIAL

## 2020-11-10 VITALS — HEIGHT: 67 IN | BODY MASS INDEX: 26.02 KG/M2 | WEIGHT: 165.81 LBS

## 2020-11-10 DIAGNOSIS — M25.569 KNEE PAIN, UNSPECIFIED CHRONICITY, UNSPECIFIED LATERALITY: ICD-10-CM

## 2020-11-10 DIAGNOSIS — M25.561 RIGHT KNEE PAIN, UNSPECIFIED CHRONICITY: ICD-10-CM

## 2020-11-10 DIAGNOSIS — M25.561 RIGHT KNEE PAIN, UNSPECIFIED CHRONICITY: Primary | ICD-10-CM

## 2020-11-10 PROCEDURE — 99213 OFFICE O/P EST LOW 20 MIN: CPT | Mod: S$GLB,,, | Performed by: PHYSICIAN ASSISTANT

## 2020-11-10 PROCEDURE — 73562 X-RAY EXAM OF KNEE 3: CPT | Mod: TC,LT

## 2020-11-10 PROCEDURE — 99213 PR OFFICE/OUTPT VISIT, EST, LEVL III, 20-29 MIN: ICD-10-PCS | Mod: S$GLB,,, | Performed by: PHYSICIAN ASSISTANT

## 2020-11-10 PROCEDURE — 73564 X-RAY EXAM KNEE 4 OR MORE: CPT | Mod: 26,RT,, | Performed by: RADIOLOGY

## 2020-11-10 PROCEDURE — 3008F BODY MASS INDEX DOCD: CPT | Mod: CPTII,S$GLB,, | Performed by: PHYSICIAN ASSISTANT

## 2020-11-10 PROCEDURE — 73564 XR KNEE ORTHO RIGHT WITH FLEXION: ICD-10-PCS | Mod: 26,RT,, | Performed by: RADIOLOGY

## 2020-11-10 PROCEDURE — 1125F AMNT PAIN NOTED PAIN PRSNT: CPT | Mod: S$GLB,,, | Performed by: PHYSICIAN ASSISTANT

## 2020-11-10 PROCEDURE — 3008F PR BODY MASS INDEX (BMI) DOCUMENTED: ICD-10-PCS | Mod: CPTII,S$GLB,, | Performed by: PHYSICIAN ASSISTANT

## 2020-11-10 PROCEDURE — 73562 X-RAY EXAM OF KNEE 3: CPT | Mod: 26,59,LT, | Performed by: RADIOLOGY

## 2020-11-10 PROCEDURE — 1125F PR PAIN SEVERITY QUANTIFIED, PAIN PRESENT: ICD-10-PCS | Mod: S$GLB,,, | Performed by: PHYSICIAN ASSISTANT

## 2020-11-10 PROCEDURE — 99999 PR PBB SHADOW E&M-EST. PATIENT-LVL III: ICD-10-PCS | Mod: PBBFAC,,, | Performed by: PHYSICIAN ASSISTANT

## 2020-11-10 PROCEDURE — 73562 XR KNEE ORTHO RIGHT WITH FLEXION: ICD-10-PCS | Mod: 26,59,LT, | Performed by: RADIOLOGY

## 2020-11-10 PROCEDURE — 99999 PR PBB SHADOW E&M-EST. PATIENT-LVL III: CPT | Mod: PBBFAC,,, | Performed by: PHYSICIAN ASSISTANT

## 2020-11-10 RX ORDER — MELOXICAM 15 MG/1
15 TABLET ORAL DAILY
Qty: 30 TABLET | Refills: 0 | Status: SHIPPED | OUTPATIENT
Start: 2020-11-10 | End: 2020-12-10

## 2020-11-10 NOTE — LETTER
November 12, 2020      Zamzam Bazan MD  [address] [facility] [address]           [facility] - Ortho After Hours 5th Floor  [address]  [facility] [address]  Phone: [phone]  Fax: [phone]          Patient: Nora Mccoy   MR Number: [id number]   YOB: 1965   Date of Visit: 11/10/2020       Dear Dr. Zamzam Bazan:    Thank you for referring Nora Mccoy to me for evaluation. Attached you will find relevant portions of my assessment and plan of care.    If you have questions, please do not hesitate to call me. I look forward to following Nora Mccoy along with you.    Sincerely,    Holly Saxena PA-C    Enclosure  CC:  No Recipients    If you would like to receive this communication electronically, please contact [email or web address] or [phone] to request more information on [employer] access.    For providers and/or their staff who would like to refer a patient to [facility], please contact us through our one-stop-shop provider referral line, [facility], at [phone].    If you feel you have received this communication in error or would no longer like to receive these types of communications, please e-mail [email or web address]

## 2020-11-10 NOTE — PROGRESS NOTES
"Subjective:      Patient ID: Nora Mccoy is a 55 y.o. female.    Chief Complaint: Pain and Swelling of the Right Knee    HPI  55 year old female presents with chief complaint of right knee pain x 2 days. When she woke up that morning she says the knee felt "wobbly." Later the night, she squatted down and felt a sharp pain at the knee and heard a crunch, pop. She reports muscle pain at the posterior knee and at the medial and lateral sides. It is worse with standing and walking. She had a lot of swelling at the knee yesterday. Today she rested and iced the knee and her swelling has improved. She took mobic yesterday with no relief. She reports cracking and says the knee feels weak. She denies locking, catching, and giving way.   Review of Systems   Constitution: Negative for chills, fever and night sweats.   Cardiovascular: Negative for chest pain.   Respiratory: Negative for cough and shortness of breath.    Hematologic/Lymphatic: Does not bruise/bleed easily.   Skin: Negative for color change.   Gastrointestinal: Negative for heartburn.   Genitourinary: Negative for dysuria.   Neurological: Negative for numbness and paresthesias.   Psychiatric/Behavioral: Negative for altered mental status.   Allergic/Immunologic: Negative for persistent infections.         Objective:            Ortho/SPM Exam  General :   alert, appears stated age and cooperative   Gait: Mild antalgic. The patient can bear weight on the injured extremity.   Right Lower Extremity  Hip Palpation:  no tenderness over the greater  trochanter   Hip ROM: 100% of normal    Knee Effusion:  0-1+   Ecchymosis:  none   Knee ROM:  0 to 120 degrees with subpatellar   crepitance.   Patella:  Patella does track normally.  Patellar apprehension test: negative  Patellar compression test: negative   Tenderness: Posterior knee and medial/lateral soft tissue tenderness   Stability:  Lachman's test: negative  Posterior drawer: negative  Medial collateral " ligament: negative  Lateral collateral ligament: negative         Noelle's Test:  negative with no joint line tenderness   Sensation:   intact to light touch   Pulses: normal DP and PT pulses         X-ray: ordered and reviewed by myself. Joint spaces are maintained.  Bony structures are intact no joint effusion is seen on the right related.        Assessment:       Encounter Diagnoses   Name Primary?    Knee pain, unspecified chronicity, unspecified laterality     Right knee pain, unspecified chronicity Yes          Plan:       Discussed treatment options with patient. She will rest, ice, and elevate. She will take mobic x 2 weeks. HEP given but she will wait a few days before starting this. Do exercises within limits of pain. RTC if symptoms worsen or do not improve and we will consider further imaging.

## 2020-11-16 ENCOUNTER — CLINICAL SUPPORT (OUTPATIENT)
Dept: REHABILITATION | Facility: OTHER | Age: 55
End: 2020-11-16
Attending: PHYSICAL MEDICINE & REHABILITATION
Payer: COMMERCIAL

## 2020-11-16 DIAGNOSIS — M62.81 WEAKNESS OF TRUNK MUSCULATURE: Primary | ICD-10-CM

## 2020-11-16 DIAGNOSIS — R68.89 DECREASED FUNCTIONAL ACTIVITY TOLERANCE: ICD-10-CM

## 2020-11-16 DIAGNOSIS — R29.898 WEAKNESS OF BOTH LOWER EXTREMITIES: ICD-10-CM

## 2020-11-16 DIAGNOSIS — M53.86 DECREASED ROM OF LUMBAR SPINE: ICD-10-CM

## 2020-11-16 PROCEDURE — 97750 PHYSICAL PERFORMANCE TEST: CPT | Mod: 32

## 2020-11-16 NOTE — PROGRESS NOTES
"[facility]      Name: Nichol Mccoy  [facility] Number: [id number]    Therapy Diagnosis:   Encounter Diagnoses   Name Primary?   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 DJD L5-S1     Pattern of pain determined: 1 PEP     Subjective   Nichol reports about 2-3 pain today, it is not as bad or as constant as it has been so that is progress.      Patient reports tolerating previous visit well with min soreness.  Patient reports their pain to be 2.5/10 on a 0-10 scale with 0 being no pain and 10 being the worst pain imaginable.  Pain Location: L LB and glut     Occupation: See subjective  Leisure: Hang out, go to dinner with friends      Pts goals: "Make sure to not worsening condition; minimize pain; be more independent with self management".      Objective     Baseline Isometric Testing on Med X equipment: Testing administered by PT  Date of testing: 10/22/20  ROM 0-45 deg   Max Peak Torque 99    Min Peak Torque 44    Flex/Ext Ratio 2.25:1   % below normative data 42%         Treatment    Pt was instructed in and performed the following:     Nichol received therapeutic exercises to develop/improved posture, cardiovascular endurance, muscular endurance, lumbar/cervical " ROM, strength and muscular endurance for 60 minutes including the following exercises:     DKTC 10x  EIL 10x  EIS 10x  Pirformis stretch 20secs 3x  LTR 10x    [facility] 11/16/2020   Visit Number 3   VAS Pain Rating 2.5   Treadmill Time (in min.) 5   Speed 2.5   Extension in Lying 15   Extension in Standing 10   Flexion in Lying 10   Lumbar Extension Seat Pad -   Femur Restraint -   Top Dead Center -   Counterweight -   Lumbar Flexion -   Lumbar Extension -   Lumbar Peak Torque -   Min Torque -   Test Percent Below Normative Data -   Lumbar Weight 50   Repetitions 20   Rating of Perceived Exertion 3   Ice - Z Lie (in min.) 5         Peripheral muscle strengthening which included 1 set of 15-20 repetitions at a slow, controlled 10-13 second per rep pace focused on strengthening supporting musculature for improved body mechanics and functional mobility.  Pt and therapist focused on proper form during treatment to ensure optimal strengthening of each targeted muscle group.  Machines were utilized including torso rotation, leg extension, leg curl, chest press, upright row. Tricep extension, bicep curl, leg press, and hip abduction added visit 3    Nora received the following manual therapy techniques: n/a were applied to the: n/a for n/a minutes.         Home Exercises Provided and Patient Education Provided   Home exercises include:  DKTC  EIL  EIS     Cardio program:TBD    Education provided:   - Educated pt on the importance of daily stretch to increase the benefit of program and positive results.   - Discussed exertion scale, slow progressive resistance protocol to promote safe and healthy strengthening of supportive musculature  by performing 15-20 reps, 7 sec per rep, and increasing weight by 5 % at 20 reps only if there ex done safely, slowly, using correct musculature, exertion and no pain.  Patient expressed understanding.  -Pt educated on strategies to safely perform examination and exercise using  ""Stop Light" analogy to describe how to avoid or stop irritating motions, proceed with caution with some movements, and progress positive exercises.     Written Home Exercises Provided: Patient instructed to cont prior HEP.  Exercises were reviewed and Nora was able to demonstrate them prior to the end of the session.  Nora demonstrated good  understanding of the education provided.     See EMR under Patient Instructions for exercises provided prior visit.          Assessment     Pt presents with minimal complaints of low back pain and able to complete exercises without increase in pain. She was able to resume exercises and demonstrated well for HEP with some cues for proper execution and progression. Increased repetitions on the lumbar medx and she was able to complete 20 repetitions, plan to increase resistance by 5% next visit. She was able to complete all peripheral strengthening exercises this visit without increase in pain        Patient is making good progress towards established goals.  Pt will continue to benefit from skilled outpatient physical therapy to address the deficits stated in the impairment chart, provide pt/family education and to maximize pt's level of independence in the home and community environment.     Anticipated Barriers for therapy: none  Pt's spiritual, cultural and educational needs considered and pt agreeable to plan of care and goals as stated below:         GOALS: Pt is in agreement with the following goals.     Short term goals:  6 weeks or 10 visits   1.  Pt will demonstrate increased lumbar ROM by at least 6 degrees from the initial ROM value with improvements noted in functional ROM and ability to perform ADLs. (progressing)  2.  Pt will demonstrate increased MedX average isometric strength value  by 15% from initial test resulting in improved ability to perform bending, lifting, and carrying activities safely, confidently. (progressing)  3.  Patient report a reduction in " worst pain score by 1-2 points for improved tolerance for functional activities. (progressing)  4.  Pt able to perform HEP correctly with minimal cueing or supervision from therapist to encourage independent management of symptoms. (progressing)        Long term goals: 10 weeks or 20 visits   1. Pt will demonstrate increased lumbar ROM by at least 8 degrees from initial ROM value, resulting in improved ability to perform functional fwd bending while standing and sitting.   2. Pt will demonstrate increased MedX average isometric strength value  by 25% from initial test resulting in improved ability to perform bending, lifting, and carrying activities safely, confidently.  3. Pt to demonstrate ability to independently control and reduce their pain through posture positioning and mechanical movements throughout a typical day.  4.  Pt will demonstrate reduced pain and improved functional outcomes as reported on the Oswestry Disability Index by reaching a score of 5 or less in order to demonstrate subjective improvement in pt's condition.    5. Pt will demonstrate independence with the HEP at discharge  6.  Patient (patient goal)will demonstrate a 5/5 B LE MMT strength  7. Patient will be able to complete a walking program for 30 minutes at least 5 days/week w/o increasing LBP.                Plan   Continue with established Plan of Care towards established PT goals.       Jermaine Butler, PT  11/16/2020

## 2020-11-18 ENCOUNTER — PATIENT MESSAGE (OUTPATIENT)
Dept: PSYCHIATRY | Facility: CLINIC | Age: 55
End: 2020-11-18

## 2020-11-24 ENCOUNTER — CLINICAL SUPPORT (OUTPATIENT)
Dept: REHABILITATION | Facility: OTHER | Age: 55
End: 2020-11-24
Attending: PHYSICAL MEDICINE & REHABILITATION
Payer: COMMERCIAL

## 2020-11-24 DIAGNOSIS — M51.36 DDD (DEGENERATIVE DISC DISEASE), LUMBAR: Primary | ICD-10-CM

## 2020-11-24 PROCEDURE — 97750 PHYSICAL PERFORMANCE TEST: CPT | Mod: 32

## 2020-11-24 NOTE — PROGRESS NOTES
"[facility]      Name: Nichol Mccoy  Clinic Number: [id number]    Therapy Diagnosis:   No diagnosis found.  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 DJD L5-S1     Pattern of pain determined: 1 PEP     Subjective   Nichol reports minimal L LBP and glut pain. Her HEP does help her pain.     Patient reports tolerating previous visit well with min soreness.  Patient reports their pain to be 5/10 on a 0-10 scale with 0 being no pain and 10 being the worst pain imaginable.  Pain Location: L LB and glut     Occupation: See subjective  Leisure: Hang out, go to dinner with friends      Pts goals: "Make sure to not worsening condition; minimize pain; be more independent with self management".      Objective     Baseline Isometric Testing on Med X equipment: Testing administered by PT  Date of testing: 10/22/20  ROM 0-45 deg   Max Peak Torque 99    Min Peak Torque 44    Flex/Ext Ratio 2.25:1   % below normative data 42%         Treatment    Pt was instructed in and performed the following:     Nichol received therapeutic exercises to develop/improved posture, cardiovascular endurance, muscular endurance, lumbar/cervical ROM, strength and muscular endurance for 45 minutes including the following exercises:     DKTC 10x  EIL 10x  EIS 10x  Pirformis stretch 20secs 3x  LTR 10x  [facility] 11/24/2020   Visit Number 4   VAS Pain " "Rating 5   Treadmill Time (in min.) 5   Speed 2.5   Extension in Lying -   Extension in Standing -   Flexion in Lying -   Lumbar Extension Seat Pad -   Femur Restraint -   Top Dead Center -   Counterweight -   Lumbar Flexion -   Lumbar Extension -   Lumbar Peak Torque -   Min Torque -   Test Percent Below Normative Data -   Lumbar Weight 53   Repetitions 18   Rating of Perceived Exertion 4   Ice - Z Lie (in min.) 5             Peripheral muscle strengthening which included 1 set of 15-20 repetitions at a slow, controlled 10-13 second per rep pace focused on strengthening supporting musculature for improved body mechanics and functional mobility.  Pt and therapist focused on proper form during treatment to ensure optimal strengthening of each targeted muscle group.  Machines were utilized including torso rotation, leg extension, leg curl, chest press, upright row. Tricep extension, bicep curl, leg press, and hip abduction added visit 3    Nora received the following manual therapy techniques: n/a were applied to the: n/a for n/a minutes.         Home Exercises Provided and Patient Education Provided   Home exercises include:  DKTC  EIL  EIS     Cardio program:TBD    Education provided:   - Educated pt on the importance of daily stretch to increase the benefit of program and positive results.   - Discussed exertion scale, slow progressive resistance protocol to promote safe and healthy strengthening of supportive musculature  by performing 15-20 reps, 7 sec per rep, and increasing weight by 5 % at 20 reps only if there ex done safely, slowly, using correct musculature, exertion and no pain.  Patient expressed understanding.  -Pt educated on strategies to safely perform examination and exercise using "Stop Light" analogy to describe how to avoid or stop irritating motions, proceed with caution with some movements, and progress positive exercises.     Written Home Exercises Provided: Patient instructed to cont prior " HEP.  Exercises were reviewed and Nora was able to demonstrate them prior to the end of the session.  Nora demonstrated good  understanding of the education provided.     See EMR under Patient Instructions for exercises provided prior visit.          Assessment     Pt presents with minimal complaints of low back pain that did decrease with session. She was able to resume exercises and demonstrated well for HEP with some cues for proper execution and progression. Weight increased on the LB machine with 18 reps and RPE 4.She was able to complete all peripheral strengthening exercises this visit without increase in pain. Pt complaint with her HEP and her HEP helps, although        Patient is making good progress towards established goals.  Pt will continue to benefit from skilled outpatient physical therapy to address the deficits stated in the impairment chart, provide pt/family education and to maximize pt's level of independence in the home and community environment.     Anticipated Barriers for therapy: none  Pt's spiritual, cultural and educational needs considered and pt agreeable to plan of care and goals as stated below:         GOALS: Pt is in agreement with the following goals.     Short term goals:  6 weeks or 10 visits   1.  Pt will demonstrate increased lumbar ROM by at least 6 degrees from the initial ROM value with improvements noted in functional ROM and ability to perform ADLs. (progressing)  2.  Pt will demonstrate increased MedX average isometric strength value  by 15% from initial test resulting in improved ability to perform bending, lifting, and carrying activities safely, confidently. (progressing)  3.  Patient report a reduction in worst pain score by 1-2 points for improved tolerance for functional activities. (progressing)  4.  Pt able to perform HEP correctly with minimal cueing or supervision from therapist to encourage independent management of symptoms. (progressing)        Long term  goals: 10 weeks or 20 visits   1. Pt will demonstrate increased lumbar ROM by at least 8 degrees from initial ROM value, resulting in improved ability to perform functional fwd bending while standing and sitting.   2. Pt will demonstrate increased MedX average isometric strength value  by 25% from initial test resulting in improved ability to perform bending, lifting, and carrying activities safely, confidently.  3. Pt to demonstrate ability to independently control and reduce their pain through posture positioning and mechanical movements throughout a typical day.  4.  Pt will demonstrate reduced pain and improved functional outcomes as reported on the Oswestry Disability Index by reaching a score of 5 or less in order to demonstrate subjective improvement in pt's condition.    5. Pt will demonstrate independence with the HEP at discharge  6.  Patient (patient goal)will demonstrate a 5/5 B LE MMT strength  7. Patient will be able to complete a walking program for 30 minutes at least 5 days/week w/o increasing LBP.                Plan   Continue with established Plan of Care towards established PT goals.       Oumou Quan, PTA  11/24/2020

## 2020-11-27 ENCOUNTER — CLINICAL SUPPORT (OUTPATIENT)
Dept: REHABILITATION | Facility: OTHER | Age: 55
End: 2020-11-27
Attending: PHYSICAL MEDICINE & REHABILITATION
Payer: COMMERCIAL

## 2020-11-27 DIAGNOSIS — R29.898 WEAKNESS OF BOTH LOWER EXTREMITIES: ICD-10-CM

## 2020-11-27 DIAGNOSIS — R68.89 DECREASED FUNCTIONAL ACTIVITY TOLERANCE: ICD-10-CM

## 2020-11-27 DIAGNOSIS — M53.86 DECREASED ROM OF LUMBAR SPINE: ICD-10-CM

## 2020-11-27 DIAGNOSIS — M62.81 WEAKNESS OF TRUNK MUSCULATURE: Primary | ICD-10-CM

## 2020-11-27 PROCEDURE — 97110 THERAPEUTIC EXERCISES: CPT

## 2020-11-27 NOTE — PROGRESS NOTES
"[facility]      Name: Nichol Mccoy  Clinic Number: [id number]    Therapy Diagnosis:   Encounter Diagnoses   Name Primary?   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 DJD L5-S1     Pattern of pain determined: 1 PEP     Subjective   Nichol reports minimal L LBP and glut pain. Her HEP does help her pain.     Patient reports tolerating previous visit well with min soreness.  Patient reports their pain to be 5/10 on a 0-10 scale with 0 being no pain and 10 being the worst pain imaginable.  Pain Location: L LB and glut     Occupation: See subjective  Leisure: Hang out, go to dinner with friends      Pts goals: "Make sure to not worsening condition; minimize pain; be more independent with self management".      Objective     Baseline Isometric Testing on Med X equipment: Testing administered by PT  Date of testing: 10/22/20  ROM 0-45 deg   Max Peak Torque 99    Min Peak Torque 44    Flex/Ext Ratio 2.25:1   % below normative data 42%         Treatment    Pt was instructed in and performed the following:     Nichol received therapeutic exercises to develop/improved posture, cardiovascular endurance, muscular endurance, lumbar/cervical ROM, strength and muscular endurance " "for 45 minutes including the following exercises:     DKTC 10x  EIL 10x  EIS 10x  Pirformis stretch 20secs 3x  LTR 10x  Bridges x20  [facility] 11/27/2020   Visit Number 5   VAS Pain Rating 0   Treadmill Time (in min.) 5   Speed -   Extension in Lying 15   Extension in Standing 10   Flexion in Lying 10   Lumbar Extension Seat Pad -   Femur Restraint -   Top Dead Center -   Counterweight -   Lumbar Flexion -   Lumbar Extension -   Lumbar Peak Torque -   Min Torque -   Test Percent Below Normative Data -   Lumbar Weight 53   Repetitions 20   Rating of Perceived Exertion 4   Ice - Z Lie (in min.) 5           Peripheral muscle strengthening which included 1 set of 15-20 repetitions at a slow, controlled 10-13 second per rep pace focused on strengthening supporting musculature for improved body mechanics and functional mobility.  Pt and therapist focused on proper form during treatment to ensure optimal strengthening of each targeted muscle group.  Machines were utilized including torso rotation, leg extension, leg curl, chest press, upright row. Tricep extension, bicep curl, leg press, and hip abduction added visit 3    Nora received the following manual therapy techniques: n/a were applied to the: n/a for n/a minutes.         Home Exercises Provided and Patient Education Provided   Home exercises include:  DKTC  EIL  EIS     Cardio program:TBD    Education provided:   - Educated pt on the importance of daily stretch to increase the benefit of program and positive results.   - Discussed exertion scale, slow progressive resistance protocol to promote safe and healthy strengthening of supportive musculature  by performing 15-20 reps, 7 sec per rep, and increasing weight by 5 % at 20 reps only if there ex done safely, slowly, using correct musculature, exertion and no pain.  Patient expressed understanding.  -Pt educated on strategies to safely perform examination and exercise using "Stop Light" analogy to " describe how to avoid or stop irritating motions, proceed with caution with some movements, and progress positive exercises.     Written Home Exercises Provided: Patient instructed to cont prior HEP.  Exercises were reviewed and Nora was able to demonstrate them prior to the end of the session.  Nora demonstrated good  understanding of the education provided.     See EMR under Patient Instructions for exercises provided prior visit.          Assessment     Pt presents with no complaints of back pain. She states that her stretches at home have been helping her symptoms. Weight maintained on MedX at 53 with 20 reps and RPE 4.She was able to complete all peripheral strengthening exercises this visit without increase in pain. Increase weight 5% next visit        Patient is making good progress towards established goals.  Pt will continue to benefit from skilled outpatient physical therapy to address the deficits stated in the impairment chart, provide pt/family education and to maximize pt's level of independence in the home and community environment.     Anticipated Barriers for therapy: none  Pt's spiritual, cultural and educational needs considered and pt agreeable to plan of care and goals as stated below:         GOALS: Pt is in agreement with the following goals.     Short term goals:  6 weeks or 10 visits   1.  Pt will demonstrate increased lumbar ROM by at least 6 degrees from the initial ROM value with improvements noted in functional ROM and ability to perform ADLs. (progressing)  2.  Pt will demonstrate increased MedX average isometric strength value  by 15% from initial test resulting in improved ability to perform bending, lifting, and carrying activities safely, confidently. (progressing)  3.  Patient report a reduction in worst pain score by 1-2 points for improved tolerance for functional activities. (progressing)  4.  Pt able to perform HEP correctly with minimal cueing or supervision from therapist  to encourage independent management of symptoms. (progressing)        Long term goals: 10 weeks or 20 visits   1. Pt will demonstrate increased lumbar ROM by at least 8 degrees from initial ROM value, resulting in improved ability to perform functional fwd bending while standing and sitting.   2. Pt will demonstrate increased MedX average isometric strength value  by 25% from initial test resulting in improved ability to perform bending, lifting, and carrying activities safely, confidently.  3. Pt to demonstrate ability to independently control and reduce their pain through posture positioning and mechanical movements throughout a typical day.  4.  Pt will demonstrate reduced pain and improved functional outcomes as reported on the Oswestry Disability Index by reaching a score of 5 or less in order to demonstrate subjective improvement in pt's condition.    5. Pt will demonstrate independence with the HEP at discharge  6.  Patient (patient goal)will demonstrate a 5/5 B LE MMT strength  7. Patient will be able to complete a walking program for 30 minutes at least 5 days/week w/o increasing LBP.                Plan   Continue with established Plan of Care towards established PT goals.       Bobby Rodriguez, PT  11/27/2020

## 2020-11-30 ENCOUNTER — TELEPHONE (OUTPATIENT)
Dept: INTERNAL MEDICINE | Facility: CLINIC | Age: 55
End: 2020-11-30

## 2020-11-30 NOTE — TELEPHONE ENCOUNTER
----- Message from Zamzam aBzan MD sent at 11/28/2020 12:24 PM CST -----  Nora arana EPP w/ pap - around   3/2020

## 2020-12-01 ENCOUNTER — CLINICAL SUPPORT (OUTPATIENT)
Dept: REHABILITATION | Facility: OTHER | Age: 55
End: 2020-12-01
Attending: PHYSICAL MEDICINE & REHABILITATION
Payer: COMMERCIAL

## 2020-12-01 DIAGNOSIS — R29.898 WEAKNESS OF BOTH LOWER EXTREMITIES: ICD-10-CM

## 2020-12-01 DIAGNOSIS — R68.89 DECREASED FUNCTIONAL ACTIVITY TOLERANCE: ICD-10-CM

## 2020-12-01 DIAGNOSIS — M53.86 DECREASED ROM OF LUMBAR SPINE: ICD-10-CM

## 2020-12-01 DIAGNOSIS — M62.81 WEAKNESS OF TRUNK MUSCULATURE: Primary | ICD-10-CM

## 2020-12-01 PROCEDURE — 97750 PHYSICAL PERFORMANCE TEST: CPT | Mod: 32

## 2020-12-01 NOTE — PROGRESS NOTES
"[facility]      Name: Nichol Mccoy  [facility] Number: [id number]    Therapy Diagnosis:   Encounter Diagnoses   Name Primary?   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 DJD L5-S1     Pattern of pain determined: 1 PEP     Subjective   Nichol reports waking up /c L LBP radiating to glute. However, pt reports LBP did not inhibit her holiday weekend activities and pt reports "most days I have been pain free".   Pt reports returning to gym and using treadmill and elliptical.          Patient reports tolerating previous visit well and leaving /c minimal LBP   Patient reports their pain to be 5/10 on a 0-10 scale with 0 being no pain and 10 being the worst pain imaginable.  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      Objective     Baseline Isometric Testing on Med X equipment: Testing administered by PT  Date of testing: 10/22/20  ROM 0-45 deg   Max Peak Torque 99    Min Peak Torque 44    Flex/Ext Ratio 2.25:1   % below normative data 42%       Oswestry " Questionnaire Review 10/22/2020 10/5/2020   Pain Intensity 2 3   Personal Care (Washing, Dressing) 0 2   Lifting 4 4   Walking 2 2   Sitting 2 4   Standing 2 3   Sleeping 2 1   Social Life 2 1   Traveling 2 1   Changing Degree of Pain 4 4   Score 22 25           Treatment    Pt was instructed in and performed the following:     Nora received therapeutic exercises to develop/improved posture, cardiovascular endurance, muscular endurance, lumbar/cervical ROM, strength and muscular endurance for 45 minutes including the following exercises:     LTR 10x  Pirformis stretch 20secs 5x  EIL 20x  PPT x 10   Bridges /c PPT 10x2   EIS 20x    NP:   MEKA 10x      [facility] 12/1/2020   Visit Number 6   VAS Pain Rating 5   Treadmill Time (in min.) 5   Speed -   Extension in Lying 20   Extension in Standing 20   Flexion in Lying -   Lumbar Extension Seat Pad -   Femur Restraint -   Top Dead Center -   Counterweight -   Lumbar Flexion -   Lumbar Extension -   Lumbar Peak Torque -   Min Torque -   Test Percent Below Normative Data -   Lumbar Weight 56   Repetitions 16   Rating of Perceived Exertion 3   Ice - Z Lie (in min.) 5         Peripheral muscle strengthening which included 1 set of 15-20 repetitions at a slow, controlled 10-13 second per rep pace focused on strengthening supporting musculature for improved body mechanics and functional mobility.  Pt and therapist focused on proper form during treatment to ensure optimal strengthening of each targeted muscle group.  Machines were utilized including torso rotation, leg extension, leg curl, chest press, upright row. Tricep extension, bicep curl, leg press, and hip abduction added visit 3    Nora received the following manual therapy techniques: n/a were applied to the: n/a for n/a minutes.         Home Exercises Provided and Patient Education Provided   Home exercises include:  MEKA  LTR  EIL  EIS  Bridges (12/01/20)     Cardio program: Gym - treadmill    Education  "provided:   - Educated pt on the importance of daily stretch to increase the benefit of program and positive results.   - Discussed exertion scale, slow progressive resistance protocol to promote safe and healthy strengthening of supportive musculature  by performing 15-20 reps, 7 sec per rep, and increasing weight by 5 % at 20 reps only if there ex done safely, slowly, using correct musculature, exertion and no pain.  Patient expressed understanding.  -Pt educated on strategies to safely perform examination and exercise using "Stop Light" analogy to describe how to avoid or stop irritating motions, proceed with caution with some movements, and progress positive exercises.     Written Home Exercises Provided: Patient instructed to cont prior HEP.  Exercises were reviewed and Nora was able to demonstrate them prior to the end of the session.  Nora demonstrated good  understanding of the education provided.     See EMR under Patient Instructions for exercises provided prior visit.      Assessment   Pt demo improved lumbar mobility especially in extension and reports application of standing extension during work indicating inc sx self management.  Pt demo good core motor control needing min cue TA activation during PPT.  Recommend cont progression of core strength/stability as pt achieves inc lumbar mobility. PT inc ex reps for mobility and stability progression today.  Also, added bridges to HEP for stability challenge.  Pt perform MedX lumbar at inc weight to 16 reps at 3 RPE indicating appropriateness of present strength challenge.  Recommend reassess pt MedX lumbar ROM tolerance as pt demo inc lumbar ROM during exercises.     Patient is making good progress towards established goals.  Pt will continue to benefit from skilled outpatient physical therapy to address the deficits stated in the impairment chart, provide pt/family education and to maximize pt's level of independence in the home and community " environment.     Anticipated Barriers for therapy: none  Pt's spiritual, cultural and educational needs considered and pt agreeable to plan of care and goals as stated below:       GOALS: Pt is in agreement with the following goals.     Short term goals:  6 weeks or 10 visits   1.  Pt will demonstrate increased lumbar ROM by at least 6 degrees from the initial ROM value with improvements noted in functional ROM and ability to perform ADLs. (progressing)  2.  Pt will demonstrate increased MedX average isometric strength value  by 15% from initial test resulting in improved ability to perform bending, lifting, and carrying activities safely, confidently. (progressing)  3.  Patient report a reduction in worst pain score by 1-2 points for improved tolerance for functional activities. (progressing)  4.  Pt able to perform HEP correctly with minimal cueing or supervision from therapist to encourage independent management of symptoms. (progressing)        Long term goals: 10 weeks or 20 visits   1. Pt will demonstrate increased lumbar ROM by at least 8 degrees from initial ROM value, resulting in improved ability to perform functional fwd bending while standing and sitting.  (not met, progressing)  2. Pt will demonstrate increased MedX average isometric strength value  by 25% from initial test resulting in improved ability to perform bending, lifting, and carrying activities safely, confidently.  (not met, progressing)  3. Pt to demonstrate ability to independently control and reduce their pain through posture positioning and mechanical movements throughout a typical day.  (not met, progressing)  4.  Pt will demonstrate reduced pain and improved functional outcomes as reported on the Oswestry Disability Index by reaching a score of 5 or less in order to demonstrate subjective improvement in pt's condition.  (not met, progressing)  5. Pt will demonstrate independence with the HEP at discharge (not met, progressing)  6.   Patient (patient goal)will demonstrate a 5/5 B LE MMT strength (not met, progressing)  7. Patient will be able to complete a walking program for 30 minutes at least 5 days/week w/o increasing LBP.  (not met, progressing)               Plan   Continue with established Plan of Care towards established PT goals.       Satish Ambrose, PT  12/1/2020

## 2020-12-02 NOTE — PROGRESS NOTES
"[facility]      Name: Nichol Mccoy  [facility] Number: [id number]    Therapy Diagnosis:   Encounter Diagnoses   Name Primary?   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 DJD L5-S1     Pattern of pain determined: 1 PEP     Subjective   Nichol reports yesterday evening extended sitting at work and not performing stretches and believes this lead to inc sx limit household chore completion and inc LBP this AM.        Patient reports tolerating previous visit well and leaving /c minimal LBP.   Patient reports their pain to be 6/10 on a 0-10 scale with 0 being no pain and 10 being the worst pain imaginable.  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      Objective     Baseline Isometric Testing on Med X equipment: Testing administered by PT  Date of testing: 10/22/20  ROM 0-45 deg   Max Peak Torque 99    Min Peak Torque 44    Flex/Ext Ratio 2.25:1   % below normative data 42%       Oswestry Questionnaire Review 10/22/2020 10/5/2020   Pain " Intensity 2 3   Personal Care (Washing, Dressing) 0 2   Lifting 4 4   Walking 2 2   Sitting 2 4   Standing 2 3   Sleeping 2 1   Social Life 2 1   Traveling 2 1   Changing Degree of Pain 4 4   Score 22 25       12/03/20  Sit to Supine Test     Ant rotated R pelvis     Treatment    Pt was instructed in and performed the following:     Nora received therapeutic exercises to develop/improved posture, cardiovascular endurance, muscular endurance, lumbar/cervical ROM, strength and muscular endurance for 45 minutes including the following exercises:     LTR 10x  Pirformis stretch 20secs 5x  EIL 10x  PPT x 5  Bridges /c PPT 10x2   EIS 10x    MET Ant rot R pelvis x5 5 sec hold    SOC x 20 on EOB     NP:   DKTC 10x      [facility] 12/3/2020   Visit Number 7   VAS Pain Rating 6   Treadmill Time (in min.) 5   Speed -   Lumbar Stretches - Slouch Overcorrection 20   Extension in Lying 10   Extension in Standing 10   Flexion in Lying -   Lumbar Extension Seat Pad -   Femur Restraint -   Top Dead Center -   Counterweight -   Lumbar Flexion 51   Lumbar Extension 0   Lumbar Peak Torque -   Min Torque -   Test Percent Below Normative Data -   Lumbar Weight 56   Repetitions 20   Rating of Perceived Exertion 4   Ice - Z Lie (in min.) 5       Peripheral muscle strengthening which included 1 set of 15-20 repetitions at a slow, controlled 10-13 second per rep pace focused on strengthening supporting musculature for improved body mechanics and functional mobility.  Pt and therapist focused on proper form during treatment to ensure optimal strengthening of each targeted muscle group.  Machines were utilized including torso rotation, leg extension, leg curl, chest press, upright row. Tricep extension, bicep curl, leg press, and hip abduction added visit 3    Nora received the following manual therapy techniques: n/a were applied to the: n/a for n/a minutes.       Home Exercises Provided and Patient Education Provided   Home  "exercises include:  DKTC  LTR  EIL  EIS  Alejandra (12/01/20)     Cardio program: Gym - treadmill    Education provided:   - Educated pt on the importance of daily stretch to increase the benefit of program and positive results.   - Discussed exertion scale, slow progressive resistance protocol to promote safe and healthy strengthening of supportive musculature  by performing 15-20 reps, 7 sec per rep, and increasing weight by 5 % at 20 reps only if there ex done safely, slowly, using correct musculature, exertion and no pain.  Patient expressed understanding.  -Pt educated on strategies to safely perform examination and exercise using "Stop Light" analogy to describe how to avoid or stop irritating motions, proceed with caution with some movements, and progress positive exercises.   -Pt ed on use of MET for sx self management      Written Home Exercises Provided: Patient instructed to cont prior HEP.  Exercises were reviewed and Nora was able to demonstrate them prior to the end of the session.  Nora demonstrated good  understanding of the education provided.     See EMR under Patient Instructions for exercises provided prior visit.      Assessment      Pt present in inc subjective pain and pain report mimic SI sx.  Therefore PT perform MET. Pt note marked dec midline sx.  Pt advised to perform METs at home and pt demo /c accuracy.  Based off this evidence recommend cont core/glute strength programming.  Therefore bridges reinforced as HEP ex.   Introduce SOC for exercise to be performed at work for postural recognition and offering moment of lumbar ext when not able to do standing extensions due to meeting.  Pt demo /c accuracy.  Recommend reassess for pt application to everyday life.    Pt demo inc lumbar flex tolerance in MedX lumber setup.  Therefore, MedX lumbar performed /c inc flexion to 20 reps at 4 RPE indicating inc strength and activity tolerance and appropriateness of inc weight next visit.     "     Patient is making good progress towards established goals.  Pt will continue to benefit from skilled outpatient physical therapy to address the deficits stated in the impairment chart, provide pt/family education and to maximize pt's level of independence in the home and community environment.     Anticipated Barriers for therapy: none  Pt's spiritual, cultural and educational needs considered and pt agreeable to plan of care and goals as stated below:       GOALS: Pt is in agreement with the following goals.     Short term goals:  6 weeks or 10 visits   1.  Pt will demonstrate increased lumbar ROM by at least 6 degrees from the initial ROM value with improvements noted in functional ROM and ability to perform ADLs. (progressing)  2.  Pt will demonstrate increased MedX average isometric strength value  by 15% from initial test resulting in improved ability to perform bending, lifting, and carrying activities safely, confidently. (progressing)  3.  Patient report a reduction in worst pain score by 1-2 points for improved tolerance for functional activities. (progressing)  4.  Pt able to perform HEP correctly with minimal cueing or supervision from therapist to encourage independent management of symptoms. (progressing)        Long term goals: 10 weeks or 20 visits   1. Pt will demonstrate increased lumbar ROM by at least 8 degrees from initial ROM value, resulting in improved ability to perform functional fwd bending while standing and sitting.  (not met, progressing)  2. Pt will demonstrate increased MedX average isometric strength value  by 25% from initial test resulting in improved ability to perform bending, lifting, and carrying activities safely, confidently.  (not met, progressing)  3. Pt to demonstrate ability to independently control and reduce their pain through posture positioning and mechanical movements throughout a typical day.  (not met, progressing)  4.  Pt will demonstrate reduced pain and  improved functional outcomes as reported on the Oswestry Disability Index by reaching a score of 5 or less in order to demonstrate subjective improvement in pt's condition.  (not met, progressing)  5. Pt will demonstrate independence with the HEP at discharge (not met, progressing)  6.  Patient (patient goal)will demonstrate a 5/5 B LE MMT strength (not met, progressing)  7. Patient will be able to complete a walking program for 30 minutes at least 5 days/week w/o increasing LBP.  (not met, progressing)               Plan   Continue with established Plan of Care towards established PT goals.       Satish Ambrose, PT  12/3/2020

## 2020-12-03 ENCOUNTER — CLINICAL SUPPORT (OUTPATIENT)
Dept: REHABILITATION | Facility: OTHER | Age: 55
End: 2020-12-03
Attending: PHYSICAL MEDICINE & REHABILITATION
Payer: COMMERCIAL

## 2020-12-03 DIAGNOSIS — R29.898 WEAKNESS OF BOTH LOWER EXTREMITIES: ICD-10-CM

## 2020-12-03 DIAGNOSIS — M53.86 DECREASED ROM OF LUMBAR SPINE: ICD-10-CM

## 2020-12-03 DIAGNOSIS — R68.89 DECREASED FUNCTIONAL ACTIVITY TOLERANCE: ICD-10-CM

## 2020-12-03 DIAGNOSIS — M62.81 WEAKNESS OF TRUNK MUSCULATURE: Primary | ICD-10-CM

## 2020-12-03 PROCEDURE — 97750 PHYSICAL PERFORMANCE TEST: CPT | Mod: 32

## 2020-12-08 ENCOUNTER — CLINICAL SUPPORT (OUTPATIENT)
Dept: OTHER | Facility: CLINIC | Age: 55
End: 2020-12-08
Payer: COMMERCIAL

## 2020-12-08 DIAGNOSIS — Z00.8 ENCOUNTER FOR OTHER GENERAL EXAMINATION: ICD-10-CM

## 2020-12-09 VITALS — HEIGHT: 67 IN | BODY MASS INDEX: 25.97 KG/M2

## 2020-12-09 LAB
GLUCOSE SERPL-MCNC: 95 MG/DL (ref 60–140)
HDLC SERPL-MCNC: 65 MG/DL
POC CHOLESTEROL, LDL (DOCK): 105 MG/DL
POC CHOLESTEROL, TOTAL: 181 MG/DL
TRIGL SERPL-MCNC: 51 MG/DL

## 2020-12-14 ENCOUNTER — PATIENT MESSAGE (OUTPATIENT)
Dept: PSYCHIATRY | Facility: CLINIC | Age: 55
End: 2020-12-14

## 2020-12-21 RX ORDER — MELOXICAM 15 MG/1
15 TABLET ORAL DAILY
Qty: 30 TABLET | Refills: 1 | Status: SHIPPED | OUTPATIENT
Start: 2020-12-21 | End: 2021-01-20

## 2020-12-27 ENCOUNTER — PATIENT MESSAGE (OUTPATIENT)
Dept: PSYCHIATRY | Facility: CLINIC | Age: 55
End: 2020-12-27

## 2020-12-30 ENCOUNTER — IMMUNIZATION (OUTPATIENT)
Dept: INTERNAL MEDICINE | Facility: CLINIC | Age: 55
End: 2020-12-30
Payer: COMMERCIAL

## 2020-12-30 DIAGNOSIS — Z23 NEED FOR VACCINATION: ICD-10-CM

## 2020-12-30 PROCEDURE — 91300 COVID-19, MRNA, LNP-S, PF, 30 MCG/0.3 ML DOSE VACCINE: CPT | Mod: ,,, | Performed by: INTERNAL MEDICINE

## 2020-12-30 PROCEDURE — 0001A COVID-19, MRNA, LNP-S, PF, 30 MCG/0.3 ML DOSE VACCINE: ICD-10-PCS | Mod: CV19,,, | Performed by: INTERNAL MEDICINE

## 2020-12-30 PROCEDURE — 0001A COVID-19, MRNA, LNP-S, PF, 30 MCG/0.3 ML DOSE VACCINE: CPT | Mod: CV19,,, | Performed by: INTERNAL MEDICINE

## 2020-12-30 PROCEDURE — 91300 COVID-19, MRNA, LNP-S, PF, 30 MCG/0.3 ML DOSE VACCINE: ICD-10-PCS | Mod: ,,, | Performed by: INTERNAL MEDICINE

## 2021-01-05 ENCOUNTER — CLINICAL SUPPORT (OUTPATIENT)
Dept: REHABILITATION | Facility: OTHER | Age: 56
End: 2021-01-05
Attending: PHYSICAL MEDICINE & REHABILITATION
Payer: COMMERCIAL

## 2021-01-05 DIAGNOSIS — M62.81 WEAKNESS OF TRUNK MUSCULATURE: ICD-10-CM

## 2021-01-05 DIAGNOSIS — M51.36 DDD (DEGENERATIVE DISC DISEASE), LUMBAR: Primary | ICD-10-CM

## 2021-01-05 DIAGNOSIS — M53.86 DECREASED ROM OF LUMBAR SPINE: ICD-10-CM

## 2021-01-05 DIAGNOSIS — R68.89 DECREASED FUNCTIONAL ACTIVITY TOLERANCE: ICD-10-CM

## 2021-01-05 DIAGNOSIS — R29.898 WEAKNESS OF BOTH LOWER EXTREMITIES: ICD-10-CM

## 2021-01-05 PROCEDURE — 97750 PHYSICAL PERFORMANCE TEST: CPT | Mod: 32

## 2021-01-07 ENCOUNTER — CLINICAL SUPPORT (OUTPATIENT)
Dept: REHABILITATION | Facility: OTHER | Age: 56
End: 2021-01-07
Attending: PHYSICAL MEDICINE & REHABILITATION
Payer: COMMERCIAL

## 2021-01-07 DIAGNOSIS — R68.89 DECREASED FUNCTIONAL ACTIVITY TOLERANCE: ICD-10-CM

## 2021-01-07 DIAGNOSIS — R29.898 WEAKNESS OF BOTH LOWER EXTREMITIES: ICD-10-CM

## 2021-01-07 DIAGNOSIS — M62.81 WEAKNESS OF TRUNK MUSCULATURE: Primary | ICD-10-CM

## 2021-01-07 DIAGNOSIS — M53.86 DECREASED ROM OF LUMBAR SPINE: ICD-10-CM

## 2021-01-07 PROCEDURE — 97750 PHYSICAL PERFORMANCE TEST: CPT | Mod: 32

## 2021-01-12 ENCOUNTER — CLINICAL SUPPORT (OUTPATIENT)
Dept: REHABILITATION | Facility: OTHER | Age: 56
End: 2021-01-12
Attending: PHYSICAL MEDICINE & REHABILITATION
Payer: COMMERCIAL

## 2021-01-12 DIAGNOSIS — R29.898 WEAKNESS OF BOTH LOWER EXTREMITIES: ICD-10-CM

## 2021-01-12 DIAGNOSIS — R68.89 DECREASED FUNCTIONAL ACTIVITY TOLERANCE: ICD-10-CM

## 2021-01-12 DIAGNOSIS — M62.81 WEAKNESS OF TRUNK MUSCULATURE: Primary | ICD-10-CM

## 2021-01-12 DIAGNOSIS — M53.86 DECREASED ROM OF LUMBAR SPINE: ICD-10-CM

## 2021-01-12 PROCEDURE — 97750 PHYSICAL PERFORMANCE TEST: CPT | Mod: 32

## 2021-01-14 ENCOUNTER — PATIENT MESSAGE (OUTPATIENT)
Dept: DERMATOLOGY | Facility: CLINIC | Age: 56
End: 2021-01-14

## 2021-01-15 ENCOUNTER — CLINICAL SUPPORT (OUTPATIENT)
Dept: REHABILITATION | Facility: OTHER | Age: 56
End: 2021-01-15
Attending: PHYSICAL MEDICINE & REHABILITATION
Payer: COMMERCIAL

## 2021-01-15 DIAGNOSIS — M62.81 WEAKNESS OF TRUNK MUSCULATURE: Primary | ICD-10-CM

## 2021-01-15 DIAGNOSIS — R29.898 WEAKNESS OF BOTH LOWER EXTREMITIES: ICD-10-CM

## 2021-01-15 DIAGNOSIS — R68.89 DECREASED FUNCTIONAL ACTIVITY TOLERANCE: ICD-10-CM

## 2021-01-15 DIAGNOSIS — M53.86 DECREASED ROM OF LUMBAR SPINE: ICD-10-CM

## 2021-01-15 PROCEDURE — 97750 PHYSICAL PERFORMANCE TEST: CPT | Mod: 32

## 2021-01-20 ENCOUNTER — IMMUNIZATION (OUTPATIENT)
Dept: INTERNAL MEDICINE | Facility: CLINIC | Age: 56
End: 2021-01-20
Payer: COMMERCIAL

## 2021-01-20 DIAGNOSIS — Z23 NEED FOR VACCINATION: Primary | ICD-10-CM

## 2021-01-20 PROCEDURE — 0002A COVID-19, MRNA, LNP-S, PF, 30 MCG/0.3 ML DOSE VACCINE: CPT | Mod: PBBFAC | Performed by: INTERNAL MEDICINE

## 2021-01-20 PROCEDURE — 91300 COVID-19, MRNA, LNP-S, PF, 30 MCG/0.3 ML DOSE VACCINE: CPT | Mod: PBBFAC | Performed by: INTERNAL MEDICINE

## 2021-01-21 ENCOUNTER — PATIENT MESSAGE (OUTPATIENT)
Dept: PSYCHIATRY | Facility: CLINIC | Age: 56
End: 2021-01-21

## 2021-01-28 ENCOUNTER — CLINICAL SUPPORT (OUTPATIENT)
Dept: REHABILITATION | Facility: OTHER | Age: 56
End: 2021-01-28
Attending: PHYSICAL MEDICINE & REHABILITATION
Payer: COMMERCIAL

## 2021-01-28 DIAGNOSIS — R68.89 DECREASED FUNCTIONAL ACTIVITY TOLERANCE: ICD-10-CM

## 2021-01-28 DIAGNOSIS — M62.81 WEAKNESS OF TRUNK MUSCULATURE: Primary | ICD-10-CM

## 2021-01-28 DIAGNOSIS — R29.898 WEAKNESS OF BOTH LOWER EXTREMITIES: ICD-10-CM

## 2021-01-28 DIAGNOSIS — M53.86 DECREASED ROM OF LUMBAR SPINE: ICD-10-CM

## 2021-01-28 PROCEDURE — 97750 PHYSICAL PERFORMANCE TEST: CPT | Mod: 32

## 2021-01-29 ENCOUNTER — OFFICE VISIT (OUTPATIENT)
Dept: OPTOMETRY | Facility: CLINIC | Age: 56
End: 2021-01-29
Payer: COMMERCIAL

## 2021-01-29 DIAGNOSIS — H52.4 MYOPIA WITH PRESBYOPIA, BILATERAL: Primary | ICD-10-CM

## 2021-01-29 DIAGNOSIS — H25.13 NUCLEAR SCLEROSIS OF BOTH EYES: ICD-10-CM

## 2021-01-29 DIAGNOSIS — H52.13 MYOPIA WITH PRESBYOPIA, BILATERAL: Primary | ICD-10-CM

## 2021-01-29 PROCEDURE — 92014 COMPRE OPH EXAM EST PT 1/>: CPT | Mod: S$GLB,,, | Performed by: OPTOMETRIST

## 2021-01-29 PROCEDURE — 99999 PR PBB SHADOW E&M-EST. PATIENT-LVL III: CPT | Mod: PBBFAC,,, | Performed by: OPTOMETRIST

## 2021-01-29 PROCEDURE — 92015 DETERMINE REFRACTIVE STATE: CPT | Mod: S$GLB,,, | Performed by: OPTOMETRIST

## 2021-01-29 PROCEDURE — 99999 PR PBB SHADOW E&M-EST. PATIENT-LVL III: ICD-10-PCS | Mod: PBBFAC,,, | Performed by: OPTOMETRIST

## 2021-01-29 PROCEDURE — 92015 PR REFRACTION: ICD-10-PCS | Mod: S$GLB,,, | Performed by: OPTOMETRIST

## 2021-01-29 PROCEDURE — 92014 PR EYE EXAM, EST PATIENT,COMPREHESV: ICD-10-PCS | Mod: S$GLB,,, | Performed by: OPTOMETRIST

## 2021-02-03 ENCOUNTER — TELEPHONE (OUTPATIENT)
Dept: ORTHOPEDICS | Facility: CLINIC | Age: 56
End: 2021-02-03

## 2021-02-03 ENCOUNTER — CLINICAL SUPPORT (OUTPATIENT)
Dept: REHABILITATION | Facility: OTHER | Age: 56
End: 2021-02-03
Attending: PHYSICAL MEDICINE & REHABILITATION
Payer: COMMERCIAL

## 2021-02-03 ENCOUNTER — PATIENT MESSAGE (OUTPATIENT)
Dept: ORTHOPEDICS | Facility: CLINIC | Age: 56
End: 2021-02-03

## 2021-02-03 DIAGNOSIS — M53.86 DECREASED ROM OF LUMBAR SPINE: ICD-10-CM

## 2021-02-03 DIAGNOSIS — R29.898 WEAKNESS OF BOTH LOWER EXTREMITIES: ICD-10-CM

## 2021-02-03 DIAGNOSIS — R68.89 DECREASED FUNCTIONAL ACTIVITY TOLERANCE: ICD-10-CM

## 2021-02-03 DIAGNOSIS — M62.81 WEAKNESS OF TRUNK MUSCULATURE: Primary | ICD-10-CM

## 2021-02-03 PROCEDURE — 97750 PHYSICAL PERFORMANCE TEST: CPT | Mod: 32

## 2021-02-04 DIAGNOSIS — M25.561 RIGHT KNEE PAIN, UNSPECIFIED CHRONICITY: Primary | ICD-10-CM

## 2021-02-04 RX ORDER — MELOXICAM 15 MG/1
15 TABLET ORAL DAILY
Qty: 14 TABLET | Refills: 0 | Status: SHIPPED | OUTPATIENT
Start: 2021-02-04 | End: 2021-03-06

## 2021-02-10 ENCOUNTER — HOSPITAL ENCOUNTER (OUTPATIENT)
Dept: RADIOLOGY | Facility: HOSPITAL | Age: 56
Discharge: HOME | End: 2021-02-10
Attending: PHYSICIAN ASSISTANT
Payer: COMMERCIAL

## 2021-02-10 DIAGNOSIS — M25.561 RIGHT KNEE PAIN, UNSPECIFIED CHRONICITY: ICD-10-CM

## 2021-02-10 PROCEDURE — 73721 MRI KNEE WITHOUT CONTRAST RIGHT: ICD-10-PCS | Mod: 26,RT,, | Performed by: RADIOLOGY

## 2021-02-10 PROCEDURE — 73721 MRI JNT OF LWR EXTRE W/O DYE: CPT | Mod: 26,RT,, | Performed by: RADIOLOGY

## 2021-02-10 PROCEDURE — 73721 MRI JNT OF LWR EXTRE W/O DYE: CPT | Mod: TC,RT

## 2021-02-11 ENCOUNTER — TELEPHONE (OUTPATIENT)
Dept: ORTHOPEDICS | Facility: CLINIC | Age: 56
End: 2021-02-11

## 2021-02-23 ENCOUNTER — OFFICE VISIT (OUTPATIENT)
Dept: SPORTS MEDICINE | Facility: CLINIC | Age: 56
End: 2021-02-23
Payer: COMMERCIAL

## 2021-02-23 VITALS
SYSTOLIC BLOOD PRESSURE: 114 MMHG | WEIGHT: 166 LBS | DIASTOLIC BLOOD PRESSURE: 77 MMHG | HEART RATE: 80 BPM | BODY MASS INDEX: 26.06 KG/M2 | HEIGHT: 67 IN

## 2021-02-23 DIAGNOSIS — S83.241A OTHER TEAR OF MEDIAL MENISCUS OF RIGHT KNEE AS CURRENT INJURY, INITIAL ENCOUNTER: Primary | ICD-10-CM

## 2021-02-23 DIAGNOSIS — M94.261 CHONDROMALACIA OF RIGHT KNEE: ICD-10-CM

## 2021-02-23 PROCEDURE — 1126F PR PAIN SEVERITY QUANTIFIED, NO PAIN PRESENT: ICD-10-PCS | Mod: S$GLB,,, | Performed by: ORTHOPAEDIC SURGERY

## 2021-02-23 PROCEDURE — 3008F PR BODY MASS INDEX (BMI) DOCUMENTED: ICD-10-PCS | Mod: CPTII,S$GLB,, | Performed by: ORTHOPAEDIC SURGERY

## 2021-02-23 PROCEDURE — 99999 PR PBB SHADOW E&M-EST. PATIENT-LVL III: CPT | Mod: PBBFAC,,, | Performed by: ORTHOPAEDIC SURGERY

## 2021-02-23 PROCEDURE — 1126F AMNT PAIN NOTED NONE PRSNT: CPT | Mod: S$GLB,,, | Performed by: ORTHOPAEDIC SURGERY

## 2021-02-23 PROCEDURE — 99203 PR OFFICE/OUTPT VISIT, NEW, LEVL III, 30-44 MIN: ICD-10-PCS | Mod: 25,S$GLB,, | Performed by: ORTHOPAEDIC SURGERY

## 2021-02-23 PROCEDURE — 20610 LARGE JOINT ASPIRATION/INJECTION: R KNEE: ICD-10-PCS | Mod: RT,S$GLB,, | Performed by: ORTHOPAEDIC SURGERY

## 2021-02-23 PROCEDURE — 20610 DRAIN/INJ JOINT/BURSA W/O US: CPT | Mod: RT,S$GLB,, | Performed by: ORTHOPAEDIC SURGERY

## 2021-02-23 PROCEDURE — 99203 OFFICE O/P NEW LOW 30 MIN: CPT | Mod: 25,S$GLB,, | Performed by: ORTHOPAEDIC SURGERY

## 2021-02-23 PROCEDURE — 99999 PR PBB SHADOW E&M-EST. PATIENT-LVL III: ICD-10-PCS | Mod: PBBFAC,,, | Performed by: ORTHOPAEDIC SURGERY

## 2021-02-23 PROCEDURE — 3008F BODY MASS INDEX DOCD: CPT | Mod: CPTII,S$GLB,, | Performed by: ORTHOPAEDIC SURGERY

## 2021-02-23 RX ADMIN — TRIAMCINOLONE ACETONIDE 40 MG: 40 INJECTION, SUSPENSION INTRA-ARTICULAR; INTRAMUSCULAR at 08:02

## 2021-02-26 ENCOUNTER — CLINICAL SUPPORT (OUTPATIENT)
Dept: REHABILITATION | Facility: HOSPITAL | Age: 56
End: 2021-02-26
Attending: ORTHOPAEDIC SURGERY
Payer: COMMERCIAL

## 2021-02-26 ENCOUNTER — PATIENT MESSAGE (OUTPATIENT)
Dept: SPORTS MEDICINE | Facility: CLINIC | Age: 56
End: 2021-02-26

## 2021-02-26 ENCOUNTER — PATIENT MESSAGE (OUTPATIENT)
Dept: REHABILITATION | Facility: OTHER | Age: 56
End: 2021-02-26

## 2021-02-26 DIAGNOSIS — R26.2 DIFFICULTY WALKING DOWN STAIRS: ICD-10-CM

## 2021-02-26 DIAGNOSIS — M17.11 OSTEOARTHRITIS OF RIGHT PATELLOFEMORAL JOINT: ICD-10-CM

## 2021-02-26 DIAGNOSIS — S83.241A OTHER TEAR OF MEDIAL MENISCUS OF RIGHT KNEE AS CURRENT INJURY, INITIAL ENCOUNTER: ICD-10-CM

## 2021-02-26 DIAGNOSIS — R29.898 WEAKNESS OF BOTH LOWER EXTREMITIES: Primary | ICD-10-CM

## 2021-02-26 DIAGNOSIS — M25.561 RIGHT KNEE PAIN, UNSPECIFIED CHRONICITY: ICD-10-CM

## 2021-02-26 PROCEDURE — 97110 THERAPEUTIC EXERCISES: CPT

## 2021-02-26 PROCEDURE — 97140 MANUAL THERAPY 1/> REGIONS: CPT

## 2021-02-26 PROCEDURE — 97161 PT EVAL LOW COMPLEX 20 MIN: CPT

## 2021-03-04 ENCOUNTER — CLINICAL SUPPORT (OUTPATIENT)
Dept: REHABILITATION | Facility: HOSPITAL | Age: 56
End: 2021-03-04
Attending: ORTHOPAEDIC SURGERY
Payer: COMMERCIAL

## 2021-03-04 ENCOUNTER — HOSPITAL ENCOUNTER (OUTPATIENT)
Dept: RADIOLOGY | Facility: HOSPITAL | Age: 56
Discharge: HOME | End: 2021-03-04
Attending: ORTHOPAEDIC SURGERY
Payer: COMMERCIAL

## 2021-03-04 ENCOUNTER — OFFICE VISIT (OUTPATIENT)
Dept: SPORTS MEDICINE | Facility: CLINIC | Age: 56
End: 2021-03-04
Payer: COMMERCIAL

## 2021-03-04 VITALS
SYSTOLIC BLOOD PRESSURE: 109 MMHG | WEIGHT: 164 LBS | HEART RATE: 73 BPM | HEIGHT: 67 IN | BODY MASS INDEX: 25.74 KG/M2 | DIASTOLIC BLOOD PRESSURE: 70 MMHG

## 2021-03-04 DIAGNOSIS — R26.2 DIFFICULTY WALKING DOWN STAIRS: ICD-10-CM

## 2021-03-04 DIAGNOSIS — G89.29 CHRONIC PAIN OF LEFT KNEE: ICD-10-CM

## 2021-03-04 DIAGNOSIS — G89.29 CHRONIC PAIN OF RIGHT KNEE: ICD-10-CM

## 2021-03-04 DIAGNOSIS — M25.562 CHRONIC PAIN OF LEFT KNEE: ICD-10-CM

## 2021-03-04 DIAGNOSIS — M17.11 PRIMARY OSTEOARTHRITIS OF RIGHT KNEE: ICD-10-CM

## 2021-03-04 DIAGNOSIS — M17.12 PRIMARY OSTEOARTHRITIS OF LEFT KNEE: Primary | ICD-10-CM

## 2021-03-04 DIAGNOSIS — M25.561 CHRONIC PAIN OF RIGHT KNEE: ICD-10-CM

## 2021-03-04 DIAGNOSIS — S83.241A OTHER TEAR OF MEDIAL MENISCUS OF RIGHT KNEE AS CURRENT INJURY, INITIAL ENCOUNTER: ICD-10-CM

## 2021-03-04 PROCEDURE — 73564 X-RAY EXAM KNEE 4 OR MORE: CPT | Mod: TC,LT

## 2021-03-04 PROCEDURE — 3008F PR BODY MASS INDEX (BMI) DOCUMENTED: ICD-10-PCS | Mod: CPTII,S$GLB,, | Performed by: ORTHOPAEDIC SURGERY

## 2021-03-04 PROCEDURE — 99213 PR OFFICE/OUTPT VISIT, EST, LEVL III, 20-29 MIN: ICD-10-PCS | Mod: 25,S$GLB,, | Performed by: ORTHOPAEDIC SURGERY

## 2021-03-04 PROCEDURE — 20610 LARGE JOINT ASPIRATION/INJECTION: L KNEE: ICD-10-PCS | Mod: LT,S$GLB,, | Performed by: ORTHOPAEDIC SURGERY

## 2021-03-04 PROCEDURE — 97110 THERAPEUTIC EXERCISES: CPT

## 2021-03-04 PROCEDURE — 73562 XR KNEE ORTHO LEFT WITH FLEXION: ICD-10-PCS | Mod: 26,RT,, | Performed by: RADIOLOGY

## 2021-03-04 PROCEDURE — 73564 XR KNEE ORTHO LEFT WITH FLEXION: ICD-10-PCS | Mod: 26,LT,, | Performed by: RADIOLOGY

## 2021-03-04 PROCEDURE — 1126F PR PAIN SEVERITY QUANTIFIED, NO PAIN PRESENT: ICD-10-PCS | Mod: S$GLB,,, | Performed by: ORTHOPAEDIC SURGERY

## 2021-03-04 PROCEDURE — 99999 PR PBB SHADOW E&M-EST. PATIENT-LVL III: CPT | Mod: PBBFAC,,, | Performed by: ORTHOPAEDIC SURGERY

## 2021-03-04 PROCEDURE — 99999 PR PBB SHADOW E&M-EST. PATIENT-LVL III: ICD-10-PCS | Mod: PBBFAC,,, | Performed by: ORTHOPAEDIC SURGERY

## 2021-03-04 PROCEDURE — 1126F AMNT PAIN NOTED NONE PRSNT: CPT | Mod: S$GLB,,, | Performed by: ORTHOPAEDIC SURGERY

## 2021-03-04 PROCEDURE — 73564 X-RAY EXAM KNEE 4 OR MORE: CPT | Mod: 26,LT,, | Performed by: RADIOLOGY

## 2021-03-04 PROCEDURE — 3008F BODY MASS INDEX DOCD: CPT | Mod: CPTII,S$GLB,, | Performed by: ORTHOPAEDIC SURGERY

## 2021-03-04 PROCEDURE — 73562 X-RAY EXAM OF KNEE 3: CPT | Mod: 26,RT,, | Performed by: RADIOLOGY

## 2021-03-04 PROCEDURE — 99213 OFFICE O/P EST LOW 20 MIN: CPT | Mod: 25,S$GLB,, | Performed by: ORTHOPAEDIC SURGERY

## 2021-03-04 PROCEDURE — 20610 DRAIN/INJ JOINT/BURSA W/O US: CPT | Mod: LT,S$GLB,, | Performed by: ORTHOPAEDIC SURGERY

## 2021-03-04 RX ADMIN — TRIAMCINOLONE ACETONIDE 40 MG: 40 INJECTION, SUSPENSION INTRA-ARTICULAR; INTRAMUSCULAR at 08:03

## 2021-03-05 PROBLEM — M25.562 CHRONIC PAIN OF LEFT KNEE: Status: ACTIVE | Noted: 2021-03-05

## 2021-03-05 PROBLEM — G89.29 CHRONIC PAIN OF LEFT KNEE: Status: ACTIVE | Noted: 2021-03-05

## 2021-03-08 RX ORDER — TRIAMCINOLONE ACETONIDE 40 MG/ML
40 INJECTION, SUSPENSION INTRA-ARTICULAR; INTRAMUSCULAR
Status: DISCONTINUED | OUTPATIENT
Start: 2021-02-23 | End: 2021-03-08 | Stop reason: HOSPADM

## 2021-03-15 ENCOUNTER — CLINICAL SUPPORT (OUTPATIENT)
Dept: REHABILITATION | Facility: HOSPITAL | Age: 56
End: 2021-03-15
Attending: ORTHOPAEDIC SURGERY
Payer: COMMERCIAL

## 2021-03-15 DIAGNOSIS — M25.562 CHRONIC PAIN OF LEFT KNEE: ICD-10-CM

## 2021-03-15 DIAGNOSIS — M25.561 CHRONIC PAIN OF RIGHT KNEE: ICD-10-CM

## 2021-03-15 DIAGNOSIS — R26.2 DIFFICULTY WALKING DOWN STAIRS: ICD-10-CM

## 2021-03-15 DIAGNOSIS — G89.29 CHRONIC PAIN OF LEFT KNEE: ICD-10-CM

## 2021-03-15 DIAGNOSIS — G89.29 CHRONIC PAIN OF RIGHT KNEE: ICD-10-CM

## 2021-03-15 PROCEDURE — 97110 THERAPEUTIC EXERCISES: CPT

## 2021-03-21 RX ORDER — TRIAMCINOLONE ACETONIDE 40 MG/ML
40 INJECTION, SUSPENSION INTRA-ARTICULAR; INTRAMUSCULAR
Status: DISCONTINUED | OUTPATIENT
Start: 2021-03-04 | End: 2021-03-21 | Stop reason: HOSPADM

## 2021-03-25 ENCOUNTER — PROCEDURE VISIT (OUTPATIENT)
Dept: DERMATOLOGY | Facility: CLINIC | Age: 56
End: 2021-03-25

## 2021-03-25 DIAGNOSIS — Z41.1 ELECTIVE PROCEDURE FOR UNACCEPTABLE COSMETIC APPEARANCE: Primary | ICD-10-CM

## 2021-03-25 PROCEDURE — 99499 NO LOS: ICD-10-PCS | Mod: S$GLB,,, | Performed by: DERMATOLOGY

## 2021-03-25 PROCEDURE — 99499 UNLISTED E&M SERVICE: CPT | Mod: S$GLB,,, | Performed by: DERMATOLOGY

## 2021-03-29 ENCOUNTER — CLINICAL SUPPORT (OUTPATIENT)
Dept: REHABILITATION | Facility: HOSPITAL | Age: 56
End: 2021-03-29
Attending: ORTHOPAEDIC SURGERY
Payer: COMMERCIAL

## 2021-03-29 DIAGNOSIS — G89.29 CHRONIC PAIN OF LEFT KNEE: ICD-10-CM

## 2021-03-29 DIAGNOSIS — R26.2 DIFFICULTY WALKING DOWN STAIRS: ICD-10-CM

## 2021-03-29 DIAGNOSIS — M25.561 CHRONIC PAIN OF RIGHT KNEE: ICD-10-CM

## 2021-03-29 DIAGNOSIS — G89.29 CHRONIC PAIN OF RIGHT KNEE: ICD-10-CM

## 2021-03-29 DIAGNOSIS — M25.562 CHRONIC PAIN OF LEFT KNEE: ICD-10-CM

## 2021-03-29 PROCEDURE — 97110 THERAPEUTIC EXERCISES: CPT

## 2021-04-19 ENCOUNTER — CLINICAL SUPPORT (OUTPATIENT)
Dept: REHABILITATION | Facility: HOSPITAL | Age: 56
End: 2021-04-19
Attending: ORTHOPAEDIC SURGERY
Payer: COMMERCIAL

## 2021-04-19 DIAGNOSIS — G89.29 CHRONIC PAIN OF LEFT KNEE: ICD-10-CM

## 2021-04-19 DIAGNOSIS — M25.562 CHRONIC PAIN OF LEFT KNEE: ICD-10-CM

## 2021-04-19 DIAGNOSIS — R26.2 DIFFICULTY WALKING DOWN STAIRS: ICD-10-CM

## 2021-04-19 DIAGNOSIS — G89.29 CHRONIC PAIN OF RIGHT KNEE: ICD-10-CM

## 2021-04-19 DIAGNOSIS — M25.561 CHRONIC PAIN OF RIGHT KNEE: ICD-10-CM

## 2021-04-19 PROCEDURE — 97112 NEUROMUSCULAR REEDUCATION: CPT

## 2021-04-19 PROCEDURE — 97110 THERAPEUTIC EXERCISES: CPT

## 2021-04-28 ENCOUNTER — CLINICAL SUPPORT (OUTPATIENT)
Dept: REHABILITATION | Facility: OTHER | Age: 56
End: 2021-04-28
Attending: PHYSICAL MEDICINE & REHABILITATION
Payer: COMMERCIAL

## 2021-04-28 DIAGNOSIS — M62.81 WEAKNESS OF TRUNK MUSCULATURE: Primary | ICD-10-CM

## 2021-04-28 DIAGNOSIS — M53.86 DECREASED ROM OF LUMBAR SPINE: ICD-10-CM

## 2021-04-28 DIAGNOSIS — R68.89 DECREASED FUNCTIONAL ACTIVITY TOLERANCE: ICD-10-CM

## 2021-04-28 DIAGNOSIS — R29.898 WEAKNESS OF BOTH LOWER EXTREMITIES: ICD-10-CM

## 2021-04-28 PROCEDURE — 97750 PHYSICAL PERFORMANCE TEST: CPT | Mod: 32

## 2021-04-29 ENCOUNTER — OFFICE VISIT (OUTPATIENT)
Dept: PSYCHIATRY | Facility: CLINIC | Age: 56
End: 2021-04-29
Payer: COMMERCIAL

## 2021-04-29 DIAGNOSIS — F41.9 ANXIETY: ICD-10-CM

## 2021-04-29 DIAGNOSIS — F33.41 RECURRENT MAJOR DEPRESSION IN PARTIAL REMISSION: Primary | ICD-10-CM

## 2021-04-29 PROCEDURE — 99213 OFFICE O/P EST LOW 20 MIN: CPT | Mod: 95,,, | Performed by: NURSE PRACTITIONER

## 2021-04-29 PROCEDURE — 99213 PR OFFICE/OUTPT VISIT, EST, LEVL III, 20-29 MIN: ICD-10-PCS | Mod: 95,,, | Performed by: NURSE PRACTITIONER

## 2021-04-29 RX ORDER — FLUOXETINE HYDROCHLORIDE 20 MG/1
CAPSULE ORAL
Qty: 90 CAPSULE | Refills: 1 | Status: SHIPPED | OUTPATIENT
Start: 2021-04-29 | End: 2021-10-26 | Stop reason: SDUPTHER

## 2021-04-29 RX ORDER — MELOXICAM 15 MG/1
15 TABLET ORAL DAILY PRN
COMMUNITY
Start: 2021-04-27 | End: 2021-06-01 | Stop reason: SDUPTHER

## 2021-04-29 RX ORDER — TRAZODONE HYDROCHLORIDE 50 MG/1
100 TABLET ORAL NIGHTLY
Qty: 180 TABLET | Refills: 0 | Status: SHIPPED | OUTPATIENT
Start: 2021-04-29 | End: 2021-10-26 | Stop reason: SDUPTHER

## 2021-04-29 RX ORDER — LORAZEPAM 0.5 MG/1
0.5 TABLET ORAL 2 TIMES DAILY PRN
Qty: 60 TABLET | Refills: 0 | Status: SHIPPED | OUTPATIENT
Start: 2021-04-29 | End: 2021-10-26 | Stop reason: SDUPTHER

## 2021-04-29 RX ORDER — FLUOXETINE HYDROCHLORIDE 40 MG/1
CAPSULE ORAL
Qty: 90 CAPSULE | Refills: 1 | Status: SHIPPED | OUTPATIENT
Start: 2021-04-29 | End: 2021-10-26 | Stop reason: SDUPTHER

## 2021-05-03 ENCOUNTER — CLINICAL SUPPORT (OUTPATIENT)
Dept: REHABILITATION | Facility: HOSPITAL | Age: 56
End: 2021-05-03
Payer: COMMERCIAL

## 2021-05-03 DIAGNOSIS — M25.562 CHRONIC PAIN OF LEFT KNEE: ICD-10-CM

## 2021-05-03 DIAGNOSIS — G89.29 CHRONIC PAIN OF LEFT KNEE: ICD-10-CM

## 2021-05-03 DIAGNOSIS — M25.561 RIGHT KNEE PAIN, UNSPECIFIED CHRONICITY: ICD-10-CM

## 2021-05-03 DIAGNOSIS — R26.2 DIFFICULTY WALKING DOWN STAIRS: ICD-10-CM

## 2021-05-03 PROCEDURE — 97110 THERAPEUTIC EXERCISES: CPT

## 2021-05-04 ENCOUNTER — OFFICE VISIT (OUTPATIENT)
Dept: INTERNAL MEDICINE | Facility: CLINIC | Age: 56
End: 2021-05-04
Payer: COMMERCIAL

## 2021-05-04 ENCOUNTER — LAB VISIT (OUTPATIENT)
Dept: LAB | Facility: HOSPITAL | Age: 56
End: 2021-05-04
Attending: INTERNAL MEDICINE
Payer: COMMERCIAL

## 2021-05-04 VITALS
OXYGEN SATURATION: 98 % | RESPIRATION RATE: 15 BRPM | HEART RATE: 74 BPM | WEIGHT: 166.44 LBS | HEIGHT: 67 IN | TEMPERATURE: 98 F | DIASTOLIC BLOOD PRESSURE: 70 MMHG | SYSTOLIC BLOOD PRESSURE: 100 MMHG | BODY MASS INDEX: 26.12 KG/M2

## 2021-05-04 DIAGNOSIS — Z12.11 COLON CANCER SCREENING: ICD-10-CM

## 2021-05-04 DIAGNOSIS — Z00.00 ANNUAL PHYSICAL EXAM: ICD-10-CM

## 2021-05-04 DIAGNOSIS — Z01.419 WELL WOMAN EXAM WITH ROUTINE GYNECOLOGICAL EXAM: ICD-10-CM

## 2021-05-04 DIAGNOSIS — J30.9 CHRONIC ALLERGIC RHINITIS: ICD-10-CM

## 2021-05-04 DIAGNOSIS — Z00.00 ANNUAL PHYSICAL EXAM: Primary | ICD-10-CM

## 2021-05-04 LAB
ALBUMIN SERPL BCP-MCNC: 3.8 G/DL (ref 3.5–5.2)
ALP SERPL-CCNC: 71 U/L (ref 55–135)
ALT SERPL W/O P-5'-P-CCNC: 15 U/L (ref 10–44)
ANION GAP SERPL CALC-SCNC: 8 MMOL/L (ref 8–16)
AST SERPL-CCNC: 15 U/L (ref 10–40)
BASOPHILS # BLD AUTO: 0.03 K/UL (ref 0–0.2)
BASOPHILS NFR BLD: 0.7 % (ref 0–1.9)
BILIRUB SERPL-MCNC: 0.7 MG/DL (ref 0.1–1)
BUN SERPL-MCNC: 15 MG/DL (ref 6–20)
CALCIUM SERPL-MCNC: 9.4 MG/DL (ref 8.7–10.5)
CHLORIDE SERPL-SCNC: 106 MMOL/L (ref 95–110)
CHOLEST SERPL-MCNC: 194 MG/DL (ref 120–199)
CHOLEST/HDLC SERPL: 2.6 {RATIO} (ref 2–5)
CO2 SERPL-SCNC: 27 MMOL/L (ref 23–29)
CREAT SERPL-MCNC: 0.9 MG/DL (ref 0.5–1.4)
DIFFERENTIAL METHOD: ABNORMAL
EOSINOPHIL # BLD AUTO: 0.1 K/UL (ref 0–0.5)
EOSINOPHIL NFR BLD: 2.8 % (ref 0–8)
ERYTHROCYTE [DISTWIDTH] IN BLOOD BY AUTOMATED COUNT: 14.6 % (ref 11.5–14.5)
EST. GFR  (AFRICAN AMERICAN): >60 ML/MIN/1.73 M^2
EST. GFR  (NON AFRICAN AMERICAN): >60 ML/MIN/1.73 M^2
GLUCOSE SERPL-MCNC: 87 MG/DL (ref 70–110)
HCT VFR BLD AUTO: 40.1 % (ref 37–48.5)
HDLC SERPL-MCNC: 74 MG/DL (ref 40–75)
HDLC SERPL: 38.1 % (ref 20–50)
HGB BLD-MCNC: 12.8 G/DL (ref 12–16)
IMM GRANULOCYTES # BLD AUTO: 0.01 K/UL (ref 0–0.04)
IMM GRANULOCYTES NFR BLD AUTO: 0.2 % (ref 0–0.5)
LDLC SERPL CALC-MCNC: 104.8 MG/DL (ref 63–159)
LYMPHOCYTES # BLD AUTO: 1.4 K/UL (ref 1–4.8)
LYMPHOCYTES NFR BLD: 31.8 % (ref 18–48)
MCH RBC QN AUTO: 29.3 PG (ref 27–31)
MCHC RBC AUTO-ENTMCNC: 31.9 G/DL (ref 32–36)
MCV RBC AUTO: 92 FL (ref 82–98)
MONOCYTES # BLD AUTO: 0.4 K/UL (ref 0.3–1)
MONOCYTES NFR BLD: 9.4 % (ref 4–15)
NEUTROPHILS # BLD AUTO: 2.4 K/UL (ref 1.8–7.7)
NEUTROPHILS NFR BLD: 55.1 % (ref 38–73)
NONHDLC SERPL-MCNC: 120 MG/DL
NRBC BLD-RTO: 0 /100 WBC
PLATELET # BLD AUTO: 265 K/UL (ref 150–450)
PMV BLD AUTO: 10.9 FL (ref 9.2–12.9)
POTASSIUM SERPL-SCNC: 4.2 MMOL/L (ref 3.5–5.1)
PROT SERPL-MCNC: 6.7 G/DL (ref 6–8.4)
RBC # BLD AUTO: 4.37 M/UL (ref 4–5.4)
SODIUM SERPL-SCNC: 141 MMOL/L (ref 136–145)
TRIGL SERPL-MCNC: 76 MG/DL (ref 30–150)
TSH SERPL DL<=0.005 MIU/L-ACNC: 0.98 UIU/ML (ref 0.4–4)
WBC # BLD AUTO: 4.34 K/UL (ref 3.9–12.7)

## 2021-05-04 PROCEDURE — 3008F PR BODY MASS INDEX (BMI) DOCUMENTED: ICD-10-PCS | Mod: CPTII,S$GLB,, | Performed by: INTERNAL MEDICINE

## 2021-05-04 PROCEDURE — 1125F PR PAIN SEVERITY QUANTIFIED, PAIN PRESENT: ICD-10-PCS | Mod: S$GLB,,, | Performed by: INTERNAL MEDICINE

## 2021-05-04 PROCEDURE — 96372 THER/PROPH/DIAG INJ SC/IM: CPT | Mod: S$GLB,,, | Performed by: INTERNAL MEDICINE

## 2021-05-04 PROCEDURE — 3008F BODY MASS INDEX DOCD: CPT | Mod: CPTII,S$GLB,, | Performed by: INTERNAL MEDICINE

## 2021-05-04 PROCEDURE — 1125F AMNT PAIN NOTED PAIN PRSNT: CPT | Mod: S$GLB,,, | Performed by: INTERNAL MEDICINE

## 2021-05-04 PROCEDURE — 99999 PR PBB SHADOW E&M-EST. PATIENT-LVL IV: ICD-10-PCS | Mod: PBBFAC,,, | Performed by: INTERNAL MEDICINE

## 2021-05-04 PROCEDURE — 36415 COLL VENOUS BLD VENIPUNCTURE: CPT | Mod: PO | Performed by: INTERNAL MEDICINE

## 2021-05-04 PROCEDURE — 99999 PR PBB SHADOW E&M-EST. PATIENT-LVL IV: CPT | Mod: PBBFAC,,, | Performed by: INTERNAL MEDICINE

## 2021-05-04 PROCEDURE — 80061 LIPID PANEL: CPT | Performed by: INTERNAL MEDICINE

## 2021-05-04 PROCEDURE — 96372 PR INJECTION,THERAP/PROPH/DIAG2ST, IM OR SUBCUT: ICD-10-PCS | Mod: S$GLB,,, | Performed by: INTERNAL MEDICINE

## 2021-05-04 PROCEDURE — 99396 PR PREVENTIVE VISIT,EST,40-64: ICD-10-PCS | Mod: 25,S$GLB,, | Performed by: INTERNAL MEDICINE

## 2021-05-04 PROCEDURE — 99396 PREV VISIT EST AGE 40-64: CPT | Mod: 25,S$GLB,, | Performed by: INTERNAL MEDICINE

## 2021-05-04 PROCEDURE — 85025 COMPLETE CBC W/AUTO DIFF WBC: CPT | Performed by: INTERNAL MEDICINE

## 2021-05-04 PROCEDURE — 80053 COMPREHEN METABOLIC PANEL: CPT | Performed by: INTERNAL MEDICINE

## 2021-05-04 PROCEDURE — 84443 ASSAY THYROID STIM HORMONE: CPT | Performed by: INTERNAL MEDICINE

## 2021-05-04 PROCEDURE — 88175 CYTOPATH C/V AUTO FLUID REDO: CPT | Performed by: INTERNAL MEDICINE

## 2021-05-04 RX ORDER — TRIAMCINOLONE ACETONIDE 40 MG/ML
40 INJECTION, SUSPENSION INTRA-ARTICULAR; INTRAMUSCULAR ONCE
Status: COMPLETED | OUTPATIENT
Start: 2021-05-04 | End: 2021-05-04

## 2021-05-04 RX ADMIN — TRIAMCINOLONE ACETONIDE 40 MG: 40 INJECTION, SUSPENSION INTRA-ARTICULAR; INTRAMUSCULAR at 10:05

## 2021-05-05 ENCOUNTER — TELEPHONE (OUTPATIENT)
Dept: INTERNAL MEDICINE | Facility: CLINIC | Age: 56
End: 2021-05-05

## 2021-05-13 ENCOUNTER — TELEPHONE (OUTPATIENT)
Dept: INTERNAL MEDICINE | Facility: CLINIC | Age: 56
End: 2021-05-13

## 2021-05-13 LAB
FINAL PATHOLOGIC DIAGNOSIS: NORMAL
Lab: NORMAL

## 2021-05-19 ENCOUNTER — CLINICAL SUPPORT (OUTPATIENT)
Dept: REHABILITATION | Facility: OTHER | Age: 56
End: 2021-05-19
Attending: PHYSICAL MEDICINE & REHABILITATION
Payer: COMMERCIAL

## 2021-05-19 DIAGNOSIS — M62.81 WEAKNESS OF TRUNK MUSCULATURE: Primary | ICD-10-CM

## 2021-05-19 DIAGNOSIS — M53.86 DECREASED ROM OF LUMBAR SPINE: ICD-10-CM

## 2021-05-19 DIAGNOSIS — R29.898 WEAKNESS OF BOTH LOWER EXTREMITIES: ICD-10-CM

## 2021-05-19 PROCEDURE — 97750 PHYSICAL PERFORMANCE TEST: CPT | Mod: 32

## 2021-06-01 ENCOUNTER — DOCUMENTATION ONLY (OUTPATIENT)
Dept: REHABILITATION | Facility: OTHER | Age: 56
End: 2021-06-01

## 2021-06-01 RX ORDER — MELOXICAM 15 MG/1
15 TABLET ORAL DAILY PRN
Qty: 30 TABLET | Refills: 1 | Status: SHIPPED | OUTPATIENT
Start: 2021-06-01 | End: 2022-06-23

## 2021-06-08 ENCOUNTER — CLINICAL SUPPORT (OUTPATIENT)
Dept: OTHER | Facility: CLINIC | Age: 56
End: 2021-06-08
Payer: COMMERCIAL

## 2021-06-08 DIAGNOSIS — Z00.8 ENCOUNTER FOR OTHER GENERAL EXAMINATION: ICD-10-CM

## 2021-07-06 VITALS — BODY MASS INDEX: 26.07 KG/M2 | HEIGHT: 67 IN

## 2021-07-06 LAB
HDLC SERPL-MCNC: 67 MG/DL
POC CHOLESTEROL, LDL (DOCK): 101 MG/DL
POC CHOLESTEROL, TOTAL: 186 MG/DL
POC GLUCOSE, FASTING: 86 MG/DL (ref 60–110)
TRIGL SERPL-MCNC: 88 MG/DL

## 2021-07-19 ENCOUNTER — OFFICE VISIT (OUTPATIENT)
Dept: URGENT CARE | Facility: CLINIC | Age: 56
End: 2021-07-19
Payer: COMMERCIAL

## 2021-07-19 VITALS
TEMPERATURE: 98 F | WEIGHT: 166 LBS | HEART RATE: 71 BPM | HEIGHT: 67 IN | DIASTOLIC BLOOD PRESSURE: 74 MMHG | RESPIRATION RATE: 20 BRPM | SYSTOLIC BLOOD PRESSURE: 123 MMHG | OXYGEN SATURATION: 97 % | BODY MASS INDEX: 26.06 KG/M2

## 2021-07-19 DIAGNOSIS — J06.9 UPPER RESPIRATORY TRACT INFECTION, UNSPECIFIED TYPE: Primary | ICD-10-CM

## 2021-07-19 DIAGNOSIS — Z20.822 EXPOSURE TO COVID-19 VIRUS: ICD-10-CM

## 2021-07-19 LAB
CTP QC/QA: YES
SARS-COV-2 RDRP RESP QL NAA+PROBE: NEGATIVE

## 2021-07-19 PROCEDURE — 3008F PR BODY MASS INDEX (BMI) DOCUMENTED: ICD-10-PCS | Mod: CPTII,S$GLB,, | Performed by: FAMILY MEDICINE

## 2021-07-19 PROCEDURE — 99214 PR OFFICE/OUTPT VISIT, EST, LEVL IV, 30-39 MIN: ICD-10-PCS | Mod: S$GLB,,, | Performed by: FAMILY MEDICINE

## 2021-07-19 PROCEDURE — 99214 OFFICE O/P EST MOD 30 MIN: CPT | Mod: S$GLB,,, | Performed by: FAMILY MEDICINE

## 2021-07-19 PROCEDURE — 3008F BODY MASS INDEX DOCD: CPT | Mod: CPTII,S$GLB,, | Performed by: FAMILY MEDICINE

## 2021-07-19 RX ORDER — FLUTICASONE PROPIONATE 50 MCG
1 SPRAY, SUSPENSION (ML) NASAL DAILY
Qty: 9.9 ML | Refills: 0 | Status: SHIPPED | OUTPATIENT
Start: 2021-07-19

## 2021-08-11 ENCOUNTER — PATIENT MESSAGE (OUTPATIENT)
Dept: DERMATOLOGY | Facility: CLINIC | Age: 56
End: 2021-08-11

## 2021-09-08 ENCOUNTER — PATIENT MESSAGE (OUTPATIENT)
Dept: DERMATOLOGY | Facility: CLINIC | Age: 56
End: 2021-09-08

## 2021-09-08 ENCOUNTER — PATIENT MESSAGE (OUTPATIENT)
Dept: SPORTS MEDICINE | Facility: CLINIC | Age: 56
End: 2021-09-08

## 2021-09-14 ENCOUNTER — PATIENT MESSAGE (OUTPATIENT)
Dept: INTERNAL MEDICINE | Facility: CLINIC | Age: 56
End: 2021-09-14

## 2021-09-14 DIAGNOSIS — G89.29 CHRONIC HIP PAIN, UNSPECIFIED LATERALITY: Primary | ICD-10-CM

## 2021-09-14 DIAGNOSIS — M25.559 CHRONIC HIP PAIN, UNSPECIFIED LATERALITY: Primary | ICD-10-CM

## 2021-09-20 DIAGNOSIS — M25.559 HIP PAIN: Primary | ICD-10-CM

## 2021-09-21 ENCOUNTER — APPOINTMENT (OUTPATIENT)
Dept: RADIOLOGY | Facility: HOSPITAL | Age: 56
End: 2021-09-21
Attending: ORTHOPAEDIC SURGERY
Payer: COMMERCIAL

## 2021-09-21 ENCOUNTER — OFFICE VISIT (OUTPATIENT)
Dept: ORTHOPEDICS | Facility: CLINIC | Age: 56
End: 2021-09-21
Payer: COMMERCIAL

## 2021-09-21 VITALS
WEIGHT: 164 LBS | HEART RATE: 73 BPM | RESPIRATION RATE: 18 BRPM | BODY MASS INDEX: 25.74 KG/M2 | HEIGHT: 67 IN | TEMPERATURE: 99 F | OXYGEN SATURATION: 97 %

## 2021-09-21 DIAGNOSIS — M25.559 PAIN IN UNSPECIFIED HIP: ICD-10-CM

## 2021-09-21 DIAGNOSIS — G89.29 CHRONIC HIP PAIN, UNSPECIFIED LATERALITY: ICD-10-CM

## 2021-09-21 DIAGNOSIS — M25.559 HIP PAIN: ICD-10-CM

## 2021-09-21 DIAGNOSIS — M25.559 CHRONIC HIP PAIN, UNSPECIFIED LATERALITY: ICD-10-CM

## 2021-09-21 PROCEDURE — 73502 X-RAY EXAM HIP UNI 2-3 VIEWS: CPT | Mod: 26,RT,, | Performed by: RADIOLOGY

## 2021-09-21 PROCEDURE — 73502 XR HIP WITH PELVIS WHEN PERFORMED, 2 OR 3  VIEWS RIGHT: ICD-10-PCS | Mod: 26,RT,, | Performed by: RADIOLOGY

## 2021-09-21 PROCEDURE — 99214 PR OFFICE/OUTPT VISIT, EST, LEVL IV, 30-39 MIN: ICD-10-PCS | Mod: S$GLB,,, | Performed by: ORTHOPAEDIC SURGERY

## 2021-09-21 PROCEDURE — 3008F BODY MASS INDEX DOCD: CPT | Mod: CPTII,S$GLB,, | Performed by: ORTHOPAEDIC SURGERY

## 2021-09-21 PROCEDURE — 1160F PR REVIEW ALL MEDS BY PRESCRIBER/CLIN PHARMACIST DOCUMENTED: ICD-10-PCS | Mod: CPTII,S$GLB,, | Performed by: ORTHOPAEDIC SURGERY

## 2021-09-21 PROCEDURE — 99999 PR PBB SHADOW E&M-EST. PATIENT-LVL V: ICD-10-PCS | Mod: PBBFAC,,, | Performed by: ORTHOPAEDIC SURGERY

## 2021-09-21 PROCEDURE — 1159F MED LIST DOCD IN RCRD: CPT | Mod: CPTII,S$GLB,, | Performed by: ORTHOPAEDIC SURGERY

## 2021-09-21 PROCEDURE — 3008F PR BODY MASS INDEX (BMI) DOCUMENTED: ICD-10-PCS | Mod: CPTII,S$GLB,, | Performed by: ORTHOPAEDIC SURGERY

## 2021-09-21 PROCEDURE — 73502 X-RAY EXAM HIP UNI 2-3 VIEWS: CPT | Mod: TC,FY,PN,RT

## 2021-09-21 PROCEDURE — 1160F RVW MEDS BY RX/DR IN RCRD: CPT | Mod: CPTII,S$GLB,, | Performed by: ORTHOPAEDIC SURGERY

## 2021-09-21 PROCEDURE — 99999 PR PBB SHADOW E&M-EST. PATIENT-LVL V: CPT | Mod: PBBFAC,,, | Performed by: ORTHOPAEDIC SURGERY

## 2021-09-21 PROCEDURE — 99214 OFFICE O/P EST MOD 30 MIN: CPT | Mod: S$GLB,,, | Performed by: ORTHOPAEDIC SURGERY

## 2021-09-21 PROCEDURE — 1159F PR MEDICATION LIST DOCUMENTED IN MEDICAL RECORD: ICD-10-PCS | Mod: CPTII,S$GLB,, | Performed by: ORTHOPAEDIC SURGERY

## 2021-09-21 RX ORDER — DICLOFENAC SODIUM 75 MG/1
75 TABLET, DELAYED RELEASE ORAL 2 TIMES DAILY
Qty: 60 TABLET | Refills: 0 | Status: SHIPPED | OUTPATIENT
Start: 2021-09-21 | End: 2021-10-22

## 2021-09-27 ENCOUNTER — PATIENT MESSAGE (OUTPATIENT)
Dept: INTERNAL MEDICINE | Facility: CLINIC | Age: 56
End: 2021-09-27

## 2021-09-27 DIAGNOSIS — Z12.31 VISIT FOR SCREENING MAMMOGRAM: Primary | ICD-10-CM

## 2021-09-29 ENCOUNTER — PATIENT OUTREACH (OUTPATIENT)
Dept: ADMINISTRATIVE | Facility: OTHER | Age: 56
End: 2021-09-29

## 2021-09-29 ENCOUNTER — HOSPITAL ENCOUNTER (OUTPATIENT)
Dept: RADIOLOGY | Facility: HOSPITAL | Age: 56
Discharge: HOME | End: 2021-09-29
Attending: ORTHOPAEDIC SURGERY
Payer: COMMERCIAL

## 2021-09-29 DIAGNOSIS — M25.559 PAIN IN UNSPECIFIED HIP: ICD-10-CM

## 2021-09-29 PROCEDURE — 73721 MRI JNT OF LWR EXTRE W/O DYE: CPT | Mod: TC,RT

## 2021-09-29 PROCEDURE — 73721 MRI HIP WITHOUT CONTRAST RIGHT: ICD-10-PCS | Mod: 26,RT,, | Performed by: RADIOLOGY

## 2021-09-29 PROCEDURE — 73721 MRI JNT OF LWR EXTRE W/O DYE: CPT | Mod: 26,RT,, | Performed by: RADIOLOGY

## 2021-09-30 ENCOUNTER — PROCEDURE VISIT (OUTPATIENT)
Dept: DERMATOLOGY | Facility: CLINIC | Age: 56
End: 2021-09-30

## 2021-09-30 DIAGNOSIS — Z41.1 ELECTIVE PROCEDURE FOR UNACCEPTABLE COSMETIC APPEARANCE: Primary | ICD-10-CM

## 2021-09-30 PROCEDURE — 99499 NO LOS: ICD-10-PCS | Mod: S$GLB,,, | Performed by: DERMATOLOGY

## 2021-09-30 PROCEDURE — 99499 UNLISTED E&M SERVICE: CPT | Mod: S$GLB,,, | Performed by: DERMATOLOGY

## 2021-10-04 ENCOUNTER — PATIENT MESSAGE (OUTPATIENT)
Dept: ORTHOPEDICS | Facility: CLINIC | Age: 56
End: 2021-10-04

## 2021-10-05 ENCOUNTER — PATIENT MESSAGE (OUTPATIENT)
Dept: ORTHOPEDICS | Facility: CLINIC | Age: 56
End: 2021-10-05

## 2021-10-05 DIAGNOSIS — G89.29 CHRONIC HIP PAIN, UNSPECIFIED LATERALITY: Primary | ICD-10-CM

## 2021-10-05 DIAGNOSIS — M25.559 CHRONIC HIP PAIN, UNSPECIFIED LATERALITY: Primary | ICD-10-CM

## 2021-10-06 ENCOUNTER — PATIENT MESSAGE (OUTPATIENT)
Dept: ORTHOPEDICS | Facility: CLINIC | Age: 56
End: 2021-10-06

## 2021-10-06 ENCOUNTER — TELEPHONE (OUTPATIENT)
Dept: PAIN MEDICINE | Facility: CLINIC | Age: 56
End: 2021-10-06

## 2021-10-06 ENCOUNTER — HOSPITAL ENCOUNTER (OUTPATIENT)
Dept: RADIOLOGY | Facility: HOSPITAL | Age: 56
Discharge: HOME | End: 2021-10-06
Attending: INTERNAL MEDICINE
Payer: COMMERCIAL

## 2021-10-06 ENCOUNTER — PATIENT MESSAGE (OUTPATIENT)
Dept: PAIN MEDICINE | Facility: CLINIC | Age: 56
End: 2021-10-06

## 2021-10-06 ENCOUNTER — PATIENT MESSAGE (OUTPATIENT)
Dept: DERMATOLOGY | Facility: CLINIC | Age: 56
End: 2021-10-06

## 2021-10-06 VITALS — WEIGHT: 158 LBS | BODY MASS INDEX: 24.8 KG/M2 | HEIGHT: 67 IN

## 2021-10-06 DIAGNOSIS — M25.551 RIGHT HIP PAIN: Primary | ICD-10-CM

## 2021-10-06 DIAGNOSIS — Z12.31 VISIT FOR SCREENING MAMMOGRAM: ICD-10-CM

## 2021-10-06 PROCEDURE — 77067 SCR MAMMO BI INCL CAD: CPT | Mod: 26,,, | Performed by: RADIOLOGY

## 2021-10-06 PROCEDURE — 77063 BREAST TOMOSYNTHESIS BI: CPT | Mod: 26,,, | Performed by: RADIOLOGY

## 2021-10-06 PROCEDURE — 77067 SCR MAMMO BI INCL CAD: CPT | Mod: TC

## 2021-10-06 PROCEDURE — 77067 MAMMO DIGITAL SCREENING BILAT WITH TOMO: ICD-10-PCS | Mod: 26,,, | Performed by: RADIOLOGY

## 2021-10-06 PROCEDURE — 77063 MAMMO DIGITAL SCREENING BILAT WITH TOMO: ICD-10-PCS | Mod: 26,,, | Performed by: RADIOLOGY

## 2021-10-07 ENCOUNTER — PATIENT MESSAGE (OUTPATIENT)
Dept: DERMATOLOGY | Facility: CLINIC | Age: 56
End: 2021-10-07

## 2021-10-11 ENCOUNTER — TELEPHONE (OUTPATIENT)
Dept: RADIOLOGY | Facility: HOSPITAL | Age: 56
End: 2021-10-11

## 2021-10-11 ENCOUNTER — HOSPITAL ENCOUNTER (OUTPATIENT)
Facility: HOSPITAL | Age: 56
Discharge: HOME | End: 2021-10-11
Attending: PAIN MEDICINE | Admitting: PAIN MEDICINE
Payer: COMMERCIAL

## 2021-10-11 VITALS
SYSTOLIC BLOOD PRESSURE: 109 MMHG | RESPIRATION RATE: 18 BRPM | DIASTOLIC BLOOD PRESSURE: 69 MMHG | OXYGEN SATURATION: 97 % | TEMPERATURE: 98 F | WEIGHT: 160 LBS | HEIGHT: 67 IN | HEART RATE: 64 BPM | BODY MASS INDEX: 25.11 KG/M2

## 2021-10-11 DIAGNOSIS — M25.551 RIGHT HIP PAIN: Primary | ICD-10-CM

## 2021-10-11 PROCEDURE — 25500020 PHARM REV CODE 255: Performed by: PAIN MEDICINE

## 2021-10-11 PROCEDURE — 20610 DRAIN/INJ JOINT/BURSA W/O US: CPT | Mod: RT,,, | Performed by: PAIN MEDICINE

## 2021-10-11 PROCEDURE — 77002 NEEDLE LOCALIZATION BY XRAY: CPT | Mod: 26,,, | Performed by: PAIN MEDICINE

## 2021-10-11 PROCEDURE — 63600175 PHARM REV CODE 636 W HCPCS: Performed by: PAIN MEDICINE

## 2021-10-11 PROCEDURE — 25000003 PHARM REV CODE 250: Performed by: PAIN MEDICINE

## 2021-10-11 PROCEDURE — 20610 DRAIN/INJ JOINT/BURSA W/O US: CPT | Performed by: PAIN MEDICINE

## 2021-10-11 PROCEDURE — 25000003 PHARM REV CODE 250

## 2021-10-11 PROCEDURE — 77002 PR FLUOROSCOPIC GUIDANCE NEEDLE PLACEMENT: ICD-10-PCS | Mod: 26,,, | Performed by: PAIN MEDICINE

## 2021-10-11 PROCEDURE — 77002 NEEDLE LOCALIZATION BY XRAY: CPT | Performed by: PAIN MEDICINE

## 2021-10-11 PROCEDURE — 20610 PR DRAIN/INJECT LARGE JOINT/BURSA: ICD-10-PCS | Mod: RT,,, | Performed by: PAIN MEDICINE

## 2021-10-11 RX ORDER — TRIAMCINOLONE ACETONIDE 40 MG/ML
40 INJECTION, SUSPENSION INTRA-ARTICULAR; INTRAMUSCULAR ONCE
Status: COMPLETED | OUTPATIENT
Start: 2021-10-11 | End: 2021-10-11

## 2021-10-11 RX ORDER — LIDOCAINE HYDROCHLORIDE 20 MG/ML
INJECTION, SOLUTION INFILTRATION; PERINEURAL
Status: DISCONTINUED
Start: 2021-10-11 | End: 2021-10-11 | Stop reason: HOSPADM

## 2021-10-11 RX ORDER — TRIAMCINOLONE ACETONIDE 40 MG/ML
INJECTION, SUSPENSION INTRA-ARTICULAR; INTRAMUSCULAR
Status: DISCONTINUED
Start: 2021-10-11 | End: 2021-10-11 | Stop reason: HOSPADM

## 2021-10-11 RX ORDER — ALPRAZOLAM 0.5 MG/1
1 TABLET, ORALLY DISINTEGRATING ORAL ONCE AS NEEDED
Status: COMPLETED | OUTPATIENT
Start: 2021-10-11 | End: 2021-10-11

## 2021-10-11 RX ORDER — ALPRAZOLAM 0.5 MG/1
TABLET, ORALLY DISINTEGRATING ORAL
Status: COMPLETED
Start: 2021-10-11 | End: 2021-10-11

## 2021-10-11 RX ORDER — BUPIVACAINE HYDROCHLORIDE 2.5 MG/ML
INJECTION, SOLUTION EPIDURAL; INFILTRATION; INTRACAUDAL
Status: DISCONTINUED
Start: 2021-10-11 | End: 2021-10-11 | Stop reason: HOSPADM

## 2021-10-11 RX ORDER — LIDOCAINE HYDROCHLORIDE 20 MG/ML
10 INJECTION, SOLUTION INFILTRATION; PERINEURAL ONCE
Status: COMPLETED | OUTPATIENT
Start: 2021-10-11 | End: 2021-10-11

## 2021-10-11 RX ORDER — BUPIVACAINE HYDROCHLORIDE 2.5 MG/ML
10 INJECTION, SOLUTION EPIDURAL; INFILTRATION; INTRACAUDAL ONCE
Status: COMPLETED | OUTPATIENT
Start: 2021-10-11 | End: 2021-10-11

## 2021-10-11 RX ADMIN — ALPRAZOLAM 1 MG: 0.5 TABLET, ORALLY DISINTEGRATING ORAL at 12:10

## 2021-10-13 ENCOUNTER — OFFICE VISIT (OUTPATIENT)
Dept: DERMATOLOGY | Facility: CLINIC | Age: 56
End: 2021-10-13
Payer: COMMERCIAL

## 2021-10-13 DIAGNOSIS — Z41.1 ELECTIVE PROCEDURE FOR UNACCEPTABLE COSMETIC APPEARANCE: Primary | ICD-10-CM

## 2021-10-13 DIAGNOSIS — F33.41 RECURRENT MAJOR DEPRESSION IN PARTIAL REMISSION: ICD-10-CM

## 2021-10-13 PROCEDURE — 99999 PR PBB SHADOW E&M-EST. PATIENT-LVL I: CPT | Mod: PBBFAC,,, | Performed by: DERMATOLOGY

## 2021-10-13 PROCEDURE — 1159F PR MEDICATION LIST DOCUMENTED IN MEDICAL RECORD: ICD-10-PCS | Mod: CPTII,S$GLB,, | Performed by: DERMATOLOGY

## 2021-10-13 PROCEDURE — 99999 PR PBB SHADOW E&M-EST. PATIENT-LVL I: ICD-10-PCS | Mod: PBBFAC,,, | Performed by: DERMATOLOGY

## 2021-10-13 PROCEDURE — 99024 POSTOP FOLLOW-UP VISIT: CPT | Mod: S$GLB,,, | Performed by: DERMATOLOGY

## 2021-10-13 PROCEDURE — 1159F MED LIST DOCD IN RCRD: CPT | Mod: CPTII,S$GLB,, | Performed by: DERMATOLOGY

## 2021-10-13 PROCEDURE — 1160F RVW MEDS BY RX/DR IN RCRD: CPT | Mod: CPTII,S$GLB,, | Performed by: DERMATOLOGY

## 2021-10-13 PROCEDURE — 99024 PR POST-OP FOLLOW-UP VISIT: ICD-10-PCS | Mod: S$GLB,,, | Performed by: DERMATOLOGY

## 2021-10-13 PROCEDURE — 1160F PR REVIEW ALL MEDS BY PRESCRIBER/CLIN PHARMACIST DOCUMENTED: ICD-10-PCS | Mod: CPTII,S$GLB,, | Performed by: DERMATOLOGY

## 2021-10-13 RX ORDER — TRAZODONE HYDROCHLORIDE 50 MG/1
100 TABLET ORAL NIGHTLY
Qty: 180 TABLET | Refills: 0 | OUTPATIENT
Start: 2021-10-13

## 2021-10-15 ENCOUNTER — HOSPITAL ENCOUNTER (OUTPATIENT)
Dept: RADIOLOGY | Facility: HOSPITAL | Age: 56
Discharge: HOME | End: 2021-10-15
Attending: INTERNAL MEDICINE
Payer: COMMERCIAL

## 2021-10-15 DIAGNOSIS — R92.8 ABNORMAL FINDING ON BREAST IMAGING: ICD-10-CM

## 2021-10-15 PROCEDURE — 77061 BREAST TOMOSYNTHESIS UNI: CPT | Mod: TC,LT

## 2021-10-15 PROCEDURE — G0279 TOMOSYNTHESIS, MAMMO: HCPCS | Mod: 26,LT,, | Performed by: RADIOLOGY

## 2021-10-15 PROCEDURE — 77065 DX MAMMO INCL CAD UNI: CPT | Mod: 26,LT,, | Performed by: RADIOLOGY

## 2021-10-15 PROCEDURE — G0279 MAMMO DIGITAL DIAGNOSTIC LEFT WITH TOMO: ICD-10-PCS | Mod: 26,LT,, | Performed by: RADIOLOGY

## 2021-10-15 PROCEDURE — 77065 MAMMO DIGITAL DIAGNOSTIC LEFT WITH TOMO: ICD-10-PCS | Mod: 26,LT,, | Performed by: RADIOLOGY

## 2021-10-21 ENCOUNTER — PATIENT MESSAGE (OUTPATIENT)
Dept: PSYCHIATRY | Facility: CLINIC | Age: 56
End: 2021-10-21
Payer: COMMERCIAL

## 2021-10-26 ENCOUNTER — OFFICE VISIT (OUTPATIENT)
Dept: PSYCHIATRY | Facility: CLINIC | Age: 56
End: 2021-10-26
Payer: COMMERCIAL

## 2021-10-26 DIAGNOSIS — F41.9 ANXIETY: ICD-10-CM

## 2021-10-26 DIAGNOSIS — F33.41 RECURRENT MAJOR DEPRESSION IN PARTIAL REMISSION: Primary | ICD-10-CM

## 2021-10-26 PROCEDURE — 1159F MED LIST DOCD IN RCRD: CPT | Mod: CPTII,95,, | Performed by: NURSE PRACTITIONER

## 2021-10-26 PROCEDURE — 99213 OFFICE O/P EST LOW 20 MIN: CPT | Mod: 95,,, | Performed by: NURSE PRACTITIONER

## 2021-10-26 PROCEDURE — 99213 PR OFFICE/OUTPT VISIT, EST, LEVL III, 20-29 MIN: ICD-10-PCS | Mod: 95,,, | Performed by: NURSE PRACTITIONER

## 2021-10-26 PROCEDURE — 1160F RVW MEDS BY RX/DR IN RCRD: CPT | Mod: CPTII,95,, | Performed by: NURSE PRACTITIONER

## 2021-10-26 PROCEDURE — 1160F PR REVIEW ALL MEDS BY PRESCRIBER/CLIN PHARMACIST DOCUMENTED: ICD-10-PCS | Mod: CPTII,95,, | Performed by: NURSE PRACTITIONER

## 2021-10-26 PROCEDURE — 1159F PR MEDICATION LIST DOCUMENTED IN MEDICAL RECORD: ICD-10-PCS | Mod: CPTII,95,, | Performed by: NURSE PRACTITIONER

## 2021-10-26 RX ORDER — FLUOXETINE HYDROCHLORIDE 20 MG/1
CAPSULE ORAL
Qty: 90 CAPSULE | Refills: 1 | Status: SHIPPED | OUTPATIENT
Start: 2021-10-26 | End: 2022-06-23 | Stop reason: SDUPTHER

## 2021-10-26 RX ORDER — TRAZODONE HYDROCHLORIDE 50 MG/1
100 TABLET ORAL NIGHTLY PRN
Qty: 180 TABLET | Refills: 0 | Status: SHIPPED | OUTPATIENT
Start: 2021-04-29 | End: 2022-06-23 | Stop reason: SDUPTHER

## 2021-10-26 RX ORDER — LORAZEPAM 0.5 MG/1
0.5 TABLET ORAL 2 TIMES DAILY PRN
Qty: 60 TABLET | Refills: 0 | Status: SHIPPED | OUTPATIENT
Start: 2021-10-26 | End: 2022-06-23 | Stop reason: SDUPTHER

## 2021-10-26 RX ORDER — FLUOXETINE HYDROCHLORIDE 40 MG/1
CAPSULE ORAL
Qty: 90 CAPSULE | Refills: 1 | Status: SHIPPED | OUTPATIENT
Start: 2021-10-26 | End: 2022-12-12 | Stop reason: SDUPTHER

## 2021-11-08 ENCOUNTER — PATIENT MESSAGE (OUTPATIENT)
Dept: INTERNAL MEDICINE | Facility: CLINIC | Age: 56
End: 2021-11-08
Payer: COMMERCIAL

## 2021-11-08 DIAGNOSIS — G89.29 CHRONIC HIP PAIN, UNSPECIFIED LATERALITY: Primary | ICD-10-CM

## 2021-11-08 DIAGNOSIS — M25.559 CHRONIC HIP PAIN, UNSPECIFIED LATERALITY: Primary | ICD-10-CM

## 2021-11-19 ENCOUNTER — OFFICE VISIT (OUTPATIENT)
Dept: ORTHOPEDICS | Facility: CLINIC | Age: 56
End: 2021-11-19
Payer: COMMERCIAL

## 2021-11-19 VITALS — BODY MASS INDEX: 25.37 KG/M2 | WEIGHT: 161.63 LBS | HEIGHT: 67 IN

## 2021-11-19 DIAGNOSIS — M25.559 PAIN IN UNSPECIFIED HIP: ICD-10-CM

## 2021-11-19 DIAGNOSIS — S73.191A TEAR OF RIGHT ACETABULAR LABRUM, INITIAL ENCOUNTER: Primary | ICD-10-CM

## 2021-11-19 PROCEDURE — 1160F RVW MEDS BY RX/DR IN RCRD: CPT | Mod: CPTII,S$GLB,, | Performed by: ORTHOPAEDIC SURGERY

## 2021-11-19 PROCEDURE — 3008F PR BODY MASS INDEX (BMI) DOCUMENTED: ICD-10-PCS | Mod: CPTII,S$GLB,, | Performed by: ORTHOPAEDIC SURGERY

## 2021-11-19 PROCEDURE — 99213 PR OFFICE/OUTPT VISIT, EST, LEVL III, 20-29 MIN: ICD-10-PCS | Mod: S$GLB,,, | Performed by: ORTHOPAEDIC SURGERY

## 2021-11-19 PROCEDURE — 3008F BODY MASS INDEX DOCD: CPT | Mod: CPTII,S$GLB,, | Performed by: ORTHOPAEDIC SURGERY

## 2021-11-19 PROCEDURE — 99999 PR PBB SHADOW E&M-EST. PATIENT-LVL III: ICD-10-PCS | Mod: PBBFAC,,, | Performed by: ORTHOPAEDIC SURGERY

## 2021-11-19 PROCEDURE — 99999 PR PBB SHADOW E&M-EST. PATIENT-LVL III: CPT | Mod: PBBFAC,,, | Performed by: ORTHOPAEDIC SURGERY

## 2021-11-19 PROCEDURE — 1159F PR MEDICATION LIST DOCUMENTED IN MEDICAL RECORD: ICD-10-PCS | Mod: CPTII,S$GLB,, | Performed by: ORTHOPAEDIC SURGERY

## 2021-11-19 PROCEDURE — 1160F PR REVIEW ALL MEDS BY PRESCRIBER/CLIN PHARMACIST DOCUMENTED: ICD-10-PCS | Mod: CPTII,S$GLB,, | Performed by: ORTHOPAEDIC SURGERY

## 2021-11-19 PROCEDURE — 99213 OFFICE O/P EST LOW 20 MIN: CPT | Mod: S$GLB,,, | Performed by: ORTHOPAEDIC SURGERY

## 2021-11-19 PROCEDURE — 1159F MED LIST DOCD IN RCRD: CPT | Mod: CPTII,S$GLB,, | Performed by: ORTHOPAEDIC SURGERY

## 2021-11-22 ENCOUNTER — TELEPHONE (OUTPATIENT)
Dept: SPORTS MEDICINE | Facility: CLINIC | Age: 56
End: 2021-11-22
Payer: COMMERCIAL

## 2021-12-22 ENCOUNTER — IMMUNIZATION (OUTPATIENT)
Dept: PHARMACY | Facility: CLINIC | Age: 56
End: 2021-12-22
Payer: COMMERCIAL

## 2021-12-22 DIAGNOSIS — Z23 NEED FOR VACCINATION: Primary | ICD-10-CM

## 2021-12-28 ENCOUNTER — HOSPITAL ENCOUNTER (OUTPATIENT)
Dept: RADIOLOGY | Facility: OTHER | Age: 56
Discharge: HOME | End: 2021-12-28
Attending: ORTHOPAEDIC SURGERY
Payer: COMMERCIAL

## 2021-12-28 DIAGNOSIS — M25.551 PAIN IN RIGHT HIP: ICD-10-CM

## 2021-12-28 DIAGNOSIS — S73.121A: ICD-10-CM

## 2021-12-28 PROCEDURE — 73525 CONTRAST X-RAY OF HIP: CPT | Mod: TC,RT

## 2021-12-28 PROCEDURE — 27093 INJECTION FOR HIP X-RAY: CPT

## 2021-12-28 PROCEDURE — 73723 MRI JOINT LWR EXTR W/O&W/DYE: CPT | Mod: TC,RT

## 2021-12-28 PROCEDURE — 27093 INJECTION FOR HIP X-RAY: CPT | Mod: RT,,, | Performed by: RADIOLOGY

## 2021-12-28 PROCEDURE — 25000003 PHARM REV CODE 250: Performed by: ORTHOPAEDIC SURGERY

## 2021-12-28 PROCEDURE — 25500020 PHARM REV CODE 255: Performed by: ORTHOPAEDIC SURGERY

## 2021-12-28 PROCEDURE — A9585 GADOBUTROL INJECTION: HCPCS | Performed by: ORTHOPAEDIC SURGERY

## 2021-12-28 PROCEDURE — 73525 XR ARTHROGRAM HIP RIGHT: ICD-10-PCS | Mod: 26,RT,, | Performed by: RADIOLOGY

## 2021-12-28 PROCEDURE — 27093 XR ARTHROGRAM HIP RIGHT: ICD-10-PCS | Mod: RT,,, | Performed by: RADIOLOGY

## 2021-12-28 PROCEDURE — 73723: ICD-10-PCS | Mod: 26,RT,, | Performed by: RADIOLOGY

## 2021-12-28 PROCEDURE — 73525 CONTRAST X-RAY OF HIP: CPT | Mod: 26,RT,, | Performed by: RADIOLOGY

## 2021-12-28 PROCEDURE — 73723 MRI JOINT LWR EXTR W/O&W/DYE: CPT | Mod: 26,RT,, | Performed by: RADIOLOGY

## 2021-12-28 RX ORDER — LIDOCAINE HYDROCHLORIDE 10 MG/ML
1 INJECTION INFILTRATION; PERINEURAL ONCE
Status: DISCONTINUED | OUTPATIENT
Start: 2021-12-28 | End: 2021-12-28

## 2021-12-28 RX ORDER — GADOBUTROL 604.72 MG/ML
7.5 INJECTION INTRAVENOUS
Status: COMPLETED | OUTPATIENT
Start: 2021-12-28 | End: 2021-12-28

## 2021-12-28 RX ORDER — LIDOCAINE HYDROCHLORIDE 10 MG/ML
8 INJECTION INFILTRATION; PERINEURAL ONCE
Status: COMPLETED | OUTPATIENT
Start: 2021-12-28 | End: 2021-12-28

## 2021-12-28 RX ADMIN — LIDOCAINE HYDROCHLORIDE 8 ML: 10 INJECTION, SOLUTION INFILTRATION; PERINEURAL at 04:12

## 2021-12-28 RX ADMIN — IOHEXOL 15 ML: 350 INJECTION, SOLUTION INTRAVENOUS at 04:12

## 2021-12-28 RX ADMIN — GADOBUTROL 7.5 ML: 604.72 INJECTION INTRAVENOUS at 04:12

## 2022-01-13 DIAGNOSIS — M19.90 ARTHRITIS: ICD-10-CM

## 2022-01-13 DIAGNOSIS — M25.559 HIP PAIN: Primary | ICD-10-CM

## 2022-01-18 ENCOUNTER — CLINICAL SUPPORT (OUTPATIENT)
Dept: REHABILITATION | Facility: HOSPITAL | Age: 57
End: 2022-01-18
Payer: COMMERCIAL

## 2022-01-18 DIAGNOSIS — M19.90 ARTHRITIS: ICD-10-CM

## 2022-01-18 DIAGNOSIS — M25.559 HIP PAIN: ICD-10-CM

## 2022-01-18 PROBLEM — G89.29 CHRONIC PAIN OF LEFT KNEE: Status: RESOLVED | Noted: 2021-03-05 | Resolved: 2022-01-18

## 2022-01-18 PROBLEM — M25.561 RIGHT KNEE PAIN: Status: RESOLVED | Noted: 2021-02-26 | Resolved: 2022-01-18

## 2022-01-18 PROBLEM — R29.898 WEAKNESS OF BOTH LOWER EXTREMITIES: Status: RESOLVED | Noted: 2020-10-22 | Resolved: 2022-01-18

## 2022-01-18 PROBLEM — R26.2 DIFFICULTY WALKING DOWN STAIRS: Status: RESOLVED | Noted: 2021-02-26 | Resolved: 2022-01-18

## 2022-01-18 PROBLEM — M53.86 DECREASED ROM OF LUMBAR SPINE: Status: RESOLVED | Noted: 2020-10-22 | Resolved: 2022-01-18

## 2022-01-18 PROBLEM — M62.81 WEAKNESS OF TRUNK MUSCULATURE: Status: RESOLVED | Noted: 2020-10-22 | Resolved: 2022-01-18

## 2022-01-18 PROBLEM — M25.562 CHRONIC PAIN OF LEFT KNEE: Status: RESOLVED | Noted: 2021-03-05 | Resolved: 2022-01-18

## 2022-01-18 PROCEDURE — 97161 PT EVAL LOW COMPLEX 20 MIN: CPT

## 2022-01-18 PROCEDURE — 97110 THERAPEUTIC EXERCISES: CPT

## 2022-01-18 NOTE — PLAN OF CARE
[facility]   Physical Therapy Initial Evaluation     Date: 1/18/2022   Name: Nora Castillo [facility] Number: [id number]    Therapy Diagnosis: No diagnosis found.  Physician: Madhu Sanchez MD    Physician Orders: PT Eval and Treat   Medical Diagnosis from Referral:     M25.559 (ICD-10-CM) - Hip pain   M19.90 (ICD-10-CM) - Arthritis       Evaluation Date: 1/18/2022  Authorization Period Expiration: 12/31/22  Plan of Care Expiration: 4/18/22  Progress Note Due: 2/18/22  Visit # / Visits authorized: 1/ 1   FOTO: 1/5    Precautions:  none    Time In: 0815am  Time Out: 0900am  Total Appointment Time (timed & untimed codes): 45 minutes      SUBJECTIVE   Date of onset: June 2021    History of current condition - Nora reports: she was doing a leg press machine in June and when she got up to walk, her hipike it had popped out of socket. Her pain has gotten worse since then. She has since stopped exercising other than occasionally riding a bike or walking. She had B knee injections and PT last year. She had a hip injection about 3 months ago with only 1 day of relief. She gets a sharp pain along anterior R hip with hip flexion and a burning ache along her lateral R hip that wakes her up at night. She does report occasional R knee pain when she lands on it wrong.     Falls: n/a     Imaging, MRI studies: Right hip osteoarthrosis with degenerative acetabular labral tearing and reactive bone edema in the right acetabular roof.     Right gluteus medius tendinosis, partial thickness insertional tear and reactive changes within the greater trochanter of the femur.     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)  Prior Level of Function: (I)  Current Level of Function: (I) with difficulty ambulating, sleeping, navigating stairs and getting out of  "bed/chair     Pain:  Current 5/10, worst 8/10, best 5/10   Location: right hip  Description: Aching, Burning, Sharp and "popping out of socket"; wakes her up all night   Aggravating Factors: Walking, Night Time and Getting out of bed/chair, stairs  Easing Factors: pain medication (tylenol/ibuprofen)     Patients goals: to not limp and minimize pain as much as possible     Medical History:   Past Medical History:   Diagnosis Date    Bronchitis     DJD (degenerative joint disease)     shoulder, knee, and back    Esophagitis, unspecified     Fibrocystic disease of breast     Menstrual disorder     Sinus infection     Stress     UTI (urinary tract infection)        Surgical History:   Nora Mccoy  has a past surgical history that includes Back surgery; Colonoscopy (N/A, 7/7/2016); Breast biopsy (Left, 2015); Functional endoscopic sinus surgery (FESS) using computer-assisted navigation (Bilateral, 4/5/2019); Ethmoidectomy (N/A, 4/5/2019); Nasal turbinate reduction (Bilateral, 4/5/2019); Maxillary antrostomy (4/5/2019); Frontal sinus obliteration (4/5/2019); and Epidural steroid injection (Right, 10/11/2021).    Medications:   Nora has a current medication list which includes the following prescription(s): acyclovir, budesonide, cyclobenzaprine, epinephrine, fluoxetine, fluoxetine, fluticasone propionate, lorazepam, meloxicam, multivitamin, and trazodone.    Allergies:   Review of patient's allergies indicates:   Allergen Reactions    Penicillin g Anaphylaxis    Penicillins Anaphylaxis    Penicillin Hives        Objective     POSTURE    NEUROLOGICAL SCREENING       Palpation: TTP L greater trochanter, L glute medius/minimus   Sensation: WNL    Range of Motion/Strength:     Hip Right MMT Left MMT Pain/Dysfunction with Movement (pain=!)   AROM        flexion  WFL 4/5  WFL 4/5    extension  limited by pain   WFL  Performs good bridge with R anterior hip "pulling"    abduction  WFL 3+/5  WFL 4/5 " Painful AROM R hip   Internal rotation  50% limited with pain on PROM/AROM 3/5  WFL 4-/5    External rotation  25% limited with pain on PROM/AROM 3/5  WFL 4/5      Knee Right MMT Left MMT Pain/Dysfunction with Movement (pain=!)   AROM        flexion  WFL 4-/5  WFL 4-/5 Anterior R knee pain    extension  WFL 4/5  WFL 4/5          Flexibility: hamstring 90/90: L lacking 8 deg, R lacking 16 deg     Special Tests: R: + Lin, +FADIR, + Ham test (for anterior labral tear)    Gait Analysis:Without AD Deviations: antalgic, decreased right lower extremity weight bearing      CMS Impairment/Limitation/Restriction for FOTO Hip Survey    Therapist reviewed FOTO scores for Nora Mccoy on 1/18/2022.   FOTO documents entered into [employer] - see Media section.    Limitation Score: 44%  Category: Mobility         TREATMENT     Total Treatment time (time-based codes) separate from Evaluation: 10 minutes      Nora received the treatments listed below:      therapeutic exercises to develop strength, endurance, ROM, flexibility, posture and core stabilization for 10 minutes including:    HEP instruction & return demonstration: seated hamstring stretch, seated LIN stretch, VMO extensions      PATIENT EDUCATION AND HOME EXERCISES     Education provided:   - purpose of PT  -HEP  -aquatic exercise  -use of dry needling     Written Home Exercises Provided: yes. Exercises were reviewed and Nora was able to demonstrate them prior to the end of the session.  Nora demonstrated good  understanding of the education provided. See EMR under Patient Instructions for exercises provided during therapy sessions.    ASSESSMENT     Nora is a 56 y.o. female referred to outpatient Physical Therapy with a medical diagnosis of hip pain and arthritis with acetabular labral tear, gluteus medius partial thickness tear shown on MRI . Patient presents with antalgic gait and transitional movements, weakness of R gluteal and pain with  functional mobility affecting her functional mobility. Pt is open to try dry needling during future visits.     Patient prognosis is Good.   Patientt will benefit from skilled outpatient Physical Therapy to address the deficits stated above and in the chart below, provide patient /family education, and to maximize patientt's level of independence.     Plan of care discussed with patient: Yes  Patient's spiritual, cultural and educational needs considered and patient is agreeable to the plan of care and goals as stated below:     Anticipated Barriers for therapy: pain     Medical Necessity is demonstrated by the following  History  Co-morbidities and personal factors that may impact the plan of care Co-morbidities:   See EMR    Personal Factors:   lifestyle     low   Examination  Body Structures and Functions, activity limitations and participation restrictions that may impact the plan of care Body Regions:   back  lower extremities  trunk    Body Systems:    gross symmetry  ROM  strength  gross coordinated movement  balance  gait  transfers  transitions  motor learning    Participation Restrictions:   none    Activity limitations:   Learning and applying knowledge  no deficits    General Tasks and Commands  no deficits    Communication  no deficits    Mobility  walking    Self care  looking after one's health    Domestic Life  doing house work (cleaning house, washing dishes, laundry)    Interactions/Relationships  no deficits    Life Areas  employment    Community and Social Life  recreation and leisure         moderate   Clinical Presentation stable and uncomplicated low   Decision Making/ Complexity Score: low     Goals:  Short Term Goals: 6 visits  1. Pt will be compliant /c HEP to supplement PT in order to improve functional tasks  2. Pt will improve R hip MMTs to 3+/5 grossly to improve strength for functional activities    Long Term Goals: 12 visits   1. Pt will improve R hamstring flexibility to = left lower  extremity for improved functional movement and reduced pain with standing activities   2. Pt will improve FOTO score to </= 39% limitation to reduce perceived pain with mobility   3. Pt will report ability to sleep >4 nights/week without waking up with pain       PLAN   Plan of care Certification: 1/18/2022 to 4/18/22.    Outpatient Physical Therapy 2 times weekly for  12 visit to include the following interventions: Manual Therapy, Moist Heat/ Ice, Neuromuscular Re-ed, Patient Education, Self Care, Therapeutic Activities and Therapeutic Exercise, E-stim and Dry Needling PRN.     Priyanka Murray, PT      I CERTIFY THE NEED FOR THESE SERVICES FURNISHED UNDER THIS PLAN OF TREATMENT AND WHILE UNDER MY CARE   Physician's comments:     Physician's Signature: ___________________________________________________

## 2022-01-24 NOTE — PROGRESS NOTES
"[facility]   Physical Therapy Treatment Note     Name: Nora Castillo [facility] Number: [id number]    Therapy Diagnosis:   Encounter Diagnoses   Name Primary?    Antalgic gait Yes    Hip pain     Weakness of right hip      Physician: Madhu Sanchez MD    Visit Date: 1/25/2022     Physician Orders: PT Eval and Treat   Medical Diagnosis from Referral:    M25.559 (ICD-10-CM) - Hip pain   M19.90 (ICD-10-CM) - Arthritis   Evaluation Date: 1/18/2022  Authorization Period Expiration: 12/31/22  Plan of Care Expiration: 4/18/22  Progress Note Due: 2/18/22  Visit # / Visits authorized: 1/1, 1/20  FOTO: 1/5     Precautions:  none        PTA Visit #: 0/5     Time In: 755  Time Out: 840  Total Billable Time: 45 minutes    SUBJECTIVE     Pt reports: that her hip has been hurting her more today than normal, which she attributes to the weather. It did keep her up all night last night with difficulty sleeping.  She was compliant with home exercise program.  Response to previous treatment: No adverse effects  Functional change: none     Pain: 6/10  Location: right hip      OBJECTIVE     Objective Measures updated at progress report unless specified.     Treatment     Nora received the treatments listed below:      therapeutic exercises to develop strength, endurance, ROM, flexibility, posture and core stabilization for 30 minutes including:  LIN stretch 3x30s  Hamstring stretch 3x30s  VMO extensions 2x10, 3"  Triple flexion x15  Bridges 2x10, 3"   SL clams 3x30s  Standing step downs (emphasis on hip extension) x10 B  Side stepping red theraband at toes x3 laps (~8 steps each way)    manual therapy techniques: Soft tissue Mobilization were applied to the: R thigh for 15 minutes, including:   PT used semi-standardized FDN protocol to R rectus femoris with (2) 60 mm size needles, with mechanical winding technique. Needled in situ  8 min.        Palpation Assessment to determine the necessity for " Functional Dry Needling    Patient provided written and verbal consent to Functional Dry Needling       Written Handout on response to FDN provided: yes    Nora demonstrated good  understanding of the education provided.   Patient verbalized consent to FDN: yes      Patient Education and Home Exercises     Home Exercises Provided and Patient Education Provided     Education provided:   - HEP  - dry needling    Written Home Exercises Provided: Patient instructed to cont prior HEP. Exercises were reviewed and Nora was able to demonstrate them prior to the end of the session.  Nora demonstrated good  understanding of the education provided. See EMR under Patient Instructions for exercises provided during therapy sessions    ASSESSMENT     Pt tolerated first session after initial evaluation fairly well today. Muscle twitch noted during FDN and good response after and no adverse effects. Attempted SLRs but unable to complete 2/2 thigh pain. All other activities tolerated well. Modified step downs to emphasize hip extension on landing to allow for improved tolerance and further improved hip extension.     Nora Is progressing well towards her goals.   Patient prognosis is Good.   Patient will benefit from skilled outpatient Physical Therapy to address the deficits stated above and in the chart below, provide patient /family education, and to maximize patientt's level of independence.      Plan of care discussed with patient: Yes  Patient's spiritual, cultural and educational needs considered and patient is agreeable to the plan of care and goals as stated below:      Anticipated Barriers for therapy: pain     Goals:   Short Term Goals: 6 visits  1. Pt will be compliant /c HEP to supplement PT in order to improve functional tasks  2. Pt will improve R hip MMTs to 3+/5 grossly to improve strength for functional activities     Long Term Goals: 12 visits   1. Pt will improve R hamstring flexibility to = left lower  extremity for improved functional movement and reduced pain with standing activities   2. Pt will improve FOTO score to </= 39% limitation to reduce perceived pain with mobility   3. Pt will report ability to sleep >4 nights/week without waking up with pain     PLAN     Plan of care Certification: 1/18/2022 to 4/18/22.     Outpatient Physical Therapy 2 times weekly for  12 visit to include the following interventions: Manual Therapy, Moist Heat/ Ice, Neuromuscular Re-ed, Patient Education, Self Care, Therapeutic Activities and Therapeutic Exercise, E-stim and Dry Needling PRN.     Ben Ernandez, PT

## 2022-01-25 ENCOUNTER — CLINICAL SUPPORT (OUTPATIENT)
Dept: REHABILITATION | Facility: HOSPITAL | Age: 57
End: 2022-01-25
Payer: COMMERCIAL

## 2022-01-25 DIAGNOSIS — M25.559 HIP PAIN: ICD-10-CM

## 2022-01-25 DIAGNOSIS — R29.898 WEAKNESS OF RIGHT HIP: ICD-10-CM

## 2022-01-25 DIAGNOSIS — R26.89 ANTALGIC GAIT: Primary | ICD-10-CM

## 2022-01-25 PROCEDURE — 97110 THERAPEUTIC EXERCISES: CPT

## 2022-01-25 PROCEDURE — 97140 MANUAL THERAPY 1/> REGIONS: CPT

## 2022-02-02 NOTE — PROGRESS NOTES
"[facility]   Physical Therapy Treatment Note     Name: Nora Castillo [facility] Number: [id number]    Therapy Diagnosis:   Encounter Diagnoses   Name Primary?    Hip pain     Antalgic gait     Weakness of right hip      Physician: Madhu Sanchez MD    Visit Date: 2/3/2022     Physician Orders: PT Eval and Treat   Medical Diagnosis from Referral:    M25.559 (ICD-10-CM) - Hip pain   M19.90 (ICD-10-CM) - Arthritis   Evaluation Date: 1/18/2022  Authorization Period Expiration: 12/31/22  Plan of Care Expiration: 4/18/22  Progress Note Due: 2/18/22  Visit # / Visits authorized: 1/1, 2/20  FOTO: 2/5     Precautions:  none        PTA Visit #: 1/5     Time In: 12:03 pm  Time Out: 12:44 pm   Total Billable Time: 41 minutes    SUBJECTIVE     Pt reports: that she feels that the dry needling made a big difference in her pain. Pt states that she is not having the intense pull in her leg, however is still experiencing some hip pain.   She was compliant with home exercise program.  Response to previous treatment: No adverse effects  Functional change: none     Pain: 6/10  Location: right hip      OBJECTIVE     Objective Measures updated at progress report unless specified.     Treatment     Nora received the treatments listed below:      therapeutic exercises to develop strength, endurance, ROM, flexibility, posture and core stabilization for 33 minutes including:  LIN stretch 3x30s  Hamstring stretch 3x30s  Hip flexor stretch off mat 3x30"   VMO extensions 2x10, 3"  Bridges 2x10 c/ RTB, 3"   SL clams 3x30s  SLR boxes 10x BLE   Sidelying hip abd 3" 2x10 BLE         Triple flexion x15 NP  Standing step downs (emphasis on hip extension) x10 B NP  Side stepping red theraband at toes x3 laps (~8 steps each way) NP    manual therapy techniques: Soft tissue Mobilization were applied to the: R thigh for 8 minutes, including:   STM c/ rolling pin       Patient Education and Home Exercises     Home " Exercises Provided and Patient Education Provided     Education provided:   - HEP  - dry needling    Written Home Exercises Provided: Patient instructed to cont prior HEP. Exercises were reviewed and Nora was able to demonstrate them prior to the end of the session.  Nora demonstrated good  understanding of the education provided. See EMR under Patient Instructions for exercises provided during therapy sessions    ASSESSMENT     Pt with tolerance to all therex this session, including the addition of hip flexor stretching and SLRs. Progressed sidelying clams to be performed with RTB. Pt noted no increased pain or symptoms after sessions, and gained relief from manual therapy techniques. Will continue to monitor pt's response to therapy sessions and progress per her tolerance and POC.     Nora Is progressing well towards her goals.   Patient prognosis is Good.   Patient will benefit from skilled outpatient Physical Therapy to address the deficits stated above and in the chart below, provide patient /family education, and to maximize patientt's level of independence.      Plan of care discussed with patient: Yes  Patient's spiritual, cultural and educational needs considered and patient is agreeable to the plan of care and goals as stated below:      Anticipated Barriers for therapy: pain     Goals:   Short Term Goals: 6 visits  1. Pt will be compliant /c HEP to supplement PT in order to improve functional tasks  2. Pt will improve R hip MMTs to 3+/5 grossly to improve strength for functional activities     Long Term Goals: 12 visits   1. Pt will improve R hamstring flexibility to = left lower extremity for improved functional movement and reduced pain with standing activities   2. Pt will improve FOTO score to </= 39% limitation to reduce perceived pain with mobility   3. Pt will report ability to sleep >4 nights/week without waking up with pain     PLAN     Plan of care Certification: 1/18/2022 to  4/18/22.     Outpatient Physical Therapy 2 times weekly for  12 visit to include the following interventions: Manual Therapy, Moist Heat/ Ice, Neuromuscular Re-ed, Patient Education, Self Care, Therapeutic Activities and Therapeutic Exercise, E-stim and Dry Needling PRN.     Cintia De Los Santos, PTA

## 2022-02-03 ENCOUNTER — CLINICAL SUPPORT (OUTPATIENT)
Dept: REHABILITATION | Facility: HOSPITAL | Age: 57
End: 2022-02-03
Payer: COMMERCIAL

## 2022-02-03 DIAGNOSIS — R26.89 ANTALGIC GAIT: ICD-10-CM

## 2022-02-03 DIAGNOSIS — M25.559 HIP PAIN: ICD-10-CM

## 2022-02-03 DIAGNOSIS — R29.898 WEAKNESS OF RIGHT HIP: ICD-10-CM

## 2022-02-03 PROCEDURE — 97140 MANUAL THERAPY 1/> REGIONS: CPT | Mod: CQ

## 2022-02-03 PROCEDURE — 97110 THERAPEUTIC EXERCISES: CPT | Mod: CQ

## 2022-02-07 NOTE — PROGRESS NOTES
"[facility]   Physical Therapy Treatment Note     Name: Nora Castillo [facility] Number: [id number]    Therapy Diagnosis:   Encounter Diagnoses   Name Primary?    Hip pain     Antalgic gait     Weakness of right hip      Physician: Madhu Sanchez MD    Visit Date: 2/8/2022     Physician Orders: PT Eval and Treat   Medical Diagnosis from Referral:    M25.559 (ICD-10-CM) - Hip pain   M19.90 (ICD-10-CM) - Arthritis   Evaluation Date: 1/18/2022  Authorization Period Expiration: 12/31/22  Plan of Care Expiration: 4/18/22  Progress Note Due: 2/18/22  Visit # / Visits authorized: 1/1, 3/20  FOTO: 3/5     Precautions:  none      PTA Visit #: 2/5     Time In: 7:20 am (pt arrived late)   Time Out: 7:53 am   Total Billable Time: 33 minutes    SUBJECTIVE     Pt reports: that she has been having a different pain, and feels that it is less in the muscle and more in the joint itself. It states that she gets a shooting pain deep in her hip when she is walking.   She was compliant with home exercise program.  Response to previous treatment: No adverse effects  Functional change: none     Pain: 6/10  Location: right hip      OBJECTIVE     Objective Measures updated at progress report unless specified.     Treatment     Nora received the treatments listed below:      therapeutic exercises to develop strength, endurance, ROM, flexibility, posture and core stabilization for 10 minutes including:  LIN stretch 3x30s  Hamstring stretch 3x30s  Hip flexor stretch off mat 3x30"       VMO extensions 2x10, 3"  Bridges 2x10 c/ RTB, 3"   SL clams 3x30s  SLR boxes 10x BLE   Sidelying hip abd 3" 2x10 BLE         Triple flexion x15 NP  Standing step downs (emphasis on hip extension) x10 B NP  Side stepping red theraband at toes x3 laps (~8 steps each way) NP    manual therapy techniques: Soft tissue Mobilization were applied to the: R thigh for 23 minutes, including:   STM c/ rolling pin  Long axis LLE " distraction c/ belt  Inferior grade ll mobilization in flexion c/ belt        Patient Education and Home Exercises     Home Exercises Provided and Patient Education Provided     Education provided:   - HEP  - dry needling    Written Home Exercises Provided: Patient instructed to cont prior HEP. Exercises were reviewed and Nora was able to demonstrate them prior to the end of the session.  Nora demonstrated good  understanding of the education provided. See EMR under Patient Instructions for exercises provided during therapy sessions    ASSESSMENT     Declined most therex this session due to pt's increased pain and her late arrival to therapy. Incorporated additional manual therapy techniques of long axis distraction and inferior femoral mobilization in order to decrease pain in left hip with ambulation. Pt with no adverse effects after manual therapy. Will continue to progress pt per her tolerance and POC.      Nora Is progressing well towards her goals.   Patient prognosis is Good.   Patient will benefit from skilled outpatient Physical Therapy to address the deficits stated above and in the chart below, provide patient /family education, and to maximize patientt's level of independence.      Plan of care discussed with patient: Yes  Patient's spiritual, cultural and educational needs considered and patient is agreeable to the plan of care and goals as stated below:      Anticipated Barriers for therapy: pain     Goals:   Short Term Goals: 6 visits  1. Pt will be compliant /c HEP to supplement PT in order to improve functional tasks  2. Pt will improve R hip MMTs to 3+/5 grossly to improve strength for functional activities     Long Term Goals: 12 visits   1. Pt will improve R hamstring flexibility to = left lower extremity for improved functional movement and reduced pain with standing activities   2. Pt will improve FOTO score to </= 39% limitation to reduce perceived pain with mobility   3. Pt will report  ability to sleep >4 nights/week without waking up with pain     PLAN     Plan of care Certification: 1/18/2022 to 4/18/22.     Outpatient Physical Therapy 2 times weekly for  12 visit to include the following interventions: Manual Therapy, Moist Heat/ Ice, Neuromuscular Re-ed, Patient Education, Self Care, Therapeutic Activities and Therapeutic Exercise, E-stim and Dry Needling PRN.     Cintia De Los Santos, PTA

## 2022-02-08 ENCOUNTER — CLINICAL SUPPORT (OUTPATIENT)
Dept: REHABILITATION | Facility: HOSPITAL | Age: 57
End: 2022-02-08
Payer: COMMERCIAL

## 2022-02-08 DIAGNOSIS — R26.89 ANTALGIC GAIT: ICD-10-CM

## 2022-02-08 DIAGNOSIS — M25.559 HIP PAIN: ICD-10-CM

## 2022-02-08 DIAGNOSIS — R29.898 WEAKNESS OF RIGHT HIP: ICD-10-CM

## 2022-02-08 PROCEDURE — 97110 THERAPEUTIC EXERCISES: CPT | Mod: CQ

## 2022-02-08 PROCEDURE — 97140 MANUAL THERAPY 1/> REGIONS: CPT | Mod: CQ

## 2022-02-14 NOTE — PROGRESS NOTES
"[facility]   Physical Therapy Treatment Note     Name: Nora Castillo [facility] Number: [id number]    Therapy Diagnosis:   No diagnosis found.  Physician: Madhu Sanchez MD    Visit Date: 2/15/2022     Physician Orders: PT Eval and Treat   Medical Diagnosis from Referral:    M25.559 (ICD-10-CM) - Hip pain   M19.90 (ICD-10-CM) - Arthritis   Evaluation Date: 1/18/2022  Authorization Period Expiration: 12/31/22  Plan of Care Expiration: 4/18/22  Progress Note Due: 2/18/22  Visit # / Visits authorized: 1/1, 4/20  FOTO: 4/5     Precautions:  none      PTA Visit #: 3/5     Time In: 7:20 am (pt arrived late)   Time Out: 7:50 am   Total Billable Time: 30 minutes    SUBJECTIVE     Pt reports: that since her last appointment she has not felt a difference in her pain. Pt states that she has difficulty and more pain after sitting for a while and getting up to walk.    She was compliant with home exercise program.  Response to previous treatment: No adverse effects  Functional change: none     Pain: 6/10  Location: right hip      OBJECTIVE     Objective Measures updated at progress report unless specified.     Treatment     Nora received the treatments listed below:      therapeutic exercises to develop strength, endurance, ROM, flexibility, posture and core stabilization for 15 minutes including:  LIN stretch 3x30s  Hamstring stretch 3x30s  Hip flexor stretch off mat c/ strap 3x30"       Not performed:   VMO extensions 2x10, 3"  Bridges 2x10 c/ RTB, 3"   SL clams 3x30s  SLR boxes 10x BLE   Sidelying hip abd 3" 2x10 BLE         Triple flexion x15 NP  Standing step downs (emphasis on hip extension) x10 B NP  Side stepping red theraband at toes x3 laps (~8 steps each way) NP    manual therapy techniques: Soft tissue Mobilization were applied to the: R thigh for 0 minutes, including:   STM c/ rolling pin  Long axis LLE distraction c/ belt  Inferior grade ll mobilization in flexion c/ belt  "       Patient Education and Home Exercises     Home Exercises Provided and Patient Education Provided     Education provided:   - HEP  - dry needling    Written Home Exercises Provided: Patient instructed to cont prior HEP. Exercises were reviewed and Nora was able to demonstrate them prior to the end of the session.  Nora demonstrated good  understanding of the education provided. See EMR under Patient Instructions for exercises provided during therapy sessions    ASSESSMENT     Pt arrived late to treatment session, therefore unable to complete full therapy session. PTA assisted pt in therex and stretches and dry needling performed at the end of session by Jon Obrien DPT. Pt noted some relief up exiting therapy session. Will continue to progress pt per her tolerance and POC.      Nora Is progressing well towards her goals.   Patient prognosis is Good.   Patient will benefit from skilled outpatient Physical Therapy to address the deficits stated above and in the chart below, provide patient /family education, and to maximize patientt's level of independence.      Plan of care discussed with patient: Yes  Patient's spiritual, cultural and educational needs considered and patient is agreeable to the plan of care and goals as stated below:      Anticipated Barriers for therapy: pain     Goals:   Short Term Goals: 6 visits  1. Pt will be compliant /c HEP to supplement PT in order to improve functional tasks  2. Pt will improve R hip MMTs to 3+/5 grossly to improve strength for functional activities     Long Term Goals: 12 visits   1. Pt will improve R hamstring flexibility to = left lower extremity for improved functional movement and reduced pain with standing activities   2. Pt will improve FOTO score to </= 39% limitation to reduce perceived pain with mobility   3. Pt will report ability to sleep >4 nights/week without waking up with pain     PLAN     Plan of care Certification: 1/18/2022 to  4/18/22.     Outpatient Physical Therapy 2 times weekly for  12 visit to include the following interventions: Manual Therapy, Moist Heat/ Ice, Neuromuscular Re-ed, Patient Education, Self Care, Therapeutic Activities and Therapeutic Exercise, E-stim and Dry Needling PRN.     Cintia De Los Santos, PTA

## 2022-02-15 ENCOUNTER — CLINICAL SUPPORT (OUTPATIENT)
Dept: REHABILITATION | Facility: HOSPITAL | Age: 57
End: 2022-02-15
Payer: COMMERCIAL

## 2022-02-15 DIAGNOSIS — F41.9 ANXIETY: ICD-10-CM

## 2022-02-15 DIAGNOSIS — M25.559 HIP PAIN: ICD-10-CM

## 2022-02-15 DIAGNOSIS — R26.89 ANTALGIC GAIT: ICD-10-CM

## 2022-02-15 DIAGNOSIS — F33.41 RECURRENT MAJOR DEPRESSION IN PARTIAL REMISSION: ICD-10-CM

## 2022-02-15 DIAGNOSIS — R29.898 WEAKNESS OF RIGHT HIP: ICD-10-CM

## 2022-02-15 PROCEDURE — 97110 THERAPEUTIC EXERCISES: CPT | Mod: CQ

## 2022-02-15 RX ORDER — FLUOXETINE HYDROCHLORIDE 40 MG/1
CAPSULE ORAL
Qty: 30 CAPSULE | Refills: 0 | Status: SHIPPED | OUTPATIENT
Start: 2022-02-15 | End: 2022-03-28 | Stop reason: SDUPTHER

## 2022-02-21 ENCOUNTER — CLINICAL SUPPORT (OUTPATIENT)
Dept: REHABILITATION | Facility: HOSPITAL | Age: 57
End: 2022-02-21
Payer: COMMERCIAL

## 2022-02-21 DIAGNOSIS — R26.89 ANTALGIC GAIT: ICD-10-CM

## 2022-02-21 DIAGNOSIS — M25.559 HIP PAIN: ICD-10-CM

## 2022-02-21 DIAGNOSIS — R29.898 WEAKNESS OF RIGHT HIP: Primary | ICD-10-CM

## 2022-02-21 PROCEDURE — 97110 THERAPEUTIC EXERCISES: CPT

## 2022-02-21 NOTE — PROGRESS NOTES
"  Physical Therapy Daily Treatment Note     Name: Nora Castillo [facility] Number: [id number]    Therapy Diagnosis:   Encounter Diagnoses   Name Primary?    Weakness of right hip Yes    Antalgic gait     Hip pain      Physician: Madhu Sanchez MD    Visit Date: 2/21/2022    Physician Orders: PT Eval and Treat   Medical Diagnosis from Referral:    M25.559 (ICD-10-CM) - Hip pain   M19.90 (ICD-10-CM) - Arthritis   Evaluation Date: 1/18/2022  Authorization Period Expiration: 12/31/22  Plan of Care Expiration: 4/18/22  Progress Note Due: 2/18/22  Visit # / Visits authorized: 1/1, 5/20  FOTO: 5    Time In: 2:19 pm   Time Out: 2:59 pm   Total Billable Time: 40 minutes    Precautions: Standard    Subjective     Pt reports: that her (R) hip is feeling better, and feels more of a weakness than pain. Pt stated that she still experiences a sharp pain in (R) hip every now and then, but not hurting as much. Pt stated that if she gets up and moves and doesn't sit too long then does not get feeling that (R) hip is slipping out.   She was compliant with home exercise program.  Response to previous treatment: soreness  Functional change: none    Pain: 5/10 (R) anterior/lateral thigh       Objective     Nora received the following manual therapy techniques: Soft tissue Mobilization were applied to the:  for 5 minutes, including:  Roller stick to (R) quad     Nora received the treatments listed below:      therapeutic exercises to develop strength, endurance, ROM, flexibility, posture and core stabilization for 35 minutes including:  LIN stretch 3x30s - not performed   Hamstring stretch 3x30s  Hip flexor stretch off mat c/ strap 3x30"   Bridges 2x10 c/ RTB, 3"   SL clams RTB 2x10 3" hold  VMO extensions 2x10, 5"  SLR boxes 10x BLE  Sidelying hip abduction 2x10 B  Monster walks 3 laps RTB          Home Exercises Provided and Patient Education Provided     Education provided:   - HEP     Written Home Exercises Provided: " Patient instructed to cont prior HEP.  Exercises were reviewed and Nora was able to demonstrate them prior to the end of the session.  Nora demonstrated good  understanding of the education provided.     See EMR under Patient Instructions for exercises provided 2/21/2022.      Assessment     Pt was able to tolerate all therex performed today without c/o pain. Pt reported experiencing decreased pain in (R) anterior/lateral thigh at end of session compared to beginning of session.   Nora Is progressing well towards her goals.   Pt prognosis is Good.     Pt will continue to benefit from skilled outpatient physical therapy to address the deficits listed in the problem list box on initial evaluation, provide pt/family education and to maximize pt's level of independence in the home and community environment.     Pt's spiritual, cultural and educational needs considered and pt agreeable to plan of care and goals.    Anticipated Barriers for therapy: pain     Goals:   Short Term Goals: 6 visits  1. Pt will be compliant /c HEP to supplement PT in order to improve functional tasks  2. Pt will improve R hip MMTs to 3+/5 grossly to improve strength for functional activities     Long Term Goals: 12 visits   1. Pt will improve R hamstring flexibility to = left lower extremity for improved functional movement and reduced pain with standing activities   2. Pt will improve FOTO score to </= 39% limitation to reduce perceived pain with mobility   3. Pt will report ability to sleep >4 nights/week without waking up with pain     Plan     Continue per POC, progress as tolerated    Peri Acosta, PT

## 2022-02-25 ENCOUNTER — CLINICAL SUPPORT (OUTPATIENT)
Dept: REHABILITATION | Facility: HOSPITAL | Age: 57
End: 2022-02-25
Payer: COMMERCIAL

## 2022-02-25 DIAGNOSIS — R29.898 WEAKNESS OF RIGHT HIP: Primary | ICD-10-CM

## 2022-02-25 DIAGNOSIS — R26.89 ANTALGIC GAIT: ICD-10-CM

## 2022-02-25 DIAGNOSIS — M25.559 HIP PAIN: ICD-10-CM

## 2022-02-25 PROCEDURE — 97110 THERAPEUTIC EXERCISES: CPT

## 2022-02-25 NOTE — PROGRESS NOTES
"  Physical Therapy Daily Treatment Note     Name: Nora Castillo [facility] Number: [id number]    Therapy Diagnosis:   Encounter Diagnoses   Name Primary?    Weakness of right hip Yes    Antalgic gait     Hip pain      Physician: Madhu Sanchez MD    Visit Date: 2/25/2022    Physician Orders: PT Eval and Treat   Medical Diagnosis from Referral:    M25.559 (ICD-10-CM) - Hip pain   M19.90 (ICD-10-CM) - Arthritis   Evaluation Date: 1/18/2022  Authorization Period Expiration: 12/31/22  Plan of Care Expiration: 4/18/22  Progress Note Due: 2/18/22  Visit # / Visits authorized: 1/1, 6/20  FOTO: 0    Time In: 7:45 am   Time Out: 8:30 am   Total Billable Time: 45 minutes    Precautions: Standard    Subjective     Pt reports: that her hip has continued to be significantly better. She feels like she is 80% better. She states that she is still stiff after sitting for long periods of time and the sharp pain only happens occasionally. She plans to return to the gym this week.  She was compliant with home exercise program.  Response to previous treatment: soreness  Functional change: none    Pain: 3/10 (R) anterior/lateral thigh       Objective     Nora received the following manual therapy techniques: Soft tissue Mobilization were applied to the:  for 0 minutes, including:  Roller stick to (R) quad     Nora received the treatments listed below:      therapeutic exercises to develop strength, endurance, ROM, flexibility, posture and core stabilization for 45 minutes including:  LIN stretch 3x30s - not performed   Nustep 5' (for seated hip ROM and strengthening)  Hamstring stretch 3x30s NP  Hip flexor stretch off mat c/ strap 3x30"   Bridges 2x10 c/ bosu, 3"   SL clams RTB 2x10 3" hold  VMO extensions 2x10, 5"  SLR boxes 10x BLE  Sidelying hip abduction 2x10 B  -Monster walks 3 laps red theraband NP  Shuttle DLP 2x10 2 cords  Seated Hip Abduction 30# 2/10  Seated hip adduction 30# 2x10    FOTO: 14%        Home Exercises " Provided and Patient Education Provided     Education provided:   - HEP     Written Home Exercises Provided: Patient instructed to cont prior HEP.  Exercises were reviewed and Nora was able to demonstrate them prior to the end of the session.  Nora demonstrated good  understanding of the education provided.     See EMR under Patient Instructions for exercises provided 2/21/2022.      Assessment     Pt tolerated progressions of exercises well today with no adverse effects. Emphasis on hip strength and stability with good tolerance and appropriate muscular fatigue. FOTO scores indicate significant improvement from initial evaluation. Pt will be returning to the gym to perform LE strengthening exercises with HEP from today to be done prior to gym exercises. Plan to discuss discharge planning at next session pending pt response and PT availability.   Nora Is progressing well towards her goals.   Pt prognosis is Good.     Pt will continue to benefit from skilled outpatient physical therapy to address the deficits listed in the problem list box on initial evaluation, provide pt/family education and to maximize pt's level of independence in the home and community environment.     Pt's spiritual, cultural and educational needs considered and pt agreeable to plan of care and goals.    Anticipated Barriers for therapy: pain     Goals:   Short Term Goals: 6 visits  1. Pt will be compliant /c HEP to supplement PT in order to improve functional tasks  2. Pt will improve R hip MMTs to 3+/5 grossly to improve strength for functional activities     Long Term Goals: 12 visits   1. Pt will improve R hamstring flexibility to = left lower extremity for improved functional movement and reduced pain with standing activities   2. Pt will improve FOTO score to </= 39% limitation to reduce perceived pain with mobility   3. Pt will report ability to sleep >4 nights/week without waking up with pain     Plan     Continue per POC, progress  as tolerated    Ben Ernandez, PT

## 2022-02-28 ENCOUNTER — DOCUMENTATION ONLY (OUTPATIENT)
Dept: REHABILITATION | Facility: HOSPITAL | Age: 57
End: 2022-02-28
Payer: COMMERCIAL

## 2022-02-28 NOTE — PROGRESS NOTES
PT/PTA met face to face to discuss pt's treatment plan and progress towards established goals. Pt will be seen by a physical therapist minimally every 6th visit or every 30 days.    Cintia De Los Santos PTA

## 2022-03-03 NOTE — PROGRESS NOTES
"  Physical Therapy Daily Treatment Note     Name: Nora Castillo [facility] Number: [id number]    Therapy Diagnosis:   Encounter Diagnoses   Name Primary?    Weakness of right hip Yes    Antalgic gait     Hip pain      Physician: Madhu Sanchez MD    Visit Date: 3/4/2022    Physician Orders: PT Eval and Treat   Medical Diagnosis from Referral:    M25.559 (ICD-10-CM) - Hip pain   M19.90 (ICD-10-CM) - Arthritis   Evaluation Date: 1/18/2022  Authorization Period Expiration: 12/31/22  Plan of Care Expiration: 4/18/22  Progress Note Due: 2/18/22  Visit # / Visits authorized: 1/1, 7/20  FOTO: 1    Time In: 8:03 am   Time Out: 8:43 am   Total Billable Time: 40 minutes    Precautions: Standard    Subjective     Pt reports: that she was feeling good until she did some gardening with her mom, during which she was in a flexed position at her trunk and when she stood up she felt a sharp "strum" across her muscles and it has been sore and tender ever since then.   She was compliant with home exercise program.  Response to previous treatment: soreness  Functional change: none    Pain: 3/10 (R) anterior/lateral thigh       Objective     Nora received the following manual therapy techniques: Soft tissue Mobilization were applied to the:  for 0 minutes, including:  Roller stick to (R) quad     Nora received the treatments listed below:      therapeutic exercises to develop strength, endurance, ROM, flexibility, posture and core stabilization for 12 minutes including:  LIN stretch 3x30s - not performed   Nustep 10' (for seated hip ROM and strengthening)  Hip flexor stretch off mat c/ strap 3x30"     Hamstring stretch 3x30s NP  Bridges 2x10 c/ bosu, 3"   SL clams RTB 2x10 3" hold  VMO extensions 2x10, 5"  SLR boxes 10x BLE  Sidelying hip abduction 2x10 B  -Monster walks 3 laps red theraband NP  Shuttle DLP 2x10 2 cords  Seated Hip Abduction 30# 2/10  Seated hip adduction 30# 2x10    FOTO: 14%      hot pack for 5 minutes to " right LE.        Home Exercises Provided and Patient Education Provided     Education provided:   - HEP     Written Home Exercises Provided: Patient instructed to cont prior HEP.  Exercises were reviewed and Nora was able to demonstrate them prior to the end of the session.  Nora demonstrated good  understanding of the education provided.     See EMR under Patient Instructions for exercises provided 2/21/2022.      Assessment     Dry needling performed by Alvaro Esparza DPT during session after pt's subjective reports of flare up of original pain. Pt performed Nu-step for ROM, endurance and strength. Pt given minimal cues for pacing. Moist heat applied at the end of session to decrease muscle tension. Spoke with patient about possibility of discharging, however due to recent flare up, will hold PT for another week and have patient return the following week to assess pt's progress.     Nora Is progressing well towards her goals.   Pt prognosis is Good.     Pt will continue to benefit from skilled outpatient physical therapy to address the deficits listed in the problem list box on initial evaluation, provide pt/family education and to maximize pt's level of independence in the home and community environment.     Pt's spiritual, cultural and educational needs considered and pt agreeable to plan of care and goals.    Anticipated Barriers for therapy: pain     Goals:   Short Term Goals: 6 visits  1. Pt will be compliant /c HEP to supplement PT in order to improve functional tasks  2. Pt will improve R hip MMTs to 3+/5 grossly to improve strength for functional activities     Long Term Goals: 12 visits   1. Pt will improve R hamstring flexibility to = left lower extremity for improved functional movement and reduced pain with standing activities   2. Pt will improve FOTO score to </= 39% limitation to reduce perceived pain with mobility   3. Pt will report ability to sleep >4 nights/week without waking up with pain      Plan     Continue per POC, progress as tolerated    Cintia De Los Santos, MICHAEL

## 2022-03-04 ENCOUNTER — CLINICAL SUPPORT (OUTPATIENT)
Dept: REHABILITATION | Facility: HOSPITAL | Age: 57
End: 2022-03-04
Payer: COMMERCIAL

## 2022-03-04 DIAGNOSIS — M25.559 HIP PAIN: ICD-10-CM

## 2022-03-04 DIAGNOSIS — R26.89 ANTALGIC GAIT: ICD-10-CM

## 2022-03-04 DIAGNOSIS — R29.898 WEAKNESS OF RIGHT HIP: Primary | ICD-10-CM

## 2022-03-04 PROCEDURE — 97110 THERAPEUTIC EXERCISES: CPT | Mod: CQ

## 2022-03-28 ENCOUNTER — PATIENT MESSAGE (OUTPATIENT)
Dept: PSYCHIATRY | Facility: CLINIC | Age: 57
End: 2022-03-28
Payer: COMMERCIAL

## 2022-03-28 DIAGNOSIS — F41.9 ANXIETY: ICD-10-CM

## 2022-03-28 DIAGNOSIS — F33.41 RECURRENT MAJOR DEPRESSION IN PARTIAL REMISSION: ICD-10-CM

## 2022-03-28 RX ORDER — FLUOXETINE HYDROCHLORIDE 40 MG/1
CAPSULE ORAL
Qty: 30 CAPSULE | Refills: 0 | Status: SHIPPED | OUTPATIENT
Start: 2022-03-28 | End: 2022-06-23 | Stop reason: SDUPTHER

## 2022-05-31 ENCOUNTER — OFFICE VISIT (OUTPATIENT)
Dept: URGENT CARE | Facility: CLINIC | Age: 57
End: 2022-05-31
Payer: COMMERCIAL

## 2022-05-31 VITALS
HEART RATE: 79 BPM | DIASTOLIC BLOOD PRESSURE: 75 MMHG | TEMPERATURE: 99 F | HEIGHT: 67 IN | SYSTOLIC BLOOD PRESSURE: 118 MMHG | WEIGHT: 161 LBS | BODY MASS INDEX: 25.27 KG/M2 | OXYGEN SATURATION: 98 %

## 2022-05-31 DIAGNOSIS — N30.01 ACUTE CYSTITIS WITH HEMATURIA: Primary | ICD-10-CM

## 2022-05-31 DIAGNOSIS — R30.0 DYSURIA: ICD-10-CM

## 2022-05-31 LAB
BILIRUB UR QL STRIP: NEGATIVE
CLARITY UR: CLEAR
COLOR UR: ABNORMAL
GLUCOSE UR QL STRIP: NEGATIVE
KETONES UR QL STRIP: NEGATIVE
LEUKOCYTE ESTERASE UR QL STRIP: POSITIVE
PH, POC UA: 6.5 (ref 5–8)
POC BLOOD, URINE: POSITIVE
POC NITRATES, URINE: POSITIVE
PROT UR QL STRIP: NEGATIVE
SP GR UR STRIP: 1.01 (ref 1–1.03)
UROBILINOGEN UR STRIP-ACNC: ABNORMAL (ref 0.1–1.1)

## 2022-05-31 PROCEDURE — 3078F PR MOST RECENT DIASTOLIC BLOOD PRESSURE < 80 MM HG: ICD-10-PCS | Mod: CPTII,S$GLB,, | Performed by: PHYSICIAN ASSISTANT

## 2022-05-31 PROCEDURE — 3008F PR BODY MASS INDEX (BMI) DOCUMENTED: ICD-10-PCS | Mod: CPTII,S$GLB,, | Performed by: PHYSICIAN ASSISTANT

## 2022-05-31 PROCEDURE — 1159F MED LIST DOCD IN RCRD: CPT | Mod: CPTII,S$GLB,, | Performed by: PHYSICIAN ASSISTANT

## 2022-05-31 PROCEDURE — 1160F PR REVIEW ALL MEDS BY PRESCRIBER/CLIN PHARMACIST DOCUMENTED: ICD-10-PCS | Mod: CPTII,S$GLB,, | Performed by: PHYSICIAN ASSISTANT

## 2022-05-31 PROCEDURE — 81003 URINALYSIS AUTO W/O SCOPE: CPT | Mod: QW,S$GLB,, | Performed by: PHYSICIAN ASSISTANT

## 2022-05-31 PROCEDURE — 3074F PR MOST RECENT SYSTOLIC BLOOD PRESSURE < 130 MM HG: ICD-10-PCS | Mod: CPTII,S$GLB,, | Performed by: PHYSICIAN ASSISTANT

## 2022-05-31 PROCEDURE — 99213 OFFICE O/P EST LOW 20 MIN: CPT | Mod: S$GLB,,, | Performed by: PHYSICIAN ASSISTANT

## 2022-05-31 PROCEDURE — 3074F SYST BP LT 130 MM HG: CPT | Mod: CPTII,S$GLB,, | Performed by: PHYSICIAN ASSISTANT

## 2022-05-31 PROCEDURE — 3078F DIAST BP <80 MM HG: CPT | Mod: CPTII,S$GLB,, | Performed by: PHYSICIAN ASSISTANT

## 2022-05-31 PROCEDURE — 1160F RVW MEDS BY RX/DR IN RCRD: CPT | Mod: CPTII,S$GLB,, | Performed by: PHYSICIAN ASSISTANT

## 2022-05-31 PROCEDURE — 99213 PR OFFICE/OUTPT VISIT, EST, LEVL III, 20-29 MIN: ICD-10-PCS | Mod: S$GLB,,, | Performed by: PHYSICIAN ASSISTANT

## 2022-05-31 PROCEDURE — 1159F PR MEDICATION LIST DOCUMENTED IN MEDICAL RECORD: ICD-10-PCS | Mod: CPTII,S$GLB,, | Performed by: PHYSICIAN ASSISTANT

## 2022-05-31 PROCEDURE — 81003 POCT URINALYSIS, DIPSTICK, AUTOMATED, W/O SCOPE: ICD-10-PCS | Mod: QW,S$GLB,, | Performed by: PHYSICIAN ASSISTANT

## 2022-05-31 PROCEDURE — 3008F BODY MASS INDEX DOCD: CPT | Mod: CPTII,S$GLB,, | Performed by: PHYSICIAN ASSISTANT

## 2022-05-31 RX ORDER — NITROFURANTOIN 25; 75 MG/1; MG/1
100 CAPSULE ORAL 2 TIMES DAILY
Qty: 10 CAPSULE | Refills: 0 | Status: SHIPPED | OUTPATIENT
Start: 2022-05-31 | End: 2022-06-05

## 2022-05-31 NOTE — PATIENT INSTRUCTIONS
PLEASE READ YOUR DISCHARGE INSTRUCTIONS ENTIRELY AS IT CONTAINS IMPORTANT INFORMATION.      Take the antibiotics to completion.     Drink plenty of fluids, wipe front to back, take showers not baths, no scented soaps, wear breathable cotton underwear, urinate after sexual intercourse.     A urine culture was sent. You will be contacted once it results and appropriate action will be taken if needed.     Take the pyridium three times a day with meals. It will turn your urine orange. You do not need to take the whole prescription you can stop this once the pain is better and finish out the antibiotics    Please go to the ER for worsening symptoms including fever, worsening flank pain, vomiting, etc.       Please return or see your primary care doctor if you develop new or worsening symptoms.     Please arrange follow up with your primary medical clinic as soon as possible. You must understand that you've received an Urgent Care treatment only and that you may be released before all of your medical problems are known or treated. You, the patient, will arrange for follow up as instructed. If your symptoms worsen or fail to improve you should go to the Emergency Room.  WE CANNOT RULE OUT ALL POSSIBLE CAUSES OF YOUR SYMPTOMS IN THE URGENT CARE SETTING PLEASE GO TO THE ER IF YOU FEELS YOUR CONDITION IS WORSENING OR YOU WOULD LIKE EMERGENT EVALUATION.      Please follow up with your Primary care provider within 2-5 days if your signs and symptoms have not resolved or worsen.     If your condition worsens or fails to improve we recommend that you receive another evaluation at the emergency room immediately or contact your primary medical clinic to discuss your concerns.   You must understand that you have received an Urgent Care treatment only and that you may be released before all of your medical problems are known or treated. You, the patient, will arrange for follow up care as instructed.     RED FLAGS/WARNING SYMPTOMS  DISCUSSED WITH PATIENT THAT WOULD WARRANT EMERGENT MEDICAL ATTENTION. PATIENT VERBALIZED UNDERSTANDING.

## 2022-05-31 NOTE — PROGRESS NOTES
"Subjective:       Patient ID: Nora Mccoy is a 57 y.o. female.    Vitals:  height is 5' 7" (1.702 m) and weight is 73 kg (161 lb). Her temperature is 99.1 °F (37.3 °C). Her blood pressure is 118/75 and her pulse is 79. Her oxygen saturation is 98%.     Chief Complaint: Urinary Tract Infection    Patient presents to urgent care today for evaluation of dysuria, urinary frequency and urgency that started 2-3 days ago. Patient has not tried any OTC medications for symptom relief. Afebrile. She reports suprapubic pressure old denies any pain.  Denies any nausea or vomiting.  Denies any back or flank pain.    Urinary Tract Infection   This is a new problem. The current episode started in the past 7 days. The problem occurs every urination. The problem has been gradually worsening. The quality of the pain is described as burning. There has been no fever. Associated symptoms include frequency and urgency. Pertinent negatives include no behavior changes, chills, discharge, flank pain, hematuria, hesitancy, nausea, possible pregnancy, sweats, vomiting, weight loss, bubble bath use, constipation, rash or withholding. She has tried nothing for the symptoms. The treatment provided no relief. Her past medical history is significant for recurrent UTIs. There is no history of catheterization, diabetes insipidus, diabetes mellitus, genitourinary reflux, hypertension, kidney stones, a single kidney, STD, urinary stasis or a urological procedure.       Constitution: Negative for chills and fever.   HENT: Negative for congestion and sore throat.    Cardiovascular: Negative for chest pain.   Respiratory: Negative for cough and shortness of breath.    Gastrointestinal: Positive for abdominal pain (suprapubic pressure). Negative for nausea, vomiting and constipation.   Genitourinary: Positive for dysuria, frequency and urgency. Negative for flank pain and hematuria.   Musculoskeletal: Negative for muscle ache.   Skin: " Negative for rash.   Neurological: Negative for headaches.       Objective:      Physical Exam   Constitutional: She is oriented to person, place, and time. She appears well-developed.  Non-toxic appearance. She does not appear ill. No distress.   HENT:   Head: Normocephalic and atraumatic.   Ears:   Right Ear: External ear normal.   Left Ear: External ear normal.   Nose: Nose normal.   Mouth/Throat: Mucous membranes are normal.   Eyes: Conjunctivae and lids are normal. Right eye exhibits no discharge. Left eye exhibits no discharge. No scleral icterus.   Neck: Trachea normal. Neck supple.   Cardiovascular: Normal rate, regular rhythm and normal heart sounds.   No murmur heard.Exam reveals no gallop and no friction rub.   Pulmonary/Chest: Effort normal and breath sounds normal. No stridor. No respiratory distress. She has no wheezes. She has no rhonchi. She has no rales.   Abdominal: Normal appearance and bowel sounds are normal. She exhibits no distension, no abdominal bruit, no pulsatile midline mass and no mass. Soft. There is abdominal tenderness (mild suprapubic TTP; no guarding or rebound TTP). There is no rebound, no guarding, no left CVA tenderness and no right CVA tenderness.      Comments: Abdomen is scaphoid without any ecchymosis, erythema or lesions. Abdomen is soft to palpation without any distention or crepitus.  No organomegaly noted. Mild suprapubic TTP noted. No guarding or acute abdomen.  No CVA tenderness bilaterally.       Musculoskeletal: Normal range of motion.         General: Normal range of motion.   Neurological: She is alert and oriented to person, place, and time. She has normal strength.   Skin: Skin is warm, dry, intact, not diaphoretic and not pale.   Psychiatric: Her speech is normal and behavior is normal. Judgment and thought content normal.   Nursing note and vitals reviewed.        Results for orders placed or performed in visit on 05/31/22   POCT Urinalysis, Dipstick, Automated,  W/O Scope   Result Value Ref Range    POC Blood, Urine Positive (A) Negative    POC Bilirubin, Urine Negative Negative    POC Urobilinogen, Urine Norm 0.1 - 1.1    POC Ketones, Urine Negative Negative    POC Protein, Urine Negative Negative    POC Nitrates, Urine Positive (A) Negative    POC Glucose, Urine Negative Negative    pH, UA 6.5 5 - 8    POC Specific Gravity, Urine 1.015 1.003 - 1.029    POC Leukocytes, Urine Positive (A) Negative    Color, UA Light Yellow Light Yellow, Yellow    Clarity, UA Clear Clear       Assessment:       1. Acute cystitis with hematuria    2. Dysuria        Plan:     Urinalysis positive at time of visit.  Discussed results with patient and that they require antibiotic treatment for resolution of urinary tract infection. Discussed warning signs for any worsening abdominal pain, nausea/vomiting that is intractable, or fever, that these are reasons to go to the emergency room.  Increase oral intake of fluids to maintain good hydration and flush out bacteria. For any persistent symptoms, please follow-up with your PCP.  Patient verbalized understanding all of their questions answered.      Acute cystitis with hematuria  -     nitrofurantoin, macrocrystal-monohydrate, (MACROBID) 100 MG capsule; Take 1 capsule (100 mg total) by mouth 2 (two) times daily. for 5 days  Dispense: 10 capsule; Refill: 0    Dysuria  -     POCT Urinalysis, Dipstick, Automated, W/O Scope      Patient Instructions   PLEASE READ YOUR DISCHARGE INSTRUCTIONS ENTIRELY AS IT CONTAINS IMPORTANT INFORMATION.      Take the antibiotics to completion.     Drink plenty of fluids, wipe front to back, take showers not baths, no scented soaps, wear breathable cotton underwear, urinate after sexual intercourse.     A urine culture was sent. You will be contacted once it results and appropriate action will be taken if needed.     Take the pyridium three times a day with meals. It will turn your urine orange. You do not need to take  the whole prescription you can stop this once the pain is better and finish out the antibiotics    Please go to the ER for worsening symptoms including fever, worsening flank pain, vomiting, etc.       Please return or see your primary care doctor if you develop new or worsening symptoms.     Please arrange follow up with your primary medical clinic as soon as possible. You must understand that you've received an Urgent Care treatment only and that you may be released before all of your medical problems are known or treated. You, the patient, will arrange for follow up as instructed. If your symptoms worsen or fail to improve you should go to the Emergency Room.  WE CANNOT RULE OUT ALL POSSIBLE CAUSES OF YOUR SYMPTOMS IN THE URGENT CARE SETTING PLEASE GO TO THE ER IF YOU FEELS YOUR CONDITION IS WORSENING OR YOU WOULD LIKE EMERGENT EVALUATION.      Please follow up with your Primary care provider within 2-5 days if your signs and symptoms have not resolved or worsen.     If your condition worsens or fails to improve we recommend that you receive another evaluation at the emergency room immediately or contact your primary medical clinic to discuss your concerns.   You must understand that you have received an Urgent Care treatment only and that you may be released before all of your medical problems are known or treated. You, the patient, will arrange for follow up care as instructed.     RED FLAGS/WARNING SYMPTOMS DISCUSSED WITH PATIENT THAT WOULD WARRANT EMERGENT MEDICAL ATTENTION. PATIENT VERBALIZED UNDERSTANDING.

## 2022-06-23 ENCOUNTER — OFFICE VISIT (OUTPATIENT)
Dept: PSYCHIATRY | Facility: CLINIC | Age: 57
End: 2022-06-23
Payer: COMMERCIAL

## 2022-06-23 DIAGNOSIS — F33.42 RECURRENT MAJOR DEPRESSIVE DISORDER, IN FULL REMISSION: Primary | ICD-10-CM

## 2022-06-23 DIAGNOSIS — G47.00 INSOMNIA, UNSPECIFIED TYPE: ICD-10-CM

## 2022-06-23 DIAGNOSIS — F41.9 ANXIETY: ICD-10-CM

## 2022-06-23 PROCEDURE — 99214 OFFICE O/P EST MOD 30 MIN: CPT | Mod: 95,,, | Performed by: NURSE PRACTITIONER

## 2022-06-23 PROCEDURE — 1160F PR REVIEW ALL MEDS BY PRESCRIBER/CLIN PHARMACIST DOCUMENTED: ICD-10-PCS | Mod: CPTII,95,, | Performed by: NURSE PRACTITIONER

## 2022-06-23 PROCEDURE — 1160F RVW MEDS BY RX/DR IN RCRD: CPT | Mod: CPTII,95,, | Performed by: NURSE PRACTITIONER

## 2022-06-23 PROCEDURE — 99214 PR OFFICE/OUTPT VISIT, EST, LEVL IV, 30-39 MIN: ICD-10-PCS | Mod: 95,,, | Performed by: NURSE PRACTITIONER

## 2022-06-23 PROCEDURE — 1159F MED LIST DOCD IN RCRD: CPT | Mod: CPTII,95,, | Performed by: NURSE PRACTITIONER

## 2022-06-23 PROCEDURE — 1159F PR MEDICATION LIST DOCUMENTED IN MEDICAL RECORD: ICD-10-PCS | Mod: CPTII,95,, | Performed by: NURSE PRACTITIONER

## 2022-06-23 RX ORDER — FLUOXETINE HYDROCHLORIDE 40 MG/1
CAPSULE ORAL
Qty: 90 CAPSULE | Refills: 0 | Status: SHIPPED | OUTPATIENT
Start: 2022-06-23 | End: 2022-11-07 | Stop reason: SDUPTHER

## 2022-06-23 RX ORDER — LORAZEPAM 0.5 MG/1
0.5 TABLET ORAL DAILY PRN
Qty: 30 TABLET | Refills: 0 | Status: SHIPPED | OUTPATIENT
Start: 2022-06-23 | End: 2023-04-03 | Stop reason: SDUPTHER

## 2022-06-23 RX ORDER — TRAZODONE HYDROCHLORIDE 50 MG/1
100 TABLET ORAL NIGHTLY PRN
Qty: 180 TABLET | Refills: 0 | Status: SHIPPED | OUTPATIENT
Start: 2022-06-23 | End: 2022-12-12 | Stop reason: SDUPTHER

## 2022-06-23 RX ORDER — FLUOXETINE HYDROCHLORIDE 20 MG/1
CAPSULE ORAL
Qty: 90 CAPSULE | Refills: 0 | Status: SHIPPED | OUTPATIENT
Start: 2022-06-23 | End: 2022-09-22
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
## 2022-07-29 DIAGNOSIS — R09.81 CONGESTION OF NASAL SINUS: Primary | ICD-10-CM

## 2022-07-29 LAB
CTP QC/QA: YES
SARS-COV-2 AG RESP QL IA.RAPID: POSITIVE

## 2022-08-26 ENCOUNTER — PATIENT MESSAGE (OUTPATIENT)
Dept: DERMATOLOGY | Facility: CLINIC | Age: 57
End: 2022-08-26
Payer: COMMERCIAL

## 2022-10-06 ENCOUNTER — PATIENT MESSAGE (OUTPATIENT)
Dept: INTERNAL MEDICINE | Facility: CLINIC | Age: 57
End: 2022-10-06
Payer: COMMERCIAL

## 2022-10-06 DIAGNOSIS — Z12.31 VISIT FOR SCREENING MAMMOGRAM: Primary | ICD-10-CM

## 2022-11-07 DIAGNOSIS — F41.9 ANXIETY: ICD-10-CM

## 2022-11-07 DIAGNOSIS — F33.42 RECURRENT MAJOR DEPRESSIVE DISORDER, IN FULL REMISSION: ICD-10-CM

## 2022-11-09 RX ORDER — FLUOXETINE HYDROCHLORIDE 20 MG/1
CAPSULE ORAL
Qty: 20 CAPSULE | Refills: 0 | Status: SHIPPED | OUTPATIENT
Start: 2022-11-09 | End: 2022-12-12

## 2022-11-09 RX ORDER — FLUOXETINE HYDROCHLORIDE 40 MG/1
CAPSULE ORAL
Qty: 20 CAPSULE | Refills: 0 | Status: SHIPPED | OUTPATIENT
Start: 2022-11-09 | End: 2022-12-12

## 2022-11-17 ENCOUNTER — PROCEDURE VISIT (OUTPATIENT)
Dept: DERMATOLOGY | Facility: CLINIC | Age: 57
End: 2022-11-17

## 2022-11-17 DIAGNOSIS — Z41.1 ELECTIVE PROCEDURE FOR UNACCEPTABLE COSMETIC APPEARANCE: Primary | ICD-10-CM

## 2022-11-17 DIAGNOSIS — F41.9 ANXIETY: ICD-10-CM

## 2022-11-17 DIAGNOSIS — F33.42 RECURRENT MAJOR DEPRESSIVE DISORDER, IN FULL REMISSION: ICD-10-CM

## 2022-11-17 PROCEDURE — 99499 UNLISTED E&M SERVICE: CPT | Mod: S$GLB,,, | Performed by: DERMATOLOGY

## 2022-11-17 PROCEDURE — 99499 NO LOS: ICD-10-PCS | Mod: S$GLB,,, | Performed by: DERMATOLOGY

## 2022-11-17 RX ORDER — FLUOXETINE HYDROCHLORIDE 40 MG/1
CAPSULE ORAL
Qty: 20 CAPSULE | Refills: 0 | OUTPATIENT
Start: 2022-11-17

## 2022-11-17 NOTE — PROGRESS NOTES
Pt here for repeat botox treatment. Has had written consent signed in the past.    Pt denies any new medical problems or medications. Pt denies current pregnancy.   Risks reviewed including headache, pain, infection, bleeding, bruising, eyebrow droop, eyelid droop, asymmetry, inability to close eye temporarily. Pt understands and would like to have treatment.  16 units placed in glabella, 0 units placed in forehead, 12 units placed in crows feet.   No complications.    Post procedure handout provided.    Botox lot number [id number]  Expiration date 5/2025

## 2022-12-02 DIAGNOSIS — F41.9 ANXIETY: ICD-10-CM

## 2022-12-02 DIAGNOSIS — F33.42 RECURRENT MAJOR DEPRESSIVE DISORDER, IN FULL REMISSION: ICD-10-CM

## 2022-12-06 ENCOUNTER — HOSPITAL ENCOUNTER (OUTPATIENT)
Dept: RADIOLOGY | Facility: HOSPITAL | Age: 57
Discharge: HOME | End: 2022-12-06
Attending: INTERNAL MEDICINE
Payer: COMMERCIAL

## 2022-12-06 VITALS — BODY MASS INDEX: 25.9 KG/M2 | WEIGHT: 165 LBS | HEIGHT: 67 IN

## 2022-12-06 DIAGNOSIS — Z12.31 VISIT FOR SCREENING MAMMOGRAM: ICD-10-CM

## 2022-12-06 PROCEDURE — 77063 BREAST TOMOSYNTHESIS BI: CPT | Mod: TC

## 2022-12-06 PROCEDURE — 77067 MAMMO DIGITAL SCREENING BILAT WITH TOMO: ICD-10-PCS | Mod: 26,,, | Performed by: RADIOLOGY

## 2022-12-06 PROCEDURE — 77063 BREAST TOMOSYNTHESIS BI: CPT | Mod: 26,,, | Performed by: RADIOLOGY

## 2022-12-06 PROCEDURE — 77067 SCR MAMMO BI INCL CAD: CPT | Mod: 26,,, | Performed by: RADIOLOGY

## 2022-12-06 PROCEDURE — 77067 SCR MAMMO BI INCL CAD: CPT | Mod: TC

## 2022-12-06 PROCEDURE — 77063 MAMMO DIGITAL SCREENING BILAT WITH TOMO: ICD-10-PCS | Mod: 26,,, | Performed by: RADIOLOGY

## 2022-12-07 RX ORDER — FLUOXETINE HYDROCHLORIDE 40 MG/1
CAPSULE ORAL
Qty: 20 CAPSULE | Refills: 0 | OUTPATIENT
Start: 2022-12-07

## 2022-12-12 ENCOUNTER — TELEPHONE (OUTPATIENT)
Dept: INTERNAL MEDICINE | Facility: CLINIC | Age: 57
End: 2022-12-12
Payer: COMMERCIAL

## 2022-12-12 ENCOUNTER — OFFICE VISIT (OUTPATIENT)
Dept: PSYCHIATRY | Facility: CLINIC | Age: 57
End: 2022-12-12
Payer: COMMERCIAL

## 2022-12-12 DIAGNOSIS — G47.00 INSOMNIA, UNSPECIFIED TYPE: ICD-10-CM

## 2022-12-12 DIAGNOSIS — F33.42 RECURRENT MAJOR DEPRESSIVE DISORDER, IN FULL REMISSION: Primary | ICD-10-CM

## 2022-12-12 DIAGNOSIS — F41.9 ANXIETY: ICD-10-CM

## 2022-12-12 PROCEDURE — 1160F PR REVIEW ALL MEDS BY PRESCRIBER/CLIN PHARMACIST DOCUMENTED: ICD-10-PCS | Mod: CPTII,95,, | Performed by: NURSE PRACTITIONER

## 2022-12-12 PROCEDURE — 1159F PR MEDICATION LIST DOCUMENTED IN MEDICAL RECORD: ICD-10-PCS | Mod: CPTII,95,, | Performed by: NURSE PRACTITIONER

## 2022-12-12 PROCEDURE — 1160F RVW MEDS BY RX/DR IN RCRD: CPT | Mod: CPTII,95,, | Performed by: NURSE PRACTITIONER

## 2022-12-12 PROCEDURE — 99214 PR OFFICE/OUTPT VISIT, EST, LEVL IV, 30-39 MIN: ICD-10-PCS | Mod: 95,,, | Performed by: NURSE PRACTITIONER

## 2022-12-12 PROCEDURE — 1159F MED LIST DOCD IN RCRD: CPT | Mod: CPTII,95,, | Performed by: NURSE PRACTITIONER

## 2022-12-12 PROCEDURE — 99214 OFFICE O/P EST MOD 30 MIN: CPT | Mod: 95,,, | Performed by: NURSE PRACTITIONER

## 2022-12-12 RX ORDER — TRAZODONE HYDROCHLORIDE 50 MG/1
100 TABLET ORAL NIGHTLY PRN
Qty: 180 TABLET | Refills: 1 | Status: SHIPPED | OUTPATIENT
Start: 2022-12-12 | End: 2023-09-06 | Stop reason: SDUPTHER

## 2022-12-12 RX ORDER — FLUOXETINE HYDROCHLORIDE 20 MG/1
CAPSULE ORAL
Qty: 90 CAPSULE | Refills: 1 | Status: SHIPPED | OUTPATIENT
Start: 2022-12-12 | End: 2023-07-26 | Stop reason: SDUPTHER

## 2022-12-12 RX ORDER — FLUOXETINE HYDROCHLORIDE 40 MG/1
CAPSULE ORAL
Qty: 90 CAPSULE | Refills: 1 | Status: SHIPPED | OUTPATIENT
Start: 2022-12-12 | End: 2023-07-26 | Stop reason: SDUPTHER

## 2022-12-12 NOTE — PROGRESS NOTES
"  12/12/2022 2:13 PM  Nora Mccoy  1965  [id number]    Outpatient Psychiatry Follow-Up Visit (MD/NP) - Telemedicine Visit      The patient location is: Patient reported that her location at the time of this visit was in the [facility]   Address: documented in [facility]      Visit type: audiovisual      Each patient to whom he or she provides medical services by telemedicine is:  (1) informed of the relationship between the physician and patient and the respective role of any other health care provider with respect to management of the patient; and (2) notified that he or she may decline to receive medical services by telemedicine and may withdraw from such care at any time.      Clinical Status of Patient:  Outpatient (Ambulatory)    Chief Complaint:  Nora Mccoy, a 57 y.o. female,who presents today for follow up of depression and anxiety.  Met with patient.      Interim Events/Subjective Report/Content of Current Session:     Today the pt states that her sxs of depression continue to be well controlled with her current med regimen. States she has normal ups and downs but states that overall, she feels "leveled out". She denies anhedonia and hopelessness.  Uses Ativan prn once or twice a month for anxiety.  was queried. Reports that her anxiety is under good control.  She notes that for the past couple of weeks she has been waking a couple of times during the night. She'll wake around 1 am and then again around 4 am. She states she will try taking 100 mg of trazodone to see if that helps.    She and her family are now back in their home after a tree fell on it.  Work has been hectic. 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  She hosted Thanksgiving at her house, and she is looking forward to spending Malinda with her family at her sister's home.    Pt denies recurrent thoughts of death and denies SI/HI. " Denies any sxs of alex. Denies AVH, paranoia and delusions. No objective s/sx of psychosis or alex. Denies any ASE from her psych meds.        Psychotherapy:  Target symptoms: depression, anxiety   Why chosen therapy is appropriate versus another modality: relevant to diagnosis, patient responds to this modality  Outcome monitoring methods: self-report, observation  Therapeutic intervention type: supportive psychotherapy  Topics discussed/themes: work stress, building skills sets for symptom management  The patient's response to the intervention is accepting. The patient's progress toward treatment goals is good.   Duration of intervention: 4 minutes.      Psychotropic medication review  Previous Trials-  None    Current meds-  Prozac  Trazodone  Ativan      Review of Systems       Review of Systems   Constitutional:  Negative for chills, fever and malaise/fatigue.   Respiratory:  Negative for shortness of breath and wheezing.    Cardiovascular:  Negative for chest pain and palpitations.   Musculoskeletal:  Negative for falls and myalgias.   Skin:  Negative for rash.   Neurological:  Negative for tremors, seizures and headaches.   Psychiatric/Behavioral:          See HPI       Past Medical, Family and Social History: The patient's past medical, family and social history have been reviewed and updated as appropriate within the electronic medical record - see encounter notes.    Compliance: yes    Side effects: None    Risk Parameters:  Patient reports no suicidal ideation  Patient reports no homicidal ideation  Patient reports no self-injurious behavior  Patient reports no violent behavior    Exam (detailed: at least 9 elements; comprehensive: all 15 elements)   Constitutional  Vitals:  Most recent vital signs, dated greater than 90 days prior to this appointment, were reviewed.   There were no vitals filed for this visit.     General:  unremarkable, age appropriate, normal weight, well nourished, casually dressed,  neatly groomed     Musculoskeletal  Muscle Strength/Tone:  LETICIA due to virtual visit   Gait & Station:  LETICIA due to virtual visit     Psychiatric      Appearance:  unremarkable, age appropriate, normal weight, well nourished, casually dressed, neatly groomed   Behavior:  normal, friendly and cooperative, eye contact normal     Speech:  no latency; no press   Mood & Affect:  euthymic  congruent and appropriate   Thought Process:  normal and logical   Associations:  intact   Thought Content:  normal, no suicidality, no homicidality, delusions, or paranoia   Insight:  intact   Judgement: behavior is adequate to circumstances, age appropriate   Orientation:  grossly intact   Memory: intact for content of interview   Language: grossly intact   Attention Span & Concentration:  able to focus   Fund of Knowledge:  intact and appropriate to age and level of education     Medications:  Outpatient Encounter Medications as of 12/12/2022   Medication Sig Dispense Refill    acyclovir (ZOVIRAX) 800 MG Tab Take 1 tablet (800 mg total) by mouth 2 (two) times daily. (Patient not taking: Reported on 5/31/2022) 60 tablet 5    budesonide (PULMICORT) 0.5 mg/2 mL nebulizer solution Take 2 mLs (0.5 mg total) by nebulization once daily. For use in saline irrigation as directed. 180 mL 1    cyclobenzaprine (FLEXERIL) 10 MG tablet Take 1 tablet (10 mg total) by mouth 3 (three) times daily as needed for Muscle spasms. (Patient not taking: Reported on 5/31/2022) 60 tablet 2    EPINEPHrine (EPIPEN 2-JAMES) 0.3 mg/0.3 mL AtIn Inject 0.3 mLs (0.3 mg total) into the muscle once. for 1 dose as needed 2 each 2    FLUoxetine 20 MG capsule TAKE 1 CAPSULE BY MOUTH EVERY DAY WITH 40 MG OF PROZAC 90 capsule 0    FLUoxetine 20 MG capsule TAKE 1 CAPSULE BY MOUTH EVERY DAY WITH 40 MG OF PROZAC 20 capsule 0    FLUoxetine 40 MG capsule Take 1 capsule by mouth daily with 20 mg of Prozac for a total of 60 mg q day 90 capsule 1    FLUoxetine 40 MG capsule Take 1  capsule by mouth daily 20 capsule 0    fluticasone propionate (FLONASE) 50 mcg/actuation nasal spray 1 spray (50 mcg total) by Each Nostril route once daily. (Patient not taking: Reported on 5/31/2022) 9.9 mL 0    LORazepam (ATIVAN) 0.5 MG tablet Take 1 tablet (0.5 mg total) by mouth daily as needed (severe anxiety). 30 tablet 0    multivitamin (THERAGRAN) tablet Take 1 tablet by mouth once daily.      traZODone (DESYREL) 50 MG tablet Take 2 tablets (100 mg total) by mouth nightly as needed for Insomnia. 180 tablet 0     No facility-administered encounter medications on file as of 12/12/2022.       Allergy:  Review of patient's allergies indicates:   Allergen Reactions    Penicillin g Anaphylaxis    Penicillins Anaphylaxis    Penicillin Hives         Assessment and Diagnosis   Status/Progress: Based on the examination today, the patient's problem(s) is/are well controlled.  New problems have not been presented today.   Co-morbidities are not complicating management of the primary condition.  There are no active rule-out diagnoses for this patient at this time.         General Impression:       ICD-10-CM ICD-9-CM   1. Recurrent major depressive disorder, in full remission  F33.42 296.36   2. Anxiety  F41.9 300.00   3. Insomnia, unspecified type  G47.00 780.52       Intervention/Counseling/Treatment Plan     Medication Management:  Continue Prozac 60 mg q day  Continue Trazodone  mg q hs. Pt was told not drive or to take this med with ETOH or other sedating meds/substance. Pt verbalized understanding.  Continue Ativan 0.5 mg q day prn anxiety. Pt was told not drive or to take this med with ETOH or other sedating meds/substance. Pt verbalized understanding.  Labs: reviewed most recent  Prescription Monitoring Program was queried.   The treatment plan and follow up plan were reviewed with the patient.  Discussed with patient informed consent, risks vs. benefits, alternative treatments, side effect profile and the  inherent unpredictability of individual responses to these treatments. The patient expresses understanding of the above and displays the capacity to agree with this current plan and had no other questions.  Encouraged Patient to keep future appointments.   Take medications as prescribed and abstain from substance abuse.   Present to ED or call 911 for SI/HI plan or intent, psychosis, or medical emergency.        Return to Clinic: 6 months, or sooner if needed        Face-to-face time with the patient: 15 minutes  Total time:  22 minutes of total time spent on the encounter, which includes face to face time and non-face to face time preparing to see the patient (eg, review of tests), Obtaining and/or reviewing separately obtained history, Documenting clinical information in the electronic or other health record, Independently interpreting results (not separately reported) and communicating results to the patient/family/caregiver, or Care coordination (not separately reported).       Harmony Pascual, MSN, APRN, PMHNP-BC  [facility]

## 2022-12-12 NOTE — TELEPHONE ENCOUNTER
----- Message from Zamzam Bazan MD sent at 12/9/2022  3:34 PM CST -----  Please note that your mammogram was read as follows  Impression:  There is no mammographic evidence of malignancy.   diego

## 2023-01-19 ENCOUNTER — OFFICE VISIT (OUTPATIENT)
Dept: URGENT CARE | Facility: CLINIC | Age: 58
End: 2023-01-19
Payer: COMMERCIAL

## 2023-01-19 VITALS
WEIGHT: 165 LBS | BODY MASS INDEX: 25.9 KG/M2 | SYSTOLIC BLOOD PRESSURE: 102 MMHG | HEART RATE: 92 BPM | HEIGHT: 67 IN | TEMPERATURE: 98 F | RESPIRATION RATE: 18 BRPM | DIASTOLIC BLOOD PRESSURE: 76 MMHG | OXYGEN SATURATION: 98 %

## 2023-01-19 DIAGNOSIS — Z78.9 DISCOMFORT: ICD-10-CM

## 2023-01-19 DIAGNOSIS — N30.00 ACUTE CYSTITIS WITHOUT HEMATURIA: ICD-10-CM

## 2023-01-19 DIAGNOSIS — J06.9 UPPER RESPIRATORY TRACT INFECTION, UNSPECIFIED TYPE: Primary | ICD-10-CM

## 2023-01-19 DIAGNOSIS — R09.81 SINUS CONGESTION: ICD-10-CM

## 2023-01-19 LAB
BILIRUB UR QL STRIP: NEGATIVE
CTP QC/QA: YES
CTP QC/QA: YES
GLUCOSE UR QL STRIP: NEGATIVE
KETONES UR QL STRIP: NEGATIVE
LEUKOCYTE ESTERASE UR QL STRIP: POSITIVE
PH, POC UA: 5.5 (ref 5–8)
POC BLOOD, URINE: POSITIVE
POC MOLECULAR INFLUENZA A AGN: NEGATIVE
POC MOLECULAR INFLUENZA B AGN: NEGATIVE
POC NITRATES, URINE: POSITIVE
PROT UR QL STRIP: POSITIVE
SARS-COV-2 AG RESP QL IA.RAPID: NEGATIVE
SP GR UR STRIP: 1.01 (ref 1–1.03)
UROBILINOGEN UR STRIP-ACNC: ABNORMAL (ref 0.1–1.1)

## 2023-01-19 PROCEDURE — 87811 SARS CORONAVIRUS 2 ANTIGEN POCT, MANUAL READ: ICD-10-PCS | Mod: QW,S$GLB,, | Performed by: NURSE PRACTITIONER

## 2023-01-19 PROCEDURE — 3008F BODY MASS INDEX DOCD: CPT | Mod: CPTII,S$GLB,, | Performed by: NURSE PRACTITIONER

## 2023-01-19 PROCEDURE — 87502 INFLUENZA DNA AMP PROBE: CPT | Mod: QW,S$GLB,, | Performed by: NURSE PRACTITIONER

## 2023-01-19 PROCEDURE — 99214 PR OFFICE/OUTPT VISIT, EST, LEVL IV, 30-39 MIN: ICD-10-PCS | Mod: S$GLB,,, | Performed by: NURSE PRACTITIONER

## 2023-01-19 PROCEDURE — 87502 POCT INFLUENZA A/B MOLECULAR: ICD-10-PCS | Mod: QW,S$GLB,, | Performed by: NURSE PRACTITIONER

## 2023-01-19 PROCEDURE — 1160F RVW MEDS BY RX/DR IN RCRD: CPT | Mod: CPTII,S$GLB,, | Performed by: NURSE PRACTITIONER

## 2023-01-19 PROCEDURE — 3078F DIAST BP <80 MM HG: CPT | Mod: CPTII,S$GLB,, | Performed by: NURSE PRACTITIONER

## 2023-01-19 PROCEDURE — 1160F PR REVIEW ALL MEDS BY PRESCRIBER/CLIN PHARMACIST DOCUMENTED: ICD-10-PCS | Mod: CPTII,S$GLB,, | Performed by: NURSE PRACTITIONER

## 2023-01-19 PROCEDURE — 99214 OFFICE O/P EST MOD 30 MIN: CPT | Mod: S$GLB,,, | Performed by: NURSE PRACTITIONER

## 2023-01-19 PROCEDURE — 1159F MED LIST DOCD IN RCRD: CPT | Mod: CPTII,S$GLB,, | Performed by: NURSE PRACTITIONER

## 2023-01-19 PROCEDURE — 3074F PR MOST RECENT SYSTOLIC BLOOD PRESSURE < 130 MM HG: ICD-10-PCS | Mod: CPTII,S$GLB,, | Performed by: NURSE PRACTITIONER

## 2023-01-19 PROCEDURE — 81003 URINALYSIS AUTO W/O SCOPE: CPT | Mod: QW,S$GLB,, | Performed by: NURSE PRACTITIONER

## 2023-01-19 PROCEDURE — 81003 POCT URINALYSIS, DIPSTICK, MANUAL, W/O SCOPE: ICD-10-PCS | Mod: QW,S$GLB,, | Performed by: NURSE PRACTITIONER

## 2023-01-19 PROCEDURE — 3008F PR BODY MASS INDEX (BMI) DOCUMENTED: ICD-10-PCS | Mod: CPTII,S$GLB,, | Performed by: NURSE PRACTITIONER

## 2023-01-19 PROCEDURE — 3078F PR MOST RECENT DIASTOLIC BLOOD PRESSURE < 80 MM HG: ICD-10-PCS | Mod: CPTII,S$GLB,, | Performed by: NURSE PRACTITIONER

## 2023-01-19 PROCEDURE — 1159F PR MEDICATION LIST DOCUMENTED IN MEDICAL RECORD: ICD-10-PCS | Mod: CPTII,S$GLB,, | Performed by: NURSE PRACTITIONER

## 2023-01-19 PROCEDURE — 87811 SARS-COV-2 COVID19 W/OPTIC: CPT | Mod: QW,S$GLB,, | Performed by: NURSE PRACTITIONER

## 2023-01-19 PROCEDURE — 3074F SYST BP LT 130 MM HG: CPT | Mod: CPTII,S$GLB,, | Performed by: NURSE PRACTITIONER

## 2023-01-19 RX ORDER — FLUTICASONE PROPIONATE 50 MCG
2 SPRAY, SUSPENSION (ML) NASAL DAILY
Qty: 18.2 ML | Refills: 0 | Status: SHIPPED | OUTPATIENT
Start: 2023-01-19 | End: 2023-02-18

## 2023-01-19 RX ORDER — CETIRIZINE HYDROCHLORIDE 10 MG/1
10 TABLET ORAL DAILY
Qty: 30 TABLET | Refills: 0 | Status: SHIPPED | OUTPATIENT
Start: 2023-01-19 | End: 2023-12-06

## 2023-01-19 RX ORDER — NITROFURANTOIN 25; 75 MG/1; MG/1
100 CAPSULE ORAL 2 TIMES DAILY
Qty: 14 CAPSULE | Refills: 0 | Status: SHIPPED | OUTPATIENT
Start: 2023-01-19 | End: 2023-01-26

## 2023-01-19 NOTE — PATIENT INSTRUCTIONS
"If your condition worsens or fails to improve, we recommend that you receive another evaluation at the ER immediately contact your PCP to discuss your concerns, or return here.  You must understand that you've received an urgent care treatment only, and that you may be released before all your medical problems are known or treated.  You, the patient, will arrange for follow-up care as instructed.     If we discussed that I think your illness is viral, it will not respond to antibiotics and will last 10-14 days.  If we discussed "wait and see" antibiotics, and if over the next few days the symptoms worsen, start the antibiotics I have given you.     If you are female and on birth control pills and do take the antibiotics, use additional methods to prevent pregnancy while on the antibiotics and for one cycle after.     Flonase (fluticasone) is a nasal spray which is available over the counter and may help with your symptoms.  Zyrtec D, Claritin D, or Allegra D can also help with symptoms of congestion and drainage.  If you have hypertension, avoid using the "D" which is the decongestant formula.    If you just have drainage, you can take plain Zyrtec, Claritin, or Allegra.  If you just have a congested feeling, you can take pseudoephedrine (unless you have high blood pressure), which you have to sign for behind the counter.  Do not buy phenylephrine OTC, as it is not effective.    Rest and fluids are also important.  Tylenol or ibuprofen can also be used as directed for pain, unless you have an allergy to them or medical condition (such as stomach ulcers, kidney or liver disease, or use blood thinners, etc.) for which you should not be taking these type of medications.     If you are flying in the next few days, Afrin nose drops for the airplane flight upon take off and landing may help.  Other than at those times, refrain from using Afrin.     If you were prescribed a narcotic or sedating cough medicine, do not drive " or operate heavy machinery while taking these medications.     ----    If your condition worsens or fails to improve, we recommend that you receive another evaluation at the ER immediately, contact your PCP to discuss your concerns, or return here.  You must understand that you've received an urgent care treatment only, and that you may be released before all your medical problems are known or treated.  You, the patient, will arrange for follow-up care as instructed.     If you had cultures done, it will take 3-5 business days to result.  We will call you with the result.     If you are are female and on birth control pills, use additional methods to prevent pregnancy while on antibiotics and for one cycle after.     Cranberry juice may help.  Get the 100% cranberry juice, mix 4 oz. of juice with 4 oz. of water, and drink this 8 oz. glass of liquid once a day.  Increase water intake to at least 8-10 glasses/day.    Do not wear contacts with Pyridium as it will stain them.  You may want to wear a panty liner with Pyridium as it may stain your underwear.    Avoid caffeine, alcohol, or spicy foods as they irritate the bladder.

## 2023-01-19 NOTE — PROGRESS NOTES
"Subjective:       Patient ID: Nora Mccoy is a 57 y.o. female.    Vitals:  height is 5' 7" (1.702 m) and weight is 74.8 kg (165 lb). Her oral temperature is 98.3 °F (36.8 °C). Her blood pressure is 102/76 and her pulse is 92. Her respiration is 18 and oxygen saturation is 98%.     Chief Complaint: Headache    58yo female pt reports headache, chills, nausea without vomiting, abdominal pain, and body aches that started 2 days ago.  Reports that she also noted cloudy urine with foul odor and is experiencing increasing bladder pressure today, denies back/flank pain.  Denies burning or pain with urination.  Denies fever, denies diarrhea, denies chest pain, wheezing, or SOB.  Reports only mild temporary improvement with ibuprofen.  Reports negative at-home COVID test yesterday.  Denies known sick contacts.  Reports receiving both COVID and flu vaccinations.    Headache   The current episode started in the past 7 days. Associated symptoms include abdominal pain, nausea and sinus pressure. Pertinent negatives include no back pain, coughing, fever, sore throat or vomiting. The treatment provided no relief.     Constitution: Positive for chills. Negative for fever.   HENT:  Positive for congestion, postnasal drip and sinus pressure. Negative for sore throat.    Cardiovascular:  Negative for chest pain.   Respiratory:  Negative for cough, shortness of breath and wheezing.    Gastrointestinal:  Positive for abdominal pain and nausea. Negative for vomiting and diarrhea.   Genitourinary:  Negative for dysuria, frequency, urgency, urine decreased, flank pain, hematuria, vaginal pain, vaginal discharge, vaginal bleeding, vaginal odor, painful ejaculation, genital sore, painful ejaculation and pelvic pain.   Musculoskeletal:  Negative for back pain.   Neurological:  Positive for headaches.     Objective:      Physical Exam   Constitutional: She is oriented to person, place, and time. She appears well-developed. She is " "cooperative.  Non-toxic appearance. She does not appear ill. No distress.   HENT:   Head: Normocephalic and atraumatic.   Ears:   Right Ear: Hearing, external ear and ear canal normal. Tympanic membrane is bulging. Tympanic membrane is not erythematous and not retracted. A middle ear effusion (clear fluid) is present.   Left Ear: Hearing, external ear and ear canal normal. Tympanic membrane is bulging. Tympanic membrane is not erythematous and not retracted. A middle ear effusion (clear fluid) is present.   Nose: Mucosal edema (erythema and swelling to BL turbinates) and rhinorrhea (clear to BL nares) present. No purulent discharge or nasal deformity. No epistaxis. Right sinus exhibits no maxillary sinus tenderness and no frontal sinus tenderness. Left sinus exhibits no maxillary sinus tenderness and no frontal sinus tenderness.   Mouth/Throat: Uvula is midline and mucous membranes are normal. No trismus in the jaw. Normal dentition. No uvula swelling. Oropharyngeal exudate (clear postnasal drip) present. No posterior oropharyngeal edema or posterior oropharyngeal erythema. Tonsils are 1+ on the right. Tonsils are 1+ on the left. No tonsillar exudate.   Eyes: Conjunctivae and lids are normal. No scleral icterus.   Neck: Trachea normal and phonation normal. Neck supple. No edema present. No erythema present. No neck rigidity present.   Cardiovascular: Normal rate, regular rhythm, normal heart sounds and normal pulses.   Pulmonary/Chest: Effort normal and breath sounds normal. No accessory muscle usage or stridor. No tachypnea. No respiratory distress. She has no decreased breath sounds. She has no wheezes. She has no rhonchi. She has no rales.   Abdominal: Normal appearance and bowel sounds are normal. She exhibits no distension and no mass. Soft. flat abdomen There is abdominal tenderness (pt reports "pressure" to palpation, denies pain) in the suprapubic area. There is no rebound, no guarding, no tenderness at " McBurney's point, negative Price's sign, no left CVA tenderness, negative Rovsing's sign, negative psoas sign, no right CVA tenderness and negative obturator sign.   Genitourinary:         Comments: Exam deferred.     Musculoskeletal: Normal range of motion.         General: No deformity. Normal range of motion.   Lymphadenopathy:        Head (right side): No submandibular adenopathy present.        Head (left side): No submandibular adenopathy present.     She has no cervical adenopathy.   Neurological: She is alert and oriented to person, place, and time. She has normal strength. She exhibits normal muscle tone. Coordination normal.   Skin: Skin is warm, dry, intact, not diaphoretic and not pale.   Psychiatric: Her speech is normal and behavior is normal. Judgment and thought content normal.   Nursing note and vitals reviewed.    Results for orders placed or performed in visit on 01/19/23   POCT Urinalysis, Dipstick, Manual, W/O Scope   Result Value Ref Range    POC Blood, Urine Positive (A) Negative    POC Bilirubin, Urine Negative Negative    POC Urobilinogen, Urine NORM 0.1 - 1.1    POC Ketones, Urine Negative Negative    POC Protein, Urine Positive (A) Negative    POC Nitrates, Urine Positive (A) Negative    POC Glucose, Urine Negative Negative    pH, UA 5.5 5 - 8    POC Specific Gravity, Urine 1.015 1.003 - 1.029    POC Leukocytes, Urine Positive (A) Negative   POCT Influenza A/B MOLECULAR   Result Value Ref Range    POC Molecular Influenza A Ag Negative Negative, Not Reported    POC Molecular Influenza B Ag Negative Negative, Not Reported     Acceptable Yes    SARS Coronavirus 2 Antigen, POCT Manual Read   Result Value Ref Range    SARS Coronavirus 2 Antigen Negative Negative     Acceptable Yes            Assessment:       1. Upper respiratory tract infection, unspecified type    2. Discomfort    3. Sinus congestion    4. Acute cystitis without hematuria          Plan:      "  Provided education on prescribed medications.  Provided education on return/ER precautions.  Pt verbalized understanding and agreed to plan.      Upper respiratory tract infection, unspecified type  -     cetirizine (ZYRTEC) 10 MG tablet; Take 1 tablet (10 mg total) by mouth once daily.  Dispense: 30 tablet; Refill: 0  -     fluticasone propionate (FLONASE) 50 mcg/actuation nasal spray; 2 sprays (100 mcg total) by Each Nostril route once daily.  Dispense: 18.2 mL; Refill: 0    Discomfort  -     POCT Urinalysis, Dipstick, Manual, W/O Scope  -     POCT Influenza A/B MOLECULAR    Sinus congestion  -     SARS Coronavirus 2 Antigen, POCT Manual Read    Acute cystitis without hematuria  -     nitrofurantoin, macrocrystal-monohydrate, (MACROBID) 100 MG capsule; Take 1 capsule (100 mg total) by mouth 2 (two) times daily. for 7 days  Dispense: 14 capsule; Refill: 0      Patient Instructions   If your condition worsens or fails to improve, we recommend that you receive another evaluation at the ER immediately contact your PCP to discuss your concerns, or return here.  You must understand that you've received an urgent care treatment only, and that you may be released before all your medical problems are known or treated.  You, the patient, will arrange for follow-up care as instructed.     If we discussed that I think your illness is viral, it will not respond to antibiotics and will last 10-14 days.  If we discussed "wait and see" antibiotics, and if over the next few days the symptoms worsen, start the antibiotics I have given you.     If you are female and on birth control pills and do take the antibiotics, use additional methods to prevent pregnancy while on the antibiotics and for one cycle after.     Flonase (fluticasone) is a nasal spray which is available over the counter and may help with your symptoms.  Zyrtec D, Claritin D, or Allegra D can also help with symptoms of congestion and drainage.  If you have " "hypertension, avoid using the "D" which is the decongestant formula.    If you just have drainage, you can take plain Zyrtec, Claritin, or Allegra.  If you just have a congested feeling, you can take pseudoephedrine (unless you have high blood pressure), which you have to sign for behind the counter.  Do not buy phenylephrine OTC, as it is not effective.    Rest and fluids are also important.  Tylenol or ibuprofen can also be used as directed for pain, unless you have an allergy to them or medical condition (such as stomach ulcers, kidney or liver disease, or use blood thinners, etc.) for which you should not be taking these type of medications.     If you are flying in the next few days, Afrin nose drops for the airplane flight upon take off and landing may help.  Other than at those times, refrain from using Afrin.     If you were prescribed a narcotic or sedating cough medicine, do not drive or operate heavy machinery while taking these medications.     ----    If your condition worsens or fails to improve, we recommend that you receive another evaluation at the ER immediately, contact your PCP to discuss your concerns, or return here.  You must understand that you've received an urgent care treatment only, and that you may be released before all your medical problems are known or treated.  You, the patient, will arrange for follow-up care as instructed.     If you had cultures done, it will take 3-5 business days to result.  We will call you with the result.     If you are are female and on birth control pills, use additional methods to prevent pregnancy while on antibiotics and for one cycle after.     Cranberry juice may help.  Get the 100% cranberry juice, mix 4 oz. of juice with 4 oz. of water, and drink this 8 oz. glass of liquid once a day.  Increase water intake to at least 8-10 glasses/day.    Do not wear contacts with Pyridium as it will stain them.  You may want to wear a panty liner with Pyridium as " it may stain your underwear.    Avoid caffeine, alcohol, or spicy foods as they irritate the bladder.

## 2023-03-31 NOTE — PROGRESS NOTES
"58-year-old female presents for Annual PE  Nonsmoker, social alcohol consumer.                                                                                                                                            SCREENING TESTS:    Chol/lab, pending    C-scope: 7/2016     One 5 mm polyp in the ascending colon   [id number]                                                 Mammogram: 8/2020  Pap:   Eye exam: UTD  DDS exam: UTD    VACCINATIONS:  Flu, yearly  TDAP, 4/8/2013  Covid: 2/2    MedCard reviewed.                                                                                                                                         REVIEW OF SYSTEMS:                                                           CONSTITUTIONAL:  No fever, chill or night sweats.                            CARDIOVASCULAR:  No chest pain or palpitations.                              RESPIRATORY:  Exacerbation of chronic rhinitis with postnasal drip and drainage that has causing cough without shortness of breath or wheezing.                                         GYN. No unusual discharge or abnormal bleeding.                              : No blood in her urine.     Increased freq w/o dysuria    GI:Flare w/ food and "gastritis" lasted several mos and tx successfully w/ PPI                            MSK: back and knee- chronic, in Healthy Back Program  -improved, now doing Pilates several times weekly  Knee, stable                              Remainder of review negative except as previously noted.                                                                                                                     PHYSICAL EXAMINATION:                                                        VITAL SIGNS:                               GENERAL:  She is alert and oriented, in no acute distress.  Well-developed, well-nourished,   conversant and cooperative.   Pleasant as always.                               EYES:  Conjunctivae and " lids unremarkable.    Sclerae anicteric.    ENT:  Canals without significant cerumen, but injected appearance and TMs dull, nasal mucosa and turbinates injected with deviated septum note  Oropharyngeal very injected no exudate or edema noted                                      sinuses nontender                              NECK:  Supple.  No thyromegaly or lymphadenopathy.                           RESPIRATORY:  Unlabored.  Lungs clear to auscultation.                       HEART:  Regular rate and rhythm.  No carotid bruits.                         EXTREMITIES:  1+ pedal pulse.  No edema.                                     ABDOMEN:  Bowel sounds present, soft, nontender,   no hepatosplenomegaly.      MSK: Gait nl. No CCE  NEURO: CRAVEN. No tremor noted   SKIN: Warm and dry                                                              IMPRESSION:                                                                  Annual PE                                                       Meningioma,stable MRI  Chronic rhinitis/sinusitis w/ eosinophils, exacerbation                    PLAN:    Fasting lab today  Mammo -due in 12/2023  DEXA  C-scope  Call prn  RTC 1 year

## 2023-04-03 ENCOUNTER — LAB VISIT (OUTPATIENT)
Dept: LAB | Facility: HOSPITAL | Age: 58
End: 2023-04-03
Attending: INTERNAL MEDICINE
Payer: COMMERCIAL

## 2023-04-03 ENCOUNTER — OFFICE VISIT (OUTPATIENT)
Dept: INTERNAL MEDICINE | Facility: CLINIC | Age: 58
End: 2023-04-03
Payer: COMMERCIAL

## 2023-04-03 VITALS
WEIGHT: 167.56 LBS | RESPIRATION RATE: 18 BRPM | HEART RATE: 79 BPM | TEMPERATURE: 98 F | DIASTOLIC BLOOD PRESSURE: 80 MMHG | SYSTOLIC BLOOD PRESSURE: 100 MMHG | OXYGEN SATURATION: 99 % | BODY MASS INDEX: 26.3 KG/M2 | HEIGHT: 67 IN

## 2023-04-03 DIAGNOSIS — Z00.00 ANNUAL PHYSICAL EXAM: ICD-10-CM

## 2023-04-03 DIAGNOSIS — J30.9 CHRONIC ALLERGIC RHINITIS: ICD-10-CM

## 2023-04-03 DIAGNOSIS — D32.9 MENINGIOMA: ICD-10-CM

## 2023-04-03 DIAGNOSIS — F41.9 ANXIETY: ICD-10-CM

## 2023-04-03 DIAGNOSIS — Z00.00 ANNUAL PHYSICAL EXAM: Primary | ICD-10-CM

## 2023-04-03 DIAGNOSIS — Z12.11 COLON CANCER SCREENING: ICD-10-CM

## 2023-04-03 LAB
25(OH)D3+25(OH)D2 SERPL-MCNC: 30 NG/ML (ref 30–96)
ALBUMIN SERPL BCP-MCNC: 4.1 G/DL (ref 3.5–5.2)
ALP SERPL-CCNC: 64 U/L (ref 55–135)
ALT SERPL W/O P-5'-P-CCNC: 13 U/L (ref 10–44)
ANION GAP SERPL CALC-SCNC: 12 MMOL/L (ref 8–16)
AST SERPL-CCNC: 20 U/L (ref 10–40)
BASOPHILS # BLD AUTO: 0.05 K/UL (ref 0–0.2)
BASOPHILS NFR BLD: 1.3 % (ref 0–1.9)
BILIRUB SERPL-MCNC: 0.9 MG/DL (ref 0.1–1)
BUN SERPL-MCNC: 17 MG/DL (ref 6–20)
CALCIUM SERPL-MCNC: 9.5 MG/DL (ref 8.7–10.5)
CHLORIDE SERPL-SCNC: 105 MMOL/L (ref 95–110)
CHOLEST SERPL-MCNC: 195 MG/DL (ref 120–199)
CHOLEST/HDLC SERPL: 3.4 {RATIO} (ref 2–5)
CO2 SERPL-SCNC: 24 MMOL/L (ref 23–29)
CREAT SERPL-MCNC: 0.9 MG/DL (ref 0.5–1.4)
DIFFERENTIAL METHOD: ABNORMAL
EOSINOPHIL # BLD AUTO: 0.2 K/UL (ref 0–0.5)
EOSINOPHIL NFR BLD: 4.2 % (ref 0–8)
ERYTHROCYTE [DISTWIDTH] IN BLOOD BY AUTOMATED COUNT: 13.4 % (ref 11.5–14.5)
EST. GFR  (NO RACE VARIABLE): >60 ML/MIN/1.73 M^2
ESTIMATED AVG GLUCOSE: 105 MG/DL (ref 68–131)
GLUCOSE SERPL-MCNC: 92 MG/DL (ref 70–110)
HBA1C MFR BLD: 5.3 % (ref 4–5.6)
HCT VFR BLD AUTO: 41.1 % (ref 37–48.5)
HDLC SERPL-MCNC: 58 MG/DL (ref 40–75)
HDLC SERPL: 29.7 % (ref 20–50)
HGB BLD-MCNC: 13.1 G/DL (ref 12–16)
IMM GRANULOCYTES # BLD AUTO: 0 K/UL (ref 0–0.04)
IMM GRANULOCYTES NFR BLD AUTO: 0 % (ref 0–0.5)
LDLC SERPL CALC-MCNC: 121.2 MG/DL (ref 63–159)
LYMPHOCYTES # BLD AUTO: 1.4 K/UL (ref 1–4.8)
LYMPHOCYTES NFR BLD: 37.5 % (ref 18–48)
MCH RBC QN AUTO: 29 PG (ref 27–31)
MCHC RBC AUTO-ENTMCNC: 31.9 G/DL (ref 32–36)
MCV RBC AUTO: 91 FL (ref 82–98)
MONOCYTES # BLD AUTO: 0.4 K/UL (ref 0.3–1)
MONOCYTES NFR BLD: 9.1 % (ref 4–15)
NEUTROPHILS # BLD AUTO: 1.8 K/UL (ref 1.8–7.7)
NEUTROPHILS NFR BLD: 47.9 % (ref 38–73)
NONHDLC SERPL-MCNC: 137 MG/DL
NRBC BLD-RTO: 0 /100 WBC
PLATELET # BLD AUTO: 257 K/UL (ref 150–450)
PMV BLD AUTO: 10.8 FL (ref 9.2–12.9)
POTASSIUM SERPL-SCNC: 4.9 MMOL/L (ref 3.5–5.1)
PROT SERPL-MCNC: 6.7 G/DL (ref 6–8.4)
RBC # BLD AUTO: 4.51 M/UL (ref 4–5.4)
SODIUM SERPL-SCNC: 141 MMOL/L (ref 136–145)
TRIGL SERPL-MCNC: 79 MG/DL (ref 30–150)
TSH SERPL DL<=0.005 MIU/L-ACNC: 1.11 UIU/ML (ref 0.4–4)
WBC # BLD AUTO: 3.84 K/UL (ref 3.9–12.7)

## 2023-04-03 PROCEDURE — 80053 COMPREHEN METABOLIC PANEL: CPT | Performed by: INTERNAL MEDICINE

## 2023-04-03 PROCEDURE — 1160F RVW MEDS BY RX/DR IN RCRD: CPT | Mod: CPTII,S$GLB,, | Performed by: INTERNAL MEDICINE

## 2023-04-03 PROCEDURE — 82306 VITAMIN D 25 HYDROXY: CPT | Performed by: INTERNAL MEDICINE

## 2023-04-03 PROCEDURE — 83036 HEMOGLOBIN GLYCOSYLATED A1C: CPT | Performed by: INTERNAL MEDICINE

## 2023-04-03 PROCEDURE — 80061 LIPID PANEL: CPT | Performed by: INTERNAL MEDICINE

## 2023-04-03 PROCEDURE — 3079F DIAST BP 80-89 MM HG: CPT | Mod: CPTII,S$GLB,, | Performed by: INTERNAL MEDICINE

## 2023-04-03 PROCEDURE — 3008F BODY MASS INDEX DOCD: CPT | Mod: CPTII,S$GLB,, | Performed by: INTERNAL MEDICINE

## 2023-04-03 PROCEDURE — 1159F MED LIST DOCD IN RCRD: CPT | Mod: CPTII,S$GLB,, | Performed by: INTERNAL MEDICINE

## 2023-04-03 PROCEDURE — 3079F PR MOST RECENT DIASTOLIC BLOOD PRESSURE 80-89 MM HG: ICD-10-PCS | Mod: CPTII,S$GLB,, | Performed by: INTERNAL MEDICINE

## 2023-04-03 PROCEDURE — 99999 PR PBB SHADOW E&M-EST. PATIENT-LVL V: CPT | Mod: PBBFAC,,, | Performed by: INTERNAL MEDICINE

## 2023-04-03 PROCEDURE — 1159F PR MEDICATION LIST DOCUMENTED IN MEDICAL RECORD: ICD-10-PCS | Mod: CPTII,S$GLB,, | Performed by: INTERNAL MEDICINE

## 2023-04-03 PROCEDURE — 36415 COLL VENOUS BLD VENIPUNCTURE: CPT | Mod: PO | Performed by: INTERNAL MEDICINE

## 2023-04-03 PROCEDURE — 3074F SYST BP LT 130 MM HG: CPT | Mod: CPTII,S$GLB,, | Performed by: INTERNAL MEDICINE

## 2023-04-03 PROCEDURE — 1160F PR REVIEW ALL MEDS BY PRESCRIBER/CLIN PHARMACIST DOCUMENTED: ICD-10-PCS | Mod: CPTII,S$GLB,, | Performed by: INTERNAL MEDICINE

## 2023-04-03 PROCEDURE — 3074F PR MOST RECENT SYSTOLIC BLOOD PRESSURE < 130 MM HG: ICD-10-PCS | Mod: CPTII,S$GLB,, | Performed by: INTERNAL MEDICINE

## 2023-04-03 PROCEDURE — 84443 ASSAY THYROID STIM HORMONE: CPT | Performed by: INTERNAL MEDICINE

## 2023-04-03 PROCEDURE — 99396 PREV VISIT EST AGE 40-64: CPT | Mod: S$GLB,,, | Performed by: INTERNAL MEDICINE

## 2023-04-03 PROCEDURE — 99396 PR PREVENTIVE VISIT,EST,40-64: ICD-10-PCS | Mod: S$GLB,,, | Performed by: INTERNAL MEDICINE

## 2023-04-03 PROCEDURE — 85025 COMPLETE CBC W/AUTO DIFF WBC: CPT | Performed by: INTERNAL MEDICINE

## 2023-04-03 PROCEDURE — 3008F PR BODY MASS INDEX (BMI) DOCUMENTED: ICD-10-PCS | Mod: CPTII,S$GLB,, | Performed by: INTERNAL MEDICINE

## 2023-04-03 PROCEDURE — 99999 PR PBB SHADOW E&M-EST. PATIENT-LVL V: ICD-10-PCS | Mod: PBBFAC,,, | Performed by: INTERNAL MEDICINE

## 2023-04-03 RX ORDER — LORAZEPAM 0.5 MG/1
0.5 TABLET ORAL DAILY PRN
Qty: 30 TABLET | Refills: 1 | Status: SHIPPED | OUTPATIENT
Start: 2023-04-03 | End: 2023-09-06 | Stop reason: SDUPTHER

## 2023-04-04 ENCOUNTER — TELEPHONE (OUTPATIENT)
Dept: INTERNAL MEDICINE | Facility: CLINIC | Age: 58
End: 2023-04-04
Payer: COMMERCIAL

## 2023-04-04 NOTE — TELEPHONE ENCOUNTER
----- Message from Zamzam Bazan MD sent at 4/4/2023  3:29 PM CDT -----  Nora,  Your lab has resulted, and your CBC revealed a slight decrease in your WBC count,  But with a normal differential I doubt that it is of any worrisome significance.  Your TSH is in the normal range.  Your vitamin-D is in the normal range, but barely, please take 2000 international units of vitamin D3 daily.  Your total cholesterol is stable, although your HDL has decreased and your LDL has increased,  Usually the good cholesterol goes up with exercise and the bad goes down, so please strive for 180 minutes of exercise weekly, that should help the numbers be more favorable.( They are not bad, just not as good as last check)  Your chemistries were all normal, including your hemoglobin A1c that measured at 5.3.  Please do not hesitate to call/email if you have any questions or concerns.  Zamzam Montes

## 2023-04-17 ENCOUNTER — PATIENT MESSAGE (OUTPATIENT)
Dept: INTERNAL MEDICINE | Facility: CLINIC | Age: 58
End: 2023-04-17
Payer: COMMERCIAL

## 2023-04-17 DIAGNOSIS — K21.9 GASTROESOPHAGEAL REFLUX DISEASE, UNSPECIFIED WHETHER ESOPHAGITIS PRESENT: Primary | ICD-10-CM

## 2023-04-25 RX ORDER — SOD SULF/POT CHLORIDE/MAG SULF 1.479 G
12 TABLET ORAL DAILY
Qty: 24 TABLET | Refills: 0 | Status: SHIPPED | OUTPATIENT
Start: 2023-04-25

## 2023-05-04 ENCOUNTER — ANESTHESIA EVENT (OUTPATIENT)
Dept: ENDOSCOPY | Facility: HOSPITAL | Age: 58
End: 2023-05-04
Payer: COMMERCIAL

## 2023-05-05 ENCOUNTER — HOSPITAL ENCOUNTER (OUTPATIENT)
Facility: HOSPITAL | Age: 58
Discharge: HOME | End: 2023-05-05
Attending: INTERNAL MEDICINE | Admitting: INTERNAL MEDICINE
Payer: COMMERCIAL

## 2023-05-05 ENCOUNTER — ANESTHESIA (OUTPATIENT)
Dept: ENDOSCOPY | Facility: HOSPITAL | Age: 58
End: 2023-05-05
Payer: COMMERCIAL

## 2023-05-05 VITALS
HEART RATE: 67 BPM | DIASTOLIC BLOOD PRESSURE: 69 MMHG | RESPIRATION RATE: 24 BRPM | SYSTOLIC BLOOD PRESSURE: 110 MMHG | TEMPERATURE: 98 F | OXYGEN SATURATION: 100 %

## 2023-05-05 DIAGNOSIS — R10.12 LEFT UPPER QUADRANT ABDOMINAL PAIN: ICD-10-CM

## 2023-05-05 PROCEDURE — D9220A PRA ANESTHESIA: ICD-10-PCS | Mod: 33,CRNA,, | Performed by: STUDENT IN AN ORGANIZED HEALTH CARE EDUCATION/TRAINING PROGRAM

## 2023-05-05 PROCEDURE — 88305 TISSUE EXAM BY PATHOLOGIST: ICD-10-PCS | Mod: 26,,, | Performed by: PATHOLOGY

## 2023-05-05 PROCEDURE — 88305 TISSUE EXAM BY PATHOLOGIST: CPT | Mod: 26,,, | Performed by: PATHOLOGY

## 2023-05-05 PROCEDURE — G0105 COLORECTAL SCRN; HI RISK IND: HCPCS | Performed by: INTERNAL MEDICINE

## 2023-05-05 PROCEDURE — 37000009 HC ANESTHESIA EA ADD 15 MINS: Performed by: INTERNAL MEDICINE

## 2023-05-05 PROCEDURE — 88342 CHG IMMUNOCYTOCHEMISTRY: ICD-10-PCS | Mod: 26,,, | Performed by: PATHOLOGY

## 2023-05-05 PROCEDURE — D9220A PRA ANESTHESIA: Mod: 33,ANES,, | Performed by: ANESTHESIOLOGY

## 2023-05-05 PROCEDURE — D9220A PRA ANESTHESIA: Mod: 33,CRNA,, | Performed by: STUDENT IN AN ORGANIZED HEALTH CARE EDUCATION/TRAINING PROGRAM

## 2023-05-05 PROCEDURE — G0105 COLORECTAL SCRN; HI RISK IND: ICD-10-PCS | Mod: ,,, | Performed by: INTERNAL MEDICINE

## 2023-05-05 PROCEDURE — 43239 PR EGD, FLEX, W/BIOPSY, SGL/MULTI: ICD-10-PCS | Mod: 51,,, | Performed by: INTERNAL MEDICINE

## 2023-05-05 PROCEDURE — D9220A PRA ANESTHESIA: ICD-10-PCS | Mod: 33,ANES,, | Performed by: ANESTHESIOLOGY

## 2023-05-05 PROCEDURE — 88342 IMHCHEM/IMCYTCHM 1ST ANTB: CPT | Performed by: PATHOLOGY

## 2023-05-05 PROCEDURE — 43239 EGD BIOPSY SINGLE/MULTIPLE: CPT | Performed by: INTERNAL MEDICINE

## 2023-05-05 PROCEDURE — 63600175 PHARM REV CODE 636 W HCPCS: Performed by: STUDENT IN AN ORGANIZED HEALTH CARE EDUCATION/TRAINING PROGRAM

## 2023-05-05 PROCEDURE — 25000003 PHARM REV CODE 250: Performed by: STUDENT IN AN ORGANIZED HEALTH CARE EDUCATION/TRAINING PROGRAM

## 2023-05-05 PROCEDURE — 37000008 HC ANESTHESIA 1ST 15 MINUTES: Performed by: INTERNAL MEDICINE

## 2023-05-05 PROCEDURE — G0105 COLORECTAL SCRN; HI RISK IND: HCPCS | Mod: ,,, | Performed by: INTERNAL MEDICINE

## 2023-05-05 PROCEDURE — 27201012 HC FORCEPS, HOT/COLD, DISP: Performed by: INTERNAL MEDICINE

## 2023-05-05 PROCEDURE — 43239 EGD BIOPSY SINGLE/MULTIPLE: CPT | Mod: 51,,, | Performed by: INTERNAL MEDICINE

## 2023-05-05 PROCEDURE — 25000003 PHARM REV CODE 250: Performed by: ANESTHESIOLOGY

## 2023-05-05 PROCEDURE — 88342 IMHCHEM/IMCYTCHM 1ST ANTB: CPT | Mod: 26,,, | Performed by: PATHOLOGY

## 2023-05-05 PROCEDURE — 88305 TISSUE EXAM BY PATHOLOGIST: CPT | Performed by: PATHOLOGY

## 2023-05-05 RX ORDER — LIDOCAINE HYDROCHLORIDE 40 MG/ML
SOLUTION TOPICAL
Status: DISCONTINUED | OUTPATIENT
Start: 2023-05-05 | End: 2023-05-05

## 2023-05-05 RX ORDER — LIDOCAINE HYDROCHLORIDE 20 MG/ML
INJECTION INTRAVENOUS
Status: DISCONTINUED | OUTPATIENT
Start: 2023-05-05 | End: 2023-05-05

## 2023-05-05 RX ORDER — PROPOFOL 10 MG/ML
VIAL (ML) INTRAVENOUS
Status: DISCONTINUED | OUTPATIENT
Start: 2023-05-05 | End: 2023-05-05

## 2023-05-05 RX ORDER — LIDOCAINE HYDROCHLORIDE 20 MG/ML
INJECTION, SOLUTION EPIDURAL; INFILTRATION; INTRACAUDAL; PERINEURAL
Status: DISCONTINUED
Start: 2023-05-05 | End: 2023-05-05 | Stop reason: HOSPADM

## 2023-05-05 RX ORDER — SODIUM CHLORIDE 9 MG/ML
INJECTION, SOLUTION INTRAVENOUS CONTINUOUS
Status: DISCONTINUED | OUTPATIENT
Start: 2023-05-05 | End: 2023-05-05 | Stop reason: HOSPADM

## 2023-05-05 RX ORDER — PROPOFOL 10 MG/ML
INJECTION, EMULSION INTRAVENOUS
Status: DISCONTINUED
Start: 2023-05-05 | End: 2023-05-05 | Stop reason: HOSPADM

## 2023-05-05 RX ADMIN — PROPOFOL 40 MG: 10 INJECTION, EMULSION INTRAVENOUS at 11:05

## 2023-05-05 RX ADMIN — PROPOFOL 80 MG: 10 INJECTION, EMULSION INTRAVENOUS at 10:05

## 2023-05-05 RX ADMIN — PROPOFOL 50 MG: 10 INJECTION, EMULSION INTRAVENOUS at 11:05

## 2023-05-05 RX ADMIN — SODIUM CHLORIDE: 0.9 INJECTION, SOLUTION INTRAVENOUS at 10:05

## 2023-05-05 RX ADMIN — LIDOCAINE HYDROCHLORIDE 4 ML: 40 SOLUTION TOPICAL at 10:05

## 2023-05-05 RX ADMIN — LIDOCAINE HYDROCHLORIDE 40 MG: 20 INJECTION, SOLUTION INTRAVENOUS at 10:05

## 2023-05-05 RX ADMIN — PROPOFOL 40 MG: 10 INJECTION, EMULSION INTRAVENOUS at 10:05

## 2023-05-05 NOTE — ANESTHESIA PREPROCEDURE EVALUATION
05/05/2023  Nora Mccoy is a 58 y.o., female.  To undergo Procedure(s) (LRB):  EGD (ESOPHAGOGASTRODUODENOSCOPY) (N/A)  COLONOSCOPY (N/A)     Denies CP/SOB/MI/CVA/URI symptoms.  Endorses occasional GERD.  METS > 4  NPO > 8    Past Medical History:  Past Medical History:   Diagnosis Date    Bronchitis     DJD (degenerative joint disease)     shoulder, knee, and back    Esophagitis, unspecified     Fibrocystic disease of breast     Menstrual disorder     Sinus infection     Stress     UTI (urinary tract infection)        Past Surgical History:  Past Surgical History:   Procedure Laterality Date    BACK SURGERY      micro discectomy L4-L5, 2002    BREAST BIOPSY Left 2015    core bx-benign    COLONOSCOPY N/A 7/7/2016    Procedure: COLONOSCOPY;  Surgeon: Ronan Dolan MD;  Location: [facility] (4TH FLR);  Service: Endoscopy;  Laterality: N/A;    EPIDURAL STEROID INJECTION Right 10/11/2021    Procedure: Right Hip Steroid Injection (Ref: Liuzza);  Surgeon: Jon Toth Jr., MD;  Location: [facility];  Service: Pain Management;  Laterality: Right;  Arrive @ 1200; No ATC or DM; Needs Consent    ETHMOIDECTOMY N/A 4/5/2019    Procedure: ETHMOIDECTOMY;  Surgeon: Vijay Tatum MD;  Location: [address];  Service: ENT;  Laterality: N/A;    FRONTAL SINUS OBLITERATION  4/5/2019    Procedure: SINUSOTOMY, FRONTAL SINUS, OBLITERATIVE;  Surgeon: Vijay Tatum MD;  Location: [address];  Service: ENT;;    FUNCTIONAL ENDOSCOPIC SINUS SURGERY (FESS) USING COMPUTER-ASSISTED NAVIGATION Bilateral 4/5/2019    Procedure: FESS, USING COMPUTER-ASSISTED NAVIGATION;  Surgeon: Vijay Tatum MD;  Location: [address];  Service: ENT;  Laterality: Bilateral;    MAXILLARY ANTROSTOMY  4/5/2019    Procedure: MAXILLARY ANTROSTOMY;  Surgeon: Vijay Tatum MD;  Location: [facility]  2ND FLR;  Service: ENT;;    NASAL TURBINATE REDUCTION Bilateral 4/5/2019    Procedure: REDUCTION, NASAL TURBINATE;  Surgeon: Vijay Tatum MD;  Location: [facility] OR 2ND FLR;  Service: ENT;  Laterality: Bilateral;       Social History:  Social History     Socioeconomic History    Marital status:      Spouse name: Sonu    Number of children: 2    Years of education: 18   Occupational History    Occupation: registered nurse     Employer: [employer]     Comment: ICU   Tobacco Use    Smoking status: Never    Smokeless tobacco: Never   Substance and Sexual Activity    Alcohol use: Yes     Alcohol/week: 1.7 standard drinks     Types: 2 Standard drinks or equivalent per week    Drug use: No   Other Topics Concern    Are you pregnant or think you may be? No   Social History Narrative        Spouse w/ diabetes insipidus, CML , and neuroendocrine tumor newly dx    RN- working in Neuro tele ICU    2 children- dtr and son               Medications:  No current facility-administered medications on file prior to encounter.     Current Outpatient Medications on File Prior to Encounter   Medication Sig Dispense Refill    acyclovir (ZOVIRAX) 800 MG Tab Take 1 tablet (800 mg total) by mouth 2 (two) times daily. 60 tablet 5    budesonide (PULMICORT) 0.5 mg/2 mL nebulizer solution Take 2 mLs (0.5 mg total) by nebulization once daily. For use in saline irrigation as directed. 180 mL 1    cetirizine (ZYRTEC) 10 MG tablet Take 1 tablet (10 mg total) by mouth once daily. 30 tablet 0    cyclobenzaprine (FLEXERIL) 10 MG tablet Take 1 tablet (10 mg total) by mouth 3 (three) times daily as needed for Muscle spasms. 60 tablet 2    EPINEPHrine (EPIPEN 2-JAMES) 0.3 mg/0.3 mL AtIn Inject 0.3 mLs (0.3 mg total) into the muscle once. for 1 dose as needed 2 each 2    FLUoxetine 20 MG capsule TAKE 1 CAPSULE BY MOUTH EVERY DAY WITH 40 MG OF PROZAC TO TOAL 60MG DAILY 90 capsule 1    FLUoxetine 40 MG capsule  Take 1 capsule by mouth daily with 20 mg of Prozac for a total of 60 mg daily 90 capsule 1    fluticasone propionate (FLONASE) 50 mcg/actuation nasal spray 1 spray (50 mcg total) by Each Nostril route once daily. 9.9 mL 0    LORazepam (ATIVAN) 0.5 MG tablet Take 1 tablet (0.5 mg total) by mouth daily as needed (severe anxiety). 30 tablet 1    multivitamin (THERAGRAN) tablet Take 1 tablet by mouth once daily.      traZODone (DESYREL) 50 MG tablet Take 2 tablets (100 mg total) by mouth nightly as needed for Insomnia. 180 tablet 1       Allergies:  Review of patient's allergies indicates:   Allergen Reactions    Penicillin g Anaphylaxis    Penicillins Anaphylaxis    Penicillin Hives       Active Problems:  Patient Active Problem List   Diagnosis    Other plastic surgery for unacceptable cosmetic appearance    Knee pain    Meningioma    Chronic sinusitis    Deviated nasal septum    Nasal turbinate hypertrophy    Ulnar neuropathy at elbow of right upper extremity    Ankle pain    Weakness of right hip    Decreased ROM of ankle    Antalgic gait    Decreased functional activity tolerance    Hip pain       Diagnostic Studies:   Latest Reference Range & Units 04/03/23 09:19   WBC 3.90 - 12.70 K/uL 3.84 (L)   RBC 4.00 - 5.40 M/uL 4.51   Hemoglobin 12.0 - 16.0 g/dL 13.1   Hematocrit 37.0 - 48.5 % 41.1   MCV 82 - 98 fL 91   MCH 27.0 - 31.0 pg 29.0   MCHC 32.0 - 36.0 g/dL 31.9 (L)   RDW 11.5 - 14.5 % 13.4   Platelets 150 - 450 K/uL 257   MPV 9.2 - 12.9 fL 10.8   Gran % 38.0 - 73.0 % 47.9   Lymph % 18.0 - 48.0 % 37.5   Mono % 4.0 - 15.0 % 9.1   Eosinophil % 0.0 - 8.0 % 4.2   Basophil % 0.0 - 1.9 % 1.3   Immature Granulocytes 0.0 - 0.5 % 0.0   Gran # (ANC) 1.8 - 7.7 K/uL 1.8   Lymph # 1.0 - 4.8 K/uL 1.4   Mono # 0.3 - 1.0 K/uL 0.4   Eos # 0.0 - 0.5 K/uL 0.2   Baso # 0.00 - 0.20 K/uL 0.05   Immature Grans (Abs) 0.00 - 0.04 K/uL 0.00   nRBC 0 /100 WBC 0   Differential Method  Automated      Latest Reference Range  & Units 04/03/23 09:19   Sodium 136 - 145 mmol/L 141   Potassium 3.5 - 5.1 mmol/L 4.9   Chloride 95 - 110 mmol/L 105   CO2 23 - 29 mmol/L 24   Anion Gap 8 - 16 mmol/L 12   BUN 6 - 20 mg/dL 17   Creatinine 0.5 - 1.4 mg/dL 0.9   eGFR >60 mL/min/1.73 m^2 >60.0     Stress Echo (10/6/17):    1 - Normal left ventricular systolic function (EF 60-65%).     2 - Normal right ventricular systolic function .     3 - Normal left ventricular diastolic function.     24 Hour Vitals:  BP: ()/()   Arterial Line BP: ()/()    See Nursing Charting For Additional Vitals      Pre-op Assessment    I have reviewed the Patient Summary Reports.     I have reviewed the Nursing Notes.       Review of Systems  Anesthesia Hx:  No problems with previous Anesthesia   Denies Personal Hx of Anesthesia complications.   Social:  Non-Smoker, Alcohol Use    Cardiovascular:  Cardiovascular Normal Exercise tolerance: good     Pulmonary:  Pulmonary Normal    Hepatic/GI:   GERD    Musculoskeletal:   Arthritis     Neurological:  Neurology Normal    Endocrine:  Endocrine Normal        Physical Exam  General: Well nourished and Cooperative    Airway:  Mallampati: I   Mouth Opening: Normal  TM Distance: Normal      Dental:  Intact    Chest/Lungs:  Clear to auscultation, Normal Respiratory Rate    Heart:  Rate: Normal  Rhythm: Regular Rhythm        Anesthesia Plan  Type of Anesthesia, risks & benefits discussed:    Anesthesia Type: Gen Natural Airway, MAC, Gen ETT  Intra-op Monitoring Plan: Standard ASA Monitors  Post Op Pain Control Plan: multimodal analgesia  Induction:  IV  Informed Consent: Informed consent signed with the Patient and all parties understand the risks and agree with anesthesia plan.  All questions answered.   ASA Score: 2    Ready For Surgery From Anesthesia Perspective.     .

## 2023-05-05 NOTE — TRANSFER OF CARE
Anesthesia Transfer of Care Note    Patient: Nora Mccoy    Procedure(s) Performed: Procedure(s) (LRB):  EGD (ESOPHAGOGASTRODUODENOSCOPY) (N/A)  COLONOSCOPY (N/A)    Patient location: GI    Anesthesia Type: general    Transport from OR: Transported from OR on room air with adequate spontaneous ventilation    Post pain: adequate analgesia    Post assessment: no apparent anesthetic complications and tolerated procedure well    Post vital signs: stable    Level of consciousness: responds to stimulation and lethargic    Nausea/Vomiting: no nausea/vomiting    Complications: none    Transfer of care protocol was followed      Last vitals:   Visit Vitals  BP (!) 94/53 (BP Location: Left arm, Patient Position: Lying)   Pulse 79   Temp 36.4 °C (97.5 °F) (Oral)   Resp 14   LMP 02/15/2017   SpO2 95%   Breastfeeding No

## 2023-05-05 NOTE — H&P
Short Stay Endoscopy History and Physical    PCP - Arpita Bazan MD    Procedure - Colonoscopy  ASA - per anesthesia  Mallampati - per anesthesia  History of Anesthesia problems - no  Family history Anesthesia problems - no   Plan of anesthesia - General    HPI:  This is a 58 y.o. female here for evaluation of : personal history of colon polyps, last polyp was benign      ROS:  Constitutional: No fevers, chills, No weight loss  CV: No chest pain  Pulm: No cough, No shortness of breath  GI: see HPI  Derm: No rash    Medical History:  has a past medical history of Bronchitis, DJD (degenerative joint disease), Esophagitis, unspecified, Fibrocystic disease of breast, Menstrual disorder, Sinus infection, Stress, and UTI (urinary tract infection).    Surgical History:  has a past surgical history that includes Back surgery; Colonoscopy (N/A, 7/7/2016); Breast biopsy (Left, 2015); Functional endoscopic sinus surgery (FESS) using computer-assisted navigation (Bilateral, 4/5/2019); Ethmoidectomy (N/A, 4/5/2019); Nasal turbinate reduction (Bilateral, 4/5/2019); Maxillary antrostomy (4/5/2019); Frontal sinus obliteration (4/5/2019); and Epidural steroid injection (Right, 10/11/2021).    Family History: family history includes Breast cancer in her maternal aunt; Breast cancer (age of onset: 72) in her mother; Cancer in her father; Cataracts in her maternal grandmother; Hypertension in her sister; Lupus in her mother; No Known Problems in her brother, brother, daughter, and son; Other in her mother; Retinal detachment in her mother; Thyroid disease in her mother.. Otherwise no colon cancer, inflammatory bowel disease, or GI malignancies.    Social History:  reports that she has never smoked. She has never used smokeless tobacco. She reports current alcohol use of about 1.7 standard drinks per week. She reports that she does not use drugs.    Review of patient's allergies indicates:   Allergen Reactions     Penicillin g Anaphylaxis    Penicillins Anaphylaxis    Penicillin Hives       Medications:   Medications Prior to Admission   Medication Sig Dispense Refill Last Dose    FLUoxetine 40 MG capsule Take 1 capsule by mouth daily with 20 mg of Prozac for a total of 60 mg daily 90 capsule 1 5/4/2023    traZODone (DESYREL) 50 MG tablet Take 2 tablets (100 mg total) by mouth nightly as needed for Insomnia. 180 tablet 1 5/4/2023    acyclovir (ZOVIRAX) 800 MG Tab Take 1 tablet (800 mg total) by mouth 2 (two) times daily. 60 tablet 5     budesonide (PULMICORT) 0.5 mg/2 mL nebulizer solution Take 2 mLs (0.5 mg total) by nebulization once daily. For use in saline irrigation as directed. 180 mL 1     cetirizine (ZYRTEC) 10 MG tablet Take 1 tablet (10 mg total) by mouth once daily. 30 tablet 0     cyclobenzaprine (FLEXERIL) 10 MG tablet Take 1 tablet (10 mg total) by mouth 3 (three) times daily as needed for Muscle spasms. 60 tablet 2     EPINEPHrine (EPIPEN 2-JAMES) 0.3 mg/0.3 mL AtIn Inject 0.3 mLs (0.3 mg total) into the muscle once. for 1 dose as needed 2 each 2     FLUoxetine 20 MG capsule TAKE 1 CAPSULE BY MOUTH EVERY DAY WITH 40 MG OF PROZAC TO TOAL 60MG DAILY 90 capsule 1     fluticasone propionate (FLONASE) 50 mcg/actuation nasal spray 1 spray (50 mcg total) by Each Nostril route once daily. 9.9 mL 0     LORazepam (ATIVAN) 0.5 MG tablet Take 1 tablet (0.5 mg total) by mouth daily as needed (severe anxiety). 30 tablet 1     multivitamin (THERAGRAN) tablet Take 1 tablet by mouth once daily.            Physical Exam:    Vital Signs:   Vitals:    05/05/23 1026   BP: 109/63   Pulse:    Resp:    Temp:        General Appearance: Well appearing in no acute distress  Eyes:    No scleral icterus  ENT: Neck supple, Lips, mucosa, and tongue normal; teeth and gums normal  Abdomen: Soft, non tender, non distended with positive bowel sounds. No hepatosplenomegaly, ascites, or mass.  Extremities: 2+ pulses, no clubbing, cyanosis or  edema  Skin: No rash      Labs:  Lab Results   Component Value Date    WBC 3.84 (L) 04/03/2023    HGB 13.1 04/03/2023    HCT 41.1 04/03/2023     04/03/2023    CHOL 195 04/03/2023    TRIG 79 04/03/2023    HDL 58 04/03/2023    ALT 13 04/03/2023    AST 20 04/03/2023     04/03/2023    K 4.9 04/03/2023     04/03/2023    CREATININE 0.9 04/03/2023    BUN 17 04/03/2023    CO2 24 04/03/2023    TSH 1.115 04/03/2023    HGBA1C 5.3 04/03/2023       I have explained the risks and benefits of endoscopy procedures to the patient including but not limited to bleeding, perforation, infection, and death.  The patient was asked if they understand and allowed to ask any further questions to their satisfaction.    Josh Funez MD

## 2023-05-05 NOTE — PROVATION PATIENT INSTRUCTIONS
Discharge Summary/Instructions after an Endoscopic Procedure  Patient Name: Nora Mccoy  Patient MRN: [id number]  Patient YOB: 1965  Friday, May 5, 2023  Josh Funez MD  Dear patient,  As a result of recent federal legislation (The Federal Cures Act), you may   receive lab or pathology results from your procedure in your [facility]   account before your physician is able to contact you. Your physician or   their representative will relay the results to you with their   recommendations at their soonest availability.  Thank you,  RESTRICTIONS:  During your procedure today, you received medications for sedation.  These   medications may affect your judgment, balance and coordination.  Therefore,   for 24 hours, you have the following restrictions:   - DO NOT drive a car, operate machinery, make legal/financial decisions,   sign important papers or drink alcohol.    ACTIVITY:  Today: no heavy lifting, straining or running due to procedural   sedation/anesthesia.  The following day: return to full activity including work.  DIET:  Eat and drink normally unless instructed otherwise.     TREATMENT FOR COMMON SIDE EFFECTS:  - Mild abdominal pain, nausea, belching, bloating or excessive gas:  rest,   eat lightly and use a heating pad.  - Sore Throat: treat with throat lozenges and/or gargle with warm salt   water.  - Because air was used during the procedure, expelling large amounts of air   from your rectum or belching is normal.  - If a bowel prep was taken, you may not have a bowel movement for 1-3 days.    This is normal.  SYMPTOMS TO WATCH FOR AND REPORT TO YOUR PHYSICIAN:  1. Abdominal pain or bloating, other than gas cramps.  2. Chest pain.  3. Back pain.  4. Signs of infection such as: chills or fever occurring within 24 hours   after the procedure.  5. Rectal bleeding, which would show as bright red, maroon, or black stools.   (A tablespoon of blood from the rectum is not serious, especially if    hemorrhoids are present.)  6. Vomiting.  7. Weakness or dizziness.  GO DIRECTLY TO THE NEAREST EMERGENCY ROOM IF YOU HAVE ANY OF THE FOLLOWING:      Difficulty breathing              Chills and/or fever over 101 F   Persistent vomiting and/or vomiting blood   Severe abdominal pain   Severe chest pain   Black, tarry stools   Bleeding- more than one tablespoon   Any other symptom or condition that you feel may need urgent attention  Your doctor recommends these additional instructions:  If any biopsies were taken, your doctors clinic will contact you in 1 to 2   weeks with any results.  - Patient has a contact number available for emergencies.  The signs and   symptoms of potential delayed complications were discussed with the   patient.  Return to normal activities tomorrow.  Written discharge   instructions were provided to the patient.   - Discharge patient to home.   - Repeat colonoscopy in 10 years for screening purposes. Last colon polyp   was not adenomatous.  - The findings and recommendations were discussed with the designated   responsible adult.  For questions, problems or results please call your physician - Josh Funez MD at Work:  [phone].  [facility] can be reached at [phone]     IF A COMPLICATION OR EMERGENCY SITUATION ARISES AND YOU ARE UNABLE TO REACH   YOUR PHYSICIAN - GO DIRECTLY TO THE EMERGENCY ROOM.  Josh Funez MD  5/5/2023 11:25:35 AM  This report has been verified and signed electronically.  Dear patient,  As a result of recent federal legislation (The Federal Cures Act), you may   receive lab or pathology results from your procedure in your [facility]   account before your physician is able to contact you. Your physician or   their representative will relay the results to you with their   recommendations at their soonest availability.  Thank you,  PROVATION

## 2023-05-05 NOTE — PROVATION PATIENT INSTRUCTIONS
Discharge Summary/Instructions after an Endoscopic Procedure  Patient Name: Nora Mccoy  Patient MRN: [id number]  Patient YOB: 1965  Friday, May 5, 2023  Josh Funez MD  Dear patient,  As a result of recent federal legislation (The Federal Cures Act), you may   receive lab or pathology results from your procedure in your [facility]   account before your physician is able to contact you. Your physician or   their representative will relay the results to you with their   recommendations at their soonest availability.  Thank you,  RESTRICTIONS:  During your procedure today, you received medications for sedation.  These   medications may affect your judgment, balance and coordination.  Therefore,   for 24 hours, you have the following restrictions:   - DO NOT drive a car, operate machinery, make legal/financial decisions,   sign important papers or drink alcohol.    ACTIVITY:  Today: no heavy lifting, straining or running due to procedural   sedation/anesthesia.  The following day: return to full activity including work.  DIET:  Eat and drink normally unless instructed otherwise.     TREATMENT FOR COMMON SIDE EFFECTS:  - Mild abdominal pain, nausea, belching, bloating or excessive gas:  rest,   eat lightly and use a heating pad.  - Sore Throat: treat with throat lozenges and/or gargle with warm salt   water.  - Because air was used during the procedure, expelling large amounts of air   from your rectum or belching is normal.  - If a bowel prep was taken, you may not have a bowel movement for 1-3 days.    This is normal.  SYMPTOMS TO WATCH FOR AND REPORT TO YOUR PHYSICIAN:  1. Abdominal pain or bloating, other than gas cramps.  2. Chest pain.  3. Back pain.  4. Signs of infection such as: chills or fever occurring within 24 hours   after the procedure.  5. Rectal bleeding, which would show as bright red, maroon, or black stools.   (A tablespoon of blood from the rectum is not serious, especially if    hemorrhoids are present.)  6. Vomiting.  7. Weakness or dizziness.  GO DIRECTLY TO THE NEAREST EMERGENCY ROOM IF YOU HAVE ANY OF THE FOLLOWING:      Difficulty breathing              Chills and/or fever over 101 F   Persistent vomiting and/or vomiting blood   Severe abdominal pain   Severe chest pain   Black, tarry stools   Bleeding- more than one tablespoon   Any other symptom or condition that you feel may need urgent attention  Your doctor recommends these additional instructions:  If any biopsies were taken, your doctors clinic will contact you in 1 to 2   weeks with any results.  - Discharge patient to home.   - Perform a colonoscopy today.   - COnsider CT scan if LUQ abdominal pain persists  - Await pathology results.   - The findings and recommendations were discussed with the designated   responsible adult.  For questions, problems or results please call your physician - Josh Funez MD at Work:  [phone].  [facility] can be reached at [phone]     IF A COMPLICATION OR EMERGENCY SITUATION ARISES AND YOU ARE UNABLE TO REACH   YOUR PHYSICIAN - GO DIRECTLY TO THE EMERGENCY ROOM.  Josh Funez MD  5/5/2023 11:23:34 AM  This report has been verified and signed electronically.  Dear patient,  As a result of recent federal legislation (The Federal Cures Act), you may   receive lab or pathology results from your procedure in your [facility]   account before your physician is able to contact you. Your physician or   their representative will relay the results to you with their   recommendations at their soonest availability.  Thank you,  PROVATION

## 2023-05-05 NOTE — PLAN OF CARE
Procedure and recovery complete. Pt awake and alert. MD Funez and  at bedside, procedure findings and suggestions discussed. Discharge instructions given, pt verbalized understanding of instruction. IV was D/c'd, inner cannula intact. No c/o pain, no distress noted. Gait steady, able to ambulate without assistance. Pt walked out accompanied by spouse.

## 2023-05-05 NOTE — ANESTHESIA POSTPROCEDURE EVALUATION
Anesthesia Post Evaluation    Patient: Nora Mccoy    Procedure(s) Performed: Procedure(s) (LRB):  EGD (ESOPHAGOGASTRODUODENOSCOPY) (N/A)  COLONOSCOPY (N/A)    Final Anesthesia Type: general      Patient location during evaluation: GI PACU  Patient participation: Yes- Able to Participate  Level of consciousness: awake and alert and oriented  Post-procedure vital signs: reviewed and stable  Pain management: adequate  Airway patency: patent    PONV status at discharge: No PONV  Anesthetic complications: no      Cardiovascular status: hemodynamically stable and blood pressure returned to baseline  Respiratory status: spontaneous ventilation, room air and unassisted  Hydration status: euvolemic  Follow-up not needed.          Vitals Value Taken Time   /69 05/05/23 1153   Temp 36.4 °C (97.5 °F) 05/05/23 1123   Pulse 67 05/05/23 1153   Resp 24 05/05/23 1153   SpO2 100 % 05/05/23 1153         Event Time   Out of Recovery 12:24:17         Pain/Sonia Score: Sonia Score: 10 (5/5/2023 11:53 AM)

## 2023-05-11 ENCOUNTER — PATIENT MESSAGE (OUTPATIENT)
Dept: GASTROENTEROLOGY | Facility: CLINIC | Age: 58
End: 2023-05-11
Payer: COMMERCIAL

## 2023-05-11 LAB
FINAL PATHOLOGIC DIAGNOSIS: NORMAL
GROSS: NORMAL
Lab: NORMAL
SUPPLEMENTAL DIAGNOSIS: NORMAL

## 2023-05-16 ENCOUNTER — PATIENT MESSAGE (OUTPATIENT)
Dept: DERMATOLOGY | Facility: CLINIC | Age: 58
End: 2023-05-16
Payer: COMMERCIAL

## 2023-05-17 ENCOUNTER — PATIENT MESSAGE (OUTPATIENT)
Dept: INTERNAL MEDICINE | Facility: CLINIC | Age: 58
End: 2023-05-17
Payer: COMMERCIAL

## 2023-05-17 DIAGNOSIS — R10.9 ABDOMINAL PAIN, UNSPECIFIED ABDOMINAL LOCATION: Primary | ICD-10-CM

## 2023-05-18 ENCOUNTER — PATIENT MESSAGE (OUTPATIENT)
Dept: INTERNAL MEDICINE | Facility: CLINIC | Age: 58
End: 2023-05-18
Payer: COMMERCIAL

## 2023-05-19 ENCOUNTER — TELEPHONE (OUTPATIENT)
Dept: GASTROENTEROLOGY | Facility: CLINIC | Age: 58
End: 2023-05-19
Payer: COMMERCIAL

## 2023-05-19 ENCOUNTER — LAB VISIT (OUTPATIENT)
Dept: LAB | Facility: HOSPITAL | Age: 58
End: 2023-05-19
Attending: INTERNAL MEDICINE
Payer: COMMERCIAL

## 2023-05-19 ENCOUNTER — OFFICE VISIT (OUTPATIENT)
Dept: GASTROENTEROLOGY | Facility: CLINIC | Age: 58
End: 2023-05-19
Payer: COMMERCIAL

## 2023-05-19 VITALS
HEIGHT: 67 IN | DIASTOLIC BLOOD PRESSURE: 79 MMHG | SYSTOLIC BLOOD PRESSURE: 126 MMHG | WEIGHT: 167.69 LBS | HEART RATE: 72 BPM | BODY MASS INDEX: 26.32 KG/M2

## 2023-05-19 DIAGNOSIS — R10.9 ABDOMINAL PAIN, UNSPECIFIED ABDOMINAL LOCATION: ICD-10-CM

## 2023-05-19 LAB
CREAT SERPL-MCNC: 0.8 MG/DL (ref 0.5–1.4)
EST. GFR  (NO RACE VARIABLE): >60 ML/MIN/1.73 M^2

## 2023-05-19 PROCEDURE — 3044F HG A1C LEVEL LT 7.0%: CPT | Mod: CPTII,S$GLB,, | Performed by: INTERNAL MEDICINE

## 2023-05-19 PROCEDURE — 3074F PR MOST RECENT SYSTOLIC BLOOD PRESSURE < 130 MM HG: ICD-10-PCS | Mod: CPTII,S$GLB,, | Performed by: INTERNAL MEDICINE

## 2023-05-19 PROCEDURE — 1160F PR REVIEW ALL MEDS BY PRESCRIBER/CLIN PHARMACIST DOCUMENTED: ICD-10-PCS | Mod: CPTII,S$GLB,, | Performed by: INTERNAL MEDICINE

## 2023-05-19 PROCEDURE — 99213 OFFICE O/P EST LOW 20 MIN: CPT | Mod: S$GLB,,, | Performed by: INTERNAL MEDICINE

## 2023-05-19 PROCEDURE — 36415 COLL VENOUS BLD VENIPUNCTURE: CPT | Performed by: INTERNAL MEDICINE

## 2023-05-19 PROCEDURE — 99999 PR PBB SHADOW E&M-EST. PATIENT-LVL V: CPT | Mod: PBBFAC,,, | Performed by: INTERNAL MEDICINE

## 2023-05-19 PROCEDURE — 1160F RVW MEDS BY RX/DR IN RCRD: CPT | Mod: CPTII,S$GLB,, | Performed by: INTERNAL MEDICINE

## 2023-05-19 PROCEDURE — 3078F PR MOST RECENT DIASTOLIC BLOOD PRESSURE < 80 MM HG: ICD-10-PCS | Mod: CPTII,S$GLB,, | Performed by: INTERNAL MEDICINE

## 2023-05-19 PROCEDURE — 3078F DIAST BP <80 MM HG: CPT | Mod: CPTII,S$GLB,, | Performed by: INTERNAL MEDICINE

## 2023-05-19 PROCEDURE — 82565 ASSAY OF CREATININE: CPT | Performed by: INTERNAL MEDICINE

## 2023-05-19 PROCEDURE — 99213 PR OFFICE/OUTPT VISIT, EST, LEVL III, 20-29 MIN: ICD-10-PCS | Mod: S$GLB,,, | Performed by: INTERNAL MEDICINE

## 2023-05-19 PROCEDURE — 3074F SYST BP LT 130 MM HG: CPT | Mod: CPTII,S$GLB,, | Performed by: INTERNAL MEDICINE

## 2023-05-19 PROCEDURE — 1159F PR MEDICATION LIST DOCUMENTED IN MEDICAL RECORD: ICD-10-PCS | Mod: CPTII,S$GLB,, | Performed by: INTERNAL MEDICINE

## 2023-05-19 PROCEDURE — 3008F PR BODY MASS INDEX (BMI) DOCUMENTED: ICD-10-PCS | Mod: CPTII,S$GLB,, | Performed by: INTERNAL MEDICINE

## 2023-05-19 PROCEDURE — 3044F PR MOST RECENT HEMOGLOBIN A1C LEVEL <7.0%: ICD-10-PCS | Mod: CPTII,S$GLB,, | Performed by: INTERNAL MEDICINE

## 2023-05-19 PROCEDURE — 3008F BODY MASS INDEX DOCD: CPT | Mod: CPTII,S$GLB,, | Performed by: INTERNAL MEDICINE

## 2023-05-19 PROCEDURE — 1159F MED LIST DOCD IN RCRD: CPT | Mod: CPTII,S$GLB,, | Performed by: INTERNAL MEDICINE

## 2023-05-19 PROCEDURE — 99999 PR PBB SHADOW E&M-EST. PATIENT-LVL V: ICD-10-PCS | Mod: PBBFAC,,, | Performed by: INTERNAL MEDICINE

## 2023-05-19 NOTE — TELEPHONE ENCOUNTER
----- Message from Ron Samuels sent at 5/19/2023  9:20 AM CDT -----  Regarding: LAB ORDERS  Hello,           This patient is scheduled for a ct scan. The patient will need a cmp, bmp, or creatinine lab prior to receiving contrast. Please put in the lab order as soon as possible to avoid any delay in patient care.         Thank you,    Ron Samuels

## 2023-05-19 NOTE — PROGRESS NOTES
"[facility]    CC: abdominal pain    HPI 58 y.o. female who presents with chronic, constant, left upper quadrant abdominal pain which is described as a pressure, is tender to the touch, and is associated with heartburn sometimes.  It has been present since Jan 2023.  She also feels full and is eating less due to this.  EGD and colonoscopy were unrevealing for a cause.    She denies nausea and vomiting.    She did try Omeprazole for about a month.  It did relieve the heartburn but did not change the LUQ discomfort.  Zantac did not help    She does have a BM only a couple times per week.  It varies between normal to soft to watery.    Past Medical History  Past Medical History:   Diagnosis Date    Bronchitis     DJD (degenerative joint disease)     shoulder, knee, and back    Esophagitis, unspecified     Fibrocystic disease of breast     Menstrual disorder     Sinus infection     Stress     UTI (urinary tract infection)      Past Surgical History  Back surgery    No pertinent family history of colon cancer      Physical Examination  /79   Pulse 72   Ht 5' 7" (1.702 m)   Wt 76.1 kg (167 lb 10.6 oz)   LMP 02/15/2017   BMI 26.26 kg/m²   General appearance: alert, cooperative, no distress  HENT: Normocephalic, atraumatic, neck symmetrical, no nasal discharge   Abdomen: soft, non-tender;  no organomegaly  Neurologic: Alert and oriented X 3, moving all four extremities, intact sensation to light touch    Labs:  Lab Results   Component Value Date    WBC 3.84 (L) 04/03/2023    HGB 13.1 04/03/2023    HCT 41.1 04/03/2023    MCV 91 04/03/2023     04/03/2023         CMP  Sodium   Date Value Ref Range Status   04/03/2023 141 136 - 145 mmol/L Final     Potassium   Date Value Ref Range Status   04/03/2023 4.9 3.5 - 5.1 mmol/L Final     Chloride   Date Value Ref Range Status   04/03/2023 105 95 - 110 mmol/L Final     CO2   Date Value Ref Range Status   04/03/2023 24 23 - 29 mmol/L Final     Glucose "   Date Value Ref Range Status   04/03/2023 92 70 - 110 mg/dL Final     BUN   Date Value Ref Range Status   04/03/2023 17 6 - 20 mg/dL Final     Creatinine   Date Value Ref Range Status   04/03/2023 0.9 0.5 - 1.4 mg/dL Final     Calcium   Date Value Ref Range Status   04/03/2023 9.5 8.7 - 10.5 mg/dL Final     Total Protein   Date Value Ref Range Status   04/03/2023 6.7 6.0 - 8.4 g/dL Final     Albumin   Date Value Ref Range Status   04/03/2023 4.1 3.5 - 5.2 g/dL Final     Total Bilirubin   Date Value Ref Range Status   04/03/2023 0.9 0.1 - 1.0 mg/dL Final     Comment:     For infants and newborns, interpretation of results should be based  on gestational age, weight and in agreement with clinical  observations.    Premature Infant recommended reference ranges:  Up to 24 hours.............<8.0 mg/dL  Up to 48 hours............<12.0 mg/dL  3-5 days..................<15.0 mg/dL  6-29 days.................<15.0 mg/dL       Alkaline Phosphatase   Date Value Ref Range Status   04/03/2023 64 55 - 135 U/L Final     AST   Date Value Ref Range Status   04/03/2023 20 10 - 40 U/L Final     ALT   Date Value Ref Range Status   04/03/2023 13 10 - 44 U/L Final     Anion Gap   Date Value Ref Range Status   04/03/2023 12 8 - 16 mmol/L Final     eGFR   Date Value Ref Range Status   04/03/2023 >60.0 >60 mL/min/1.73 m^2 Final           Assessment:   1. Abdominal pain, unspecified abdominal location        Plan:  -She will try a trial of Miralax to see if having more frequent bowel movements helps with her discomfort.  -She will also try a trial of FD Nehal.  -CT abdomen and pelvis ordered for further evaluation.  -Consider gastric emptying in the future if her CT is unrevealing and her symptoms persist.    Sue Plaza MD

## 2023-05-25 ENCOUNTER — HOSPITAL ENCOUNTER (OUTPATIENT)
Dept: RADIOLOGY | Facility: HOSPITAL | Age: 58
Discharge: HOME | End: 2023-05-25
Attending: INTERNAL MEDICINE
Payer: COMMERCIAL

## 2023-05-25 DIAGNOSIS — R10.9 ABDOMINAL PAIN, UNSPECIFIED ABDOMINAL LOCATION: ICD-10-CM

## 2023-05-25 PROCEDURE — 25500020 PHARM REV CODE 255: Performed by: INTERNAL MEDICINE

## 2023-05-25 PROCEDURE — 74177 CT ABDOMEN PELVIS WITH CONTRAST: ICD-10-PCS | Mod: 26,,, | Performed by: RADIOLOGY

## 2023-05-25 PROCEDURE — 74177 CT ABD & PELVIS W/CONTRAST: CPT | Mod: TC

## 2023-05-25 PROCEDURE — 74177 CT ABD & PELVIS W/CONTRAST: CPT | Mod: 26,,, | Performed by: RADIOLOGY

## 2023-05-25 RX ADMIN — IOHEXOL 15 ML: 300 INJECTION, SOLUTION INTRAVENOUS at 08:05

## 2023-05-25 RX ADMIN — IOHEXOL 80 ML: 350 INJECTION, SOLUTION INTRAVENOUS at 08:05

## 2023-06-06 ENCOUNTER — APPOINTMENT (OUTPATIENT)
Dept: RADIOLOGY | Facility: CLINIC | Age: 58
End: 2023-06-06
Attending: INTERNAL MEDICINE
Payer: COMMERCIAL

## 2023-06-06 DIAGNOSIS — Z00.00 ANNUAL PHYSICAL EXAM: ICD-10-CM

## 2023-06-06 PROCEDURE — 77080 DXA BONE DENSITY AXIAL SKELETON 1 OR MORE SITES: ICD-10-PCS | Mod: 26,,, | Performed by: INTERNAL MEDICINE

## 2023-06-06 PROCEDURE — 77080 DXA BONE DENSITY AXIAL: CPT | Mod: TC,PO

## 2023-06-06 PROCEDURE — 77080 DXA BONE DENSITY AXIAL: CPT | Mod: 26,,, | Performed by: INTERNAL MEDICINE

## 2023-06-09 ENCOUNTER — TELEPHONE (OUTPATIENT)
Dept: INTERNAL MEDICINE | Facility: CLINIC | Age: 58
End: 2023-06-09
Payer: COMMERCIAL

## 2023-06-09 NOTE — TELEPHONE ENCOUNTER
----- Message from Zamzam Bazan MD sent at 6/9/2023  9:31 AM CDT -----  Nora,  Please note that your bone density revealed thinning of your bone, but not to a degree that prescriptive medication is recommended.    Please include calcium rich foods in your diet  and take vitamin D supplements to help keep your bone stronger.   diego

## 2023-07-26 ENCOUNTER — PATIENT MESSAGE (OUTPATIENT)
Dept: PSYCHIATRY | Facility: CLINIC | Age: 58
End: 2023-07-26
Payer: COMMERCIAL

## 2023-07-26 DIAGNOSIS — F41.9 ANXIETY: ICD-10-CM

## 2023-07-26 DIAGNOSIS — F33.42 RECURRENT MAJOR DEPRESSIVE DISORDER, IN FULL REMISSION: ICD-10-CM

## 2023-07-26 RX ORDER — FLUOXETINE HYDROCHLORIDE 20 MG/1
CAPSULE ORAL
Qty: 30 CAPSULE | Refills: 0 | Status: SHIPPED | OUTPATIENT
Start: 2023-07-26 | End: 2023-09-06 | Stop reason: SDUPTHER

## 2023-07-26 RX ORDER — FLUOXETINE HYDROCHLORIDE 40 MG/1
CAPSULE ORAL
Qty: 30 CAPSULE | Refills: 0 | Status: SHIPPED | OUTPATIENT
Start: 2023-07-26 | End: 2023-09-06 | Stop reason: SDUPTHER

## 2023-07-26 RX ORDER — FLUOXETINE HYDROCHLORIDE 40 MG/1
CAPSULE ORAL
Qty: 90 CAPSULE | Refills: 1 | Status: CANCELLED | OUTPATIENT
Start: 2023-07-26

## 2023-08-31 ENCOUNTER — PROCEDURE VISIT (OUTPATIENT)
Dept: DERMATOLOGY | Facility: CLINIC | Age: 58
End: 2023-08-31

## 2023-08-31 DIAGNOSIS — Z41.1 ELECTIVE PROCEDURE FOR UNACCEPTABLE COSMETIC APPEARANCE: Primary | ICD-10-CM

## 2023-08-31 PROCEDURE — 99499 NO LOS: ICD-10-PCS | Mod: CSM,,, | Performed by: DERMATOLOGY

## 2023-08-31 PROCEDURE — 99499 UNLISTED E&M SERVICE: CPT | Mod: CSM,,, | Performed by: DERMATOLOGY

## 2023-08-31 NOTE — PROGRESS NOTES
Pt here for repeat botox treatment. Has had written consent signed in the past.    Pt denies any new medical problems or medications. Pt denies current pregnancy.   Risks reviewed including headache, pain, infection, bleeding, bruising, eyebrow droop, eyelid droop, asymmetry, inability to close eye temporarily. Pt understands and would like to have treatment.  16 units placed in glabella, 7 units placed in forehead, 12 units placed in crows feet.   No complications.    Post procedure handout provided.    Botox lot number [id number]  Expiration date 05/2026

## 2023-09-06 ENCOUNTER — OFFICE VISIT (OUTPATIENT)
Dept: PSYCHIATRY | Facility: CLINIC | Age: 58
End: 2023-09-06
Payer: COMMERCIAL

## 2023-09-06 DIAGNOSIS — G47.00 INSOMNIA, UNSPECIFIED TYPE: ICD-10-CM

## 2023-09-06 DIAGNOSIS — F41.9 ANXIETY: ICD-10-CM

## 2023-09-06 DIAGNOSIS — F33.42 RECURRENT MAJOR DEPRESSIVE DISORDER, IN FULL REMISSION: Primary | ICD-10-CM

## 2023-09-06 PROCEDURE — 1159F MED LIST DOCD IN RCRD: CPT | Mod: CPTII,95,, | Performed by: NURSE PRACTITIONER

## 2023-09-06 PROCEDURE — 1160F PR REVIEW ALL MEDS BY PRESCRIBER/CLIN PHARMACIST DOCUMENTED: ICD-10-PCS | Mod: CPTII,95,, | Performed by: NURSE PRACTITIONER

## 2023-09-06 PROCEDURE — 1159F PR MEDICATION LIST DOCUMENTED IN MEDICAL RECORD: ICD-10-PCS | Mod: CPTII,95,, | Performed by: NURSE PRACTITIONER

## 2023-09-06 PROCEDURE — 3044F PR MOST RECENT HEMOGLOBIN A1C LEVEL <7.0%: ICD-10-PCS | Mod: CPTII,95,, | Performed by: NURSE PRACTITIONER

## 2023-09-06 PROCEDURE — 3044F HG A1C LEVEL LT 7.0%: CPT | Mod: CPTII,95,, | Performed by: NURSE PRACTITIONER

## 2023-09-06 PROCEDURE — 99214 OFFICE O/P EST MOD 30 MIN: CPT | Mod: 95,,, | Performed by: NURSE PRACTITIONER

## 2023-09-06 PROCEDURE — 1160F RVW MEDS BY RX/DR IN RCRD: CPT | Mod: CPTII,95,, | Performed by: NURSE PRACTITIONER

## 2023-09-06 PROCEDURE — 99214 PR OFFICE/OUTPT VISIT, EST, LEVL IV, 30-39 MIN: ICD-10-PCS | Mod: 95,,, | Performed by: NURSE PRACTITIONER

## 2023-09-06 RX ORDER — FLUOXETINE HYDROCHLORIDE 20 MG/1
CAPSULE ORAL
Qty: 90 CAPSULE | Refills: 1 | Status: SHIPPED | OUTPATIENT
Start: 2023-09-06

## 2023-09-06 RX ORDER — LORAZEPAM 0.5 MG/1
0.5 TABLET ORAL DAILY PRN
Qty: 30 TABLET | Refills: 0 | Status: SHIPPED | OUTPATIENT
Start: 2023-09-06 | End: 2023-12-06

## 2023-09-06 RX ORDER — TRAZODONE HYDROCHLORIDE 50 MG/1
100 TABLET ORAL NIGHTLY PRN
Qty: 180 TABLET | Refills: 1 | Status: SHIPPED | OUTPATIENT
Start: 2023-09-06 | End: 2024-03-04

## 2023-09-06 RX ORDER — FLUOXETINE HYDROCHLORIDE 40 MG/1
CAPSULE ORAL
Qty: 90 CAPSULE | Refills: 1 | Status: SHIPPED | OUTPATIENT
Start: 2023-09-06

## 2023-09-06 NOTE — PROGRESS NOTES
9/6/2023 2:13 PM  Nora Mccoy  1965  [id number]    Outpatient Psychiatry Follow-Up Visit (MD/NP) - Telemedicine Visit      The patient location is: Patient reported that her location at the time of this visit was in the [facility]   Address: documented in [facility]      Visit type: audiovisual      Each patient to whom he or she provides medical services by telemedicine is:  (1) informed of the relationship between the physician and patient and the respective role of any other health care provider with respect to management of the patient; and (2) notified that he or she may decline to receive medical services by telemedicine and may withdraw from such care at any time.      Clinical Status of Patient:  Outpatient (Ambulatory)    Chief Complaint:  Nora cMcoy, a 58 y.o. female,who presents today for follow up of depression and anxiety.  Met with patient.      Interim Events/Subjective Report/Content of Current Session:     Today the pt states that her sxs of depression continue to be well controlled with her current med regimen. She denies depression, amotivation, anhedonia and hopelessness. States anxiety has been elevated. Work has been stressful and her 18 yo son was recently hospitalized for depression, anxiety, and paranoia. States they started him on new psych meds and he is doing well now. She states her sxs are stable on her current psych meds.     Uses Ativan prn once or twice a month for anxiety.  was queried. No discrepancies or signs of misuse noted on . Pt appears to be using the medication appropriately. Sleeping very well with Trazodone.    Pt denies recurrent thoughts of death and denies SI/HI. Denies any sxs of alex. Denies AVH, paranoia and delusions. No objective s/sx of psychosis or alex. Denies any ASE from her psych meds.        Psychotherapy:  Target symptoms: depression, anxiety   Why chosen therapy is appropriate versus another modality: relevant to  diagnosis, patient responds to this modality  Outcome monitoring methods: self-report, observation  Therapeutic intervention type: supportive psychotherapy  Topics discussed/themes: work stress, building skills sets for symptom management  The patient's response to the intervention is accepting. The patient's progress toward treatment goals is good.   Duration of intervention: 4 minutes.      Psychotropic medication review  Previous Trials-  None    Current meds-  Prozac  Trazodone  Ativan      Review of Systems       Review of Systems   Constitutional:  Negative for chills, fever and malaise/fatigue.   Respiratory:  Negative for shortness of breath and wheezing.    Cardiovascular:  Negative for chest pain and palpitations.   Musculoskeletal:  Negative for falls and myalgias.   Skin:  Negative for rash.   Neurological:  Negative for tremors, seizures and headaches.   Psychiatric/Behavioral:          See HPI         Past Medical, Family and Social History: The patient's past medical, family and social history have been reviewed and updated as appropriate within the electronic medical record - see encounter notes.    Compliance: yes      Risk Parameters:  Patient reports no suicidal ideation  Patient reports no homicidal ideation  Patient reports no self-injurious behavior  Patient reports no violent behavior    Exam (detailed: at least 9 elements; comprehensive: all 15 elements)   Constitutional  Vitals:  Most recent vital signs, dated greater than 90 days prior to this appointment, were reviewed.   There were no vitals filed for this visit.     General:  unremarkable, age appropriate, normal weight, well nourished, casually dressed, neatly groomed     Musculoskeletal  Muscle Strength/Tone:  LETICIA due to virtual visit   Gait & Station:  LETICIA due to virtual visit     Psychiatric      Appearance:  unremarkable, age appropriate, normal weight, well nourished, casually dressed, neatly groomed   Behavior:  normal, friendly  and cooperative, eye contact normal     Speech:  no latency; no press   Mood & Affect:  euthymic  congruent and appropriate   Thought Process:  normal and logical   Associations:  intact   Thought Content:  normal, no suicidality, no homicidality, delusions, or paranoia   Insight:  intact   Judgement: behavior is adequate to circumstances, age appropriate   Orientation:  grossly intact   Memory: intact for content of interview   Language: grossly intact   Attention Span & Concentration:  able to focus   Fund of Knowledge:  intact and appropriate to age and level of education     Medications:  Outpatient Encounter Medications as of 9/6/2023   Medication Sig Dispense Refill    acyclovir (ZOVIRAX) 800 MG Tab Take 1 tablet (800 mg total) by mouth 2 (two) times daily. 60 tablet 5    budesonide (PULMICORT) 0.5 mg/2 mL nebulizer solution Take 2 mLs (0.5 mg total) by nebulization once daily. For use in saline irrigation as directed. 180 mL 1    cetirizine (ZYRTEC) 10 MG tablet Take 1 tablet (10 mg total) by mouth once daily. 30 tablet 0    cyclobenzaprine (FLEXERIL) 10 MG tablet Take 1 tablet (10 mg total) by mouth 3 (three) times daily as needed for Muscle spasms. 60 tablet 2    EPINEPHrine (EPIPEN 2-JAMES) 0.3 mg/0.3 mL AtIn Inject 0.3 mLs (0.3 mg total) into the muscle once. for 1 dose as needed 2 each 2    FLUoxetine 20 MG capsule TAKE 1 CAPSULE BY MOUTH EVERY DAY WITH 40 MG OF PROZAC TO TOAL 60MG DAILY 30 capsule 0    FLUoxetine 40 MG capsule Take 1 capsule by mouth daily with 20 mg of Prozac for a total of 60 mg daily 30 capsule 0    fluticasone propionate (FLONASE) 50 mcg/actuation nasal spray 1 spray (50 mcg total) by Each Nostril route once daily. (Patient not taking: Reported on 5/19/2023) 9.9 mL 0    LORazepam (ATIVAN) 0.5 MG tablet Take 1 tablet (0.5 mg total) by mouth daily as needed (severe anxiety). 30 tablet 1    multivitamin (THERAGRAN) tablet Take 1 tablet by mouth once daily.      sod sulf-pot chloride-mag  sulf (SUTAB) 1.479-0.188- 0.225 gram tablet Take 12 tablets by mouth once daily. Take according to package instructions with indicated amount of water. (Patient not taking: Reported on 5/19/2023) 24 tablet 0    traZODone (DESYREL) 50 MG tablet Take 2 tablets (100 mg total) by mouth nightly as needed for Insomnia. 180 tablet 1     No facility-administered encounter medications on file as of 9/6/2023.       Allergy:  Review of patient's allergies indicates:   Allergen Reactions    Penicillin g Anaphylaxis    Penicillins Anaphylaxis    Penicillin Hives         Assessment and Diagnosis   Status/Progress: Based on the examination today, the patient's problem(s) is/are well controlled.  New problems have not been presented today.   Co-morbidities are not complicating management of the primary condition.  There are no active rule-out diagnoses for this patient at this time.         General Impression:       ICD-10-CM ICD-9-CM   1. Recurrent major depressive disorder, in full remission  F33.42 296.36   2. Anxiety  F41.9 300.00   3. Insomnia, unspecified type  G47.00 780.52       Intervention/Counseling/Treatment Plan     Medication Management:  Continue Prozac 60 mg q day  Continue Trazodone  mg q hs. Pt was told not drive or to take this med with ETOH or other sedating meds/substance. Pt verbalized understanding.  Continue Ativan 0.5 mg q day prn anxiety. Pt was told not drive or to take this med with ETOH or other sedating meds/substance. Pt verbalized understanding.  Labs: reviewed most recent  Prescription Monitoring Program was queried.   The treatment plan and follow up plan were reviewed with the patient.  Discussed with patient informed consent, risks vs. benefits, alternative treatments, side effect profile and the inherent unpredictability of individual responses to these treatments. The patient expresses understanding of the above and displays the capacity to agree with this current plan and had no other  questions.  Encouraged Patient to keep future appointments.   Take medications as prescribed and abstain from substance abuse.   Present to ED or call 911 for SI/HI plan or intent, psychosis, or medical emergency.        Return to Clinic: 6 months, or sooner if needed        Face-to-face time with the patient: 14 minutes  Total time:  20 minutes of total time spent on the encounter, which includes face to face time and non-face to face time preparing to see the patient (eg, review of tests), Obtaining and/or reviewing separately obtained history, Documenting clinical information in the electronic or other health record, Independently interpreting results (not separately reported) and communicating results to the patient/family/caregiver, or Care coordination (not separately reported).       Harmony Pascual, MSN, APRN, PMHNP-BC  [facility]

## 2023-11-27 ENCOUNTER — TELEPHONE (OUTPATIENT)
Dept: OPHTHALMOLOGY | Facility: CLINIC | Age: 58
End: 2023-11-27
Payer: COMMERCIAL

## 2023-11-27 ENCOUNTER — OFFICE VISIT (OUTPATIENT)
Dept: OPTOMETRY | Facility: CLINIC | Age: 58
End: 2023-11-27
Payer: COMMERCIAL

## 2023-11-27 DIAGNOSIS — H53.60 VISION LOSS NIGHT: ICD-10-CM

## 2023-11-27 DIAGNOSIS — H43.821 VITREOMACULAR ADHESION OF RIGHT EYE: ICD-10-CM

## 2023-11-27 DIAGNOSIS — H35.711: ICD-10-CM

## 2023-11-27 DIAGNOSIS — H04.123 DRY EYE SYNDROME, BILATERAL: Primary | ICD-10-CM

## 2023-11-27 PROCEDURE — 92134 CPTRZ OPH DX IMG PST SGM RTA: CPT | Mod: S$GLB,,, | Performed by: OPTOMETRIST

## 2023-11-27 PROCEDURE — 3044F PR MOST RECENT HEMOGLOBIN A1C LEVEL <7.0%: ICD-10-PCS | Mod: CPTII,S$GLB,, | Performed by: OPTOMETRIST

## 2023-11-27 PROCEDURE — 1160F RVW MEDS BY RX/DR IN RCRD: CPT | Mod: CPTII,S$GLB,, | Performed by: OPTOMETRIST

## 2023-11-27 PROCEDURE — 99999 PR PBB SHADOW E&M-EST. PATIENT-LVL III: ICD-10-PCS | Mod: PBBFAC,,, | Performed by: OPTOMETRIST

## 2023-11-27 PROCEDURE — 1159F PR MEDICATION LIST DOCUMENTED IN MEDICAL RECORD: ICD-10-PCS | Mod: CPTII,S$GLB,, | Performed by: OPTOMETRIST

## 2023-11-27 PROCEDURE — 99999 PR PBB SHADOW E&M-EST. PATIENT-LVL III: CPT | Mod: PBBFAC,,, | Performed by: OPTOMETRIST

## 2023-11-27 PROCEDURE — 1160F PR REVIEW ALL MEDS BY PRESCRIBER/CLIN PHARMACIST DOCUMENTED: ICD-10-PCS | Mod: CPTII,S$GLB,, | Performed by: OPTOMETRIST

## 2023-11-27 PROCEDURE — 1159F MED LIST DOCD IN RCRD: CPT | Mod: CPTII,S$GLB,, | Performed by: OPTOMETRIST

## 2023-11-27 PROCEDURE — 99214 OFFICE O/P EST MOD 30 MIN: CPT | Mod: S$GLB,,, | Performed by: OPTOMETRIST

## 2023-11-27 PROCEDURE — 3044F HG A1C LEVEL LT 7.0%: CPT | Mod: CPTII,S$GLB,, | Performed by: OPTOMETRIST

## 2023-11-27 PROCEDURE — 92134 POSTERIOR SEGMENT OCT RETINA (OCULAR COHERENCE TOMOGRAPHY)-BOTH EYES: ICD-10-PCS | Mod: S$GLB,,, | Performed by: OPTOMETRIST

## 2023-11-27 PROCEDURE — 99214 PR OFFICE/OUTPT VISIT, EST, LEVL IV, 30-39 MIN: ICD-10-PCS | Mod: S$GLB,,, | Performed by: OPTOMETRIST

## 2023-11-27 RX ORDER — LOTEPREDNOL ETABONATE 5 MG/ML
1 SUSPENSION/ DROPS OPHTHALMIC 4 TIMES DAILY
Qty: 5 ML | Refills: 1 | Status: SHIPPED | OUTPATIENT
Start: 2023-11-27 | End: 2023-12-06

## 2023-11-27 NOTE — PROGRESS NOTES
HPI    DLS: 1/29/21    No eyedrops  No eye surgery     Pt here for check of ocular health.  Pt states x 5 days she has been   having eye pain OS (5 on scale) and a scratchy feeling.  Pt states blurry   VA OS at distance and near. Pt states x 2 days when she closes OS she has   a blur in the middle of her right eye and tunnel vision in the blurred   area.  Pt denies eye pain OD. Pt states she has been having a headache   almost every day during the last week.  Pt states some itching OU.  Pt   denies flashes, floaters, tearing or burning OU.  Last edited by Alyssa Russell MA on 11/27/2023  1:50 PM.            Assessment /Plan     For exam results, see Encounter Report.    Dry eye syndrome, bilateral   Start loteprednol (LOTEMAX) 0.5 % ophthalmic suspension; Place 1 drop into both eyes 4 (four) times daily. for 7 days  then switch to   -     perfluorohexyloctane, PF, 100 % Drop; Place 1 drop into both eyes 4 (four) times daily.  Dispense: 3 mL; Refill: 11    Vision loss night  -     Posterior Segment OCT Retina-Both eyes  Vitreomacular adhesion of right eye  Central serous chorioretinopathy, right   Consult retina

## 2023-12-01 ENCOUNTER — TELEPHONE (OUTPATIENT)
Dept: OPHTHALMOLOGY | Facility: CLINIC | Age: 58
End: 2023-12-01

## 2023-12-01 ENCOUNTER — OFFICE VISIT (OUTPATIENT)
Dept: OPHTHALMOLOGY | Facility: CLINIC | Age: 58
End: 2023-12-01
Payer: COMMERCIAL

## 2023-12-01 ENCOUNTER — PATIENT MESSAGE (OUTPATIENT)
Dept: OPHTHALMOLOGY | Facility: CLINIC | Age: 58
End: 2023-12-01

## 2023-12-01 DIAGNOSIS — H35.341 MACULAR HOLE, RIGHT: Primary | ICD-10-CM

## 2023-12-01 PROCEDURE — 1159F MED LIST DOCD IN RCRD: CPT | Mod: CPTII,S$GLB,, | Performed by: OPHTHALMOLOGY

## 2023-12-01 PROCEDURE — 92134 CPTRZ OPH DX IMG PST SGM RTA: CPT | Mod: S$GLB,,, | Performed by: OPHTHALMOLOGY

## 2023-12-01 PROCEDURE — 92134 POSTERIOR SEGMENT OCT RETINA (OCULAR COHERENCE TOMOGRAPHY)-BOTH EYES: ICD-10-PCS | Mod: S$GLB,,, | Performed by: OPHTHALMOLOGY

## 2023-12-01 PROCEDURE — 1160F PR REVIEW ALL MEDS BY PRESCRIBER/CLIN PHARMACIST DOCUMENTED: ICD-10-PCS | Mod: CPTII,S$GLB,, | Performed by: OPHTHALMOLOGY

## 2023-12-01 PROCEDURE — 99999 PR PBB SHADOW E&M-EST. PATIENT-LVL III: ICD-10-PCS | Mod: PBBFAC,,, | Performed by: OPHTHALMOLOGY

## 2023-12-01 PROCEDURE — 99999 PR PBB SHADOW E&M-EST. PATIENT-LVL III: CPT | Mod: PBBFAC,,, | Performed by: OPHTHALMOLOGY

## 2023-12-01 PROCEDURE — 3044F PR MOST RECENT HEMOGLOBIN A1C LEVEL <7.0%: ICD-10-PCS | Mod: CPTII,S$GLB,, | Performed by: OPHTHALMOLOGY

## 2023-12-01 PROCEDURE — 1159F PR MEDICATION LIST DOCUMENTED IN MEDICAL RECORD: ICD-10-PCS | Mod: CPTII,S$GLB,, | Performed by: OPHTHALMOLOGY

## 2023-12-01 PROCEDURE — 3044F HG A1C LEVEL LT 7.0%: CPT | Mod: CPTII,S$GLB,, | Performed by: OPHTHALMOLOGY

## 2023-12-01 PROCEDURE — 1160F RVW MEDS BY RX/DR IN RCRD: CPT | Mod: CPTII,S$GLB,, | Performed by: OPHTHALMOLOGY

## 2023-12-01 PROCEDURE — 99214 OFFICE O/P EST MOD 30 MIN: CPT | Mod: S$GLB,,, | Performed by: OPHTHALMOLOGY

## 2023-12-01 PROCEDURE — 99214 PR OFFICE/OUTPT VISIT, EST, LEVL IV, 30-39 MIN: ICD-10-PCS | Mod: S$GLB,,, | Performed by: OPHTHALMOLOGY

## 2023-12-01 RX ORDER — COVID-19 VACCINE, MRNA 0.04 MG/.418ML
INJECTION, SUSPENSION INTRAMUSCULAR
COMMUNITY
Start: 2023-09-28

## 2023-12-01 RX ORDER — INFLUENZA A VIRUS A/GEORGIA/12/2022 CVR-167 (H1N1) ANTIGEN (MDCK CELL DERIVED, PROPIOLACTONE INACTIVATED), INFLUENZA A VIRUS A/DARWIN/11/2021 (H3N2) ANTIGEN (MDCK CELL DERIVED, PROPIOLACTONE INACTIVATED),INFLUENZA B VIRUS B/SINGAPORE/WUH4618/2021 ANTIGEN (MDCK CELL DERIVED, PROPIOLACTONE INACTIVATED),INFLUENZA B VIRUS B/SINGAPORE/INFTT-16-0610/2016 ANTIGEN (MDCK CELL DERIVED, PROPIOLACTONE INACTIVATED) 15; 15; 15; 15 UG/.5ML; UG/.5ML; UG/.5ML; UG/.5ML
INJECTION, SUSPENSION INTRAMUSCULAR
COMMUNITY
Start: 2023-09-28

## 2023-12-01 NOTE — PROGRESS NOTES
Subjective:       Patient ID: Nora Mccoy is a 58 y.o. female      Chief Complaint   Patient presents with    Concerns About Ocular Health     History of Present Illness  HPI    Since last weekend detail vision OD shrunken into a hole.    Va normal OS    Pt sts some pain in OU mostly OS. No flashes no floaters.   Last edited by Renato Villatoro MD on 12/1/2023 11:56 AM.        Imaging:    See report    Assessment/Plan:     1. Macular hole, right  Pathology and surgical repair discussed in detail with eye model    RBA discussed in detail, inc surgical failure, need for more surgery, loss of vision/eye, no recovery of vision, surgical failure, bleeding, infection, high eye pressure (glaucoma) cataract, etc.  Pt wishes to proceed.      25 ga PPV/ILM peel/gas  Local/MAC    - Posterior Segment OCT Retina-Both eyes    Follow up in about 1 week (around 12/8/2023), or if symptoms worsen or fail to improve, for Surgery.

## 2023-12-04 ENCOUNTER — TELEPHONE (OUTPATIENT)
Dept: OPHTHALMOLOGY | Facility: CLINIC | Age: 58
End: 2023-12-04
Payer: COMMERCIAL

## 2023-12-04 NOTE — PRE-PROCEDURE INSTRUCTIONS
PreOp Instructions given:   - Verbal medication information (what to hold and what to take)   - NPO guidelines 2400  - Arrival place directions given; time to be given the day before procedure by the   Surgeon's Office [facility]  - Bathing with antibacterial soap   - Don't wear any jewelry or bring any valuables AM of surgery   - No makeup or moisturizer to face   - No perfume/cologne, powder, lotions or aftershave   Pt. verbalized understanding.   Pt denies any h/o Anesthesia/Sedation complications or side effects.  Patient does not know arrival time.  Explained that this information comes from the surgeon's office and if they haven't heard from them by 2 or 3 pm to call the office.  Patient stated an understanding.

## 2023-12-05 ENCOUNTER — ANESTHESIA (OUTPATIENT)
Dept: SURGERY | Facility: HOSPITAL | Age: 58
End: 2023-12-05
Payer: COMMERCIAL

## 2023-12-05 ENCOUNTER — ANESTHESIA EVENT (OUTPATIENT)
Dept: SURGERY | Facility: HOSPITAL | Age: 58
End: 2023-12-05
Payer: COMMERCIAL

## 2023-12-05 ENCOUNTER — HOSPITAL ENCOUNTER (OUTPATIENT)
Facility: HOSPITAL | Age: 58
Discharge: HOME | End: 2023-12-05
Attending: OPHTHALMOLOGY | Admitting: OPHTHALMOLOGY
Payer: COMMERCIAL

## 2023-12-05 VITALS
TEMPERATURE: 98 F | BODY MASS INDEX: 26.33 KG/M2 | RESPIRATION RATE: 18 BRPM | HEIGHT: 67 IN | DIASTOLIC BLOOD PRESSURE: 59 MMHG | SYSTOLIC BLOOD PRESSURE: 103 MMHG | HEART RATE: 76 BPM | WEIGHT: 167.75 LBS | OXYGEN SATURATION: 96 %

## 2023-12-05 DIAGNOSIS — H35.341 MACULAR HOLE OF RIGHT EYE: Primary | ICD-10-CM

## 2023-12-05 PROCEDURE — 25000003 PHARM REV CODE 250: Performed by: NURSE ANESTHETIST, CERTIFIED REGISTERED

## 2023-12-05 PROCEDURE — D9220A PRA ANESTHESIA: Mod: ANES,,, | Performed by: STUDENT IN AN ORGANIZED HEALTH CARE EDUCATION/TRAINING PROGRAM

## 2023-12-05 PROCEDURE — 71000015 HC POSTOP RECOV 1ST HR: Performed by: OPHTHALMOLOGY

## 2023-12-05 PROCEDURE — 67042 PR VITRECTOMY PARS PLANA REMOVE INT MEMB RETINA: ICD-10-PCS | Mod: RT,,, | Performed by: OPHTHALMOLOGY

## 2023-12-05 PROCEDURE — 71000044 HC DOSC ROUTINE RECOVERY FIRST HOUR: Performed by: OPHTHALMOLOGY

## 2023-12-05 PROCEDURE — 99499 NO LOS: ICD-10-PCS | Mod: ,,, | Performed by: STUDENT IN AN ORGANIZED HEALTH CARE EDUCATION/TRAINING PROGRAM

## 2023-12-05 PROCEDURE — 63600175 PHARM REV CODE 636 W HCPCS: Performed by: NURSE ANESTHETIST, CERTIFIED REGISTERED

## 2023-12-05 PROCEDURE — D9220A PRA ANESTHESIA: ICD-10-PCS | Mod: CRNA,,, | Performed by: NURSE ANESTHETIST, CERTIFIED REGISTERED

## 2023-12-05 PROCEDURE — 67042 VIT FOR MACULAR HOLE: CPT | Mod: RT,,, | Performed by: OPHTHALMOLOGY

## 2023-12-05 PROCEDURE — 36000708 HC OR TIME LEV III 1ST 15 MIN: Performed by: OPHTHALMOLOGY

## 2023-12-05 PROCEDURE — 36000709 HC OR TIME LEV III EA ADD 15 MIN: Performed by: OPHTHALMOLOGY

## 2023-12-05 PROCEDURE — 37000009 HC ANESTHESIA EA ADD 15 MINS: Performed by: OPHTHALMOLOGY

## 2023-12-05 PROCEDURE — 25000003 PHARM REV CODE 250

## 2023-12-05 PROCEDURE — 99499 UNLISTED E&M SERVICE: CPT | Mod: ,,, | Performed by: STUDENT IN AN ORGANIZED HEALTH CARE EDUCATION/TRAINING PROGRAM

## 2023-12-05 PROCEDURE — 27201423 OPTIME MED/SURG SUP & DEVICES STERILE SUPPLY: Performed by: OPHTHALMOLOGY

## 2023-12-05 PROCEDURE — D9220A PRA ANESTHESIA: Mod: CRNA,,, | Performed by: NURSE ANESTHETIST, CERTIFIED REGISTERED

## 2023-12-05 PROCEDURE — 37000008 HC ANESTHESIA 1ST 15 MINUTES: Performed by: OPHTHALMOLOGY

## 2023-12-05 PROCEDURE — D9220A PRA ANESTHESIA: ICD-10-PCS | Mod: ANES,,, | Performed by: STUDENT IN AN ORGANIZED HEALTH CARE EDUCATION/TRAINING PROGRAM

## 2023-12-05 PROCEDURE — 25000003 PHARM REV CODE 250: Performed by: OPHTHALMOLOGY

## 2023-12-05 PROCEDURE — 63600175 PHARM REV CODE 636 W HCPCS: Performed by: OPHTHALMOLOGY

## 2023-12-05 RX ORDER — ACETAMINOPHEN 325 MG/1
325 TABLET ORAL EVERY 6 HOURS PRN
Refills: 0
Start: 2023-12-05

## 2023-12-05 RX ORDER — PROPOFOL 10 MG/ML
VIAL (ML) INTRAVENOUS
Status: DISCONTINUED | OUTPATIENT
Start: 2023-12-05 | End: 2023-12-05

## 2023-12-05 RX ORDER — DEXAMETHASONE SODIUM PHOSPHATE 4 MG/ML
INJECTION, SOLUTION INTRA-ARTICULAR; INTRALESIONAL; INTRAMUSCULAR; INTRAVENOUS; SOFT TISSUE
Status: DISCONTINUED | OUTPATIENT
Start: 2023-12-05 | End: 2023-12-05 | Stop reason: HOSPADM

## 2023-12-05 RX ORDER — MOXIFLOXACIN 5 MG/ML
1 SOLUTION/ DROPS OPHTHALMIC
Status: DISCONTINUED | OUTPATIENT
Start: 2023-12-05 | End: 2023-12-05 | Stop reason: HOSPADM

## 2023-12-05 RX ORDER — DEXTROSE MONOHYDRATE 25 G/50ML
INJECTION, SOLUTION INTRAVENOUS
Status: COMPLETED | OUTPATIENT
Start: 2023-12-05 | End: 2023-12-05

## 2023-12-05 RX ORDER — BUPIVACAINE HYDROCHLORIDE 7.5 MG/ML
INJECTION, SOLUTION EPIDURAL; RETROBULBAR
Status: DISCONTINUED
Start: 2023-12-05 | End: 2023-12-05 | Stop reason: HOSPADM

## 2023-12-05 RX ORDER — MIDAZOLAM HYDROCHLORIDE 1 MG/ML
INJECTION, SOLUTION INTRAMUSCULAR; INTRAVENOUS
Status: DISCONTINUED | OUTPATIENT
Start: 2023-12-05 | End: 2023-12-05

## 2023-12-05 RX ORDER — NEOMYCIN SULFATE, POLYMYXIN B SULFATE, AND DEXAMETHASONE 3.5; 10000; 1 MG/G; [USP'U]/G; MG/G
OINTMENT OPHTHALMIC
Status: DISCONTINUED | OUTPATIENT
Start: 2023-12-05 | End: 2023-12-05 | Stop reason: HOSPADM

## 2023-12-05 RX ORDER — VANCOMYCIN HYDROCHLORIDE 500 MG/10ML
INJECTION, POWDER, LYOPHILIZED, FOR SOLUTION INTRAVENOUS
Status: DISCONTINUED
Start: 2023-12-05 | End: 2023-12-05 | Stop reason: HOSPADM

## 2023-12-05 RX ORDER — ATROPINE SULFATE 10 MG/ML
1 SOLUTION/ DROPS OPHTHALMIC
Status: DISCONTINUED | OUTPATIENT
Start: 2023-12-05 | End: 2023-12-05 | Stop reason: HOSPADM

## 2023-12-05 RX ORDER — EPINEPHRINE 1 MG/ML
INJECTION, SOLUTION, CONCENTRATE INTRAVENOUS
Status: DISCONTINUED
Start: 2023-12-05 | End: 2023-12-05 | Stop reason: HOSPADM

## 2023-12-05 RX ORDER — BUPIVACAINE HYDROCHLORIDE 7.5 MG/ML
INJECTION, SOLUTION EPIDURAL; RETROBULBAR
Status: DISCONTINUED | OUTPATIENT
Start: 2023-12-05 | End: 2023-12-05 | Stop reason: HOSPADM

## 2023-12-05 RX ORDER — EPINEPHRINE 1 MG/ML
INJECTION, SOLUTION, CONCENTRATE INTRAVENOUS
Status: DISCONTINUED | OUTPATIENT
Start: 2023-12-05 | End: 2023-12-05 | Stop reason: HOSPADM

## 2023-12-05 RX ORDER — TETRACAINE HYDROCHLORIDE 5 MG/ML
1 SOLUTION OPHTHALMIC
Status: DISCONTINUED | OUTPATIENT
Start: 2023-12-05 | End: 2023-12-05 | Stop reason: HOSPADM

## 2023-12-05 RX ORDER — INDOCYANINE GREEN AND WATER 25 MG
KIT INJECTION
Status: DISCONTINUED | OUTPATIENT
Start: 2023-12-05 | End: 2023-12-05 | Stop reason: HOSPADM

## 2023-12-05 RX ORDER — LIDOCAINE HYDROCHLORIDE 20 MG/ML
INJECTION, SOLUTION EPIDURAL; INFILTRATION; INTRACAUDAL; PERINEURAL
Status: DISCONTINUED
Start: 2023-12-05 | End: 2023-12-05 | Stop reason: HOSPADM

## 2023-12-05 RX ORDER — NEOMYCIN SULFATE, POLYMYXIN B SULFATE, AND DEXAMETHASONE 3.5; 10000; 1 MG/G; [USP'U]/G; MG/G
OINTMENT OPHTHALMIC
Status: DISCONTINUED
Start: 2023-12-05 | End: 2023-12-05 | Stop reason: HOSPADM

## 2023-12-05 RX ORDER — PREDNISOLONE ACETATE 10 MG/ML
1 SUSPENSION/ DROPS OPHTHALMIC
Status: DISCONTINUED | OUTPATIENT
Start: 2023-12-05 | End: 2023-12-05 | Stop reason: HOSPADM

## 2023-12-05 RX ORDER — PHENYLEPHRINE HYDROCHLORIDE 25 MG/ML
1 SOLUTION/ DROPS OPHTHALMIC
Status: DISCONTINUED | OUTPATIENT
Start: 2023-12-05 | End: 2023-12-05 | Stop reason: HOSPADM

## 2023-12-05 RX ORDER — INDOCYANINE GREEN AND WATER 25 MG
KIT INJECTION
Status: DISCONTINUED
Start: 2023-12-05 | End: 2023-12-05 | Stop reason: HOSPADM

## 2023-12-05 RX ORDER — FENTANYL CITRATE 50 UG/ML
25 INJECTION, SOLUTION INTRAMUSCULAR; INTRAVENOUS EVERY 5 MIN PRN
Status: DISCONTINUED | OUTPATIENT
Start: 2023-12-05 | End: 2023-12-05 | Stop reason: HOSPADM

## 2023-12-05 RX ORDER — LIDOCAINE HYDROCHLORIDE 20 MG/ML
INJECTION, SOLUTION EPIDURAL; INFILTRATION; INTRACAUDAL; PERINEURAL
Status: DISCONTINUED | OUTPATIENT
Start: 2023-12-05 | End: 2023-12-05

## 2023-12-05 RX ORDER — DEXAMETHASONE SODIUM PHOSPHATE 4 MG/ML
INJECTION, SOLUTION INTRA-ARTICULAR; INTRALESIONAL; INTRAMUSCULAR; INTRAVENOUS; SOFT TISSUE
Status: DISCONTINUED | OUTPATIENT
Start: 2023-12-05 | End: 2023-12-05

## 2023-12-05 RX ORDER — DEXAMETHASONE SODIUM PHOSPHATE 4 MG/ML
INJECTION, SOLUTION INTRA-ARTICULAR; INTRALESIONAL; INTRAMUSCULAR; INTRAVENOUS; SOFT TISSUE
Status: DISCONTINUED
Start: 2023-12-05 | End: 2023-12-05 | Stop reason: HOSPADM

## 2023-12-05 RX ORDER — HYDROCODONE BITARTRATE AND ACETAMINOPHEN 5; 325 MG/1; MG/1
1 TABLET ORAL EVERY 4 HOURS PRN
Status: DISCONTINUED | OUTPATIENT
Start: 2023-12-05 | End: 2023-12-05 | Stop reason: HOSPADM

## 2023-12-05 RX ORDER — ACETAMINOPHEN 325 MG/1
650 TABLET ORAL EVERY 4 HOURS PRN
Status: DISCONTINUED | OUTPATIENT
Start: 2023-12-05 | End: 2023-12-05 | Stop reason: HOSPADM

## 2023-12-05 RX ORDER — ONDANSETRON 2 MG/ML
INJECTION INTRAMUSCULAR; INTRAVENOUS
Status: DISCONTINUED | OUTPATIENT
Start: 2023-12-05 | End: 2023-12-05

## 2023-12-05 RX ORDER — LIDOCAINE HYDROCHLORIDE 20 MG/ML
INJECTION, SOLUTION EPIDURAL; INFILTRATION; INTRACAUDAL; PERINEURAL
Status: DISCONTINUED | OUTPATIENT
Start: 2023-12-05 | End: 2023-12-05 | Stop reason: HOSPADM

## 2023-12-05 RX ORDER — FENTANYL CITRATE 50 UG/ML
INJECTION, SOLUTION INTRAMUSCULAR; INTRAVENOUS
Status: DISCONTINUED | OUTPATIENT
Start: 2023-12-05 | End: 2023-12-05

## 2023-12-05 RX ADMIN — PREDNISOLONE ACETATE 1 DROP: 10 SUSPENSION/ DROPS OPHTHALMIC at 06:12

## 2023-12-05 RX ADMIN — MOXIFLOXACIN OPHTHALMIC 1 DROP: 5 SOLUTION/ DROPS OPHTHALMIC at 06:12

## 2023-12-05 RX ADMIN — LIDOCAINE HYDROCHLORIDE 50 MG: 20 INJECTION, SOLUTION EPIDURAL; INFILTRATION; INTRACAUDAL at 07:12

## 2023-12-05 RX ADMIN — PHENYLEPHRINE HYDROCHLORIDE 1 DROP: 25 SOLUTION/ DROPS OPHTHALMIC at 06:12

## 2023-12-05 RX ADMIN — ATROPINE SULFATE 1 DROP: 10 SOLUTION/ DROPS OPHTHALMIC at 06:12

## 2023-12-05 RX ADMIN — MIDAZOLAM 2 MG: 1 INJECTION INTRAMUSCULAR; INTRAVENOUS at 06:12

## 2023-12-05 RX ADMIN — PROPOFOL 150 MG: 10 INJECTION, EMULSION INTRAVENOUS at 07:12

## 2023-12-05 RX ADMIN — FENTANYL CITRATE 25 MCG: 50 INJECTION INTRAMUSCULAR; INTRAVENOUS at 08:12

## 2023-12-05 RX ADMIN — SODIUM CHLORIDE: 0.9 INJECTION, SOLUTION INTRAVENOUS at 06:12

## 2023-12-05 RX ADMIN — ONDANSETRON 4 MG: 2 INJECTION INTRAMUSCULAR; INTRAVENOUS at 07:12

## 2023-12-05 RX ADMIN — TETRACAINE HYDROCHLORIDE 1 DROP: 5 SOLUTION OPHTHALMIC at 06:12

## 2023-12-05 RX ADMIN — FENTANYL CITRATE 50 MCG: 50 INJECTION INTRAMUSCULAR; INTRAVENOUS at 08:12

## 2023-12-05 RX ADMIN — DEXAMETHASONE SODIUM PHOSPHATE 4 MG: 4 INJECTION, SOLUTION INTRAMUSCULAR; INTRAVENOUS at 07:12

## 2023-12-05 RX ADMIN — FENTANYL CITRATE 25 MCG: 50 INJECTION INTRAMUSCULAR; INTRAVENOUS at 07:12

## 2023-12-05 NOTE — OP NOTE
Date of Procedure: 12/05/2023   Pre-Op Dx: Macular hole, right eye  Post Op Dx: Same  Procedure Performed: Pars plana vitrectomy, ILM peel, injection of SF6 gas, right eye  Attending Surgeon: MIREYA Villatoro  Assistant Surgeon: SOPHIA Cardenas  Anesthesia: Local/Mac, retrobulbar injection of 50/50 mixture 0.75% bupivicaine, 2% lidocaine  Estimated blood loss: Minimal  Complication: None  Specimen: None  Disposition: Stable to recovery  Findings: None  Outcome: Stable, normotensive  Date of Discharge: 12/05/2023  Discharge Disposition: Home  F/U: 12/06/23        Informed consent was obtained and placed in the medical record. The patient was properly identified and taken to the operating room. MAC anesthesia was begun by the anesthesia team. A retrobulbar block consisting of a 50/50 mixture of 2% lidocaine and 0.75% bupivicaine was performed in the right eye without complication. The right eye was prepped and draped in the standard ophthalmic fashion taking care to exclude the lashes from the operative field.    Standard 25 gauge vitrectomy setup was achieved with all ports 4.0 mm posterior to the limbus. The [employer] visualization system alternating with a high magnification contact lens were used. Core vitrectomy was performed. The hyaloid membrane was elevated using the cutter on suction mode.  The peripheral vitreous was shaved to the vitreous base.  There was a relatively posterior vitreous base insertion.  The ILM was stained with ICG dye and peeled from the entire macular surface including the edges of the macular hole using intraocular ILM forceps.  The retina was inspected for 360 degrees to the ora michael using scleral depression. There were no breaks or detachments.  Fluid/air exchange was performed. The retina was lying nicely flat under air.  The air was exchanged for 20% SF6 gas through the infusion cannula using a chimney through one of the ports for exhaust. The ports were removed. The infusion  cannula was removed.  All wounds were checked and noted not to be leaking. The eye was normotensive. A subconjunctival injection of vancomycin and dexamethasone was placed.     The eye was patched. A Pesron shield was placed. The patient was awakened from MAC anesthesia by the anesthesia team having tolerated the procedure well.

## 2023-12-05 NOTE — DISCHARGE SUMMARY
[facility] - [facility] (1st Fl)  Discharge Note  Short Stay    Date of Admssion: 12/05/2023    Procedure(s) (LRB):  REPAIR, HOLE, MACULA (Right)      OUTCOME: Patient tolerated treatment/procedure well without complication and is now ready for discharge.    DISPOSITION: Home or Self Care    FINAL DIAGNOSIS:  Macular hole, right eye    FOLLOWUP: In clinic    DISCHARGE INSTRUCTIONS:    Discharge Procedure Orders   Diet general     Sponge bath only until clinic visit     Lifting restrictions     Leave dressing on - Keep it clean, dry, and intact until clinic visit     Call MD for:  temperature >100.4     Call MD for:  persistent nausea and vomiting     Call MD for:  severe uncontrolled pain     Call MD for:  difficulty breathing, headache or visual disturbances     Call MD for:  redness, tenderness, or signs of infection (pain, swelling, redness, odor or green/yellow discharge around incision site)     Call MD for:  hives     Call MD for:  persistent dizziness or light-headedness     Call MD for:  extreme fatigue     Call MD for:        TIME SPENT ON DISCHARGE: 15 minutes

## 2023-12-05 NOTE — ANESTHESIA POSTPROCEDURE EVALUATION
Anesthesia Post Evaluation    Patient: Nora Mccoy    Procedure(s) Performed: Procedure(s) (LRB):  REPAIR, HOLE, MACULA (Right)    Final Anesthesia Type: general      Patient location during evaluation: PACU  Patient participation: Yes- Able to Participate  Level of consciousness: awake  Post-procedure vital signs: reviewed and stable  Pain management: adequate  Airway patency: patent    PONV status at discharge: No PONV  Anesthetic complications: no      Cardiovascular status: blood pressure returned to baseline  Respiratory status: unassisted, spontaneous ventilation and room air                Vitals Value Taken Time   /59 12/05/23 0902   Temp 36.6 °C (97.9 °F) 12/05/23 0900   Pulse 75 12/05/23 0901   Resp 18 12/05/23 0825   SpO2 95 % 12/05/23 0901   Vitals shown include unvalidated device data.      No case tracking events are documented in the log.      Pain/Sonia Score: Sonia Score: 10 (12/5/2023  9:04 AM)

## 2023-12-05 NOTE — DISCHARGE INSTRUCTIONS
Face down positioning as much as possible    Either side down positioning is next best position.    DO NOT LIE FLAT ON BACK!!!

## 2023-12-05 NOTE — BRIEF OP NOTE
Date of Procedure: 12/05/2023     Pre-Op Dx: Macular hole, right eye    Post Op Dx: Same    Procedure Performed: Pars plana vitrectomy, ILM peel, injection of SF6 gas, right eye    Attending Surgeon: MIREYA Villatoro    Assistant Surgeon: SOPHIA Cardenas    Anesthesia: Local/Mac, retrobulbar injection of 50/50 mixture 0.75% bupivicaine, 2% lidocaine    Estimated blood loss: Minimal    Complication: None    Specimen: None    Disposition: Stable to recovery    Findings: None    Outcome: Stable, normotensive    Date of Discharge: 12/05/2023    Discharge Disposition: Home    F/U: 12/06/23

## 2023-12-05 NOTE — ANESTHESIA PREPROCEDURE EVALUATION
Pre-operative evaluation for Procedure(s) (LRB):  REPAIR, HOLE, MACULA (Right)    Nora Mccoy is a 58 y.o. female w/ hx of nasal turbinate hypertrophy s/p FESS in 2019, anxiety, and hole in R macula who presents for above procedure.       Prev airway (4/6/2019):   Method of Intubation: Direct laryngoscopy; Inserted by: Anesthesia Resident; Airway Device: Endotracheal Tube; Mask Ventilation: Easy; Intubated: Postinduction; Blade: Nevarez #2; Airway Device Size: 7.5; Style: Cuffed; Cuff Inflation: Minimal occlusive pressure; Inflation Amount: 9; Placement Verified By: Auscultation, Capnometry, ETT Condensation; Grade: Grade I; Complicating Factors: None; Intubation Findings: Positive EtCO2, Bilateral breath sounds, Atraumatic/Condition of teeth unchanged;  Depth of Insertion: 22; Securment: Teeth; Complications: None; Breath Sounds: Equal Bilateral; Insertion Attempts: 1    EKG (9/29/2017):   Vent. Rate : 065 BPM     Atrial Rate : 065 BPM      P-R Int : 166 ms          QRS Dur : 072 ms       QT Int : 440 ms       P-R-T Axes : 067 042 028 degrees      QTc Int : 457 ms   Normal sinus rhythm   Low voltage QRS   Borderline Abnormal ECG     Stress  Echo (9/30/2017):    1 - Normal left ventricular systolic function (EF 60-65%).     2 - Normal right ventricular systolic function .     3 - Normal left ventricular diastolic function.     Patient Active Problem List   Diagnosis    Other plastic surgery for unacceptable cosmetic appearance    Knee pain    Meningioma    Chronic sinusitis    Deviated nasal septum    Nasal turbinate hypertrophy    Ulnar neuropathy at elbow of right upper extremity    Ankle pain    Weakness of right hip    Decreased ROM of ankle    Antalgic gait    Decreased functional activity tolerance    Hip pain       Review of patient's allergies indicates:   Allergen Reactions    Penicillin g Anaphylaxis    Penicillins Anaphylaxis    Penicillin Hives        No current facility-administered  medications on file prior to encounter.     Current Outpatient Medications on File Prior to Encounter   Medication Sig Dispense Refill    EPINEPHrine (EPIPEN 2-JAMES) 0.3 mg/0.3 mL AtIn Inject 0.3 mLs (0.3 mg total) into the muscle once. for 1 dose as needed 2 each 2    FLUoxetine 40 MG capsule Take 1 capsule by mouth daily with 20 mg of Prozac for a total of 60 mg daily 90 capsule 1    LORazepam (ATIVAN) 0.5 MG tablet Take 1 tablet (0.5 mg total) by mouth daily as needed (severe anxiety). 30 tablet 0    acyclovir (ZOVIRAX) 800 MG Tab Take 1 tablet (800 mg total) by mouth 2 (two) times daily. 60 tablet 5    cetirizine (ZYRTEC) 10 MG tablet Take 1 tablet (10 mg total) by mouth once daily. 30 tablet 0    COMIRNATY 2023-24, 12Y UP,,PF, 30 mcg/0.3 mL inection       FLUCELVAX QUAD 7432-6390, PF, 60 mcg (15 mcg x 4)/0.5 mL Syrg       FLUoxetine 20 MG capsule TAKE 1 CAPSULE BY MOUTH EVERY DAY WITH 40 MG OF PROZAC TO TOAL 60MG DAILY 90 capsule 1    fluticasone propionate (FLONASE) 50 mcg/actuation nasal spray 1 spray (50 mcg total) by Each Nostril route once daily. 9.9 mL 0    multivitamin (THERAGRAN) tablet Take 1 tablet by mouth once daily.      perfluorohexyloctane, PF, 100 % Drop Place 1 drop into both eyes 4 (four) times daily. 3 mL 11    sod sulf-pot chloride-mag sulf (SUTAB) 1.479-0.188- 0.225 gram tablet Take 12 tablets by mouth once daily. Take according to package instructions with indicated amount of water. (Patient not taking: Reported on 12/4/2023) 24 tablet 0    traZODone (DESYREL) 50 MG tablet Take 2 tablets (100 mg total) by mouth nightly as needed for Insomnia. 180 tablet 1       Past Surgical History:   Procedure Laterality Date    BACK SURGERY      micro discectomy L4-L5, 2002    BREAST BIOPSY Left 2015    core bx-benign    COLONOSCOPY N/A 7/7/2016    Procedure: COLONOSCOPY;  Surgeon: Ronan Dolan MD;  Location: [facility] ([address] [place] [address]);  Service: Endoscopy;  Laterality: N/A;    COLONOSCOPY N/A 5/5/2023     Procedure: COLONOSCOPY;  Surgeon: Josh Funez MD;  Location: [facility] ENDO;  Service: Endoscopy;  Laterality: N/A;    EPIDURAL STEROID INJECTION Right 10/11/2021    Procedure: Right Hip Steroid Injection (Ref: Orianaa);  Surgeon: Jon Toth Jr., MD;  Location: [facility] ENDO;  Service: Pain Management;  Laterality: Right;  Arrive @ 1200; No ATC or DM; Needs Consent    ESOPHAGOGASTRODUODENOSCOPY N/A 5/5/2023    Procedure: EGD (ESOPHAGOGASTRODUODENOSCOPY);  Surgeon: Josh Funez MD;  Location: [facility] ENDO;  Service: Endoscopy;  Laterality: N/A;    ETHMOIDECTOMY N/A 4/5/2019    Procedure: ETHMOIDECTOMY;  Surgeon: Vijay Tatum MD;  Location: [facility] OR [facility] FLR;  Service: ENT;  Laterality: N/A;    FRONTAL SINUS OBLITERATION  4/5/2019    Procedure: SINUSOTOMY, FRONTAL SINUS, OBLITERATIVE;  Surgeon: Vijay Tatum MD;  Location: [facility] OR [facility] FLR;  Service: ENT;;    FUNCTIONAL ENDOSCOPIC SINUS SURGERY (FESS) USING COMPUTER-ASSISTED NAVIGATION Bilateral 4/5/2019    Procedure: FESS, USING COMPUTER-ASSISTED NAVIGATION;  Surgeon: Vijay Tatum MD;  Location: [facility] OR [facility] FLR;  Service: ENT;  Laterality: Bilateral;    MAXILLARY ANTROSTOMY  4/5/2019    Procedure: MAXILLARY ANTROSTOMY;  Surgeon: Vijay Tatum MD;  Location: [facility] OR [facility] FLR;  Service: ENT;;    NASAL TURBINATE REDUCTION Bilateral 4/5/2019    Procedure: REDUCTION, NASAL TURBINATE;  Surgeon: Vijay Tatum MD;  Location: [facility] OR 2ND FLR;  Service: ENT;  Laterality: Bilateral;       Social History     Socioeconomic History    Marital status:      Spouse name: Sonu    Number of children: 2    Years of education: 18   Occupational History    Occupation: registered nurse     Employer: [employer]     Comment: ICU   Tobacco Use    Smoking status: Never    Smokeless tobacco: Never   Substance and Sexual Activity    Alcohol use: Yes     Alcohol/week: 1.7 standard drinks of alcohol     Types: 2 Standard drinks or equivalent per week    Drug  "use: No   Other Topics Concern    Are you pregnant or think you may be? No   Social History Narrative        Spouse w/ diabetes insipidus, CML , and neuroendocrine tumor newly dx    RN- working in Neuro tele ICU    2 children- dtr and son                 Vital Signs Range (Last 24H):         CBC: No results for input(s): "WBC", "RBC", "HGB", "HCT", "PLT", "MCV", "MCH", "MCHC" in the last 72 hours.    CMP: No results for input(s): "NA", "K", "CL", "CO2", "BUN", "CREATININE", "GLU", "MG", "PHOS", "CALCIUM", "ALBUMIN", "PROT", "ALKPHOS", "ALT", "AST", "BILITOT" in the last 72 hours.    INR  No results for input(s): "PT", "INR", "PROTIME", "APTT" in the last 72 hours.            Pre-op Assessment    I have reviewed the Patient Summary Reports.     I have reviewed the Nursing Notes. I have reviewed the NPO Status.   I have reviewed the Medications.     Review of Systems  Anesthesia Hx:  No problems with previous Anesthesia             Denies Family Hx of Anesthesia complications.    Denies Personal Hx of Anesthesia complications.                    Hematology/Oncology:    Oncology Normal                                   Cardiovascular:  Exercise tolerance: good   Denies Pacemaker.     Denies CABG/stent.           ECG has been reviewed.                          Renal/:  Renal/ Normal                 Hepatic/GI:  Hepatic/GI Normal                 Musculoskeletal:  Arthritis               Neurological:    Denies CVA. Neuromuscular Disease,   Denies Seizures.                                Endocrine:  Endocrine Normal            Psych:   anxiety                 Physical Exam  General: Cooperative and Alert    Airway:  Mallampati: II   Mouth Opening: Normal  TM Distance: Normal  Tongue: Normal    Dental:  Intact    Chest/Lungs:  Clear to auscultation, Normal Respiratory Rate    Heart:  Rate: Normal  Rhythm: Regular Rhythm        Anesthesia Plan  Type of Anesthesia, risks & benefits discussed:    Anesthesia Type: " Gen Supraglottic Airway, Gen Natural Airway  Intra-op Monitoring Plan: Standard ASA Monitors  Post Op Pain Control Plan: IV/PO Opioids PRN and multimodal analgesia  Induction:  IV  Informed Consent: Informed consent signed with the Patient and all parties understand the risks and agree with anesthesia plan.  All questions answered.   ASA Score: 2  Day of Surgery Review of History & Physical: H&P Update referred to the surgeon/provider.    Ready For Surgery From Anesthesia Perspective.     .

## 2023-12-05 NOTE — H&P
Pre-Operative History & Physical  Ophthalmology      SUBJECTIVE:     History of Present Illness:  Patient is a 58 y.o. female presents with Macular hole, right [H35.341]  Macular hole of right eye [H35.341].    MEDICATIONS:   PTA Medications   Medication Sig    EPINEPHrine (EPIPEN 2-JAMES) 0.3 mg/0.3 mL AtIn Inject 0.3 mLs (0.3 mg total) into the muscle once. for 1 dose as needed    FLUoxetine 40 MG capsule Take 1 capsule by mouth daily with 20 mg of Prozac for a total of 60 mg daily    LORazepam (ATIVAN) 0.5 MG tablet Take 1 tablet (0.5 mg total) by mouth daily as needed (severe anxiety).    acyclovir (ZOVIRAX) 800 MG Tab Take 1 tablet (800 mg total) by mouth 2 (two) times daily.    cetirizine (ZYRTEC) 10 MG tablet Take 1 tablet (10 mg total) by mouth once daily.    COMIRNATY -, 12Y UP,,PF, 30 mcg/0.3 mL inection     FLUCELVAX QUAD 6067-3577, PF, 60 mcg (15 mcg x 4)/0.5 mL Syrg     FLUoxetine 20 MG capsule TAKE 1 CAPSULE BY MOUTH EVERY DAY WITH 40 MG OF PROZAC TO TOAL 60MG DAILY    fluticasone propionate (FLONASE) 50 mcg/actuation nasal spray 1 spray (50 mcg total) by Each Nostril route once daily.    [] loteprednol (LOTEMAX) 0.5 % ophthalmic suspension Place 1 drop into both eyes 4 (four) times daily. for 7 days    multivitamin (THERAGRAN) tablet Take 1 tablet by mouth once daily.    perfluorohexyloctane, PF, 100 % Drop Place 1 drop into both eyes 4 (four) times daily.    sod sulf-pot chloride-mag sulf (SUTAB) 1.479-0.188- 0.225 gram tablet Take 12 tablets by mouth once daily. Take according to package instructions with indicated amount of water. (Patient not taking: Reported on 2023)    traZODone (DESYREL) 50 MG tablet Take 2 tablets (100 mg total) by mouth nightly as needed for Insomnia.       ALLERGIES:   Review of patient's allergies indicates:   Allergen Reactions    Penicillin g Anaphylaxis    Penicillins Anaphylaxis    Penicillin Hives       PAST MEDICAL HISTORY:   Past Medical History:    Diagnosis Date    Bronchitis     DJD (degenerative joint disease)     shoulder, knee, and back    Esophagitis, unspecified     Fibrocystic disease of breast     Menstrual disorder     Sinus infection     Stress     UTI (urinary tract infection)      PAST SURGICAL HISTORY:   Past Surgical History:   Procedure Laterality Date    BACK SURGERY      micro discectomy L4-L5, 2002    BREAST BIOPSY Left 2015    core bx-benign    COLONOSCOPY N/A 7/7/2016    Procedure: COLONOSCOPY;  Surgeon: Ronan Dolan MD;  Location: [facility] (4TH FLR);  Service: Endoscopy;  Laterality: N/A;    COLONOSCOPY N/A 5/5/2023    Procedure: COLONOSCOPY;  Surgeon: Josh Funez MD;  Location: [facility] ENDO;  Service: Endoscopy;  Laterality: N/A;    EPIDURAL STEROID INJECTION Right 10/11/2021    Procedure: Right Hip Steroid Injection (Ref: Adelita);  Surgeon: Jon Toth Jr., MD;  Location: [facility];  Service: Pain Management;  Laterality: Right;  Arrive @ 1200; No ATC or DM; Needs Consent    ESOPHAGOGASTRODUODENOSCOPY N/A 5/5/2023    Procedure: EGD (ESOPHAGOGASTRODUODENOSCOPY);  Surgeon: Josh Funez MD;  Location: [facility];  Service: Endoscopy;  Laterality: N/A;    ETHMOIDECTOMY N/A 4/5/2019    Procedure: ETHMOIDECTOMY;  Surgeon: Vijay Tatum MD;  Location: [facility] OR 2ND FLR;  Service: ENT;  Laterality: N/A;    FRONTAL SINUS OBLITERATION  4/5/2019    Procedure: SINUSOTOMY, FRONTAL SINUS, OBLITERATIVE;  Surgeon: Vijay Tatum MD;  [facility];  Service: ENT;;    FUNCTIONAL ENDOSCOPIC SINUS SURGERY (FESS) USING COMPUTER-ASSISTED NAVIGATION Bilateral 4/5/2019    Procedure: FESS, USING COMPUTER-ASSISTED NAVIGATION;  Surgeon: Vijay Tatum MD;  Location: [facility] OR 2ND FLR;  Service: ENT;  Laterality: Bilateral;    MAXILLARY ANTROSTOMY  4/5/2019    Procedure: MAXILLARY ANTROSTOMY;  Surgeon: Vijay Tatum MD;  [facility];  Service: ENT;;    NASAL TURBINATE REDUCTION Bilateral 4/5/2019    Procedure:  REDUCTION, NASAL TURBINATE;  Surgeon: Vijay Tatum MD;  Location: [facility] OR [address] [place] [address];  Service: ENT;  Laterality: Bilateral;     PAST FAMILY HISTORY:   Family History   Problem Relation Age of Onset    Lupus Mother     Other Mother         chest pain    Breast cancer Mother 72    Retinal detachment Mother     Thyroid disease Mother     Cancer Father         unk d. 77    Hypertension Sister     No Known Problems Brother         1/2 bro ( Dad) unk    No Known Problems Brother         1/2 bro (Dad) unk    Breast cancer Maternal Aunt         d. 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    No Known Problems Son     Amblyopia Neg Hx     Blindness Neg Hx     Diabetes Neg Hx     Glaucoma Neg Hx     Macular degeneration Neg Hx     Strabismus Neg Hx     Stroke Neg Hx     Colon cancer Neg Hx     Esophageal cancer Neg Hx      SOCIAL HISTORY:   Social History     Tobacco Use    Smoking status: Never    Smokeless tobacco: Never   Substance Use Topics    Alcohol use: Yes     Alcohol/week: 1.7 standard drinks of alcohol     Types: 2 Standard drinks or equivalent per week    Drug use: No        MENTAL STATUS: Alert    REVIEW OF SYSTEMS: Negative    OBJECTIVE:     Vital Signs (Most Recent)       Physical Exam:  General: NAD  HEENT: AT/NC, right eye macular hole  Lungs: Adequate respirations  Heart: + pulses  Abdomen: Soft    ASSESSMENT/PLAN:     Patient is a 58 y.o. female with Macular hole, right [H35.341]      - Risks/benefits/alternatives of the procedure including, but not limited to, infection, bleeding, pain, ptosis, macular edema, corneal edema, persistent corneal defect, retinal tears, retinal detachment, epiretinal membrane, elevated intraocular pressure, hypotony, cataract formation, possible need for strict post-op head positioning, possible temporary avoidance of air travel, loss of vision, loss of the eye, paralysis, and death were discussed with the  "patient and/or family. The patient/family voiced good understanding, the informed consent was signed, witnessed, and placed in chart. All patient and family questions were answered.   - Will proceed with PPV/ILM peel/Gas exchange  - Plan for MAC with retrobulbar block anesthesia   - Allergies reviewed:   Review of patient's allergies indicates:   Allergen Reactions    Penicillin g Anaphylaxis    Penicillins Anaphylaxis    Penicillin Hives       "Eren" David Del Rosario III, MD  [facility] Ophthalmology, PGY-2   " week(s)

## 2023-12-05 NOTE — TRANSFER OF CARE
"Anesthesia Transfer of Care Note    Patient: Nora Mccoy    Procedure(s) Performed: Procedure(s) (LRB):  REPAIR, HOLE, MACULA (Right)    Patient location: PACU    Anesthesia Type: general    Transport from OR: Transported from OR on room air with adequate spontaneous ventilation    Post pain: adequate analgesia    Post assessment: no apparent anesthetic complications    Post vital signs: stable    Level of consciousness: awake, alert and oriented    Nausea/Vomiting: no nausea/vomiting    Transfer of care protocol was followed      Last vitals: Visit Vitals  /65 (BP Location: Left arm, Patient Position: Lying)   Pulse 74   Temp 36.7 °C (98.1 °F) (Temporal)   Resp 16   Ht 5' 7" (1.702 m)   Wt 76.1 kg (167 lb 12.3 oz)   LMP 02/15/2017   SpO2 95%   Breastfeeding No   BMI 26.28 kg/m²     "

## 2023-12-06 ENCOUNTER — OFFICE VISIT (OUTPATIENT)
Dept: OPHTHALMOLOGY | Facility: CLINIC | Age: 58
End: 2023-12-06
Attending: OPHTHALMOLOGY
Payer: COMMERCIAL

## 2023-12-06 DIAGNOSIS — H35.341 MACULAR HOLE, RIGHT: Primary | ICD-10-CM

## 2023-12-06 PROCEDURE — 99999 PR PBB SHADOW E&M-EST. PATIENT-LVL III: ICD-10-PCS | Mod: PBBFAC,,, | Performed by: OPHTHALMOLOGY

## 2023-12-06 PROCEDURE — 1160F RVW MEDS BY RX/DR IN RCRD: CPT | Mod: CPTII,S$GLB,, | Performed by: OPHTHALMOLOGY

## 2023-12-06 PROCEDURE — 1159F MED LIST DOCD IN RCRD: CPT | Mod: CPTII,S$GLB,, | Performed by: OPHTHALMOLOGY

## 2023-12-06 PROCEDURE — 3044F HG A1C LEVEL LT 7.0%: CPT | Mod: CPTII,S$GLB,, | Performed by: OPHTHALMOLOGY

## 2023-12-06 PROCEDURE — 1160F PR REVIEW ALL MEDS BY PRESCRIBER/CLIN PHARMACIST DOCUMENTED: ICD-10-PCS | Mod: CPTII,S$GLB,, | Performed by: OPHTHALMOLOGY

## 2023-12-06 PROCEDURE — 99024 POSTOP FOLLOW-UP VISIT: CPT | Mod: S$GLB,,, | Performed by: OPHTHALMOLOGY

## 2023-12-06 PROCEDURE — 3044F PR MOST RECENT HEMOGLOBIN A1C LEVEL <7.0%: ICD-10-PCS | Mod: CPTII,S$GLB,, | Performed by: OPHTHALMOLOGY

## 2023-12-06 PROCEDURE — 1159F PR MEDICATION LIST DOCUMENTED IN MEDICAL RECORD: ICD-10-PCS | Mod: CPTII,S$GLB,, | Performed by: OPHTHALMOLOGY

## 2023-12-06 PROCEDURE — 99999 PR PBB SHADOW E&M-EST. PATIENT-LVL III: CPT | Mod: PBBFAC,,, | Performed by: OPHTHALMOLOGY

## 2023-12-06 PROCEDURE — 99024 PR POST-OP FOLLOW-UP VISIT: ICD-10-PCS | Mod: S$GLB,,, | Performed by: OPHTHALMOLOGY

## 2023-12-07 NOTE — PROGRESS NOTES
Subjective:       Patient ID: Nora Mccoy is a 58 y.o. female      No chief complaint on file.    History of Present Illness  HPI    DLS 12/05/2023 by Dr. Renato Villatoro MD    PO Macular Hole OD    Cc: pt reports: eyes are tender to touch and blurry    --headaches   ++diplopia (minimal)  --flashes/floaters    Eye Meds: PF OD qid                    Vigamox OD qid                    Atropine OD qhs                    Ointment OD qhs    POHx:  1. Macular Hole OD  S/P ppv W/ ilm PEEL/INJECTION OF SF GAS OD  Last edited by Renato Villatoro MD on 12/6/2023  9:01 PM.          Assessment/Plan:     1. Macular hole, right  Doing well POD#1  PF 4/0  Vig 4/0  Atropine 1/0  No vigorous activity, no lifting, bending, etc.  Person shield sleeping  Person shield, glasses, or sunglasses all times for protection   No shower   Face down as much as possible, left side down next best positioning  DO NOT LIE FLAT ON BACK  RD/Endophthalmitis precautions   RTC 1 wk sooner PRN if any problems (christian pain, redness, dimming or loss of vision)      Follow up in about 1 week (around 12/13/2023), or if symptoms worsen or fail to improve, for Post-op, OCT Mac.

## 2023-12-14 ENCOUNTER — OFFICE VISIT (OUTPATIENT)
Dept: OPHTHALMOLOGY | Facility: CLINIC | Age: 58
End: 2023-12-14
Payer: COMMERCIAL

## 2023-12-14 DIAGNOSIS — H35.341 MACULAR HOLE, RIGHT: Primary | ICD-10-CM

## 2023-12-14 PROCEDURE — 99999 PR PBB SHADOW E&M-EST. PATIENT-LVL III: ICD-10-PCS | Mod: PBBFAC,,, | Performed by: OPHTHALMOLOGY

## 2023-12-14 PROCEDURE — 99499 NO LOS: ICD-10-PCS | Mod: S$GLB,,, | Performed by: OPHTHALMOLOGY

## 2023-12-14 PROCEDURE — 99999 PR PBB SHADOW E&M-EST. PATIENT-LVL III: CPT | Mod: PBBFAC,,, | Performed by: OPHTHALMOLOGY

## 2023-12-14 PROCEDURE — 99499 UNLISTED E&M SERVICE: CPT | Mod: S$GLB,,, | Performed by: OPHTHALMOLOGY

## 2023-12-14 PROCEDURE — 92134 POSTERIOR SEGMENT OCT RETINA (OCULAR COHERENCE TOMOGRAPHY)-BOTH EYES: ICD-10-PCS | Mod: S$GLB,,, | Performed by: OPHTHALMOLOGY

## 2023-12-14 PROCEDURE — 92134 CPTRZ OPH DX IMG PST SGM RTA: CPT | Mod: S$GLB,,, | Performed by: OPHTHALMOLOGY

## 2023-12-16 NOTE — PROGRESS NOTES
Subjective:       Patient ID: Nora Mccoy is a 58 y.o. female      Chief Complaint   Patient presents with    Post-op Evaluation     History of Present Illness  HPI    1 week post op OD   Dls; 12/06/2023 Dr. Villatoro     Pt states her vision is a little blurry,she can still see gas bubble and   she has noticed some floaters.     -Flashes   + Floaters OD   + Headache   -Pain     Eye meds:   Predforte -OD Qid   Moxi -OD Qid   Atropine -OD Qpm   Stopped Ointment   Last edited by Oumou Guzman on 12/14/2023  1:47 PM.          Assessment/Plan:     1. Macular hole, right  Doing well POW#1  Taper PF OD 4-3-2-1-0 dec by 1 gtt/day/wk until off in 4 wks  Vig d/c  Atropine d/c  DO NOT LIE FLAT ON BACK UNTIL BUBBLE GONE  RD/Endophthalmitis precautions   RTC 3 wks sooner PRN if any problems (christian pain, redness, dimming or loss of vision)    Follow up in about 3 weeks (around 1/4/2024), or if symptoms worsen or fail to improve, for Post-op, OCT Mac.

## 2023-12-21 ENCOUNTER — PATIENT MESSAGE (OUTPATIENT)
Dept: OPHTHALMOLOGY | Facility: CLINIC | Age: 58
End: 2023-12-21
Payer: COMMERCIAL

## 2024-01-11 ENCOUNTER — OFFICE VISIT (OUTPATIENT)
Dept: OPHTHALMOLOGY | Facility: CLINIC | Age: 59
End: 2024-01-11
Payer: COMMERCIAL

## 2024-01-11 ENCOUNTER — PATIENT MESSAGE (OUTPATIENT)
Dept: DERMATOLOGY | Facility: CLINIC | Age: 59
End: 2024-01-11
Payer: COMMERCIAL

## 2024-01-11 DIAGNOSIS — H35.341 MACULAR HOLE, RIGHT: Primary | ICD-10-CM

## 2024-01-11 PROCEDURE — 99024 POSTOP FOLLOW-UP VISIT: CPT | Mod: S$GLB,,, | Performed by: OPHTHALMOLOGY

## 2024-01-11 PROCEDURE — 99999 PR PBB SHADOW E&M-EST. PATIENT-LVL III: CPT | Mod: PBBFAC,,, | Performed by: OPHTHALMOLOGY

## 2024-01-11 PROCEDURE — 1159F MED LIST DOCD IN RCRD: CPT | Mod: CPTII,S$GLB,, | Performed by: OPHTHALMOLOGY

## 2024-01-11 PROCEDURE — 92134 CPTRZ OPH DX IMG PST SGM RTA: CPT | Mod: S$GLB,,, | Performed by: OPHTHALMOLOGY

## 2024-01-11 PROCEDURE — 1160F RVW MEDS BY RX/DR IN RCRD: CPT | Mod: CPTII,S$GLB,, | Performed by: OPHTHALMOLOGY

## 2024-01-11 NOTE — PROGRESS NOTES
Subjective:       Patient ID: Nora Mccoy is a 58 y.o. female      Chief Complaint   Patient presents with    Post-op Evaluation     History of Present Illness  HPI    3 wk OCT/PO OD    Pt states va is still blurry central va but stable since last visit.   Resolved floaters OD.      No floaters  No flashes  No pain  Gtts: perfluorohexyloctane TID OU          Last edited by Renato Villatoro MD on 1/11/2024  4:09 PM.          Assessment/Plan:     1. Macular hole, right  Doing well POW#3  Hole closed, OCT improving  Off all PO gtts     RD precautions    Follow up in about 8 weeks (around 3/7/2024), or if symptoms worsen or fail to improve.

## 2024-01-24 ENCOUNTER — HOSPITAL ENCOUNTER (OUTPATIENT)
Dept: RADIOLOGY | Facility: HOSPITAL | Age: 59
Discharge: HOME | End: 2024-01-24
Attending: INTERNAL MEDICINE
Payer: COMMERCIAL

## 2024-01-24 DIAGNOSIS — Z12.31 ENCOUNTER FOR SCREENING MAMMOGRAM FOR BREAST CANCER: ICD-10-CM

## 2024-01-24 PROCEDURE — 77063 BREAST TOMOSYNTHESIS BI: CPT | Mod: 26,,, | Performed by: RADIOLOGY

## 2024-01-24 PROCEDURE — 77067 SCR MAMMO BI INCL CAD: CPT | Mod: 26,,, | Performed by: RADIOLOGY

## 2024-01-24 PROCEDURE — 77067 SCR MAMMO BI INCL CAD: CPT | Mod: TC

## 2024-01-29 ENCOUNTER — OFFICE VISIT (OUTPATIENT)
Dept: PLASTIC SURGERY | Facility: CLINIC | Age: 59
End: 2024-01-29

## 2024-01-29 DIAGNOSIS — Z41.1 ENCOUNTER FOR COSMETIC PROCEDURE: Primary | ICD-10-CM

## 2024-01-29 PROCEDURE — 99024 POSTOP FOLLOW-UP VISIT: CPT | Mod: ,,, | Performed by: OTOLARYNGOLOGY

## 2024-01-29 NOTE — PROGRESS NOTES
Patient presents for cosmetic Botox. No previous adverse reaction to botulinum toxin.       Discussed benefits and risks of Botox injections, including headache, weakness/paralysis of muscles, asymmetry, eyebrow/lid drooping, pain, bruising, swelling, infection, and rare risk of systemic botulism. Verbal consent was obtained. Patient agreed to proceed with treatment.      Procedure:  50 units total were injected today:  20 in glabella   20 in lateral canthal region divided bilaterally   10 frontalis      Patient tolerated well with no complications. She was instructed not to rub or massage the treated areas and that she should avoid lying down, bending upside down, and strenuous exercise for the rest of the day.

## 2024-01-30 ENCOUNTER — CLINICAL SUPPORT (OUTPATIENT)
Dept: OTHER | Facility: CLINIC | Age: 59
End: 2024-01-30

## 2024-01-30 DIAGNOSIS — Z00.8 ENCOUNTER FOR OTHER GENERAL EXAMINATION: ICD-10-CM

## 2024-01-31 VITALS
SYSTOLIC BLOOD PRESSURE: 112 MMHG | DIASTOLIC BLOOD PRESSURE: 74 MMHG | WEIGHT: 165 LBS | BODY MASS INDEX: 25.9 KG/M2 | HEIGHT: 67 IN

## 2024-01-31 LAB
HDLC SERPL-MCNC: 55 MG/DL
POC CHOLESTEROL, LDL (DOCK): 133 MG/DL
POC CHOLESTEROL, TOTAL: 205 MG/DL
POC GLUCOSE, FASTING: 78 MG/DL (ref 60–110)
TRIGL SERPL-MCNC: 94 MG/DL

## 2024-03-04 ENCOUNTER — OFFICE VISIT (OUTPATIENT)
Dept: OPHTHALMOLOGY | Facility: CLINIC | Age: 59
End: 2024-03-04
Payer: COMMERCIAL

## 2024-03-04 DIAGNOSIS — H35.341 MACULAR HOLE, RIGHT: Primary | ICD-10-CM

## 2024-03-04 PROCEDURE — 99024 POSTOP FOLLOW-UP VISIT: CPT | Mod: S$GLB,,, | Performed by: OPHTHALMOLOGY

## 2024-03-04 PROCEDURE — 92134 CPTRZ OPH DX IMG PST SGM RTA: CPT | Mod: S$GLB,,, | Performed by: OPHTHALMOLOGY

## 2024-03-04 PROCEDURE — 1159F MED LIST DOCD IN RCRD: CPT | Mod: CPTII,S$GLB,, | Performed by: OPHTHALMOLOGY

## 2024-03-04 PROCEDURE — 99999 PR PBB SHADOW E&M-EST. PATIENT-LVL III: CPT | Mod: PBBFAC,,, | Performed by: OPHTHALMOLOGY

## 2024-03-04 NOTE — PROGRESS NOTES
Subjective:       Patient ID: Nora Mccoy is a 59 y.o. female      Chief Complaint   Patient presents with    Post-op Evaluation     History of Present Illness  HPI    8 wk FU /PO 12/05/2023 Mac hole repair   DLS- 01/11/2024 Dr. Villatoro     Pt sts vision is slightly better but still has hazy vision in OD and sees   a smudge in central va OD.   OS vision stable and seeing well.   Denies any eye pain     (-)Flashes (-)Floaters  (-)Photophobia  (-)Glare    EYEMEDS:   NONE       Last edited by Renato Villatoro MD on 3/4/2024  2:16 PM.          Assessment/Plan:     1. Macular hole, right  Doing well POM#3  Hole closed, OCT improving  Off all gtts     RD precautions    Follow up in about 6 months (around 9/4/2024), or if symptoms worsen or fail to improve, for Comprehensive Examination, OCT Mac.

## 2024-03-31 RX ORDER — ACYCLOVIR 800 MG/1
800 TABLET ORAL 2 TIMES DAILY
Qty: 60 TABLET | Refills: 5 | Status: SHIPPED | OUTPATIENT
Start: 2024-03-31

## 2024-04-08 ENCOUNTER — PATIENT MESSAGE (OUTPATIENT)
Dept: ADMINISTRATIVE | Facility: HOSPITAL | Age: 59
End: 2024-04-08
Payer: COMMERCIAL

## 2024-04-08 RX ORDER — EPINEPHRINE 0.3 MG/.3ML
1 INJECTION SUBCUTANEOUS ONCE
Qty: 2 EACH | Refills: 0 | Status: SHIPPED | OUTPATIENT
Start: 2024-04-08 | End: 2024-04-17

## 2024-04-09 ENCOUNTER — PATIENT OUTREACH (OUTPATIENT)
Dept: ADMINISTRATIVE | Facility: HOSPITAL | Age: 59
End: 2024-04-09
Payer: COMMERCIAL

## 2024-04-09 ENCOUNTER — PATIENT MESSAGE (OUTPATIENT)
Dept: ADMINISTRATIVE | Facility: HOSPITAL | Age: 59
End: 2024-04-09
Payer: COMMERCIAL

## 2024-04-17 ENCOUNTER — OFFICE VISIT (OUTPATIENT)
Dept: INTERNAL MEDICINE | Facility: CLINIC | Age: 59
End: 2024-04-17
Payer: COMMERCIAL

## 2024-04-17 ENCOUNTER — LAB VISIT (OUTPATIENT)
Dept: LAB | Facility: HOSPITAL | Age: 59
End: 2024-04-17
Payer: COMMERCIAL

## 2024-04-17 VITALS
DIASTOLIC BLOOD PRESSURE: 72 MMHG | RESPIRATION RATE: 16 BRPM | HEART RATE: 66 BPM | SYSTOLIC BLOOD PRESSURE: 112 MMHG | OXYGEN SATURATION: 97 % | HEIGHT: 67 IN | TEMPERATURE: 97 F | WEIGHT: 162.69 LBS | BODY MASS INDEX: 25.53 KG/M2

## 2024-04-17 DIAGNOSIS — F33.42 RECURRENT MAJOR DEPRESSIVE DISORDER, IN FULL REMISSION: ICD-10-CM

## 2024-04-17 DIAGNOSIS — Z12.4 ENCOUNTER FOR SCREENING FOR CERVICAL CANCER: ICD-10-CM

## 2024-04-17 DIAGNOSIS — M89.8X9 BONY EXOSTOSIS: ICD-10-CM

## 2024-04-17 DIAGNOSIS — Z00.00 ENCOUNTER FOR ANNUAL HEALTH EXAMINATION: Primary | ICD-10-CM

## 2024-04-17 DIAGNOSIS — Z23 NEED FOR VACCINATION: ICD-10-CM

## 2024-04-17 DIAGNOSIS — Z00.00 ENCOUNTER FOR ANNUAL HEALTH EXAMINATION: ICD-10-CM

## 2024-04-17 LAB
25(OH)D3+25(OH)D2 SERPL-MCNC: 69 NG/ML (ref 30–96)
ALBUMIN SERPL BCP-MCNC: 4.3 G/DL (ref 3.5–5.2)
ALP SERPL-CCNC: 68 U/L (ref 55–135)
ALT SERPL W/O P-5'-P-CCNC: 13 U/L (ref 10–44)
ANION GAP SERPL CALC-SCNC: 12 MMOL/L (ref 8–16)
AST SERPL-CCNC: 19 U/L (ref 10–40)
BASOPHILS # BLD AUTO: 0.04 K/UL (ref 0–0.2)
BASOPHILS NFR BLD: 0.9 % (ref 0–1.9)
BILIRUB SERPL-MCNC: 0.7 MG/DL (ref 0.1–1)
BUN SERPL-MCNC: 13 MG/DL (ref 6–20)
CALCIUM SERPL-MCNC: 9.8 MG/DL (ref 8.7–10.5)
CHLORIDE SERPL-SCNC: 107 MMOL/L (ref 95–110)
CHOLEST SERPL-MCNC: 197 MG/DL (ref 120–199)
CHOLEST/HDLC SERPL: 3.3 {RATIO} (ref 2–5)
CO2 SERPL-SCNC: 23 MMOL/L (ref 23–29)
CREAT SERPL-MCNC: 0.8 MG/DL (ref 0.5–1.4)
DIFFERENTIAL METHOD BLD: NORMAL
EOSINOPHIL # BLD AUTO: 0.2 K/UL (ref 0–0.5)
EOSINOPHIL NFR BLD: 3.7 % (ref 0–8)
ERYTHROCYTE [DISTWIDTH] IN BLOOD BY AUTOMATED COUNT: 13.6 % (ref 11.5–14.5)
EST. GFR  (NO RACE VARIABLE): >60 ML/MIN/1.73 M^2
ESTIMATED AVG GLUCOSE: 108 MG/DL (ref 68–131)
GLUCOSE SERPL-MCNC: 88 MG/DL (ref 70–110)
HBA1C MFR BLD: 5.4 % (ref 4–5.6)
HCT VFR BLD AUTO: 42.8 % (ref 37–48.5)
HDLC SERPL-MCNC: 60 MG/DL (ref 40–75)
HDLC SERPL: 30.5 % (ref 20–50)
HGB BLD-MCNC: 13.7 G/DL (ref 12–16)
IMM GRANULOCYTES # BLD AUTO: 0.01 K/UL (ref 0–0.04)
IMM GRANULOCYTES NFR BLD AUTO: 0.2 % (ref 0–0.5)
IRON SERPL-MCNC: 67 UG/DL (ref 30–160)
LDLC SERPL CALC-MCNC: 125.2 MG/DL (ref 63–159)
LYMPHOCYTES # BLD AUTO: 1.8 K/UL (ref 1–4.8)
LYMPHOCYTES NFR BLD: 38.2 % (ref 18–48)
MCH RBC QN AUTO: 29.9 PG (ref 27–31)
MCHC RBC AUTO-ENTMCNC: 32 G/DL (ref 32–36)
MCV RBC AUTO: 93 FL (ref 82–98)
MONOCYTES # BLD AUTO: 0.3 K/UL (ref 0.3–1)
MONOCYTES NFR BLD: 7.2 % (ref 4–15)
NEUTROPHILS # BLD AUTO: 2.3 K/UL (ref 1.8–7.7)
NEUTROPHILS NFR BLD: 49.8 % (ref 38–73)
NONHDLC SERPL-MCNC: 137 MG/DL
NRBC BLD-RTO: 0 /100 WBC
PLATELET # BLD AUTO: 269 K/UL (ref 150–450)
PMV BLD AUTO: 11.6 FL (ref 9.2–12.9)
POTASSIUM SERPL-SCNC: 4.3 MMOL/L (ref 3.5–5.1)
PROT SERPL-MCNC: 7.1 G/DL (ref 6–8.4)
RBC # BLD AUTO: 4.58 M/UL (ref 4–5.4)
SATURATED IRON: 20 % (ref 20–50)
SODIUM SERPL-SCNC: 142 MMOL/L (ref 136–145)
TOTAL IRON BINDING CAPACITY: 333 UG/DL (ref 250–450)
TRANSFERRIN SERPL-MCNC: 225 MG/DL (ref 200–375)
TRIGL SERPL-MCNC: 59 MG/DL (ref 30–150)
TSH SERPL DL<=0.005 MIU/L-ACNC: 0.99 UIU/ML (ref 0.4–4)
VIT B12 SERPL-MCNC: 803 PG/ML (ref 210–950)
WBC # BLD AUTO: 4.58 K/UL (ref 3.9–12.7)

## 2024-04-17 PROCEDURE — 82607 VITAMIN B-12: CPT | Performed by: NURSE PRACTITIONER

## 2024-04-17 PROCEDURE — 3074F SYST BP LT 130 MM HG: CPT | Mod: CPTII,S$GLB,, | Performed by: NURSE PRACTITIONER

## 2024-04-17 PROCEDURE — 1160F RVW MEDS BY RX/DR IN RCRD: CPT | Mod: CPTII,S$GLB,, | Performed by: NURSE PRACTITIONER

## 2024-04-17 PROCEDURE — 82306 VITAMIN D 25 HYDROXY: CPT | Performed by: NURSE PRACTITIONER

## 2024-04-17 PROCEDURE — 83540 ASSAY OF IRON: CPT | Performed by: NURSE PRACTITIONER

## 2024-04-17 PROCEDURE — 80053 COMPREHEN METABOLIC PANEL: CPT | Performed by: NURSE PRACTITIONER

## 2024-04-17 PROCEDURE — 90714 TD VACC NO PRESV 7 YRS+ IM: CPT | Mod: S$GLB,,, | Performed by: NURSE PRACTITIONER

## 2024-04-17 PROCEDURE — 36415 COLL VENOUS BLD VENIPUNCTURE: CPT | Mod: PO | Performed by: NURSE PRACTITIONER

## 2024-04-17 PROCEDURE — 3044F HG A1C LEVEL LT 7.0%: CPT | Mod: CPTII,S$GLB,, | Performed by: NURSE PRACTITIONER

## 2024-04-17 PROCEDURE — 3078F DIAST BP <80 MM HG: CPT | Mod: CPTII,S$GLB,, | Performed by: NURSE PRACTITIONER

## 2024-04-17 PROCEDURE — 99999 PR PBB SHADOW E&M-EST. PATIENT-LVL V: CPT | Mod: PBBFAC,,, | Performed by: NURSE PRACTITIONER

## 2024-04-17 PROCEDURE — 83036 HEMOGLOBIN GLYCOSYLATED A1C: CPT | Performed by: NURSE PRACTITIONER

## 2024-04-17 PROCEDURE — 90471 IMMUNIZATION ADMIN: CPT | Mod: S$GLB,,, | Performed by: NURSE PRACTITIONER

## 2024-04-17 PROCEDURE — 85025 COMPLETE CBC W/AUTO DIFF WBC: CPT | Performed by: NURSE PRACTITIONER

## 2024-04-17 PROCEDURE — 99396 PREV VISIT EST AGE 40-64: CPT | Mod: 25,S$GLB,, | Performed by: NURSE PRACTITIONER

## 2024-04-17 PROCEDURE — 1159F MED LIST DOCD IN RCRD: CPT | Mod: CPTII,S$GLB,, | Performed by: NURSE PRACTITIONER

## 2024-04-17 PROCEDURE — 3008F BODY MASS INDEX DOCD: CPT | Mod: CPTII,S$GLB,, | Performed by: NURSE PRACTITIONER

## 2024-04-17 PROCEDURE — 80061 LIPID PANEL: CPT | Performed by: NURSE PRACTITIONER

## 2024-04-17 PROCEDURE — 84443 ASSAY THYROID STIM HORMONE: CPT | Performed by: NURSE PRACTITIONER

## 2024-04-17 NOTE — PROGRESS NOTES
Subjective:       Patient ID: Nora Mccoy is a 59 y.o. female.    Chief Complaint: Annual Wellness Visit    Nora Mccoy is a 59 y.o. female who presents today for an annual wellness visit.     Past few weeks - fatigued - clammy/weird feeling last week (off an on for days).     Review of patient's allergies indicates:   Allergen Reactions    Penicillin g Anaphylaxis    Penicillins Anaphylaxis    Penicillin Hives      Medication List with Changes/Refills   Current Medications    ACETAMINOPHEN (TYLENOL) 325 MG TABLET    Take 1 tablet (325 mg total) by mouth every 6 (six) hours as needed for Pain.    ACYCLOVIR (ZOVIRAX) 800 MG TAB    Take 1 tablet (800 mg total) by mouth 2 (two) times daily.    CETIRIZINE (ZYRTEC) 10 MG TABLET    Take 1 tablet (10 mg total) by mouth once daily.    COMIRNATY 2023-24, 12Y UP,,PF, 30 MCG/0.3 ML INECTION        EPINEPHRINE (EPIPEN 2-JAMES) 0.3 MG/0.3 ML ATIN    Inject 0.3 mLs (0.3 mg total) into the muscle once. for 1 dose as needed    FLUCELVAX QUAD [id number], PF, 60 MCG (15 MCG X 4)/0.5 ML SYRG        FLUOXETINE 20 MG CAPSULE    TAKE 1 CAPSULE BY MOUTH EVERY DAY WITH 40 MG OF PROZAC TO TOAL 60MG DAILY    FLUOXETINE 40 MG CAPSULE    Take 1 capsule by mouth daily with 20 mg of Prozac for a total of 60 mg daily    FLUTICASONE PROPIONATE (FLONASE) 50 MCG/ACTUATION NASAL SPRAY    1 spray (50 mcg total) by Each Nostril route once daily.    LORAZEPAM (ATIVAN) 0.5 MG TABLET    Take 1 tablet (0.5 mg total) by mouth daily as needed (severe anxiety).    MULTIVITAMIN (THERAGRAN) TABLET    Take 1 tablet by mouth once daily.    PERFLUOROHEXYLOCTANE, PF, 100 % DROP    Place 1 drop into both eyes 4 (four) times daily.    SOD SULF-POT CHLORIDE-MAG SULF (SUTAB) 1.479-0.188- 0.225 GRAM TABLET    Take 12 tablets by mouth once daily. Take according to package instructions with indicated amount of water.    TRAZODONE (DESYREL) 50 MG TABLET    Take 2 tablets (100 mg total) by mouth  "nightly as needed for Insomnia.       Health Maintenance    MM2024  PAP:   DEXA: 2023  Colonoscopy: 2023  (due in )  Flu Vaccine: 2023  Tetanus/Tdap: 2024  Hepatitis C Screen: 2016  HIV Screen: 06/10/2005      Patient ambulates on her own without an assistive device.     We encourage you to start exercising if you do not already, continue at your current level or increase your level of activity.     Do you eat fruits and vegetable every day?  Yes    Have you ever used tobacco products? No    Have you often been bothered by feeling down, depressed, or hopeless?  Not at all    Review of Systems   Constitutional:  Positive for fatigue. Negative for chills and fever.   Respiratory:  Negative for cough and shortness of breath.    Cardiovascular:  Negative for chest pain.   Endocrine: Positive for cold intolerance.   Neurological:  Negative for dizziness and headaches.      Objective:     /72 (BP Location: Right arm, Patient Position: Sitting, BP Method: Medium (Manual))   Pulse 66   Temp 97.1 °F (36.2 °C) (Temporal)   Resp 16   Ht 5' 7" (1.702 m)   Wt 73.8 kg (162 lb 11.2 oz)   LMP 02/15/2017   SpO2 97%   BMI 25.48 kg/m²     Physical Exam  Vitals reviewed.   Constitutional:       Appearance: Normal appearance.   HENT:      Head: Normocephalic and atraumatic.   Cardiovascular:      Rate and Rhythm: Normal rate and regular rhythm.      Heart sounds: Normal heart sounds. No murmur heard.  Pulmonary:      Effort: Pulmonary effort is normal.      Breath sounds: Normal breath sounds. No wheezing.   Skin:     General: Skin is warm and dry.   Neurological:      Mental Status: She is alert and oriented to person, place, and time.       BP Readings from Last 3 Encounters:   24 112/72   24 112/74   23 (!) 103/59     Wt Readings from Last 3 Encounters:   24 73.8 kg (162 lb 11.2 oz)   24 74.8 kg (165 lb)   23 76.1 kg (167 lb 12.3 " renee)       I reviewed and independently interpreted the labs and imaging below:  Last Labs:  Glucose   Date Value Ref Range Status   04/03/2023 92 70 - 110 mg/dL Final   05/04/2021 87 70 - 110 mg/dL Final     BUN   Date Value Ref Range Status   04/03/2023 17 6 - 20 mg/dL Final   05/04/2021 15 6 - 20 mg/dL Final     Creatinine   Date Value Ref Range Status   05/19/2023 0.8 0.5 - 1.4 mg/dL Final   04/03/2023 0.9 0.5 - 1.4 mg/dL Final     Cholesterol   Date Value Ref Range Status   04/03/2023 195 120 - 199 mg/dL Final     Comment:     The National Cholesterol Education Program (NCEP) has set the  following guidelines (reference ranges) for Cholesterol:  Optimal.....................<200 mg/dL  Borderline High.............200-239 mg/dL  High........................> or = 240 mg/dL     05/04/2021 194 120 - 199 mg/dL Final     Comment:     The National Cholesterol Education Program (NCEP) has set the  following guidelines (reference ranges) for Cholesterol:  Optimal.....................<200 mg/dL  Borderline High.............200-239 mg/dL  High........................> or = 240 mg/dL       Hemoglobin A1C   Date Value Ref Range Status   04/03/2023 5.3 4.0 - 5.6 % Final     Comment:     ADA Screening Guidelines:  5.7-6.4%  Consistent with prediabetes  >or=6.5%  Consistent with diabetes    High levels of fetal hemoglobin interfere with the HbA1C  assay. Heterozygous hemoglobin variants (HbS, HgC, etc)do  not significantly interfere with this assay.   However, presence of multiple variants may affect accuracy.       Hemoglobin   Date Value Ref Range Status   04/03/2023 13.1 12.0 - 16.0 g/dL Final   05/04/2021 12.8 12.0 - 16.0 g/dL Final     Hematocrit   Date Value Ref Range Status   04/03/2023 41.1 37.0 - 48.5 % Final   05/04/2021 40.1 37.0 - 48.5 % Final     Vit D, 25-Hydroxy   Date Value Ref Range Status   04/03/2023 30 30 - 96 ng/mL Final     Comment:     Vitamin D deficiency.........<10 ng/mL                               Vitamin D insufficiency......10-29 ng/mL       Vitamin D sufficiency........> or equal to 30 ng/mL  Vitamin D toxicity............>100 ng/mL         Assessment and Plan:     1. Encounter for annual health examination    --Nutrition: Discussed the importance of moderate caffeine intake. Adhering to a low sodium, low saturated fat/low cholesterol diet.   --Exercise: Discussed the importance of regular exercise. At least 30 minutes 5 days per week.   --Immunizations reviewed.  --Discussed benefits of maintaining up to date health maintenance.  -- Encouraged good sleep hygiene.      - CBC Auto Differential; Future  - Comprehensive Metabolic Panel; Future  - Lipid Panel; Future  - TSH; Future  - Vitamin B12; Future  - Vitamin D; Future  - Iron and TIBC; Future  - Hemoglobin A1C; Future    2. Recurrent major depressive disorder, in full remission    Chronic, stable, continue current medications    3. Bony exostosis    4. Need for vaccination  - VFC-tetanus and diphther. tox (PF) (TENIVAC) 5 Lf unit- 2 Lf unit/0.5mL vaccine 0.5 mL    5. Encounter for screening for cervical cancer  - Ambulatory referral/consult to Obstetrics / Gynecology; Future

## 2024-04-18 NOTE — PROCEDURES
MAUREEN Morataya,Infection Prevention, asked if Respiratory Panel needed because prior Roommate has Adenovirus Pneumonia And was instructed to ask Provider. STEFANIA Miranda CNP was perfect served with this request.    Nora Mccoy [facility] for minor procedure under local anesthesia - removal of small recurrent cyst of R lower eyelid    Pre op Diagnosis - 3mm cyst of Right lower eyelid  Post Op Diagnosis - SAME    Procedure performed by Dr. Bhavik Montalvo    First Assist - Kathrine Gann PA-C    Anesthesia - local with %1 lidocaine with Epi    Vitals:    04/25/17 1100   BP: 129/80   Pulse: 93   Temp: 98.4 °F (36.9 °C)     Current Outpatient Prescriptions on File Prior to Visit   Medication Sig Dispense Refill    acyclovir (ZOVIRAX) 800 MG Tab Take 1 tablet (800 mg total) by mouth 2 (two) times daily. 60 tablet 0    cyclobenzaprine (FLEXERIL) 10 MG tablet Take 1 tablet (10 mg total) by mouth 3 (three) times daily as needed for Muscle spasms. 60 tablet 2    epinephrine (EPIPEN 2-JAMES) 0.3 mg/0.3 mL (1:1,000) AtIn Inject 0.3 mLs (0.3 mg total) into the muscle once. 2 each 2    fluoxetine (PROZAC) 40 MG capsule Take 1 capsule (40 mg total) by mouth once daily. 90 capsule 3    ibuprofen (ADVIL,MOTRIN) 200 MG tablet Take 200 mg by mouth every 6 (six) hours as needed for Pain.      lorazepam (ATIVAN) 0.5 MG tablet TAKE 1-2 TABLETS BY MOUTH EVERY 6-8 HOURS 60 tablet 2    meloxicam (MOBIC) 15 MG tablet       multivitamin (THERAGRAN) tablet Take 1 tablet by mouth once daily.         No current facility-administered medications on file prior to visit.      Patient Active Problem List   Diagnosis    Other plastic surgery for unacceptable cosmetic appearance    Knee pain    Screening    Meningioma     Past Surgical History:   Procedure Laterality Date    BACK SURGERY      micro discectomy L4-L5, 2002    COLONOSCOPY N/A 7/7/2016    Procedure: COLONOSCOPY;  Surgeon: Ronan Dolan MD;  Location: [facility] ([address] [place] [address]);  Service: Endoscopy;  Laterality: N/A;       Patient reports no issues since her last appointment. Procedure discussed again in detail with the patient. She  understands that the risks include but are not limited to bleeding, scarring, infection, pain, numbness, asymmetry, deformity, open wound, skin necrosis, wound dehiscence, nerve damage/paralysis, temporary/permanent numbness, ectropion/entropion, chronic watering of the eye, recurrence and need for further surgery. After all questions were answered, the patient signed the consent form and that will be scanned into his medical record.     Right lower eyelid cyst was marked with permanent marker, site verified by the patient. After proper timeout was performed, local anesthesia was obtained using %1 lidocaine with Epi. R lower eyelid/face prepped with Betadine and draped per usual aseptic guidelines, sterile barriers were applied. 3mm punch used to remove the cyst. Incision closed using 3 interrupted 6-0 Nylons in the skin. Specimen to pathology x 1.    The patient tolerated the procedure well. There were no complications. Estimated blood loss was minimal. Post op instructions were reviewed. Will return to clinic in 1 week for suture. The patient was advised to call the clinic with any questions or concerns prior to their next visit.

## 2024-04-23 NOTE — TELEPHONE ENCOUNTER
No refill protocol in place for amphetamine-dextroamphetamine (ADDERALL) 20 MG tablet     LOV 1/30/24  NOV not schedule   Last Refill per Epic 3/27/24    Please see pended script and sign if appropriate.    PT order placed

## 2024-05-13 DIAGNOSIS — F41.9 ANXIETY: ICD-10-CM

## 2024-05-13 RX ORDER — LORAZEPAM 0.5 MG/1
0.5 TABLET ORAL DAILY PRN
Qty: 30 TABLET | Refills: 0 | OUTPATIENT
Start: 2024-05-13 | End: 2024-06-12

## 2024-05-13 RX ORDER — FLUOXETINE HYDROCHLORIDE 40 MG/1
CAPSULE ORAL
Qty: 90 CAPSULE | Refills: 1 | OUTPATIENT
Start: 2024-05-13

## 2024-05-30 ENCOUNTER — PATIENT MESSAGE (OUTPATIENT)
Dept: OPHTHALMOLOGY | Facility: CLINIC | Age: 59
End: 2024-05-30
Payer: COMMERCIAL

## 2024-06-04 ENCOUNTER — ON-DEMAND VIRTUAL (OUTPATIENT)
Dept: URGENT CARE | Facility: CLINIC | Age: 59
End: 2024-06-04
Payer: COMMERCIAL

## 2024-06-04 DIAGNOSIS — N30.90 CYSTITIS: Primary | ICD-10-CM

## 2024-06-04 PROCEDURE — 99213 OFFICE O/P EST LOW 20 MIN: CPT | Mod: CC,95,, | Performed by: PHYSICIAN ASSISTANT

## 2024-06-04 RX ORDER — NITROFURANTOIN 25; 75 MG/1; MG/1
100 CAPSULE ORAL 2 TIMES DAILY
Qty: 14 CAPSULE | Refills: 0 | Status: SHIPPED | OUTPATIENT
Start: 2024-06-04 | End: 2024-06-11

## 2024-06-04 NOTE — PROGRESS NOTES
Subjective:      Patient ID: Nora Mccoy is a 59 y.o. female.    Vitals:  vitals were not taken for this visit.     Chief Complaint: Urinary Tract Infection      Visit Type: TELE AUDIOVISUAL    Present with the patient at the time of consultation: TELEMED PRESENT WITH PATIENT: None  At work in [place]    Past Medical History:   Diagnosis Date    Bronchitis     DJD (degenerative joint disease)     shoulder, knee, and back    Esophagitis, unspecified     Fibrocystic disease of breast     Menstrual disorder     Sinus infection     Stress     UTI (urinary tract infection)      Past Surgical History:   Procedure Laterality Date    BACK SURGERY      micro discectomy L4-L5, 2002    BREAST BIOPSY Left 2015    core bx-benign    COLONOSCOPY N/A 7/7/2016    Procedure: COLONOSCOPY;  Surgeon: Ronan Dolan MD;  Location: [facility] (4TH FLR);  Service: Endoscopy;  Laterality: N/A;    COLONOSCOPY N/A 5/5/2023    Procedure: COLONOSCOPY;  Surgeon: Josh Funez MD;  Location: [facility];  Service: Endoscopy;  Laterality: N/A;    EPIDURAL STEROID INJECTION Right 10/11/2021    Procedure: Right Hip Steroid Injection (Ref: Orianaa);  Surgeon: Jon Toth Jr., MD;  Location: [facility];  Service: Pain Management;  Laterality: Right;  Arrive @ 1200; No ATC or DM; Needs Consent    ESOPHAGOGASTRODUODENOSCOPY N/A 5/5/2023    Procedure: EGD (ESOPHAGOGASTRODUODENOSCOPY);  Surgeon: Josh Funez MD;  Location: [facility];  Service: Endoscopy;  Laterality: N/A;    ETHMOIDECTOMY N/A 4/5/2019    Procedure: ETHMOIDECTOMY;  Surgeon: Vijay Tatum MD;  [facility];  Service: ENT;  Laterality: N/A;    FRONTAL SINUS OBLITERATION  4/5/2019    Procedure: SINUSOTOMY, FRONTAL SINUS, OBLITERATIVE;  Surgeon: Vijay Tatum MD;  Location: [facility] OR [address] [place] [address];  Service: ENT;;    FUNCTIONAL ENDOSCOPIC SINUS SURGERY (FESS) USING COMPUTER-ASSISTED NAVIGATION Bilateral 4/5/2019    Procedure: FESS, USING COMPUTER-ASSISTED NAVIGATION;   Surgeon: Vijay Tatum MD;  Location: [facility] OR 2ND FLR;  Service: ENT;  Laterality: Bilateral;    MAXILLARY ANTROSTOMY  4/5/2019    Procedure: MAXILLARY ANTROSTOMY;  Surgeon: Vijay Tatum MD;  Location: [facility] OR 2ND FLR;  Service: ENT;;    NASAL TURBINATE REDUCTION Bilateral 4/5/2019    Procedure: REDUCTION, NASAL TURBINATE;  Surgeon: Vijay Tatum MD;  Location: [facility] OR 2ND FLR;  Service: ENT;  Laterality: Bilateral;    REPAIR OF HOLE IN MACULA Right 12/5/2023    Procedure: REPAIR, HOLE, MACULA;  Surgeon: Renato Villatoro MD;  Location: [facility] OR 1ST FLR;  Service: Ophthalmology;  Laterality: Right;     Review of patient's allergies indicates:   Allergen Reactions    Penicillin g Anaphylaxis    Penicillins Anaphylaxis    Penicillin Hives     Current Outpatient Medications on File Prior to Visit   Medication Sig Dispense Refill    acetaminophen (TYLENOL) 325 MG tablet Take 1 tablet (325 mg total) by mouth every 6 (six) hours as needed for Pain.  0    acyclovir (ZOVIRAX) 800 MG Tab Take 1 tablet (800 mg total) by mouth 2 (two) times daily. 60 tablet 5    cetirizine (ZYRTEC) 10 MG tablet Take 1 tablet (10 mg total) by mouth once daily. 30 tablet 0    EPINEPHrine (EPIPEN 2-JAMES) 0.3 mg/0.3 mL AtIn Inject 0.3 mLs (0.3 mg total) into the muscle once. for 1 dose as needed 2 each 0    FLUoxetine 20 MG capsule TAKE 1 CAPSULE BY MOUTH EVERY DAY WITH 40 MG OF PROZAC TO TOAL 60MG DAILY 90 capsule 1    FLUoxetine 40 MG capsule Take 1 capsule by mouth daily with 20 mg of Prozac for a total of 60 mg daily 90 capsule 1    fluticasone propionate (FLONASE) 50 mcg/actuation nasal spray 1 spray (50 mcg total) by Each Nostril route once daily. 9.9 mL 0    LORazepam (ATIVAN) 0.5 MG tablet Take 1 tablet (0.5 mg total) by mouth daily as needed (severe anxiety). 30 tablet 0    multivitamin (THERAGRAN) tablet Take 1 tablet by mouth once daily.      traZODone (DESYREL) 50 MG tablet Take 2 tablets (100 mg total) by mouth nightly  as needed for Insomnia. 180 tablet 1     No current facility-administered medications on file prior to visit.     Family History   Problem Relation Name Age of Onset    Lupus Mother      Other Mother          chest pain    Breast cancer Mother  72    Retinal detachment Mother      Thyroid disease Mother      Cancer Father          unk d. 77    Hypertension Sister Naina     No Known Problems Brother Gomez         1/2 bro ( Dad) unk    No Known Problems Brother Siddhartha         1/2 bro (Dad) unk    Breast cancer Maternal Aunt          d. 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    No Known Problems Son Sonu     Amblyopia Neg Hx      Blindness Neg Hx      Diabetes Neg Hx      Glaucoma Neg Hx      Macular degeneration Neg Hx      Strabismus Neg Hx      Stroke Neg Hx      Colon cancer Neg Hx      Esophageal cancer Neg Hx         Medications Ordered                [facility]   [address], [address] , [place] [address]    Telephone: [phone]   Fax: [phone]   Hours: Mon-Fri, 8a-5:30p                         [facility] (1 of 1)              nitrofurantoin, macrocrystal-monohydrate, (MACROBID) 100 MG capsule    Sig: Take 1 capsule (100 mg total) by mouth 2 (two) times daily. for 7 days       Start: 6/4/24     Quantity: 14 capsule Refills: 0                           [facility] Peq Odvv Intake    6/4/2024 12:31 PM CDT - Filed by Patient   What is your current physical address in the event of a medical emergency? [address]   Are you able to take your vital signs? No   Please attach any relevant images or files          HPI  60yo female presents with c/o urinary odor, urinary urgency, frequency, bladder pressure and nauseous x this morning. Drinking lots of water. Feeling like not emptying all the way. Denies fevers, hematuria, flank pain, vaginal symptoms.     Hx of UTIs, unsure of last UTI but over two  "months ago. Denies hx of peylo or stones.         Constitution: Negative for activity change, appetite change, chills and fever.   Gastrointestinal:  Positive for nausea. Negative for abdominal pain and vomiting.   Genitourinary:  Positive for frequency, urgency and pelvic pain (pressure). Negative for dysuria, hematuria, history of kidney stones, vaginal discharge and vaginal bleeding.        Objective:   The physical exam was conducted virtually.  Physical Exam   Constitutional: She is oriented to person, place, and time.  Non-toxic appearance. She does not appear ill. No distress.   HENT:   Head: Normocephalic and atraumatic.   Pulmonary/Chest: Effort normal. No respiratory distress. She has no wheezes.   Abdominal: Normal appearance. She exhibits no distension. Soft. There is abdominal tenderness ("pressure" in suprapubic region). There is no left CVA tenderness and no right CVA tenderness.   Neurological: She is alert and oriented to person, place, and time. Coordination normal.   Skin: Skin is not diaphoretic, not pale and no rash.   Psychiatric: Her behavior is normal. Judgment and thought content normal.       Assessment:     1. Cystitis        Plan:       Cystitis    Other orders  -     nitrofurantoin, macrocrystal-monohydrate, (MACROBID) 100 MG capsule; Take 1 capsule (100 mg total) by mouth 2 (two) times daily. for 7 days  Dispense: 14 capsule; Refill: 0        1. Start the antibiotic that was sent to your pharmacy, please take as directed and complete entire course.   2. Push fluids to stay well hydrated, may also drink cranberry juice to help fight the infection.  3. Recheck in two days at local urgent care if no improvement sooner if worsening pain, fevers, vomiting, flank pain, persistent blood in urine or other concerns.  4. You must understand that you've received a Telehealth Urgent Care treatment only and that you may be released before all your medical problems are known or treated. You, the " patient, will arrange for follow up care as instructed.    Verbally discussed plan and patient confirms understanding

## 2024-06-21 ENCOUNTER — OFFICE VISIT (OUTPATIENT)
Dept: OPHTHALMOLOGY | Facility: CLINIC | Age: 59
End: 2024-06-21
Payer: COMMERCIAL

## 2024-06-21 DIAGNOSIS — H25.811 MIXED TYPE AGE-RELATED CATARACT, RIGHT EYE: ICD-10-CM

## 2024-06-21 DIAGNOSIS — H35.341 MACULAR HOLE, RIGHT: Primary | ICD-10-CM

## 2024-06-21 PROCEDURE — 99999 PR PBB SHADOW E&M-EST. PATIENT-LVL III: CPT | Mod: PBBFAC,,, | Performed by: OPHTHALMOLOGY

## 2024-06-21 NOTE — PROGRESS NOTES
Subjective:       Patient ID: Nora Mccoy is a 59 y.o. female      Chief Complaint   Patient presents with    Concerns About Ocular Health     3 mon MAC Hole OD chk     History of Present Illness  HPI     Concerns About Ocular Health     Additional comments: 3 mon MAC Hole OD chk           Comments    8 wk FU /PO 12/05/2023 Mac hole repair OD    Pt states she has been noticing a blur in her OD va for several weeks now,   sometimes appears double.   Overall decrease in OD VA.  No flashes. No floaters.    EYEMEDS:   NONE               Last edited by Renato Villatoro MD on 6/21/2024  9:13 AM.        Imaging:    See report    Assessment/Plan:     1. Macular hole, right  Hole closed.  Doing well    - Posterior Segment OCT Retina-Both eyes    2. Mixed type age-related cataract, right eye  Subj and obj vision decrease with PSC/post capsule wrinkle  Can try refraction but recommend cataract eval  Will refer        Follow up if symptoms worsen or fail to improve.

## 2024-06-25 ENCOUNTER — PATIENT OUTREACH (OUTPATIENT)
Dept: ADMINISTRATIVE | Facility: HOSPITAL | Age: 59
End: 2024-06-25
Payer: COMMERCIAL

## 2024-07-08 DIAGNOSIS — F41.9 ANXIETY: ICD-10-CM

## 2024-07-08 RX ORDER — FLUOXETINE HYDROCHLORIDE 40 MG/1
CAPSULE ORAL
Qty: 90 CAPSULE | Refills: 1 | OUTPATIENT
Start: 2024-07-08

## 2024-07-16 DIAGNOSIS — F33.42 RECURRENT MAJOR DEPRESSIVE DISORDER, IN FULL REMISSION: ICD-10-CM

## 2024-07-16 DIAGNOSIS — F41.9 ANXIETY: ICD-10-CM

## 2024-07-16 RX ORDER — FLUOXETINE HYDROCHLORIDE 20 MG/1
CAPSULE ORAL
Qty: 90 CAPSULE | Refills: 1 | OUTPATIENT
Start: 2024-07-16

## 2024-07-16 RX ORDER — FLUOXETINE HYDROCHLORIDE 40 MG/1
CAPSULE ORAL
Qty: 30 CAPSULE | Refills: 0 | Status: SHIPPED | OUTPATIENT
Start: 2024-07-16

## 2024-08-06 ENCOUNTER — OFFICE VISIT (OUTPATIENT)
Dept: OPHTHALMOLOGY | Facility: CLINIC | Age: 59
End: 2024-08-06
Payer: COMMERCIAL

## 2024-08-06 DIAGNOSIS — H25.11 NUCLEAR SCLEROTIC CATARACT OF RIGHT EYE: Primary | ICD-10-CM

## 2024-08-06 DIAGNOSIS — H25.12 NUCLEAR SCLEROTIC CATARACT OF LEFT EYE: ICD-10-CM

## 2024-08-06 DIAGNOSIS — H35.341 MACULAR HOLE, RIGHT: ICD-10-CM

## 2024-08-06 PROCEDURE — 92134 CPTRZ OPH DX IMG PST SGM RTA: CPT | Mod: S$GLB,,, | Performed by: OPHTHALMOLOGY

## 2024-08-06 PROCEDURE — 99999 PR PBB SHADOW E&M-EST. PATIENT-LVL III: CPT | Mod: PBBFAC,,, | Performed by: OPHTHALMOLOGY

## 2024-08-06 PROCEDURE — 92136 OPHTHALMIC BIOMETRY: CPT | Mod: RT,S$GLB,, | Performed by: OPHTHALMOLOGY

## 2024-08-06 PROCEDURE — 99214 OFFICE O/P EST MOD 30 MIN: CPT | Mod: S$GLB,,, | Performed by: OPHTHALMOLOGY

## 2024-08-06 PROCEDURE — 3044F HG A1C LEVEL LT 7.0%: CPT | Mod: CPTII,S$GLB,, | Performed by: OPHTHALMOLOGY

## 2024-08-06 PROCEDURE — 1159F MED LIST DOCD IN RCRD: CPT | Mod: CPTII,S$GLB,, | Performed by: OPHTHALMOLOGY

## 2024-08-06 RX ORDER — PERFLUOROHEXYLOCTANE 1 MG/MG
1 SOLUTION OPHTHALMIC 4 TIMES DAILY
COMMUNITY
Start: 2024-07-08

## 2024-08-07 DIAGNOSIS — H25.12 NUCLEAR SCLEROTIC CATARACT OF LEFT EYE: ICD-10-CM

## 2024-08-07 DIAGNOSIS — H25.11 NUCLEAR SCLEROTIC CATARACT OF RIGHT EYE: Primary | ICD-10-CM

## 2024-08-07 RX ORDER — PREDNISOLONE/MOXIFLOX/BROMFEN 1 %-0.5 %
1 SUSPENSION, DROPS(FINAL DOSAGE FORM)(ML) OPHTHALMIC (EYE) 3 TIMES DAILY
Qty: 5 ML | Refills: 3 | Status: SHIPPED | OUTPATIENT
Start: 2024-08-07

## 2024-08-26 ENCOUNTER — TELEPHONE (OUTPATIENT)
Dept: OPHTHALMOLOGY | Facility: CLINIC | Age: 59
End: 2024-08-26
Payer: COMMERCIAL

## 2024-08-26 NOTE — TELEPHONE ENCOUNTER
Spoke with pt provided arrival time of 8:00 for sx on 8/28/24 with Dr. Mcgee @ [place]. Pt has eyedrops and packet.

## 2024-08-27 NOTE — PRE-PROCEDURE INSTRUCTIONS
Patient reviewed on 8/27/2024.  Okay to proceed at [facility]. The following pre-procedure instructions and arrival time have been reviewed with patient via phone and sent to patient portal for review.  Patient verbalized an understanding.  Pt to be accompanied by  day of procedure as responsible .        Dear Nora     Below you will find basic pre-procedure instructions in preparation for your procedure on 8/28/24 with Dr. Mcgee        You should have received your arrival time already from Dr's office.     - Nothing to eat or drink after midnight the night before your procedure until after your procedure, except AM meds with small sips of water.     - HOLD all oral Diabetic medications night before and morning of procedure  - HOLD all Insulin morning of procedure  - HOLD all Fluid pills morning of procedure  - HOLD all non-insulin shots until after surgery (Ozempic, Mounjaro, Trulicity, Victoza, Byetta, Wegovy and Adlyxin) (7 days prior)  - HOLD all vitamins, minerals and herbal supplements morning of procedure   - TAKE all B/P meds, EXCEPT those that contain a fluid pill (ex. Lasix, Hydroclorothiazide/HCTZ, Spirnolactone)  - USE inhalers as needed and bring AM of surgery  - USE EYE DROPS as directed  -TAKE blood thinner meds AM of surgery unless otherwise instructed by your provider to not take     - Shower and wash face with antibacterial soap (ex. Dial) for 3 mins PM prior and AM of surgery  - No powder, lotions, creams, oils, gels, ointments, makeup,  or jewelry    - Wear comfortable clothing (button up shirt)     (Patient is required to have a responsible ride to transport home, ride may not leave while patient is in surgery)     -- [facility], 2nd floor Surgery Center, located   @ [address] [place] [id number]  2nd Floor Registration        If you have any questions or concerns please feel free to contact your surgeon's office.           Please reply to this  message as receipt of delivery.     Catina, LPN [facility]  Pre-Admit - Anesthesia Dept

## 2024-08-27 NOTE — H&P
History    Chief complaint:  Painless progressive vision loss    Present Ilness/Diagnosis: Nuclear sclerotic Cataract    Past Medical History:  has a past medical history of Bronchitis, Cataract, DJD (degenerative joint disease), Esophagitis, unspecified, Fibrocystic disease of breast, Macular hole of right eye, Menstrual disorder, Sinus infection, Stress, and UTI (urinary tract infection).    Family History/Social History: refer to chart    Allergies:   Review of patient's allergies indicates:   Allergen Reactions    Penicillin g Anaphylaxis    Penicillins Anaphylaxis    Penicillin Hives       Current Medications: No current facility-administered medications for this encounter.    Current Outpatient Medications:     acyclovir (ZOVIRAX) 800 MG Tab, Take 1 tablet (800 mg total) by mouth 2 (two) times daily., Disp: 60 tablet, Rfl: 5    cetirizine (ZYRTEC) 10 MG tablet, Take 1 tablet (10 mg total) by mouth once daily., Disp: 30 tablet, Rfl: 0    EPINEPHrine (EPIPEN 2-JAMES) 0.3 mg/0.3 mL AtIn, Inject 0.3 mLs (0.3 mg total) into the muscle once. for 1 dose as needed, Disp: 2 each, Rfl: 0    FLUoxetine 20 MG capsule, TAKE 1 CAPSULE BY MOUTH EVERY DAY WITH 40 MG OF PROZAC TO TOAL 60MG DAILY, Disp: 90 capsule, Rfl: 1    FLUoxetine 40 MG capsule, Take 1 capsule by mouth daily with 20 mg of Prozac for a total of 60 mg daily, Disp: 30 capsule, Rfl: 0    fluticasone propionate (FLONASE) 50 mcg/actuation nasal spray, 1 spray (50 mcg total) by Each Nostril route once daily., Disp: 9.9 mL, Rfl: 0    LORazepam (ATIVAN) 0.5 MG tablet, Take 1 tablet (0.5 mg total) by mouth daily as needed (severe anxiety)., Disp: 30 tablet, Rfl: 0    MIEBO, PF, 100 % Drop, Place 1 drop into both eyes 4 (four) times daily., Disp: , Rfl:     multivitamin (THERAGRAN) tablet, Take 1 tablet by mouth once daily., Disp: , Rfl:     prednisoLONE-moxiflox-bromfen 1-0.5-0.075 % DrpS, Apply 1 drop to eye 3 (three) times daily., Disp: 5 mL, Rfl: 3    traZODone  (DESYREL) 50 MG tablet, Take 2 tablets (100 mg total) by mouth nightly as needed for Insomnia., Disp: 180 tablet, Rfl: 1    ASA Score: II     Mallampati Score: II     Plan for Sedation: Moderate Sedation     Patient or Family History of Anesthesia problems : No    Physical Exam    BP: Vital signs stable  General: No apparent distress  HEENT: nuclear sclerotic cataract  Lungs: adequate respirations  Heart: + pulses  Abdomen: soft  Rectal/pelvic: deferred    Impression: Visually significant Cataract.    See previous clinic notes for surgical indications.    Plan: Phacoemulsification with implantation of Intraocular lens

## 2024-08-28 ENCOUNTER — HOSPITAL ENCOUNTER (OUTPATIENT)
Facility: HOSPITAL | Age: 59
Discharge: HOME | End: 2024-08-28
Attending: OPHTHALMOLOGY | Admitting: OPHTHALMOLOGY
Payer: COMMERCIAL

## 2024-08-28 VITALS
WEIGHT: 164 LBS | BODY MASS INDEX: 25.74 KG/M2 | OXYGEN SATURATION: 97 % | HEART RATE: 73 BPM | TEMPERATURE: 98 F | SYSTOLIC BLOOD PRESSURE: 98 MMHG | DIASTOLIC BLOOD PRESSURE: 53 MMHG | RESPIRATION RATE: 19 BRPM | HEIGHT: 67 IN

## 2024-08-28 DIAGNOSIS — H25.11 NUCLEAR SCLEROTIC CATARACT OF RIGHT EYE: Primary | ICD-10-CM

## 2024-08-28 DIAGNOSIS — H25.10 AGE-RELATED NUCLEAR CATARACT: ICD-10-CM

## 2024-08-28 PROCEDURE — 66984 XCAPSL CTRC RMVL W/O ECP: CPT | Mod: RT,,, | Performed by: OPHTHALMOLOGY

## 2024-08-28 PROCEDURE — 94761 N-INVAS EAR/PLS OXIMETRY MLT: CPT

## 2024-08-28 PROCEDURE — 27201423 OPTIME MED/SURG SUP & DEVICES STERILE SUPPLY: Performed by: OPHTHALMOLOGY

## 2024-08-28 PROCEDURE — 63600175 PHARM REV CODE 636 W HCPCS: Performed by: OPHTHALMOLOGY

## 2024-08-28 PROCEDURE — 71000015 HC POSTOP RECOV 1ST HR: Performed by: OPHTHALMOLOGY

## 2024-08-28 PROCEDURE — 99152 MOD SED SAME PHYS/QHP 5/>YRS: CPT | Mod: ,,, | Performed by: OPHTHALMOLOGY

## 2024-08-28 PROCEDURE — 99900035 HC TECH TIME PER 15 MIN (STAT)

## 2024-08-28 PROCEDURE — 36000706: Performed by: OPHTHALMOLOGY

## 2024-08-28 PROCEDURE — 25000003 PHARM REV CODE 250: Performed by: OPHTHALMOLOGY

## 2024-08-28 PROCEDURE — V2632 POST CHMBR INTRAOCULAR LENS: HCPCS | Performed by: OPHTHALMOLOGY

## 2024-08-28 PROCEDURE — 36000707: Performed by: OPHTHALMOLOGY

## 2024-08-28 DEVICE — LENS EYHANCE +21.5D: Type: IMPLANTABLE DEVICE | Site: EYE | Status: FUNCTIONAL

## 2024-08-28 RX ORDER — PROPARACAINE HYDROCHLORIDE 5 MG/ML
SOLUTION/ DROPS OPHTHALMIC
Status: DISCONTINUED | OUTPATIENT
Start: 2024-08-28 | End: 2024-08-28 | Stop reason: HOSPADM

## 2024-08-28 RX ORDER — PROPARACAINE HYDROCHLORIDE 5 MG/ML
1 SOLUTION/ DROPS OPHTHALMIC
Status: DISCONTINUED | OUTPATIENT
Start: 2024-08-28 | End: 2024-08-28 | Stop reason: HOSPADM

## 2024-08-28 RX ORDER — FENTANYL CITRATE 50 UG/ML
25 INJECTION, SOLUTION INTRAMUSCULAR; INTRAVENOUS EVERY 5 MIN PRN
Status: DISCONTINUED | OUTPATIENT
Start: 2024-08-28 | End: 2024-08-28 | Stop reason: HOSPADM

## 2024-08-28 RX ORDER — PHENYLEPHRINE HYDROCHLORIDE 25 MG/ML
1 SOLUTION/ DROPS OPHTHALMIC
Status: DISCONTINUED | OUTPATIENT
Start: 2024-08-28 | End: 2024-08-28

## 2024-08-28 RX ORDER — CYCLOP/TROP/PROPA/PHEN/KET/WAT 1-1-0.1%
1 DROPS (EA) OPHTHALMIC (EYE) EVERY 5 MIN PRN
Status: COMPLETED | OUTPATIENT
Start: 2024-08-28 | End: 2024-08-28

## 2024-08-28 RX ORDER — LIDOCAINE HYDROCHLORIDE 10 MG/ML
INJECTION, SOLUTION EPIDURAL; INFILTRATION; INTRACAUDAL; PERINEURAL
Status: DISCONTINUED | OUTPATIENT
Start: 2024-08-28 | End: 2024-08-28 | Stop reason: HOSPADM

## 2024-08-28 RX ORDER — ACETAMINOPHEN 325 MG/1
650 TABLET ORAL EVERY 4 HOURS PRN
Status: DISCONTINUED | OUTPATIENT
Start: 2024-08-28 | End: 2024-08-28 | Stop reason: HOSPADM

## 2024-08-28 RX ORDER — PREDNISOLONE ACETATE 10 MG/ML
SUSPENSION/ DROPS OPHTHALMIC
Status: DISCONTINUED | OUTPATIENT
Start: 2024-08-28 | End: 2024-08-28 | Stop reason: HOSPADM

## 2024-08-28 RX ORDER — MOXIFLOXACIN 5 MG/ML
SOLUTION/ DROPS OPHTHALMIC
Status: DISCONTINUED | OUTPATIENT
Start: 2024-08-28 | End: 2024-08-28 | Stop reason: HOSPADM

## 2024-08-28 RX ORDER — TROPICAMIDE 10 MG/ML
1 SOLUTION/ DROPS OPHTHALMIC
Status: DISCONTINUED | OUTPATIENT
Start: 2024-08-28 | End: 2024-08-28

## 2024-08-28 RX ORDER — MIDAZOLAM HYDROCHLORIDE 1 MG/ML
2 INJECTION, SOLUTION INTRAMUSCULAR; INTRAVENOUS
Status: DISCONTINUED | OUTPATIENT
Start: 2024-08-28 | End: 2024-08-28 | Stop reason: HOSPADM

## 2024-08-28 RX ORDER — TETRACAINE HYDROCHLORIDE 5 MG/ML
1 SOLUTION OPHTHALMIC EVERY 5 MIN PRN
Status: DISCONTINUED | OUTPATIENT
Start: 2024-08-28 | End: 2024-08-28

## 2024-08-28 RX ORDER — PHENYLEPHRINE HYDROCHLORIDE 100 MG/ML
1 SOLUTION/ DROPS OPHTHALMIC
Status: DISCONTINUED | OUTPATIENT
Start: 2024-08-28 | End: 2024-08-28 | Stop reason: HOSPADM

## 2024-08-28 RX ORDER — MOXIFLOXACIN 5 MG/ML
1 SOLUTION/ DROPS OPHTHALMIC
Status: COMPLETED | OUTPATIENT
Start: 2024-08-28 | End: 2024-08-28

## 2024-08-28 RX ORDER — LIDOCAINE HYDROCHLORIDE 40 MG/ML
INJECTION, SOLUTION RETROBULBAR
Status: DISCONTINUED | OUTPATIENT
Start: 2024-08-28 | End: 2024-08-28 | Stop reason: HOSPADM

## 2024-08-28 RX ADMIN — MOXIFLOXACIN 1 DROP: 5 SOLUTION/ DROPS OPHTHALMIC at 09:08

## 2024-08-28 RX ADMIN — Medication 1 DROP: at 08:08

## 2024-08-28 RX ADMIN — MOXIFLOXACIN OPHTHALMIC 1 DROP: 5 SOLUTION/ DROPS OPHTHALMIC at 08:08

## 2024-08-28 RX ADMIN — MIDAZOLAM HYDROCHLORIDE 2 MG: 1 INJECTION, SOLUTION INTRAMUSCULAR; INTRAVENOUS at 09:08

## 2024-08-28 NOTE — DISCHARGE INSTRUCTIONS
Dr. Mcgee     Cataract Post-Operative Instructions       Day of surgery:     -Resume drops THREE times daily into the operative eye.     -Do not rub your eye     -Wear protective sunglasses during the day.     -Resume moderate activity.     -Bathe/shower/wash face normally     -Do not apply makeup around the operative eye for 1 week.     -You should expect:     Blurry vision and halos for 24-48 hours     Dilated pupil for 24-48 hours     Scratchy feeling in the eye for 1-2 days     Curved shadow in your peripheral vision for 2-3 weeks     Occasional flickering of lights for up to 1 week     -If you experience severe pain or nausea, call Dr. Mcgee or the on-call doctor at [phone].       Plan to see Dr. Mcgee at:     [facility] [address]    [address] [place]  [address]    **Most patients can drive the next morning.  If you do not feel comfortable driving, please arrange for transportation. **

## 2024-08-28 NOTE — DISCHARGE SUMMARY
[facility]  Discharge Note  Short Stay    Procedure(s) (LRB):  PHACOEMULSIFICATION, CATARACT, WITH IOL INSERTION (Right)  BRIEF DISCHARGE NOTE:    Date of discharge: 08/28/2024    Reason for hospitalization -  Cataract surgery     Final Diagnosis - Visually significant Cataract    Procedures and treatment provided - Status post phacoemulsification with placement of intraocular lens     Diet - Advance to regular as tolerated    Activity - as tolerated    Disposition at the end of the case - Good.    Discharge: to home    The patient tolerated the procedure well and knows to follow up with me tomorrow in the eye clinic, sooner if needed.    Patient and family instructions (as appropriate) - Given to patient on discharge    Roshni Mcgee MD

## 2024-08-28 NOTE — OP NOTE
DATE OF PROCEDURE: 08/28/2024    SURGEON: NIKKI CONNELL MD    PREOPERATIVE DIAGNOSIS:  Senile nuclear sclerotic cataract right eye.     POSTOPERATIVE DIAGNOSIS: Senile nuclear sclerotic cataract right eye.     PROCEDURE PERFORMED:  Phacoemulsification with placement of intraocular lens, right eye.    IMPLANT:  DIBOO  21.5    Anesthesia: Moderate sedation with topical Lidocaine. Patient ID confirmed and re-evaluated the patient and anesthesia plan confirming it is suitable for the patient's condition and procedure.     COMPLICATIONS: none    ESTIMATED BLOOD LOSS: <1cc    SPECIMENS: none    INDICATIONS FOR PROCEDURE:   The patient has a history of painless progressive vision loss.  The patient has described difficulties with activities of daily living, which specifically include driving, which is secondary to cataract formation and progression. After we had a thorough discussion about risks, benefits, and alternatives to cataract surgery, the patient agreed to proceed with phacoemulsification and implantation of a lens in the right eye.  These risks include, but are not limited to, hemorrhage, pain, infection, need for additional surgery, need for glasses or contacts, loss of vision, or even loss of the eye.    PROCEDURE IN DETAIL:  The patient was met in the preop holding area.  Consent was confirmed to be signed.  The operative site was marked.  The patient was brought into the operating room by the anesthesia team and placed under monitored anesthesia care.  The right eye was prepped and draped in a sterile ophthalmic fashion.  A Bety speculum was placed into the right eye.   A paracentesis site was made and 1% preservative-free lidocaine was injected into the anterior chamber.  Viscoelastic  material was injected into the anterior chamber.  A keratome blade was used to make a clear corneal incision.  A cystotome was used to initiate the continuous curvilinear capsulorrhexis which was completed with Utrata  forceps.  BSS on a ching cannula was used to perform hydrodissection.  The phacoemulsification tip was introduced into the eye and the nucleus was removed in a standard divide-and-conquer fashion.  Remaining cortical material was removed from the eye using irrigation-aspiration.  The capsular bag was filled with viscoelastic material and the intraocular lens was injected and positioned into place. Remaining viscoelastic material was removed from the eye using irrigation and aspiration.  The corneal wounds were hydrated.  The eye was filled to physiologic pressure. The wounds were found to be watertight. Drops of Vigamox and prednisilone were placed into the eye.  The eye was washed, dried, and shielded.  The patient tolerated the procedure well and knows to follow up with me tomorrow morning, sooner if needed.      Under my direct supervision, intravenous moderate sedation was administered during the course of this procedure, with continuous monitoring of hemodynamic parameters.  Monitoring of the patient's vital signs and respiratory status was provided by trained nursing staff during the entire course of the procedure and under my supervision and recorded in the patient's medical record.  The total time for sedation was 13 minutes.

## 2024-08-29 ENCOUNTER — OFFICE VISIT (OUTPATIENT)
Dept: OPHTHALMOLOGY | Facility: CLINIC | Age: 59
End: 2024-08-29
Payer: COMMERCIAL

## 2024-08-29 DIAGNOSIS — Z98.41 STATUS POST CATARACT EXTRACTION AND INSERTION OF INTRAOCULAR LENS, RIGHT: Primary | ICD-10-CM

## 2024-08-29 DIAGNOSIS — Z96.1 STATUS POST CATARACT EXTRACTION AND INSERTION OF INTRAOCULAR LENS, RIGHT: Primary | ICD-10-CM

## 2024-08-29 DIAGNOSIS — H25.12 NUCLEAR SCLEROSIS OF LEFT EYE: ICD-10-CM

## 2024-08-29 PROCEDURE — 99999 PR PBB SHADOW E&M-EST. PATIENT-LVL II: CPT | Mod: PBBFAC,,, | Performed by: OPHTHALMOLOGY

## 2024-08-29 PROCEDURE — 1159F MED LIST DOCD IN RCRD: CPT | Mod: CPTII,S$GLB,, | Performed by: OPHTHALMOLOGY

## 2024-08-29 PROCEDURE — 3044F HG A1C LEVEL LT 7.0%: CPT | Mod: CPTII,S$GLB,, | Performed by: OPHTHALMOLOGY

## 2024-08-29 PROCEDURE — 99024 POSTOP FOLLOW-UP VISIT: CPT | Mod: S$GLB,,, | Performed by: OPHTHALMOLOGY

## 2024-08-29 NOTE — PROGRESS NOTES
HPI    Dr. Villatoro    Macular hole OD s/p r//epair S/P ppv W/ ilm PEEL/INJECTION OF SF GAS OD   12/5/23    post phaco OD - 8/28/24    Miebo TID OU      Last edited by Roshni Mcgee MD on 8/29/2024  1:15 PM.            Assessment /Plan     For exam results, see Encounter Report.    Status post cataract extraction and insertion of intraocular lens, right    Nuclear sclerosis of left eye                       Slit Lamp Exam  L/L - normal  C/s - quiet  Cornea - clear  A/C - 1+ cell  Lens - PCIOL    POD #1 s/p phaco/IOL  - doing well  - continue the following drops:    vigamox or ocuflox TID x 1 wk then stop  Pred forte TID x  4 wks  Ketorolac TID until runs out    Versus:    Combination drop - 1 drop TID x total of 1 month    Appropriate precautions and post op medications reviewed.  Patient instructed to call or come in if symptoms of redness, decreased vision, or pain are experienced.    -f/up 1-2 wks, sooner PRN. Or 4 wks with [facility] for mrx if needed.

## 2024-09-03 DIAGNOSIS — F41.9 ANXIETY: ICD-10-CM

## 2024-09-04 RX ORDER — FLUOXETINE HYDROCHLORIDE 40 MG/1
CAPSULE ORAL
Qty: 15 CAPSULE | Refills: 0 | Status: SHIPPED | OUTPATIENT
Start: 2024-09-04

## 2024-09-10 ENCOUNTER — OFFICE VISIT (OUTPATIENT)
Dept: OPHTHALMOLOGY | Facility: CLINIC | Age: 59
End: 2024-09-10
Payer: COMMERCIAL

## 2024-09-10 DIAGNOSIS — Z96.1 STATUS POST CATARACT EXTRACTION AND INSERTION OF INTRAOCULAR LENS, RIGHT: Primary | ICD-10-CM

## 2024-09-10 DIAGNOSIS — Z98.41 STATUS POST CATARACT EXTRACTION AND INSERTION OF INTRAOCULAR LENS, RIGHT: Primary | ICD-10-CM

## 2024-09-10 PROCEDURE — 3044F HG A1C LEVEL LT 7.0%: CPT | Mod: CPTII,S$GLB,, | Performed by: OPHTHALMOLOGY

## 2024-09-10 PROCEDURE — 99024 POSTOP FOLLOW-UP VISIT: CPT | Mod: S$GLB,,, | Performed by: OPHTHALMOLOGY

## 2024-09-10 NOTE — PROGRESS NOTES
HPI    Dr. Villatoro    Macular hole OD s/p r//epair S/P ppv W/ ilm PEEL/INJECTION OF SF GAS OD   12/5/23    post phaco OD - 8/28/24    Miebo TID OU  PMB TID OD    Patient states vision is doing well.  Pt. Denies pain or discomfort.    Last edited by Zelda Parish on 9/10/2024  2:27 PM.            Assessment /Plan     For exam results, see Encounter Report.    Status post cataract extraction and insertion of intraocular lens, right                       PO week #1 s/p phaco/IOL -    - doing well, no issues    Continue combo drops for a total of 1 month versus: d/c abx gtt, continue PF/ketorolocTID for total of 1 month    - f/up 3-4 wks for MRx, DFE with [facility], sooner PRN.    CAT OS - early, observe

## 2024-09-19 ENCOUNTER — OFFICE VISIT (OUTPATIENT)
Dept: PSYCHIATRY | Facility: CLINIC | Age: 59
End: 2024-09-19
Payer: COMMERCIAL

## 2024-09-19 DIAGNOSIS — G47.00 INSOMNIA, UNSPECIFIED TYPE: ICD-10-CM

## 2024-09-19 DIAGNOSIS — F33.42 RECURRENT MAJOR DEPRESSIVE DISORDER, IN FULL REMISSION: Primary | ICD-10-CM

## 2024-09-19 DIAGNOSIS — F41.9 ANXIETY: ICD-10-CM

## 2024-09-19 RX ORDER — FLUOXETINE HYDROCHLORIDE 20 MG/1
CAPSULE ORAL
Qty: 90 CAPSULE | Refills: 3 | Status: SHIPPED | OUTPATIENT
Start: 2024-09-19

## 2024-09-19 RX ORDER — FLUOXETINE HYDROCHLORIDE 40 MG/1
CAPSULE ORAL
Qty: 90 CAPSULE | Refills: 3 | Status: SHIPPED | OUTPATIENT
Start: 2024-09-19

## 2024-09-19 RX ORDER — TRAZODONE HYDROCHLORIDE 50 MG/1
100 TABLET ORAL NIGHTLY PRN
Qty: 180 TABLET | Refills: 3 | Status: SHIPPED | OUTPATIENT
Start: 2024-09-19 | End: 2025-09-19

## 2024-09-19 NOTE — PROGRESS NOTES
9/19/2024 2:13 PM  Nora Mccoy  1965  [id number]    Outpatient Psychiatry Follow-Up Visit (MD/NP) - Telemedicine Visit      The patient location is: Patient reported that her location at the time of this visit [facility] in [facility]   Address: documented in [facility]      Visit type: audiovisual      Each patient to whom he or she provides medical services by telemedicine is:  (1) informed of the relationship between the physician and patient and the respective role of any other health care provider with respect to management of the patient; and (2) notified that he or she may decline to receive medical services by telemedicine and may withdraw from such care at any time.      Clinical Status of Patient:  Outpatient (Ambulatory)    Chief Complaint:  Nora Mccoy, a 59 y.o. female,who presents today for follow up of depression and anxiety.  Met with patient.      Interim Events/Subjective Report/Content of Current Session:     Today the pt states that her sxs of depression continue to be well controlled with her current med regimen. She denies depression, amotivation, anhedonia and hopelessness.   States her teenage son has psych issues and is doing well.  She enjoys her work, but it is stressful.    Uses Ativan prn once or twice a month for anxiety.  was queried. No discrepancies or signs of misuse noted on . Pt appears to be using the medication appropriately. Sleeping very well with Trazodone. However, she has found 100 mg to cause next day grogginess and brain fog, so she now takes 50 mg.    Pt denies recurrent thoughts of death and denies SI/HI. Denies any sxs of alex. Denies AVH, paranoia and delusions. No objective s/sx of psychosis or alex. Denies any ASE from her psych meds.        Psychotherapy:  Target symptoms: depression, anxiety   Why chosen therapy is appropriate versus another modality: relevant to diagnosis, patient responds to this modality  Outcome monitoring  methods: self-report, observation  Therapeutic intervention type: supportive psychotherapy  Topics discussed/themes: work stress, building skills sets for symptom management  The patient's response to the intervention is accepting. The patient's progress toward treatment goals is good.   Duration of intervention: 3 minutes.      Psychotropic medication review  Previous Trials-  Ativan    Current meds-  Prozac  Trazodone        Review of Systems       Review of Systems   Constitutional:  Negative for chills, fever and malaise/fatigue.   Respiratory:  Negative for shortness of breath and wheezing.    Cardiovascular:  Negative for chest pain and palpitations.   Musculoskeletal:  Negative for falls and myalgias.   Skin:  Negative for rash.   Neurological:  Negative for tremors, seizures and headaches.   Psychiatric/Behavioral:          See HPI         Past Medical, Family and Social History: The patient's past medical, family and social history have been reviewed and updated as appropriate within the electronic medical record - see encounter notes.    Compliance: yes      Risk Parameters:  Patient reports no suicidal ideation  Patient reports no homicidal ideation  Patient reports no self-injurious behavior  Patient reports no violent behavior    Exam (detailed: at least 9 elements; comprehensive: all 15 elements)   Constitutional  Vitals:  Most recent vital signs, dated greater than 90 days prior to this appointment, were reviewed.   There were no vitals filed for this visit.     General:  unremarkable, age appropriate, normal weight, well nourished, casually dressed, neatly groomed     Musculoskeletal  Muscle Strength/Tone:  LETICIA due to virtual visit   Gait & Station:  LETICAI due to virtual visit     Psychiatric      Appearance:  unremarkable, age appropriate, normal weight, well nourished, casually dressed, neatly groomed   Behavior:  normal, friendly and cooperative, eye contact normal     Speech:  no latency; no press    Mood & Affect:  euthymic  congruent and appropriate   Thought Process:  normal and logical   Associations:  intact   Thought Content:  normal, no suicidality, no homicidality, delusions, or paranoia   Insight:  intact   Judgement: behavior is adequate to circumstances, age appropriate   Orientation:  grossly intact   Memory: intact for content of interview   Language: grossly intact   Attention Span & Concentration:  able to focus   Fund of Knowledge:  intact and appropriate to age and level of education     Medications:  Outpatient Encounter Medications as of 9/19/2024   Medication Sig Dispense Refill    acyclovir (ZOVIRAX) 800 MG Tab Take 1 tablet (800 mg total) by mouth 2 (two) times daily. 60 tablet 5    cetirizine (ZYRTEC) 10 MG tablet Take 1 tablet (10 mg total) by mouth once daily. 30 tablet 0    EPINEPHrine (EPIPEN 2-JAMES) 0.3 mg/0.3 mL AtIn Inject 0.3 mLs (0.3 mg total) into the muscle once. for 1 dose as needed 2 each 0    FLUoxetine 20 MG capsule TAKE 1 CAPSULE BY MOUTH EVERY DAY WITH 40 MG OF PROZAC TO TOAL 60MG DAILY 90 capsule 1    FLUoxetine 40 MG capsule Take 1 capsule by mouth daily with 20 mg of Prozac for a total of 60 mg daily 15 capsule 0    fluticasone propionate (FLONASE) 50 mcg/actuation nasal spray 1 spray (50 mcg total) by Each Nostril route once daily. 9.9 mL 0    LORazepam (ATIVAN) 0.5 MG tablet Take 1 tablet (0.5 mg total) by mouth daily as needed (severe anxiety). 30 tablet 0    MIEBO, PF, 100 % Drop Place 1 drop into both eyes 4 (four) times daily.      multivitamin (THERAGRAN) tablet Take 1 tablet by mouth once daily.      prednisoLONE-moxiflox-bromfen 1-0.5-0.075 % DrpS Apply 1 drop to eye 3 (three) times daily. 5 mL 3    traZODone (DESYREL) 50 MG tablet Take 2 tablets (100 mg total) by mouth nightly as needed for Insomnia. 180 tablet 1    [DISCONTINUED] FLUoxetine 40 MG capsule Take 1 capsule by mouth daily with 20 mg of Prozac for a total of 60 mg daily 30 capsule 0     No  facility-administered encounter medications on file as of 9/19/2024.       Allergy:  Review of patient's allergies indicates:   Allergen Reactions    Penicillin g Anaphylaxis    Penicillins Anaphylaxis    Penicillin Hives         Assessment and Diagnosis   Status/Progress: Based on the examination today, the patient's problem(s) is/are well controlled.  New problems have not been presented today.   Co-morbidities are not complicating management of the primary condition.  There are no active rule-out diagnoses for this patient at this time.         General Impression:       ICD-10-CM ICD-9-CM   1. Recurrent major depressive disorder, in full remission  F33.42 296.36   2. Anxiety  F41.9 300.00   3. Insomnia, unspecified type  G47.00 780.52         Intervention/Counseling/Treatment Plan     Medication Management:  Continue Prozac 60 mg q day  Continue Trazodone  mg q hs. Pt was told not drive or to take this med with ETOH or other sedating meds/substance. Pt verbalized understanding.  Labs: reviewed most recent  Prescription Monitoring Program was queried.   The treatment plan and follow up plan were reviewed with the patient.  Discussed with patient informed consent, risks vs. benefits, alternative treatments, side effect profile and the inherent unpredictability of individual responses to these treatments. The patient expresses understanding of the above and displays the capacity to agree with this current plan and had no other questions.  Encouraged Patient to keep future appointments.   Take medications as prescribed and abstain from substance abuse.   Present to ED or call 911 for SI/HI plan or intent, psychosis, or medical emergency.        Return to Clinic:  12 months or sooner if needed        Face-to-face time with the patient: 16 minutes  Total time:  25 minutes of total time spent on the encounter, which includes face to face time and non-face to face time preparing to see the patient (eg, review of  tests), Obtaining and/or reviewing separately obtained history, Documenting clinical information in the electronic or other health record, Independently interpreting results (not separately reported) and communicating results to the patient/family/caregiver, or Care coordination (not separately reported).       Harmony Pascual, MSN, APRN, PMHNP-BC  [facility]

## 2024-10-10 ENCOUNTER — PROCEDURE VISIT (OUTPATIENT)
Dept: DERMATOLOGY | Facility: CLINIC | Age: 59
End: 2024-10-10

## 2024-10-10 DIAGNOSIS — Z41.1 ELECTIVE PROCEDURE FOR UNACCEPTABLE COSMETIC APPEARANCE: Primary | ICD-10-CM

## 2024-10-10 NOTE — PROGRESS NOTES
Pt here for repeat botox treatment. Has had written consent signed in the past.    Pt denies any new medical problems or medications. Pt denies current pregnancy.   Risks reviewed including headache, pain, infection, bleeding, bruising, eyebrow droop, eyelid droop, asymmetry, inability to close eye temporarily. Pt understands and would like to have treatment.  16 units placed in glabella, 7 units placed in forehead, 12 units placed in crows feet.   No complications.    Post procedure handout provided.    Botox lot number [id number]  Expiration date 09/2026

## 2024-10-11 ENCOUNTER — ON-DEMAND VIRTUAL (OUTPATIENT)
Dept: URGENT CARE | Facility: CLINIC | Age: 59
End: 2024-10-11
Payer: COMMERCIAL

## 2024-10-11 DIAGNOSIS — R30.0 DYSURIA: ICD-10-CM

## 2024-10-11 DIAGNOSIS — N30.00 ACUTE CYSTITIS WITHOUT HEMATURIA: ICD-10-CM

## 2024-10-11 DIAGNOSIS — R35.0 MICTURITION FREQUENCY: Primary | ICD-10-CM

## 2024-10-11 RX ORDER — NITROFURANTOIN 25; 75 MG/1; MG/1
100 CAPSULE ORAL 2 TIMES DAILY
Qty: 14 CAPSULE | Refills: 0 | Status: SHIPPED | OUTPATIENT
Start: 2024-10-11 | End: 2024-10-18

## 2024-10-11 RX ORDER — PHENAZOPYRIDINE HYDROCHLORIDE 200 MG/1
200 TABLET, FILM COATED ORAL 3 TIMES DAILY PRN
Qty: 6 TABLET | Refills: 0 | Status: SHIPPED | OUTPATIENT
Start: 2024-10-11 | End: 2024-10-13

## 2024-10-11 RX ORDER — FLUCONAZOLE 150 MG/1
150 TABLET ORAL DAILY
Qty: 1 TABLET | Refills: 0 | Status: SHIPPED | OUTPATIENT
Start: 2024-10-11 | End: 2024-10-12

## 2024-10-11 NOTE — PROGRESS NOTES
Subjective:      Patient ID: Nora Mccoy is a 59 y.o. female.    Vitals:  vitals were not taken for this visit.     Chief Complaint: Urinary Tract Infection (/)      Visit Type: TELE AUDIOVISUAL    Present with the patient at the time of consultation: TELEMED PRESENT WITH PATIENT: None    Past Medical History:   Diagnosis Date    Bronchitis     Cataract     DJD (degenerative joint disease)     shoulder, knee, and back    Esophagitis, unspecified     Fibrocystic disease of breast     Macular hole of right eye     Menstrual disorder     Sinus infection     Stress     UTI (urinary tract infection)      Past Surgical History:   Procedure Laterality Date    BACK SURGERY      micro discectomy L4-L5, 2002    BREAST BIOPSY Left 2015    core bx-benign    COLONOSCOPY N/A 7/7/2016    Procedure: COLONOSCOPY;  Surgeon: Ronan Dolan MD;  Location: [facility] (4TH [facility]);  Service: Endoscopy;  Laterality: N/A;    COLONOSCOPY N/A 5/5/2023    Procedure: COLONOSCOPY;  Surgeon: Josh Funez MD;  Location: [facility];  Service: Endoscopy;  Laterality: N/A;    EPIDURAL STEROID INJECTION Right 10/11/2021    Procedure: Right Hip Steroid Injection (Ref: Liuzza);  Surgeon: Jon Toth Jr., MD;  Location: [facility];  Service: Pain Management;  Laterality: Right;  Arrive @ 1200; No ATC or DM; Needs Consent    ESOPHAGOGASTRODUODENOSCOPY N/A 5/5/2023    Procedure: EGD (ESOPHAGOGASTRODUODENOSCOPY);  Surgeon: Josh Funez MD;  Location: [facility];  Service: Endoscopy;  Laterality: N/A;    ETHMOIDECTOMY N/A 4/5/2019    Procedure: ETHMOIDECTOMY;  Surgeon: Vijay Tatum MD;  Location: [facility] OR [address] [place] [address];  Service: ENT;  Laterality: N/A;    FRONTAL SINUS OBLITERATION  4/5/2019    Procedure: SINUSOTOMY, FRONTAL SINUS, OBLITERATIVE;  Surgeon: Vijay Tatum MD;  Location: [facility] OR [address] [place] [address];  Service: ENT;;    FUNCTIONAL ENDOSCOPIC SINUS SURGERY (FESS) USING COMPUTER-ASSISTED NAVIGATION Bilateral 4/5/2019    Procedure: FESS,  USING COMPUTER-ASSISTED NAVIGATION;  Surgeon: Vijay Tatum MD;  Location: [facility] OR 2ND FLR;  Service: ENT;  Laterality: Bilateral;    MAXILLARY ANTROSTOMY  4/5/2019    Procedure: MAXILLARY ANTROSTOMY;  Surgeon: Vijay Tatum MD;  Location: [facility] OR 2ND FLR;  Service: ENT;;    NASAL TURBINATE REDUCTION Bilateral 4/5/2019    Procedure: REDUCTION, NASAL TURBINATE;  Surgeon: Vijay Tatum MD;  Location: [facility] OR 2ND FLR;  Service: ENT;  Laterality: Bilateral;    PHACOEMULSIFICATION, CATARACT, WITH IOL INSERTION Right 8/28/2024    Procedure: PHACOEMULSIFICATION, CATARACT, WITH IOL INSERTION;  Surgeon: Roshni Mcgee MD;  Location: [facility] OR;  Service: Ophthalmology;  Laterality: Right;    REPAIR OF HOLE IN MACULA Right 12/5/2023    Procedure: REPAIR, HOLE, MACULA;  Surgeon: Renato Villatoro MD;  Location: [facility] OR 1ST FLR;  Service: Ophthalmology;  Laterality: Right;     Review of patient's allergies indicates:   Allergen Reactions    Penicillin g Anaphylaxis    Penicillins Anaphylaxis    Penicillin Hives     Current Outpatient Medications on File Prior to Visit   Medication Sig Dispense Refill    acyclovir (ZOVIRAX) 800 MG Tab Take 1 tablet (800 mg total) by mouth 2 (two) times daily. 60 tablet 5    cetirizine (ZYRTEC) 10 MG tablet Take 1 tablet (10 mg total) by mouth once daily. 30 tablet 0    EPINEPHrine (EPIPEN 2-JAMES) 0.3 mg/0.3 mL AtIn Inject 0.3 mLs (0.3 mg total) into the muscle once. for 1 dose as needed 2 each 0    FLUoxetine 20 MG capsule TAKE 1 CAPSULE BY MOUTH EVERY DAY WITH 40 MG OF PROZAC TO TOAL 60MG DAILY 90 capsule 3    FLUoxetine 40 MG capsule Take 1 capsule by mouth daily with 20 mg of Prozac for a total of 60 mg daily 90 capsule 3    fluticasone propionate (FLONASE) 50 mcg/actuation nasal spray 1 spray (50 mcg total) by Each Nostril route once daily. 9.9 mL 0    LORazepam (ATIVAN) 0.5 MG tablet Take 1 tablet (0.5 mg total) by mouth daily as needed (severe anxiety). 30 tablet 0     MIEBO, PF, 100 % Drop Place 1 drop into both eyes 4 (four) times daily.      multivitamin (THERAGRAN) tablet Take 1 tablet by mouth once daily.      prednisoLONE-moxiflox-bromfen 1-0.5-0.075 % DrpS Apply 1 drop to eye 3 (three) times daily. 5 mL 3    traZODone (DESYREL) 50 MG tablet Take 2 tablets (100 mg total) by mouth nightly as needed for Insomnia. 180 tablet 3     Current Facility-Administered Medications on File Prior to Visit   Medication Dose Route Frequency Provider Last Rate Last Admin    [COMPLETED] onabotulinumtoxinA (cosmetic) injection 35 Units  35 Units Intramuscular 1 time in Clinic/HOD    35 Units at 10/10/24 1344     Family History   Problem Relation Name Age of Onset    Lupus Mother      Other Mother          chest pain    Breast cancer Mother  72    Retinal detachment Mother      Thyroid disease Mother      Cancer Father          unk d. 77    Hypertension Sister Naina     No Known Problems Brother Gomez         1/2 bro ( Dad) unk    No Known Problems Brother Siddhartha         1/2 dimas (Dad) unk    Breast cancer Maternal Aunt          d. 39    Cataracts Maternal Grandmother      No Known Problems Daughter Merlyn FERGUSON tech @ [employer] and completed EMT    No Known Problems Son Sonu     Amblyopia Neg Hx      Blindness Neg Hx      Diabetes Neg Hx      Glaucoma Neg Hx      Macular degeneration Neg Hx      Strabismus Neg Hx      Stroke Neg Hx      Colon cancer Neg Hx      Esophageal cancer Neg Hx             Ohs Peq Odvv Intake    10/11/2024  6:15 AM CDT - Filed by Patient   What is your current physical address in the event of a medical emergency? [address]   Are you able to take your vital signs? No   Please attach any relevant images or files    Is your employer contracted with [facility]? Yes   Please enter your employer supplied coupon code. Unknown           Burning sensation     Started in the past 3 days    Increased water and cranberry juice  Increased  frequency worsened today    Urinary Tract Infection   This is a new problem. Associated symptoms include frequency and urgency.       Genitourinary:  Positive for dysuria, frequency and urgency.        Objective:   The physical exam was conducted virtually.  Physical Exam   Constitutional: She is oriented to person, place, and time.   Neurological: no focal deficit. She is alert and oriented to person, place, and time.       Assessment:     1. Micturition frequency    2. Dysuria    3. Acute cystitis without hematuria        Plan:     Will treat   Macrobid (pt with PCN allergy)  At this time suggestion for testing prior to beginning of treatment (patient is nurse suggested getting a u-dip)

## 2024-10-17 ENCOUNTER — TELEPHONE (OUTPATIENT)
Dept: OPTOMETRY | Facility: CLINIC | Age: 59
End: 2024-10-17
Payer: COMMERCIAL

## 2024-10-18 ENCOUNTER — OFFICE VISIT (OUTPATIENT)
Dept: OPTOMETRY | Facility: CLINIC | Age: 59
End: 2024-10-18
Payer: COMMERCIAL

## 2024-10-18 DIAGNOSIS — Z96.1 STATUS POST CATARACT EXTRACTION AND INSERTION OF INTRAOCULAR LENS OF RIGHT EYE: Primary | ICD-10-CM

## 2024-10-18 DIAGNOSIS — Z98.41 STATUS POST CATARACT EXTRACTION AND INSERTION OF INTRAOCULAR LENS OF RIGHT EYE: Primary | ICD-10-CM

## 2024-10-18 PROCEDURE — 99999 PR PBB SHADOW E&M-EST. PATIENT-LVL II: CPT | Mod: PBBFAC,,, | Performed by: OPTOMETRIST

## 2024-10-18 NOTE — PROGRESS NOTES
HPI    Here for 1 month post op  Phaco/IOL OD 8/28/24  CC: [facility] OD good since cataract surgery  (-) Changes in vision   (-) Pain  (-) Irritation   (+) Itching but not today  (-) Flashes  (-) Floaters  (+) Glasses wearer  (-) CL wearer  (+) Uses eye gtts   Last edited by Alexandrea Ireland, OD on 10/18/2024  4:02 PM.            Assessment /Plan     For exam results, see Encounter Report.    Status post cataract extraction and insertion of intraocular lens of right eye      Educated on today's WNL findings OU. Eyes well healed from cataract surgery OU. Pt OK to use OTC readers.     RTC in 1 year for annual eye exam unless changes noted sooner.

## 2024-11-05 ENCOUNTER — HOSPITAL ENCOUNTER (OUTPATIENT)
Dept: RADIOLOGY | Facility: HOSPITAL | Age: 59
Discharge: HOME | End: 2024-11-05
Attending: PODIATRIST
Payer: COMMERCIAL

## 2024-11-05 ENCOUNTER — OFFICE VISIT (OUTPATIENT)
Dept: PODIATRY | Facility: CLINIC | Age: 59
End: 2024-11-05
Payer: COMMERCIAL

## 2024-11-05 VITALS
BODY MASS INDEX: 25.74 KG/M2 | WEIGHT: 164 LBS | DIASTOLIC BLOOD PRESSURE: 72 MMHG | SYSTOLIC BLOOD PRESSURE: 115 MMHG | HEIGHT: 67 IN

## 2024-11-05 DIAGNOSIS — M79.672 LEFT FOOT PAIN: Primary | ICD-10-CM

## 2024-11-05 DIAGNOSIS — M72.2 PLANTAR FASCIITIS: ICD-10-CM

## 2024-11-05 DIAGNOSIS — M76.822 POSTERIOR TIBIAL TENDINITIS OF LEFT LOWER EXTREMITY: ICD-10-CM

## 2024-11-05 DIAGNOSIS — M76.62 ACHILLES TENDINITIS OF LEFT LOWER EXTREMITY: ICD-10-CM

## 2024-11-05 DIAGNOSIS — M79.672 LEFT FOOT PAIN: ICD-10-CM

## 2024-11-05 PROCEDURE — 1159F MED LIST DOCD IN RCRD: CPT | Mod: CPTII,S$GLB,, | Performed by: PODIATRIST

## 2024-11-05 PROCEDURE — 73630 X-RAY EXAM OF FOOT: CPT | Mod: TC,FY,PO,LT

## 2024-11-05 PROCEDURE — 3044F HG A1C LEVEL LT 7.0%: CPT | Mod: CPTII,S$GLB,, | Performed by: PODIATRIST

## 2024-11-05 PROCEDURE — 3078F DIAST BP <80 MM HG: CPT | Mod: CPTII,S$GLB,, | Performed by: PODIATRIST

## 2024-11-05 PROCEDURE — 3074F SYST BP LT 130 MM HG: CPT | Mod: CPTII,S$GLB,, | Performed by: PODIATRIST

## 2024-11-05 PROCEDURE — 1160F RVW MEDS BY RX/DR IN RCRD: CPT | Mod: CPTII,S$GLB,, | Performed by: PODIATRIST

## 2024-11-05 PROCEDURE — 99999 PR PBB SHADOW E&M-EST. PATIENT-LVL IV: CPT | Mod: PBBFAC,,, | Performed by: PODIATRIST

## 2024-11-05 PROCEDURE — 73630 X-RAY EXAM OF FOOT: CPT | Mod: 26,LT,, | Performed by: RADIOLOGY

## 2024-11-05 PROCEDURE — 3008F BODY MASS INDEX DOCD: CPT | Mod: CPTII,S$GLB,, | Performed by: PODIATRIST

## 2024-11-05 PROCEDURE — 99203 OFFICE O/P NEW LOW 30 MIN: CPT | Mod: S$GLB,,, | Performed by: PODIATRIST

## 2024-11-05 RX ORDER — METHYLPREDNISOLONE 4 MG/1
TABLET ORAL
Qty: 21 TABLET | Refills: 0 | Status: SHIPPED | OUTPATIENT
Start: 2024-11-05

## 2024-11-05 RX ORDER — MELOXICAM 15 MG/1
15 TABLET ORAL DAILY
Qty: 30 TABLET | Refills: 1 | Status: SHIPPED | OUTPATIENT
Start: 2024-11-05 | End: 2025-11-05

## 2024-11-05 NOTE — PATIENT INSTRUCTIONS
Over the counter pain creams: Voltaren Gel, Biofreeze, Bengay, tiger balm, two old goat, lidocaine gel,  Absorbine Veterinary Liniment Gel Topical Analgesic Sore Muscle and Joint Pain Relief    Recommend lotions: eucerin, eucerin for diabetics, aquaphor, A&D ointment, gold bond for diabetics, sween, [place]'s Bees all purpose baby ointment,  urea 40 with aloe or SkinIntegra rapid crack repair (found on amazon.com)    Shoe recommendations: (try 6pm.com, zappos.com , nordstromrack.[employer] [place], or shoes.com for discounted prices) you can visit varsity shoes in [place], DSW shoes in [place]  or larisa rack in the [facility] (there are also several shoe brand outlets in the [facility])    ONLY purchase stability style tennis shoes AVOID flex, foam, free, yoga mat style shoes or shoes that claim to feel like clouds  If you have a flatter foot purchase shoes for PRONATION  If you have a high arch purchase shoes for SUPINATION    Shoe examples:    Asics (GT or gel foundations, gel-kayano-30), new balance stability type shoes (such as the 940 series or the [id number]), saosmaniony (Guide 16, stabil c3),  Ryan (GTS or Beast or   transcend), propet, HokaOne (leticia 7, arahi, ayah) Harjinder (tennis shoes and boots)    Sofft Brand (women) Eveline&Albert (men), clarks, croerika, aerosoles, naturalizers, SAS, ecco, born, chelsea kelley, rockports (dress shoes)    Vionic, burkenstocks, fitflops, propet, taos, baretraps, Hoka or vionic recovery slides/sandals (sandals)    Hoka or vionic recovery slides/sandals, crocs, propet (house shoes)      Nail Home remedy:  Vicks Vapor rub or Emuaid to nails for easier manageability    Occasional soaks for 15-20 mins in luke warm water with 1/2 cup of listerine and 1 cup of apple cider vinegar are ok You may add several drops of oil of oregano or tea tree oil as well    Posterior tibial tendon dysfunction: More prevalent in women    By Jaimee Mathews MD   My foot hurts and my arch has collapsed. I cant wear  the shoes I want to anymore.   Many orthopaedists have female patients with similar foot complaints. Although some orthopaedists may not appreciate the significance of limited shoe style options, it is problematic for many women. Moreover, these patients want to feel better. So what is the science behind fallen arches and why are more women than men affected?    Symptomatic flatfoot: AAFD/PTTD  Flatfoot does not have to be painful. Historical research indicates that some primitive peoples in [place] and [place] were all flatfooted. Although many adults have asymptomatic flatfoot, the symptomatic flatfoot that develops over time and is brought to the attention of orthopaedists is known as adult acquired flatfoot deformity (AAFD) or posterior tibial tendon dysfunction (PTTD).   PTTD encompasses a spectrum of pathologic changes that progressively affect the posterior tibial tendon. These changes, depending on severity (stages I-IV), manifest as various alterations in foot and ankle tissue and, subsequently, kinematics. Strength and function change during gait patterns as the tendon and ligaments become weak. Swelling and pain may lead to deformity and changes in the shape of the foot. Disability, difficulty with shoe wear, and altered gait can result. In the later stages of the disease (stages III and IV), standing or walking can be painful.     Ligament changes in the foot in PTTD are not isolated to the posterior tibial tendon. The ligaments of the foot that undergo the most severe changes and degenerative involvement in patients with PTTD are the spring ligament complex (superomedial and inferomedial calcaneonavicular) and the talocalcaneal interosseous ligament, with the spring ligament being most affected. Ligament pathology is nearly as common as posterior tibial tendinopathy, and the goal of treatment should therefore be to prevent progressive failure of these structures.    Etiology of PTTD  Why does the  PTT degenerate? The etiology is unclear. What is known is that patient factors do play a role. More than half of all patients with a ruptured PTT also have a history of hypertension, obesity, diabetes, a previous operation or trauma about the medial aspect of the ankle, or treatment with steroids. Hypovascularity of the tendon, previously believed to play a role, has been excluded as an etiologic factor based on a cadaveric study in which vascularity of the tendon did not differ with respect to patient age, sex, or side.   Changes in collagen may influence the development of PTTD. Injury and inflammation may weaken collagen tissue. Moreover, normal adult collagen undergoes very little metabolic turnover. Hence, fibrotic tissue and scar may be deposited in the tendon. After surgery or trauma, the deposition of collagen fibrils (scar) during the repair process is largely irreversible and is a major feature of the pathologic changes.    An alteration in collagen content is also associated with PTTD. In a small study of nine patients with PTTD, damaged tissue had a higher proportion of type II collagen and relatively less type I collagen than normal tendon tissue. The analysis of results made no distinction between males and females. The authors concluded that the degenerative process results from marked changes in not only molecular composition of matrix collagens but also in structural organization. This change in composition would likely alter the mechanical resistance of the tissue, allowing kinematic changes to occur.     PTTD: More common in women  Recent literature has reported a higher prevalence of PTTD in women than in men, but with no explanation for this discrepancy as yet. Because the condition in women peaks during perimenopause, hormonal influence may have a role in degeneration of the tendon. The same processes that affect changes in bone (collagen I) during menopause could also affect tendons and  ligaments, thereby contributing to the increased incidence of PTTD in women in their 50s.     Conclusion  It is not understood why PTTD is more prevalent in women than in men. Although comorbidities such as diabetes and obesity and mechanical influences such as trauma to the medial ankle have been reported to contribute to PTTD, they have not been analyzed based on sex. Likewise, pathologic changes in the tendon and ligaments at the structural level have been documented but without sex analysis. Because the prevalence of PTTD in women peaks during perimenopause, hormonal influence from loss of estrogen may be postulated as a factor in the prevalence of PTTD in women.  Elucidating the reasons for the discrepancy in the prevalence of PTTD between men and women will require further research into the role of changes in collagen matrix composition with aging, hormonal influences, and injury patterns in men and women.       Understanding Posterior Tibialis Tendon Surgery  Posterior tibialis tendon surgery is a way to fix the tendon on the back of your calf that goes down the inside part of your ankle into the foot. The tendon can be torn or inflamed from injury or other causes. This can cause pain and other problems. Surgery can help relieve these problems.  What is the posterior tibialis tendon?  The posterior tibialis tendon is a strong cord of tissue. It is one of the most important tendons in your leg. It attaches the posterior tibialis muscle on the back of your calf to the bones on the inside of your foot. It helps support your foot and hold up its arch when you are walking.  An injury might tear this tendon or cause it to become inflamed. Your tendon might also tear or become inflamed from overuse. Surgery can be done to fix this damage.  Why is posterior tibialis tendon surgery done?  A tear in the posterior tibialis tendon can happen during a fall. It can also happen if you break or dislocate your ankle. Surgery  may also be done for chronic inflammation from overuse. Or you may have problems with the tendon if you have diabetes or high blood pressure, or are obese.  If your tendon becomes inflamed or torn, the arch of your foot may start to slowly fall. This can cause pain and swelling in your foot and ankle.  Your doctor may try other treatments first. These can include:  Resting your foot  Using ice  Using pain medicines  Wearing a brace or orthotic  Using steroid injections  Getting physical therapy  If you still have symptoms after several months, your doctor may advise surgery. You may need surgery right away if you hurt your posterior tibialis tendon very badly or if the injury happened suddenly.  Depending on your problem, one or more types of surgery might work for you. Talk with your doctor about the risks and benefits of all your options.  How is posterior tibialis tendon surgery done?  The surgery is done by an orthopedic surgeon. It can be done in several ways. The surgeon will make an incision through the skin and muscle of your lower calf. If you are having minimally invasive surgery, your doctor will make a small incision. He or she will then use small tools and a tiny camera to do the surgery. The surgeon will then make an incision through the sheath that surrounds the tendon. He or she will then remove or repair parts of your damaged tendon. Your surgeon may remove another tendon from your foot. This is then used to replace part or all of your posterior tibialis tendon.  What are the risks of posterior tibialis tendon surgery?  Every surgery has risks. Risks for this surgery include:  Excess bleeding  Nerve damage  Infection  Blood clot  Weakness of your calf muscles  Complications from anesthesia  Continued pain or stiffness in your foot and ankle  Your risks may vary by your age, the anatomy of your foot and ankle, your overall health, and the type of surgery done. Talk with your doctor about any concerns  you might have. You can discuss the risks that most apply to you.  Date Last Reviewed: 4/1/2017 © 2000-2017 [employer]. [address] [place] [address], [place] [address]. All rights reserved. This information is not intended as a substitute for professional medical care. Always follow your healthcare professional's instructions.      Understanding Plantar Fasciitis    Plantar fasciitis is a condition that causes foot and heel pain. The plantar fascia is a tough band of tissue that runs across the bottom of the foot from the heel to the toes. This tissue pulls on the heel bone. It supports the arch of the foot as it pushes off the ground. If the tissue becomes irritated or red and swollen (inflamed), it is called plantar fasciitis.  How to say it  PLAN-tuhr fa-see-IY-tis   What causes plantar fasciitis?  Plantar fasciitis most often occurs from overusing the plantar fascia. The tissue may become damaged from activities that put repeated stress on the heel and foot. Or it may wear down over time with age and ankle stiffness. You are more likely to have plantar fasciitis if you:  Do activities that require a lot of running, jumping, or dancing  Have a job that requires being on your feet for long periods  Are overweight or obese  Have certain foot problems, such as a tight Achilles tendon, flat feet, or high arches  Often wear poorly fitting shoes  Symptoms of plantar fasciitis  The condition most often causes pain in the heel and the bottom of the foot. The pain may occur when you take your first steps in the morning. It may get better as you walk throughout the day. But as you continue to put weight on the foot, the pain often returns. Pain may also occur after standing or sitting for long periods.  Treating plantar fasciitis  Treatments for plantar fasciitis include:  Resting the foot. This involves limiting movements that make your foot hurt. You may also need to avoid certain sports and types of work for a  time.  Using cold packs. Put an ice pack on the heel and foot to help reduce pain and swelling.  Taking pain medicines. Prescription and over-the-counter pain medicines can help relieve pain and swelling.  Using heel cups or foot inserts (orthotics). These are placed in the shoes to help support the heel or arch and cushion the heel. You may also be told to buy proper-fitting shoes with good arch support and cushioned soles.  Taping the foot. This supports the arch and limits the movement of the plantar fascia to help relieve symptoms.  Wearing a night splint. This stretches the plantar fascia and leg muscles while you sleep. This may help relieve pain.  Doing exercises and physical therapy. These stretch and strengthen the plantar fascia and the muscles in the leg that support the heel and foot.  Getting shots of medicine into the foot. These may help relieve symptoms for a time.  Having surgery. This may be needed if other treatments fail to relieve symptoms. During surgery, the surgeon may partially cut the plantar fascia to release tension.  Possible complications of plantar fasciitis  Without proper care and treatment, healing may take longer than normal. Also, symptoms may continue or get worse. Over time, the plantar fascia may be damaged. This can make it hard to walk or even stand without pain.  When to call your healthcare provider  Call your healthcare provider right away if you have any of these:  Fever of 100.4°F (38°C) or higher, or as directed  Symptoms that dont get better with treatment, or get worse  New symptoms, such as numbness, tingling, or weakness in the foot   © 7564-6374 [employer]. [address] [place] [address], [place] [address]. All rights reserved. This information is not intended as a substitute for professional medical care. Always follow your healthcare professional's instructions.        Treating Plantar Fasciitis  First, your healthcare provider tries to determine the  cause of your problem in order to suggest ways to relieve pain. If your pain is due to poor foot mechanics, custom-made shoe inserts (orthoses) may help.    Reduce symptoms  To relieve mild symptoms, try aspirin, ibuprofen, or other medicines as directed. Rubbing ice on the affected area may also help.  To reduce severe pain and swelling, your healthcare provider may prescribe pills or injections or a walking cast in some instances. Physical therapy, such as ultrasound or a daily stretching program, may also be recommended. Surgery is rarely required.  To reduce symptoms caused by poor foot mechanics, your foot may be taped. This supports the arch and temporarily controls movement. Night splints may also help by stretching the fascia.  Control movement  If taping helps, your healthcare provider may prescribe orthoses. Built from plaster casts of your feet, these inserts control the way your foot moves. As a result, your symptoms should go away.  Reduce overuse  Every time your foot strikes the ground, the plantar fascia is stretched. You can reduce the strain on the plantar fascia and the possibility of overuse by following these suggestions:  Lose any excess weight.  Avoid running on hard or uneven ground.  Use orthoses at all times in your shoes and house slippers.  If surgery is needed  Your healthcare provider may consider surgery if other types of treatment don't control your pain. During surgery, the plantar fascia is partially cut to release tension. As you heal, fibrous tissue fills the space between the heel bone and the plantar fascia.   © 3926-5683 [employer]. [address] [place] [address], [place] [address]. All rights reserved. This information is not intended as a substitute for professional medical care. Always follow your healthcare professional's instructions.        Wearing Proper Shoes                    You walk on your feet every day, forcing them to support the weight of your body.  Repeated stress on your feet can cause damage over time. The right shoes can help protect your feet. The wrong shoes can cause more foot problems. Read the information below to help you find a shoe that fits your foot needs.     A good shoe fit will cover your foot outline.       A shoe that doesnt cover the outline is a bad fit.      Whats Your Foot Shape?  To get a good fit, you need to know the shape of your foot. Do this simple test: While standing, place your foot on a piece of paper and trace around it. Is your foot straight or curved? Do you have a foot problem, such as a bunion, that causes your foot outline to show a bulge on the side of your big toe?  Finding Your Fit  Bring your foot outline to the shore store to help you find the right shoe. Place a shoe you like on top of the outline to see if it matches the shape. The shoe should cover the outline. (If you have a bunion, the shoe may not cover the bulge on the outline. Look for soft leather shoes to stretch over the bunion.) Once youve found a pair of proper shoes, put them on. Walk around. Be sure the shoes dont rub or pinch. If the shoes feel good, youve found your fit!  The Right Shoe for You  A good shoe has features that provide comfort and support. It must also be the right size and shape for your feet. Look for a shoe made of breathable fabric and lining, such as leather or canvas. Be sure that shoes have enough tread to prevent slipping. Go to a good shoe store for help finding the right shoe.  Good Shoe Features  An ideal shoe has the following:  Laces for support. If tying laces is a problem for you, try shoes with Velcro fasteners or marcy.  A front of the shoe (toe box) with ½ inch space in front of your longest toes.  An arch shape that supports your foot.  No more than 1½ inches of heel.  A stiff, snug back of the shoe to keep your foot from sliding around.  A smooth lining with no rough seams.  Shoe Shopping Tips  Below are some  dos and donts for when you go to the shoe store.  Do:  Select the shoes that feel right. Wear them around the house. Then bring them to your foot doctor to check for fit. If they dont fit well, return them.  Shop late in the day, when your feet will be slightly bigger.  Each time you buy shoes, have both your feet measured while you are standing. Foot size changes with time.  Pick shoes to suit their purpose. High heels are okay for an occasional night on the town. But for everyday wear, choose a more sensible shoe.  Try on shoes while wearing any inserts specially made for your feet (orthoses).  Try on both the right and left shoes. If your feet are different sizes, pick a pair that fits the larger foot.  Dont:  Dont buy shoes based on shoe size alone. Always try on shoes, as sizes differ from brand to brand and within brands.  Dont expect shoes to break in. If they dont fit at the store, dont buy them.  Dont buy a shoe that doesnt match your foot shape.  What About Socks?  Always wear socks with shoes. Socks help absorb sweat and reduce friction and blistering. When shopping for shoes, choose soft, padded socks with seams that dont irritate your feet.  If You Have Foot Problems  Some foot problems cause deformities. This can make it hard to find a good fit. Look for shoes made of soft leather to stretch over the deformity. If you have bunions, buy shoes with a wider toe box. To fit hammertoes, look for shoes with a tall toe box. If you have arch problems, you may need inserts. In some cases, youll need to have custom footwear or orthoses made for your feet.  Suggested Footwear  Ask your healthcare provider what kind of footwear you need. He or she may recommend a certain brand or shoe store.  © 5607-5461 [employer]. [address] [place] [address], [place] [address]. All rights reserved. This information is not intended as a substitute for professional medical care. Always follow your  healthcare professional's instructions.        Toe Extension (Flexibility)    These instructions are for your right foot. Switch sides for your left foot.  Sit in a chair. Rest your right ankle on your left knee.  Hold your toes with your right hand. Gently bend the toes backward. Feel a stretch in the undersides of the toes and ball of the foot. Hold for 30 to 60 seconds.  Then gently bend the toes in the other direction. Gently press on them until your foot is pointed. Hold for 30 to 60 seconds.  Repeat 5 times, or as instructed.  © 4399-0268 [employer]. [address] [place] [address]. All rights reserved. This information is not intended as a substitute for professional medical care. Always follow your healthcare professional's instructions.

## 2024-11-12 NOTE — PROGRESS NOTES
Subjective:      Patient ID: Nora Mccoy is a 59 y.o. female.    Chief Complaint: Ankle Pain and Plantar Fasciitis (Left foot)    Nora Mccoy is a 59 y.o. female who presents to the clinic complaining of left foot pain, especially with rising from rest and at end of day. The pain is described as Aching, Burning, Throbbing, and Sharp. The onset of the pain was gradual and has worsened over the past several weeks. Nora Mccoy rates the pain as 5/10. she denies a history of trauma.      Patient Active Problem List   Diagnosis    Other plastic surgery for unacceptable cosmetic appearance    Knee pain    Chronic sinusitis    Deviated nasal septum    Nasal turbinate hypertrophy    Ulnar neuropathy at elbow of right upper extremity    Ankle pain    Weakness of right hip    Decreased ROM of ankle    Antalgic gait    Decreased functional activity tolerance    Hip pain    Bony exostosis    Recurrent major depressive disorder, in full remission       Current Outpatient Medications on File Prior to Visit   Medication Sig Dispense Refill    acyclovir (ZOVIRAX) 800 MG Tab Take 1 tablet (800 mg total) by mouth 2 (two) times daily. 60 tablet 5    FLUoxetine 20 MG capsule TAKE 1 CAPSULE BY MOUTH EVERY DAY WITH 40 MG OF PROZAC TO TOAL 60MG DAILY 90 capsule 3    FLUoxetine 40 MG capsule Take 1 capsule by mouth daily with 20 mg of Prozac for a total of 60 mg daily 90 capsule 3    fluticasone propionate (FLONASE) 50 mcg/actuation nasal spray 1 spray (50 mcg total) by Each Nostril route once daily. 9.9 mL 0    MIEBO, PF, 100 % Drop Place 1 drop into both eyes 4 (four) times daily.      multivitamin (THERAGRAN) tablet Take 1 tablet by mouth once daily.      prednisoLONE-moxiflox-bromfen 1-0.5-0.075 % DrpS Apply 1 drop to eye 3 (three) times daily. 5 mL 3    traZODone (DESYREL) 50 MG tablet Take 2 tablets (100 mg total) by mouth nightly as needed for Insomnia. 180 tablet 3    cetirizine (ZYRTEC) 10  MG tablet Take 1 tablet (10 mg total) by mouth once daily. 30 tablet 0    EPINEPHrine (EPIPEN 2-JAMES) 0.3 mg/0.3 mL AtIn Inject 0.3 mLs (0.3 mg total) into the muscle once. for 1 dose as needed 2 each 0    LORazepam (ATIVAN) 0.5 MG tablet Take 1 tablet (0.5 mg total) by mouth daily as needed (severe anxiety). 30 tablet 0     No current facility-administered medications on file prior to visit.       Review of patient's allergies indicates:   Allergen Reactions    Penicillin g Anaphylaxis    Penicillins Anaphylaxis    Penicillin Hives       Past Surgical History:   Procedure Laterality Date    BACK SURGERY      micro discectomy L4-L5, 2002    BREAST BIOPSY Left 2015    core bx-benign    COLONOSCOPY N/A 7/7/2016    Procedure: COLONOSCOPY;  Surgeon: Ronan Dolan MD;  Location: [facility] (4TH FLR);  Service: Endoscopy;  Laterality: N/A;    COLONOSCOPY N/A 5/5/2023    Procedure: COLONOSCOPY;  Surgeon: Josh Funez MD;  Location: [facility];  Service: Endoscopy;  Laterality: N/A;    EPIDURAL STEROID INJECTION Right 10/11/2021    Procedure: Right Hip Steroid Injection (Ref: Liuzza);  Surgeon: Jon Toth Jr., MD;  Location: [facility];  Service: Pain Management;  Laterality: Right;  Arrive @ 1200; No ATC or DM; Needs Consent    ESOPHAGOGASTRODUODENOSCOPY N/A 5/5/2023    Procedure: EGD (ESOPHAGOGASTRODUODENOSCOPY);  Surgeon: Josh Funez MD;  Location: [facility];  Service: Endoscopy;  Laterality: N/A;    ETHMOIDECTOMY N/A 4/5/2019    Procedure: ETHMOIDECTOMY;  Surgeon: Vijay Tatum MD;  Location: [facility] OR [address] [place] [address];  Service: ENT;  Laterality: N/A;    FRONTAL SINUS OBLITERATION  4/5/2019    Procedure: SINUSOTOMY, FRONTAL SINUS, OBLITERATIVE;  Surgeon: Vijay Tatum MD;  Location: [facility] OR [address] [place] [address];  Service: ENT;;    FUNCTIONAL ENDOSCOPIC SINUS SURGERY (FESS) USING COMPUTER-ASSISTED NAVIGATION Bilateral 4/5/2019    Procedure: FESS, USING COMPUTER-ASSISTED NAVIGATION;  Surgeon: Vijay Tatum MD;  Location:  [facility] OR 2ND FLR;  Service: ENT;  Laterality: Bilateral;    MAXILLARY ANTROSTOMY  4/5/2019    Procedure: MAXILLARY ANTROSTOMY;  Surgeon: Vijay Tatum MD;  Location: [facility] OR 2ND FLR;  Service: ENT;;    NASAL TURBINATE REDUCTION Bilateral 4/5/2019    Procedure: REDUCTION, NASAL TURBINATE;  Surgeon: Vijay Tatum MD;  Location: [facility] OR 2ND FLR;  Service: ENT;  Laterality: Bilateral;    PHACOEMULSIFICATION, CATARACT, WITH IOL INSERTION Right 8/28/2024    Procedure: PHACOEMULSIFICATION, CATARACT, WITH IOL INSERTION;  Surgeon: Roshni Mcgee MD;  Location: [facility] OR;  Service: Ophthalmology;  Laterality: Right;    REPAIR OF HOLE IN MACULA Right 12/5/2023    Procedure: REPAIR, HOLE, MACULA;  Surgeon: Renato Villatoro MD;  Location: [facility] OR 1ST FLR;  Service: Ophthalmology;  Laterality: Right;       Family History   Problem Relation Name Age of Onset    Lupus Mother      Other Mother          chest pain    Breast cancer Mother  72    Retinal detachment Mother      Thyroid disease Mother      Cancer Father          unk d. 77    Hypertension Sister Naina     No Known Problems Brother Gomez         1/2 bro ( Dad) unk    No Known Problems Brother Siddhartha         1/2 bro (Dad) unk    Breast cancer Maternal Aunt          d. 39    Cataracts Maternal Grandmother      No Known Problems Daughter Merlyn ray @ [facility] and completed EMT    No Known Problems Son Sonu     Amblyopia Neg Hx      Blindness Neg Hx      Diabetes Neg Hx      Glaucoma Neg Hx      Macular degeneration Neg Hx      Strabismus Neg Hx      Stroke Neg Hx      Colon cancer Neg Hx      Esophageal cancer Neg Hx         Social History     Socioeconomic History    Marital status:      Spouse name: Sonu    Number of children: 2    Years of education: 18   Occupational History    Occupation: registered nurse     Employer: [employer]     Comment: ICU   Tobacco Use    Smoking status: Never    Smokeless tobacco: Never  "  Substance and Sexual Activity    Alcohol use: Yes     Alcohol/week: 1.7 standard drinks of alcohol     Types: 2 Standard drinks or equivalent per week    Drug use: No   Other Topics Concern    Are you pregnant or think you may be? No   Social History Narrative        Spouse w/ diabetes insipidus, CML , and neuroendocrine tumor newly dx    RN- working in Neuro tele ICU    2 children- dtr and son             Social Drivers of Health     Financial Resource Strain: Low Risk  (9/19/2024)    Overall Financial Resource Strain (CARDIA)     Difficulty of Paying Living Expenses: Not hard at all   Food Insecurity: No Food Insecurity (9/19/2024)    Hunger Vital Sign     Worried About Running Out of Food in the Last Year: Never true     Ran Out of Food in the Last Year: Never true   Physical Activity: Sufficiently Active (9/19/2024)    Exercise Vital Sign     Days of Exercise per Week: 2 days     Minutes of Exercise per Session: 120 min   Stress: Stress Concern Present (9/19/2024)    [employer] [place] [employer] - Occupational Stress Questionnaire     Feeling of Stress : Rather much   Housing Stability: Unknown (9/19/2024)    Housing Stability Vital Sign     Unable to Pay for Housing in the Last Year: No       Review of Systems   Constitutional: Negative for chills and fever.   Cardiovascular:  Negative for claudication and leg swelling.   Respiratory:  Negative for cough and shortness of breath.    Skin:  Negative for itching and rash.   Musculoskeletal:  Positive for joint pain, myalgias and stiffness. Negative for falls, joint swelling and muscle weakness.   Gastrointestinal:  Negative for diarrhea, nausea and vomiting.   Neurological:  Positive for paresthesias. Negative for numbness, tremors and weakness.   Psychiatric/Behavioral:  Negative for altered mental status and hallucinations.            Objective:      Vitals:    11/05/24 0803   BP: 115/72   Weight: 74.4 kg (164 lb 0.4 oz)   Height: 5' 7" " (1.702 m)   PainSc:   5       Physical Exam  Vitals and nursing note reviewed.   Constitutional:       General: She is not in acute distress.     Appearance: She is well-developed. She is not toxic-appearing or diaphoretic.      Comments: alert and oriented x 3.    Cardiovascular:      Pulses:           Dorsalis pedis pulses are 2+ on the right side and 2+ on the left side.        Posterior tibial pulses are 2+ on the right side and 2+ on the left side.      Comments:  Capillary refill time is within normal limits. Digital hair present.   Pulmonary:      Effort: No respiratory distress.   Musculoskeletal:         General: No deformity.      Right ankle: No tenderness. No lateral malleolus, medial malleolus, AITF ligament, CF ligament or posterior TF ligament tenderness.      Right Achilles Tendon: No defects. Peters's test negative.      Left ankle: No tenderness. No lateral malleolus, medial malleolus, AITF ligament, CF ligament or posterior TF ligament tenderness.      Left Achilles Tendon: Tenderness (posterior 1/3 calcaneus at region of Achilles tendon insertion) present. No defects. Peters's test negative.      Right foot: No tenderness or bony tenderness.      Left foot: Tenderness present. No bony tenderness.      Comments: Muscle strength is 5/5 in all groups bilaterally.    Pain on palpation left medial calcaneal tubercle at origin of plantar fascia and to posterior tibial tendon course and insertion. There is equinus deformity bilateral with decreased dorsiflexion at the ankle joint bilateral. No tenderness with compression of heel. Negative tinels sign. Gait analysis reveals excessive pronation through midstance and propulsion with early heel off. Shoes reveals lateral heel counter wear bilateral     Decreased first MPJ range of motion both weightbearing and nonweightbearing, no crepitus observed the first MP joint, + dorsal flag sign.   Feet:      Right foot:      Skin integrity: Skin integrity  normal.      Left foot:      Skin integrity: Skin integrity normal.   Lymphadenopathy:      Comments: No lymphatic streaking     Skin:     General: Skin is warm and dry.      Coloration: Skin is not pale.      Findings: No rash.      Nails: There is no clubbing.      Comments: Skin is of normal turgor.   Normal temperature gradient.  Examination of the skin reveals no evidence of significant rashes, open lesions, suspicious appearing nevi or other concerning lesions.    Neurological:      Sensory: No sensory deficit.      Motor: No atrophy.      Comments: Light touch present     Psychiatric:         Attention and Perception: She is attentive.         Mood and Affect: Mood is not anxious. Affect is not inappropriate.         Speech: She is communicative. Speech is not slurred.         Behavior: Behavior is not combative.               Assessment:       Encounter Diagnoses   Name Primary?    Left foot pain Yes    Posterior tibial tendinitis of left lower extremity     Plantar fasciitis     Achilles tendinitis of left lower extremity          Plan:     Problem List Items Addressed This Visit    None  Visit Diagnoses       Left foot pain    -  Primary    Relevant Orders    X-Ray Foot Complete Left (Completed)    Posterior tibial tendinitis of left lower extremity        Relevant Orders    X-Ray Foot Complete Left (Completed)    Plantar fasciitis        Relevant Orders    X-Ray Foot Complete Left (Completed)    Achilles tendinitis of left lower extremity        Relevant Orders    X-Ray Foot Complete Left (Completed)             I counseled the patient on her conditions, their implications and medical management.      Discussed tendon pathology particularly and importance of immobilization for healing as well as the lengthy course of treatment to decrease potential deformity and arthralgia.    Discussed rest vs active means of treatment    I gave written and verbal instructions on stretching exercises. Patient expressed  understanding. Discussed icing the affected area as needed and also wearing appropriate shoe gear and avoiding flats, slippers, sandals, and going barefoot. My recommendation for OTC supports is [employer]. Patient instructed on adequate icing techniques. Patient should ice the affected area at least once per day x 10 minutes for 10 days . I advised the patient that extra icing would also be beneficial to ensure adequate anti inflammatory effect. We also discussed cortisone injections and NSAID therapy. Patient will take steroid pack first and follow with NSAID. Mobic prescribed. Patient was instructed on dosing information. Discontinue if adverse effects occur     RTC in 10-12 weeks if no improvement, at this time she will receive cortisone injections, or/ and referral to PT. Or MRI evaluation. Patient is amenable to plan.

## 2024-12-09 DIAGNOSIS — F41.9 ANXIETY: ICD-10-CM

## 2024-12-10 RX ORDER — LORAZEPAM 0.5 MG/1
0.5 TABLET ORAL DAILY PRN
Qty: 20 TABLET | Refills: 0 | Status: SHIPPED | OUTPATIENT
Start: 2024-12-10 | End: 2025-01-09

## 2024-12-10 RX ORDER — EPINEPHRINE 0.3 MG/.3ML
1 INJECTION SUBCUTANEOUS ONCE
Qty: 2 EACH | Refills: 0 | Status: SHIPPED | OUTPATIENT
Start: 2024-12-10 | End: 2024-12-14

## 2025-01-15 ENCOUNTER — OFFICE VISIT (OUTPATIENT)
Dept: OBSTETRICS AND GYNECOLOGY | Facility: CLINIC | Age: 60
End: 2025-01-15
Payer: COMMERCIAL

## 2025-01-15 VITALS
DIASTOLIC BLOOD PRESSURE: 80 MMHG | WEIGHT: 167.31 LBS | SYSTOLIC BLOOD PRESSURE: 110 MMHG | BODY MASS INDEX: 26.21 KG/M2

## 2025-01-15 DIAGNOSIS — Z01.419 ENCOUNTER FOR GYNECOLOGICAL EXAMINATION WITHOUT ABNORMAL FINDING: Primary | ICD-10-CM

## 2025-01-15 DIAGNOSIS — Z12.31 BREAST CANCER SCREENING BY MAMMOGRAM: ICD-10-CM

## 2025-01-15 PROCEDURE — 88175 CYTOPATH C/V AUTO FLUID REDO: CPT | Performed by: OBSTETRICS & GYNECOLOGY

## 2025-01-15 PROCEDURE — 1159F MED LIST DOCD IN RCRD: CPT | Mod: CPTII,S$GLB,, | Performed by: OBSTETRICS & GYNECOLOGY

## 2025-01-15 PROCEDURE — 3079F DIAST BP 80-89 MM HG: CPT | Mod: CPTII,S$GLB,, | Performed by: OBSTETRICS & GYNECOLOGY

## 2025-01-15 PROCEDURE — 99999 PR PBB SHADOW E&M-EST. PATIENT-LVL III: CPT | Mod: PBBFAC,,, | Performed by: OBSTETRICS & GYNECOLOGY

## 2025-01-15 PROCEDURE — 3008F BODY MASS INDEX DOCD: CPT | Mod: CPTII,S$GLB,, | Performed by: OBSTETRICS & GYNECOLOGY

## 2025-01-15 PROCEDURE — 3074F SYST BP LT 130 MM HG: CPT | Mod: CPTII,S$GLB,, | Performed by: OBSTETRICS & GYNECOLOGY

## 2025-01-15 PROCEDURE — 87624 HPV HI-RISK TYP POOLED RSLT: CPT | Performed by: OBSTETRICS & GYNECOLOGY

## 2025-01-15 PROCEDURE — 99386 PREV VISIT NEW AGE 40-64: CPT | Mod: S$GLB,,, | Performed by: OBSTETRICS & GYNECOLOGY

## 2025-01-15 NOTE — PROGRESS NOTES
Subjective     Patient ID: Nora Mccoy is a 59 y.o. female.    Chief Complaint:  Well Woman      History of Present Illness  HPI  Annual Exam-Postmenopausal  Patient presents for annual exam. The patient has no complaints today. The patient is sexually active. GYN screening history: last pap: was normal and last mammogram: was normal. The patient is not taking hormone replacement therapy. Patient denies post-menopausal vaginal bleeding. The patient wears seatbelts: yes. The patient participates in regular exercise: yes. Has the patient ever been transfused or tattooed?: yes. The patient reports that there is not domestic violence in her life.    GYN & OB History  Patient's last menstrual period was 02/15/2017.   Date of Last Pap: 2021    OB History    Para Term  AB Living   2 2 2     2   SAB IAB Ectopic Multiple Live Births           2      # Outcome Date GA Lbr Yunior/2nd Weight Sex Type Anes PTL Lv   2 Term      Vag-Spont   WIL   1 Term      Vag-Spont   WIL     Past Medical History:  Past Medical History:   Diagnosis Date    Bronchitis     Cataract     DJD (degenerative joint disease)     shoulder, knee, and back    Esophagitis, unspecified     Fibrocystic disease of breast     Macular hole of right eye     Menstrual disorder     Sinus infection     Stress     UTI (urinary tract infection)        Past Surgical History:  Past Surgical History:   Procedure Laterality Date    BACK SURGERY      micro discectomy L4-L5,     BREAST BIOPSY Left 2015    core bx-benign    COLONOSCOPY N/A 2016    Procedure: COLONOSCOPY;  Surgeon: Ronan Dolan MD;  Location: [address]);  Service: Endoscopy;  Laterality: N/A;    COLONOSCOPY N/A 2023    Procedure: COLONOSCOPY;  Surgeon: Josh Funez MD;  Location: [facility];  Service: Endoscopy;  Laterality: N/A;    EPIDURAL STEROID INJECTION Right 10/11/2021    Procedure: Right Hip Steroid Injection (Ref: Liuzza);  Surgeon: Jon DUPONT  Navneet Bunn MD;  Location: [facility] ENDO;  Service: Pain Management;  Laterality: Right;  Arrive @ 1200; No ATC or DM; Needs Consent    ESOPHAGOGASTRODUODENOSCOPY N/A 5/5/2023    Procedure: EGD (ESOPHAGOGASTRODUODENOSCOPY);  Surgeon: Josh Funez MD;  Location: [facility] ENDO;  Service: Endoscopy;  Laterality: N/A;    ETHMOIDECTOMY N/A 4/5/2019    Procedure: ETHMOIDECTOMY;  Surgeon: Vijay Tatum MD;  Location: [facility] OR 2ND FLR;  Service: ENT;  Laterality: N/A;    FRONTAL SINUS OBLITERATION  4/5/2019    Procedure: SINUSOTOMY, FRONTAL SINUS, OBLITERATIVE;  Surgeon: Vijay Tatum MD;  [facility];  Service: ENT;;    FUNCTIONAL ENDOSCOPIC SINUS SURGERY (FESS) USING COMPUTER-ASSISTED NAVIGATION Bilateral 4/5/2019    Procedure: FESS, USING COMPUTER-ASSISTED NAVIGATION;  Surgeon: Vijay Tatum MD;  [facility];  Service: ENT;  Laterality: Bilateral;    MAXILLARY ANTROSTOMY  4/5/2019    Procedure: MAXILLARY ANTROSTOMY;  Surgeon: Vijay Tatum MD;  [facility];  Service: ENT;;    NASAL TURBINATE REDUCTION Bilateral 4/5/2019    Procedure: REDUCTION, NASAL TURBINATE;  Surgeon: Vijay Tatum MD;  Location: [facility] OR 2ND FLR;  Service: ENT;  Laterality: Bilateral;    PHACOEMULSIFICATION, CATARACT, WITH IOL INSERTION Right 8/28/2024    Procedure: PHACOEMULSIFICATION, CATARACT, WITH IOL INSERTION;  Surgeon: Roshni Mcgee MD;  Location: [facility] OR;  Service: Ophthalmology;  Laterality: Right;    REPAIR OF HOLE IN MACULA Right 12/5/2023    Procedure: REPAIR, HOLE, MACULA;  Surgeon: Renato Villatoro MD;  Location: [facility] OR 1ST FLR;  Service: Ophthalmology;  Laterality: Right;       Family History:  Family History   Problem Relation Name Age of Onset    Lupus Mother      Other Mother          chest pain    Breast cancer Mother  72    Retinal detachment Mother      Thyroid disease Mother      Cancer Father          unk d. 77    Hypertension Sister Naina     No Known Problems  Brother Gomez         1/2 bro ( Dad) unk    No Known Problems Brother Siddhartha         1/2 bro (Dad) unk    Breast cancer Maternal Aunt          d. 39    Cataracts Maternal Grandmother      No Known Problems Aubree ray @ [facility] and completed EMT    No Known Problems Son Sonu     Amblyopia Neg Hx      Blindness Neg Hx      Diabetes Neg Hx      Glaucoma Neg Hx      Macular degeneration Neg Hx      Strabismus Neg Hx      Stroke Neg Hx      Colon cancer Neg Hx      Esophageal cancer Neg Hx         Allergies:  Review of patient's allergies indicates:   Allergen Reactions    Penicillin g Anaphylaxis    Penicillins Anaphylaxis    Penicillin Hives       Medications:  Current Outpatient Medications on File Prior to Visit   Medication Sig Dispense Refill    acyclovir (ZOVIRAX) 800 MG Tab Take 1 tablet (800 mg total) by mouth 2 (two) times daily. 60 tablet 5    FLUoxetine 20 MG capsule TAKE 1 CAPSULE BY MOUTH EVERY DAY WITH 40 MG OF PROZAC TO TOAL 60MG DAILY 90 capsule 3    FLUoxetine 40 MG capsule Take 1 capsule by mouth daily with 20 mg of Prozac for a total of 60 mg daily 90 capsule 3    fluticasone propionate (FLONASE) 50 mcg/actuation nasal spray 1 spray (50 mcg total) by Each Nostril route once daily. 9.9 mL 0    meloxicam (MOBIC) 15 MG tablet Take 1 tablet (15 mg total) by mouth once daily for the next 2 weeks with food, then as needed for pain - On completion of steroid. 30 tablet 1    MIEBO, PF, 100 % Drop Place 1 drop into both eyes 4 (four) times daily.      multivitamin (THERAGRAN) tablet Take 1 tablet by mouth once daily.      prednisoLONE-moxiflox-bromfen 1-0.5-0.075 % DrpS Apply 1 drop to eye 3 (three) times daily. 5 mL 3    traZODone (DESYREL) 50 MG tablet Take 2 tablets (100 mg total) by mouth nightly as needed for Insomnia. 180 tablet 3    cetirizine (ZYRTEC) 10 MG tablet Take 1 tablet (10 mg total) by mouth once daily. 30 tablet 0    EPINEPHrine (EPIPEN 2-JAMES) 0.3 mg/0.3 mL AtIn  Inject 0.3 mL (one syringe) into the muscle once for 1 dose as needed 2 each 0    LORazepam (ATIVAN) 0.5 MG tablet Take 1 tablet (0.5 mg total) by mouth daily as needed (severe anxiety). 20 tablet 0    methylPREDNISolone (MEDROL DOSEPACK) 4 mg tablet follow package directions (Patient not taking: Reported on 1/15/2025) 21 tablet 0     No current facility-administered medications on file prior to visit.       Social History:  Social History     Tobacco Use    Smoking status: Never     Passive exposure: Never    Smokeless tobacco: Never   Substance Use Topics    Alcohol use: Yes     Alcohol/week: 1.7 standard drinks of alcohol     Types: 2 Standard drinks or equivalent per week    Drug use: No              Review of Systems  Review of Systems   Constitutional: Negative.    HENT: Negative.     Eyes: Negative.    Respiratory: Negative.     Cardiovascular: Negative.    Gastrointestinal: Negative.    Endocrine: Negative.    Genitourinary: Negative.    Musculoskeletal: Negative.    Integumentary:  Negative.   Neurological: Negative.    Hematological: Negative.    Psychiatric/Behavioral: Negative.     Breast: negative.           Objective   Physical Exam:   Constitutional: She is oriented to person, place, and time. She appears well-nourished.    HENT:   Head: Normocephalic and atraumatic.    Eyes: EOM are normal. Right eye exhibits normal extraocular motion. Left eye exhibits normal extraocular motion.    Neck: No thyromegaly present.    Cardiovascular:  Normal rate.             Pulmonary/Chest: Effort normal. No respiratory distress. Right breast exhibits no mass, no skin change and no tenderness. Left breast exhibits no mass, no skin change and no tenderness. Breasts are symmetrical.        Abdominal: Soft. She exhibits no distension and no mass. There is no abdominal tenderness.     Genitourinary:    Vagina, uterus, right adnexa and left adnexa normal.      Pelvic exam was performed with patient supine.   The external  female genitalia was normal.   No external genitalia lesions identified,Genitalia hair distrobution normal .     Labial bartholins normal.There is no rash or lesion on the right labia. There is no rash or lesion on the left labia. Cervix is normal. Right adnexum displays no tenderness and no fullness. Left adnexum displays no tenderness and no fullness. No vaginal discharge or bleeding in the vagina.    No signs of injury in the vagina.   Vagina was moist.Cervix exhibits no motion tenderness and no friability. Uterus consistancy normal and Uerus contour normal  Uterus is not tender. Normal urethral meatus.Urethral Meatus exhibits: no urethral lesionUrethra findings: no urethral mass, no tenderness and no prolapsedBladder findings: no bladder distention and no bladder tenderness          Musculoskeletal: Normal range of motion.      Lymphadenopathy:     She has no cervical adenopathy.    Neurological: She is oriented to person, place, and time.   Cranial Nerves II-XII grossly intact.    Skin: No rash noted. No erythema.    Psychiatric: She has a normal mood and affect. Her behavior is normal.            Assessment and Plan     1. Encounter for gynecological examination without abnormal finding    2. Breast cancer screening by mammogram             Plan:  1. Encounter for gynecological examination without abnormal finding (Primary)  - Pap and HPV done today.  -   Screening tests as ordered.  - Diet and exercise encouraged.    Counseling: injury prevention: Driving under the influence of alcohol  Seatbelts  Perimenopause/Menopause  Stress management techniques  indications for and frequency of periodic gynecologic exam  reviewed current Pap guidelines. Explained new understanding of natural history of cervical disease and improved Paps. Recommended guideline concordant care.  - Liquid-Based Pap Smear, Screening  - HPV High Risk Genotypes, PCR    2. Breast cancer screening by mammogram  - Self breast exams encouraged  -  Mammo Digital Screening Bilat w/ Daniel; Future

## 2025-01-18 ENCOUNTER — PATIENT MESSAGE (OUTPATIENT)
Dept: DERMATOLOGY | Facility: CLINIC | Age: 60
End: 2025-01-18
Payer: COMMERCIAL

## 2025-01-25 LAB
FINAL PATHOLOGIC DIAGNOSIS: NORMAL
Lab: NORMAL

## 2025-02-03 ENCOUNTER — HOSPITAL ENCOUNTER (OUTPATIENT)
Dept: RADIOLOGY | Facility: HOSPITAL | Age: 60
Discharge: HOME | End: 2025-02-03
Attending: OBSTETRICS & GYNECOLOGY
Payer: COMMERCIAL

## 2025-02-03 DIAGNOSIS — Z12.31 BREAST CANCER SCREENING BY MAMMOGRAM: ICD-10-CM

## 2025-02-03 PROCEDURE — 77067 SCR MAMMO BI INCL CAD: CPT | Mod: 26,,, | Performed by: RADIOLOGY

## 2025-02-03 PROCEDURE — 77067 SCR MAMMO BI INCL CAD: CPT | Mod: TC

## 2025-02-03 PROCEDURE — 77063 BREAST TOMOSYNTHESIS BI: CPT | Mod: 26,,, | Performed by: RADIOLOGY

## 2025-02-05 ENCOUNTER — PATIENT MESSAGE (OUTPATIENT)
Dept: OBSTETRICS AND GYNECOLOGY | Facility: CLINIC | Age: 60
End: 2025-02-05
Payer: COMMERCIAL

## 2025-03-16 ENCOUNTER — PATIENT MESSAGE (OUTPATIENT)
Dept: DERMATOLOGY | Facility: CLINIC | Age: 60
End: 2025-03-16
Payer: COMMERCIAL

## 2025-04-30 ENCOUNTER — CLINICAL SUPPORT (OUTPATIENT)
Dept: OTHER | Facility: CLINIC | Age: 60
End: 2025-04-30

## 2025-04-30 DIAGNOSIS — Z00.8 ENCOUNTER FOR OTHER GENERAL EXAMINATION: ICD-10-CM

## 2025-05-01 VITALS
SYSTOLIC BLOOD PRESSURE: 112 MMHG | HEIGHT: 67 IN | WEIGHT: 157 LBS | DIASTOLIC BLOOD PRESSURE: 72 MMHG | BODY MASS INDEX: 24.64 KG/M2

## 2025-05-01 LAB
HDLC SERPL-MCNC: 61 MG/DL
POC CHOLESTEROL, LDL (DOCK): 137 MG/DL
POC CHOLESTEROL, TOTAL: 208 MG/DL
POC GLUCOSE, FASTING: 93 MG/DL (ref 60–110)
TRIGL SERPL-MCNC: 57 MG/DL

## 2025-06-05 ENCOUNTER — PROCEDURE VISIT (OUTPATIENT)
Dept: DERMATOLOGY | Facility: CLINIC | Age: 60
End: 2025-06-05
Payer: COMMERCIAL

## 2025-06-05 DIAGNOSIS — Z41.1 ELECTIVE PROCEDURE FOR UNACCEPTABLE COSMETIC APPEARANCE: Primary | ICD-10-CM

## 2025-06-05 PROCEDURE — 99499 UNLISTED E&M SERVICE: CPT | Mod: ,,, | Performed by: DERMATOLOGY

## 2025-06-16 ENCOUNTER — LAB VISIT (OUTPATIENT)
Dept: LAB | Facility: HOSPITAL | Age: 60
End: 2025-06-16
Payer: COMMERCIAL

## 2025-06-16 ENCOUNTER — OFFICE VISIT (OUTPATIENT)
Dept: INTERNAL MEDICINE | Facility: CLINIC | Age: 60
End: 2025-06-16
Payer: COMMERCIAL

## 2025-06-16 VITALS
OXYGEN SATURATION: 98 % | SYSTOLIC BLOOD PRESSURE: 108 MMHG | BODY MASS INDEX: 25.67 KG/M2 | WEIGHT: 163.56 LBS | TEMPERATURE: 97 F | DIASTOLIC BLOOD PRESSURE: 76 MMHG | HEART RATE: 74 BPM | HEIGHT: 67 IN | RESPIRATION RATE: 18 BRPM

## 2025-06-16 DIAGNOSIS — Z00.00 ENCOUNTER FOR ANNUAL HEALTH EXAMINATION: ICD-10-CM

## 2025-06-16 DIAGNOSIS — Z00.00 ENCOUNTER FOR ANNUAL HEALTH EXAMINATION: Primary | ICD-10-CM

## 2025-06-16 DIAGNOSIS — F33.42 RECURRENT MAJOR DEPRESSIVE DISORDER, IN FULL REMISSION: ICD-10-CM

## 2025-06-16 DIAGNOSIS — R51.9 CHRONIC NONINTRACTABLE HEADACHE, UNSPECIFIED HEADACHE TYPE: ICD-10-CM

## 2025-06-16 DIAGNOSIS — G89.29 CHRONIC NONINTRACTABLE HEADACHE, UNSPECIFIED HEADACHE TYPE: ICD-10-CM

## 2025-06-16 DIAGNOSIS — J32.1 CHRONIC FRONTAL SINUSITIS: ICD-10-CM

## 2025-06-16 LAB
25(OH)D3+25(OH)D2 SERPL-MCNC: 62 NG/ML (ref 30–96)
ABSOLUTE EOSINOPHIL (OHS): 0.27 K/UL
ABSOLUTE MONOCYTE (OHS): 0.33 K/UL (ref 0.3–1)
ABSOLUTE NEUTROPHIL COUNT (OHS): 1.89 K/UL (ref 1.8–7.7)
ALBUMIN SERPL BCP-MCNC: 4.3 G/DL (ref 3.5–5.2)
ALP SERPL-CCNC: 66 UNIT/L (ref 40–150)
ALT SERPL W/O P-5'-P-CCNC: 14 UNIT/L (ref 10–44)
ANION GAP (OHS): 9 MMOL/L (ref 8–16)
AST SERPL-CCNC: 21 UNIT/L (ref 11–45)
BASOPHILS # BLD AUTO: 0.06 K/UL
BASOPHILS NFR BLD AUTO: 1.5 %
BILIRUB SERPL-MCNC: 0.8 MG/DL (ref 0.1–1)
BUN SERPL-MCNC: 13 MG/DL (ref 6–20)
CALCIUM SERPL-MCNC: 9.1 MG/DL (ref 8.7–10.5)
CHLORIDE SERPL-SCNC: 104 MMOL/L (ref 95–110)
CHOLEST SERPL-MCNC: 206 MG/DL (ref 120–199)
CHOLEST/HDLC SERPL: 3.3 {RATIO} (ref 2–5)
CO2 SERPL-SCNC: 27 MMOL/L (ref 23–29)
CREAT SERPL-MCNC: 0.9 MG/DL (ref 0.5–1.4)
ERYTHROCYTE [DISTWIDTH] IN BLOOD BY AUTOMATED COUNT: 13.6 % (ref 11.5–14.5)
FERRITIN SERPL-MCNC: 157 NG/ML (ref 20–300)
GFR SERPLBLD CREATININE-BSD FMLA CKD-EPI: >60 ML/MIN/1.73/M2
GLUCOSE SERPL-MCNC: 90 MG/DL (ref 70–110)
HCT VFR BLD AUTO: 40.3 % (ref 37–48.5)
HDLC SERPL-MCNC: 63 MG/DL (ref 40–75)
HDLC SERPL: 30.6 % (ref 20–50)
HGB BLD-MCNC: 12.8 GM/DL (ref 12–16)
IMM GRANULOCYTES # BLD AUTO: 0 K/UL (ref 0–0.04)
IMM GRANULOCYTES NFR BLD AUTO: 0 % (ref 0–0.5)
IRON SATN MFR SERPL: 23 % (ref 20–50)
IRON SERPL-MCNC: 80 UG/DL (ref 30–160)
LDLC SERPL CALC-MCNC: 132.6 MG/DL (ref 63–159)
LYMPHOCYTES # BLD AUTO: 1.44 K/UL (ref 1–4.8)
MCH RBC QN AUTO: 29.4 PG (ref 27–31)
MCHC RBC AUTO-ENTMCNC: 31.8 G/DL (ref 32–36)
MCV RBC AUTO: 93 FL (ref 82–98)
NONHDLC SERPL-MCNC: 143 MG/DL
NUCLEATED RBC (/100WBC) (OHS): 0 /100 WBC
PLATELET # BLD AUTO: 282 K/UL (ref 150–450)
PMV BLD AUTO: 10.8 FL (ref 9.2–12.9)
POTASSIUM SERPL-SCNC: 4.3 MMOL/L (ref 3.5–5.1)
PROT SERPL-MCNC: 6.8 GM/DL (ref 6–8.4)
RBC # BLD AUTO: 4.35 M/UL (ref 4–5.4)
RELATIVE EOSINOPHIL (OHS): 6.8 %
RELATIVE LYMPHOCYTE (OHS): 36.1 % (ref 18–48)
RELATIVE MONOCYTE (OHS): 8.3 % (ref 4–15)
RELATIVE NEUTROPHIL (OHS): 47.3 % (ref 38–73)
SODIUM SERPL-SCNC: 140 MMOL/L (ref 136–145)
TIBC SERPL-MCNC: 346 UG/DL (ref 250–450)
TRANSFERRIN SERPL-MCNC: 234 MG/DL (ref 200–375)
TRIGL SERPL-MCNC: 52 MG/DL (ref 30–150)
TSH SERPL-ACNC: 1.17 UIU/ML (ref 0.4–4)
WBC # BLD AUTO: 3.99 K/UL (ref 3.9–12.7)

## 2025-06-16 PROCEDURE — 82947 ASSAY GLUCOSE BLOOD QUANT: CPT

## 2025-06-16 PROCEDURE — 82728 ASSAY OF FERRITIN: CPT

## 2025-06-16 PROCEDURE — 36415 COLL VENOUS BLD VENIPUNCTURE: CPT | Mod: PO

## 2025-06-16 PROCEDURE — 1159F MED LIST DOCD IN RCRD: CPT | Mod: CPTII,S$GLB,, | Performed by: NURSE PRACTITIONER

## 2025-06-16 PROCEDURE — 3078F DIAST BP <80 MM HG: CPT | Mod: CPTII,S$GLB,, | Performed by: NURSE PRACTITIONER

## 2025-06-16 PROCEDURE — 1160F RVW MEDS BY RX/DR IN RCRD: CPT | Mod: CPTII,S$GLB,, | Performed by: NURSE PRACTITIONER

## 2025-06-16 PROCEDURE — 85025 COMPLETE CBC W/AUTO DIFF WBC: CPT

## 2025-06-16 PROCEDURE — 3008F BODY MASS INDEX DOCD: CPT | Mod: CPTII,S$GLB,, | Performed by: NURSE PRACTITIONER

## 2025-06-16 PROCEDURE — 84443 ASSAY THYROID STIM HORMONE: CPT

## 2025-06-16 PROCEDURE — 82306 VITAMIN D 25 HYDROXY: CPT

## 2025-06-16 PROCEDURE — 83718 ASSAY OF LIPOPROTEIN: CPT

## 2025-06-16 PROCEDURE — 99396 PREV VISIT EST AGE 40-64: CPT | Mod: S$GLB,,, | Performed by: NURSE PRACTITIONER

## 2025-06-16 PROCEDURE — 3074F SYST BP LT 130 MM HG: CPT | Mod: CPTII,S$GLB,, | Performed by: NURSE PRACTITIONER

## 2025-06-16 PROCEDURE — 99999 PR PBB SHADOW E&M-EST. PATIENT-LVL V: CPT | Mod: PBBFAC,,, | Performed by: NURSE PRACTITIONER

## 2025-06-16 PROCEDURE — 84466 ASSAY OF TRANSFERRIN: CPT

## 2025-06-16 NOTE — PROGRESS NOTES
Subjective:       Patient ID: Nora Mccoy is a 60 y.o. female.    Chief Complaint: Annual Wellness Visit and headache.     Nora Mccoy is a 60 y.o. female who presents today for an annual wellness visit with c/o headache.   Symptoms began about a few months ago. Generally, the headaches last for the duration of the morning and occur upon waking from night sleep and 2-3 times per week. The headaches are usually worse first thing in the morning. The headaches are usually moderate and pressure in quality and are located in her face and forehead.  The patient rates her most severe headaches a 8 on a scale from 1 to 10. Recently, the headaches have been increasing in frequency. Work attendance or other daily activities are not affected by the headaches. Precipitating factors include: none which have been determined. The headaches are usually not preceded by an aura. Associated neurologic symptoms: decreased physical activity. The patient denies dizziness, loss of balance, muscle weakness, numbness of extremities, speech difficulties, vision problems, and vomiting in the early morning. Home treatment has included zyrtec or claritin OTC upon start of headache in the morning with some improvement. Other history includes: frequent episodes of sinusitis, snoring, daytime fatigue and dry mouth. Family history includes no known family members with significant headaches.     Review of patient's allergies indicates:   Allergen Reactions    Penicillin g Anaphylaxis    Penicillins Anaphylaxis    Penicillin Hives      Medication List with Changes/Refills   Current Medications    ACYCLOVIR (ZOVIRAX) 800 MG TAB    Take 1 tablet (800 mg total) by mouth 2 (two) times daily.    CETIRIZINE (ZYRTEC) 10 MG TABLET    Take 1 tablet (10 mg total) by mouth once daily.    EPINEPHRINE (EPIPEN 2-JAMES) 0.3 MG/0.3 ML ATIN    Inject 0.3 mL (one syringe) into the muscle once for 1 dose as needed    FLUOXETINE 20 MG CAPSULE     TAKE 1 CAPSULE BY MOUTH EVERY DAY WITH 40 MG OF PROZAC TO TOAL 60MG DAILY    FLUOXETINE 40 MG CAPSULE    Take 1 capsule by mouth daily with 20 mg of Prozac for a total of 60 mg daily    FLUTICASONE PROPIONATE (FLONASE) 50 MCG/ACTUATION NASAL SPRAY    1 spray (50 mcg total) by Each Nostril route once daily.    LORAZEPAM (ATIVAN) 0.5 MG TABLET    Take 1 tablet (0.5 mg total) by mouth daily as needed (severe anxiety).    MELOXICAM (MOBIC) 15 MG TABLET    Take 1 tablet (15 mg total) by mouth once daily for the next 2 weeks with food, then as needed for pain - On completion of steroid.    METHYLPREDNISOLONE (MEDROL DOSEPACK) 4 MG TABLET    follow package directions    TAMMY IBRAHIM, 100 % DROP    Place 1 drop into both eyes 4 (four) times daily.    MULTIVITAMIN (THERAGRAN) TABLET    Take 1 tablet by mouth once daily.    PREDNISOLONE-MOXIFLOX-BROMFEN 1-0.5-0.075 % DRPS    Apply 1 drop to eye 3 (three) times daily.    TRAZODONE (DESYREL) 50 MG TABLET    Take 2 tablets (100 mg total) by mouth nightly as needed for Insomnia.       Health Maintenance         Date Due Completion Date    Shingles Vaccine (1 of 2) Never done ---    Pneumococcal Vaccines (Age 50+) (1 of 1 - PCV) Never done ---    COVID-19 Vaccine (5 - 2024-25 season) 09/01/2024 9/28/2023    Mammogram 02/03/2026 2/3/2025    DEXA Scan 06/06/2026 6/6/2023    Hemoglobin A1c (Diabetic Prevention Screening) 04/17/2027 4/17/2024    Cervical Cancer Screening 01/15/2030 1/15/2025    Lipid Panel 04/30/2030 4/30/2025    Colorectal Cancer Screening 05/05/2033 5/5/2023    TETANUS VACCINE 04/17/2034 4/17/2024    RSV Vaccine (Age 60+ and Pregnant patients) (1 - 1-dose 75+ series) 01/20/2040 ---          Patient ambulates on her own without an assistive device.   Does her have issues with balance, falls or walking?  No    On average, how many days per week do you do moderate to strenuous exercise:  (like a brisk walk or jog; this does not include your job/work)  3-5   On average,  how many minutes do you exercise at this level each day? 30  We encourage you to start exercising if you do not already, continue at your current level or increase your level of activity.     Do you eat fruits and vegetable every day?  Yes    Do you still have a menstrual cycle? No       If not,  menopause  For post-menopausal women:  Are you taking a daily supplement with both Vitamin D and Calcium? Vit D only  Do you have any issues with leaking of urine? No    Do you go to the eye doctor regularly? Yes  When was your last exam: 10/18/2024  Cataracts surgery to right eye, tolerated well.     Do you have any personal or family history of breast/ovarian/colon cancer?  Yes       If yes, who? Mother and maternal aunt - breast cancer   Do you have any personal or family history of heart disease? Yes       If yes, who? Sister - hypertension     Have you often been bothered by feeling down, depressed, or hopeless?  Not at all. Takes Prozac 60mg, tolerating well.   Have you often been bothered by having little interest or pleasure in doing things?  Not at all    How often do you drink alcohol?  Very rarely  How often have you used prescribed controlled substances in the past year?  never    Review of Systems   Constitutional:  Positive for fatigue. Negative for chills and fever.   HENT:  Positive for mouth dryness and sinus pressure/congestion. Negative for nasal congestion and ear pain.         Snoring   Eyes:  Negative for photophobia, pain and visual disturbance.   Respiratory:  Negative for cough and shortness of breath.    Cardiovascular:  Negative for chest pain and leg swelling.   Gastrointestinal:  Negative for abdominal pain, change in bowel habit, nausea and vomiting.   Genitourinary:  Negative for bladder incontinence, difficulty urinating and dysuria.   Musculoskeletal:  Positive for arthralgias. Negative for joint swelling.   Integumentary:  Negative for rash.   Neurological:  Positive for headaches. Negative  "for dizziness, syncope, speech difficulty, weakness, light-headedness and numbness.   Psychiatric/Behavioral:  Negative for confusion and depressed mood.       Objective:     /76 (BP Location: Left arm, Patient Position: Sitting)   Pulse 74   Temp 97 °F (36.1 °C) (Temporal)   Resp 18   Ht 5' 7" (1.702 m)   Wt 74.2 kg (163 lb 9.3 oz)   LMP 02/15/2017   SpO2 98%   BMI 25.62 kg/m²     Physical Exam  Vitals reviewed.   Constitutional:       Appearance: Normal appearance.   HENT:      Head: Normocephalic.      Right Ear: Tympanic membrane is bulging.      Left Ear: Tympanic membrane is bulging.      Nose:      Right Sinus: No maxillary sinus tenderness or frontal sinus tenderness.      Left Sinus: No maxillary sinus tenderness or frontal sinus tenderness.      Mouth/Throat:      Pharynx: Postnasal drip present.      Comments: Mallampati Score 1  Eyes:      Extraocular Movements: Extraocular movements intact.      Pupils: Pupils are equal, round, and reactive to light.   Neck:      Comments: 14" neck circumference  Cardiovascular:      Rate and Rhythm: Normal rate.      Heart sounds: No murmur heard.  Pulmonary:      Effort: Pulmonary effort is normal.      Breath sounds: Normal breath sounds.   Abdominal:      Palpations: Abdomen is soft.      Tenderness: There is no abdominal tenderness.   Musculoskeletal:         General: Normal range of motion.      Cervical back: Normal range of motion.   Lymphadenopathy:      Cervical: No cervical adenopathy.   Neurological:      General: No focal deficit present.      Mental Status: She is alert and oriented to person, place, and time.      Motor: No weakness.   Psychiatric:         Mood and Affect: Mood normal.         Speech: Speech normal.       06/16/25 108/76   04/30/25 112/72   01/15/25 110/80     Wt Readings from Last 3 Encounters:   06/16/25 74.2 kg (163 lb 9.3 oz)   04/30/25 71.2 kg (157 lb)   01/15/25 75.9 kg (167 lb 5.3 oz)       I reviewed and " independently interpreted the labs and imaging below:  Last Labs:  Glucose   Date Value Ref Range Status   04/17/2024 88 70 - 110 mg/dL Final   04/03/2023 92 70 - 110 mg/dL Final     BUN   Date Value Ref Range Status   04/17/2024 13 6 - 20 mg/dL Final   04/03/2023 17 6 - 20 mg/dL Final     Creatinine   Date Value Ref Range Status   04/17/2024 0.8 0.5 - 1.4 mg/dL Final   05/19/2023 0.8 0.5 - 1.4 mg/dL Final     Sodium   Date Value Ref Range Status   04/17/2024 142 136 - 145 mmol/L Final     Potassium   Date Value Ref Range Status   04/17/2024 4.3 3.5 - 5.1 mmol/L Final     AST   Date Value Ref Range Status   04/17/2024 19 10 - 40 U/L Final     ALT   Date Value Ref Range Status   04/17/2024 13 10 - 44 U/L Final     eGFR if    Date Value Ref Range Status   05/04/2021 >60.0 >60 mL/min/1.73 m^2 Final     eGFR if non    Date Value Ref Range Status   05/04/2021 >60.0 >60 mL/min/1.73 m^2 Final     Comment:     Calculation used to obtain the estimated glomerular filtration  rate (eGFR) is the CKD-EPI equation.        Cholesterol   Date Value Ref Range Status   04/17/2024 197 120 - 199 mg/dL Final     Comment:     The National Cholesterol Education Program (NCEP) has set the  following guidelines (reference ranges) for Cholesterol:  Optimal.....................<200 mg/dL  Borderline High.............200-239 mg/dL  High........................> or = 240 mg/dL     04/03/2023 195 120 - 199 mg/dL Final     Comment:     The National Cholesterol Education Program (NCEP) has set the  following guidelines (reference ranges) for Cholesterol:  Optimal.....................<200 mg/dL  Borderline High.............200-239 mg/dL  High........................> or = 240 mg/dL       Hemoglobin A1C   Date Value Ref Range Status   04/17/2024 5.4 4.0 - 5.6 % Final     Comment:     ADA Screening Guidelines:  5.7-6.4%  Consistent with prediabetes  >or=6.5%  Consistent with diabetes    High levels of fetal hemoglobin  interfere with the HbA1C  assay. Heterozygous hemoglobin variants (HbS, HgC, etc)do  not significantly interfere with this assay.   However, presence of multiple variants may affect accuracy.     04/03/2023 5.3 4.0 - 5.6 % Final     Comment:     ADA Screening Guidelines:  5.7-6.4%  Consistent with prediabetes  >or=6.5%  Consistent with diabetes    High levels of fetal hemoglobin interfere with the HbA1C  assay. Heterozygous hemoglobin variants (HbS, HgC, etc)do  not significantly interfere with this assay.   However, presence of multiple variants may affect accuracy.       Lab Results   Component Value Date    WBC 4.58 04/17/2024    HGB 13.7 04/17/2024    HCT 42.8 04/17/2024     04/17/2024     Lab Results   Component Value Date    TSH 0.986 04/17/2024       Assessment and Plan:     1. Encounter for annual health examination  Pt requesting annual blood work.   Iron levels were on the lower end of normal last year, will order repeat iron and ferritin.   Pt currently taking vit D, will follow up on levels.   - CBC Auto Differential; Future  - Comprehensive Metabolic Panel; Future  - Lipid Panel; Future  - TSH; Future  - Iron and TIBC; Future  - Ferritin; Future  - Vitamin D; Future  - Patient Counseling:  --Nutrition: Discussed the importance of moderate caffeine intake. Adhering to a low sodium, low saturated fat/low cholesterol diet.   --Exercise: Discussed the importance of regular exercise. At least 30 minutes 5 days per week.   --Immunizations reviewed, pt refused shingles and pneumonia vaccine at this time.   --Discussed benefits of maintaining up to date health maintenance.Preventative screenings reviewed and up to date.   -- Encouraged good sleep hygiene.      2. Recurrent major depressive disorder, in full remission    Chronic, stable, continue current medications, follow up with Psychiatry   Currently taking Prozac 60mg daily, Ativan 0.5mg PRN for anxiety, and Trazodone 50mg nightly for sleep.   -  Tolerating well, continue as prescribed. No rx refills needed at this time.    3. Chronic nonintractable headache, unspecified headache type    Headaches r/t sleep apnea VS chronic sinusitis     Pt has been told by  that she snores in her sleep. Pt endorses dry mouth upon waking, headaches upon waking, and daytime tiredness.   Will proceed with referral to evaluate for MEHRDAD.  - Ambulatory referral/consult to Sleep Disorders; Future    4. Chronic frontal sinusitis  Pt neurological exam intact. Denies facial sinus pain upon palpation.   - Educated on importance of hydration and diet.   - Lie in darkened room and apply cold packs as needed for pain.  - Prophylactic therapy: scheduled Zyrtec or Claritin OTC once nightly at bedtime for 10-14 days and Flonase nasal spray once in the morning due to high frequency of pain.  - Patient reassured that neurodiagnostic workup not indicated from benign H&P.     ALY Moses, RN, Nurse Practitioner Student     As the co-signing provider I have reviewed the students documentation and am responsible for the treatment, assessment, and plan.   Cady Soliz NP

## 2025-06-25 ENCOUNTER — RESULTS FOLLOW-UP (OUTPATIENT)
Dept: INTERNAL MEDICINE | Facility: CLINIC | Age: 60
End: 2025-06-25

## 2025-07-14 RX ORDER — ACYCLOVIR 800 MG/1
800 TABLET ORAL 2 TIMES DAILY
Qty: 60 TABLET | Refills: 5 | Status: SHIPPED | OUTPATIENT
Start: 2025-07-14

## 2025-07-14 NOTE — TELEPHONE ENCOUNTER
Refill Routing Note   Medication(s) are not appropriate for processing by [facility] for the following reason(s):        Non-participating provider    [facility] action(s):  Route             Appointments  past 12m or future 3m with PCP    Date Provider   Last Visit   4/3/2023 Zamzam Bazan MD   Next Visit   Visit date not found Zamzam Bazan MD   ED visits in past 90 days: 0        Note composed:11:23 AM 07/14/2025

## (undated) DEVICE — SOL BALANCED SALT 500ML

## (undated) DEVICE — GLOVE BIOGEL ECLIPSE SZ 6.5

## (undated) DEVICE — SOL GONAK

## (undated) DEVICE — SOL BETADINE 5%

## (undated) DEVICE — SEE MEDLINE ITEM 152622

## (undated) DEVICE — WARMER DRAPE STERILE LF

## (undated) DEVICE — DRESSING TRANS 2X2 TEGADERM

## (undated) DEVICE — GOWN SURGICAL X-LARGE

## (undated) DEVICE — TRACKER ENT INSTRUMENT

## (undated) DEVICE — PACK AUTO GAS FILL

## (undated) DEVICE — DRAPE THREE-QTR REINF 53X77IN

## (undated) DEVICE — CONTAINER SPECIMEN STRL 4OZ

## (undated) DEVICE — PAD EYE OVAL CNTOUR 1.62X2.62

## (undated) DEVICE — BLADE INFERIOR TURBINATE 5/PK

## (undated) DEVICE — KIT GREY EYE

## (undated) DEVICE — NDL HYPO A BEVEL 30X1/2

## (undated) DEVICE — DRESSING SURGICAL 1X3

## (undated) DEVICE — COVER MAYO STAND REINFRCD 30

## (undated) DEVICE — DRESSING TELFA STRL 4X3 LF

## (undated) DEVICE — CANNULA 25GA SOFT TIP

## (undated) DEVICE — CANNULA OPTHALMIC SOF TIP 25G

## (undated) DEVICE — HOLDER TUBE

## (undated) DEVICE — SYR LUER LOCK 1CC

## (undated) DEVICE — SOL BSS BALANCED SALT

## (undated) DEVICE — CATH IV INTROCAN 14G X 2.

## (undated) DEVICE — DRAPE OPHTHALMIC 48X62 FEN

## (undated) DEVICE — SPONGE PATTY SURGICAL .5X3IN

## (undated) DEVICE — LENS VITRCTMY OPHTH 30DEG 59DE

## (undated) DEVICE — SEE MEDLINE ITEM 156913

## (undated) DEVICE — PACK CONSTELLATION VITRCTMY 25

## (undated) DEVICE — DUOVISC

## (undated) DEVICE — DRAPE EYE

## (undated) DEVICE — FORCEP GRASPING 25GA SMOOTH

## (undated) DEVICE — SEE MEDLINE ITEM 157144

## (undated) DEVICE — SYR 50CC LL

## (undated) DEVICE — SUT PLAIN 4-0 SC-1 18IN

## (undated) DEVICE — SYR 10CC LUER LOCK

## (undated) DEVICE — SOL WATER STRL IRR 1000ML

## (undated) DEVICE — COVER PROXIMA MAYO STAND

## (undated) DEVICE — ELECTRODE REM PLYHSV RETURN 9

## (undated) DEVICE — DRESSING EYE OVAL LF

## (undated) DEVICE — BLADE SCALP OPHTL RND TIP

## (undated) DEVICE — SHIELD EYE ALUM FOX W/ CLOTH

## (undated) DEVICE — Device

## (undated) DEVICE — TRACKER PATIENT NON INVASIVE

## (undated) DEVICE — BLADE TRICUT

## (undated) DEVICE — CANNULA ANTERIOR CHAMBER .80MM

## (undated) DEVICE — SUT 4-0 CHROMIC GUT / RB1

## (undated) DEVICE — TRAY MUSCLE LID EYE